# Patient Record
Sex: MALE | Race: WHITE | NOT HISPANIC OR LATINO
[De-identification: names, ages, dates, MRNs, and addresses within clinical notes are randomized per-mention and may not be internally consistent; named-entity substitution may affect disease eponyms.]

---

## 2017-09-06 ENCOUNTER — APPOINTMENT (OUTPATIENT)
Dept: GASTROENTEROLOGY | Facility: CLINIC | Age: 32
End: 2017-09-06
Payer: COMMERCIAL

## 2017-09-06 VITALS
RESPIRATION RATE: 14 BRPM | TEMPERATURE: 98.1 F | HEART RATE: 71 BPM | OXYGEN SATURATION: 98 % | HEIGHT: 67 IN | DIASTOLIC BLOOD PRESSURE: 81 MMHG | WEIGHT: 135 LBS | BODY MASS INDEX: 21.19 KG/M2 | SYSTOLIC BLOOD PRESSURE: 117 MMHG

## 2017-09-06 DIAGNOSIS — Z78.9 OTHER SPECIFIED HEALTH STATUS: ICD-10-CM

## 2017-09-06 DIAGNOSIS — K51.50 LEFT SIDED COLITIS W/OUT COMPLICATIONS: ICD-10-CM

## 2017-09-06 PROCEDURE — 36415 COLL VENOUS BLD VENIPUNCTURE: CPT | Mod: GC

## 2017-09-06 PROCEDURE — 99214 OFFICE O/P EST MOD 30 MIN: CPT | Mod: 25,GC

## 2017-09-07 LAB
BASOPHILS # BLD AUTO: 0.02 K/UL
BASOPHILS NFR BLD AUTO: 0.4 %
EOSINOPHIL # BLD AUTO: 0.1 K/UL
EOSINOPHIL NFR BLD AUTO: 1.9 %
HCT VFR BLD CALC: 45.9 %
HGB BLD-MCNC: 15 G/DL
IMM GRANULOCYTES NFR BLD AUTO: 0 %
LYMPHOCYTES # BLD AUTO: 1.29 K/UL
LYMPHOCYTES NFR BLD AUTO: 23.9 %
MAN DIFF?: NORMAL
MCHC RBC-ENTMCNC: 30.2 PG
MCHC RBC-ENTMCNC: 32.7 GM/DL
MCV RBC AUTO: 92.5 FL
MONOCYTES # BLD AUTO: 0.31 K/UL
MONOCYTES NFR BLD AUTO: 5.7 %
NEUTROPHILS # BLD AUTO: 3.68 K/UL
NEUTROPHILS NFR BLD AUTO: 68.1 %
PLATELET # BLD AUTO: 311 K/UL
RBC # BLD: 4.96 M/UL
RBC # FLD: 13.7 %
WBC # FLD AUTO: 5.4 K/UL

## 2017-09-08 LAB
ALBUMIN SERPL ELPH-MCNC: 4.7 G/DL
ALP BLD-CCNC: 54 U/L
ALT SERPL-CCNC: 16 U/L
ANION GAP SERPL CALC-SCNC: 19 MMOL/L
AST SERPL-CCNC: 38 U/L
BILIRUB SERPL-MCNC: 0.5 MG/DL
BUN SERPL-MCNC: 18 MG/DL
CALCIUM SERPL-MCNC: 9.9 MG/DL
CHLORIDE SERPL-SCNC: 100 MMOL/L
CO2 SERPL-SCNC: 19 MMOL/L
CREAT SERPL-MCNC: 0.91 MG/DL
CRP SERPL-MCNC: <0.2 MG/DL
GLUCOSE SERPL-MCNC: 84 MG/DL
POTASSIUM SERPL-SCNC: 4.4 MMOL/L
PROT SERPL-MCNC: 8.7 G/DL
SODIUM SERPL-SCNC: 138 MMOL/L

## 2017-09-26 ENCOUNTER — OUTPATIENT (OUTPATIENT)
Dept: OUTPATIENT SERVICES | Facility: HOSPITAL | Age: 32
LOS: 1 days | Discharge: HOME | End: 2017-09-26
Payer: COMMERCIAL

## 2017-09-26 ENCOUNTER — APPOINTMENT (OUTPATIENT)
Dept: GASTROENTEROLOGY | Facility: HOSPITAL | Age: 32
End: 2017-09-26

## 2017-09-26 ENCOUNTER — RESULT REVIEW (OUTPATIENT)
Age: 32
End: 2017-09-26

## 2017-09-26 PROCEDURE — 44386 ENDOSCOPY BOWEL POUCH/BIOP: CPT

## 2017-09-26 PROCEDURE — 44385 ENDOSCOPY OF BOWEL POUCH: CPT | Mod: GC

## 2017-09-26 PROCEDURE — 88305 TISSUE EXAM BY PATHOLOGIST: CPT

## 2017-09-27 LAB — SURGICAL PATHOLOGY STUDY: SIGNIFICANT CHANGE UP

## 2017-09-29 DIAGNOSIS — K51.90 ULCERATIVE COLITIS, UNSPECIFIED, WITHOUT COMPLICATIONS: ICD-10-CM

## 2017-10-09 ENCOUNTER — OUTPATIENT (OUTPATIENT)
Dept: OUTPATIENT SERVICES | Facility: HOSPITAL | Age: 32
LOS: 1 days | Discharge: HOME | End: 2017-10-09
Payer: COMMERCIAL

## 2017-10-09 ENCOUNTER — APPOINTMENT (OUTPATIENT)
Dept: GASTROENTEROLOGY | Facility: HOSPITAL | Age: 32
End: 2017-10-09

## 2017-10-09 PROCEDURE — 99214 OFFICE O/P EST MOD 30 MIN: CPT

## 2017-10-09 PROCEDURE — 91110 GI TRC IMG INTRAL ESOPH-ILE: CPT

## 2017-10-09 PROCEDURE — 91299 UNLISTED DX GI PROCEDURE: CPT

## 2017-10-09 PROCEDURE — 91110 GI TRC IMG INTRAL ESOPH-ILE: CPT | Mod: 26

## 2017-10-11 ENCOUNTER — APPOINTMENT (OUTPATIENT)
Dept: GASTROENTEROLOGY | Facility: CLINIC | Age: 32
End: 2017-10-11
Payer: COMMERCIAL

## 2017-10-11 ENCOUNTER — LABORATORY RESULT (OUTPATIENT)
Age: 32
End: 2017-10-11

## 2017-10-11 VITALS
HEART RATE: 65 BPM | RESPIRATION RATE: 14 BRPM | SYSTOLIC BLOOD PRESSURE: 110 MMHG | DIASTOLIC BLOOD PRESSURE: 80 MMHG | TEMPERATURE: 97.9 F | HEIGHT: 67 IN | OXYGEN SATURATION: 99 % | BODY MASS INDEX: 21.19 KG/M2 | WEIGHT: 135 LBS

## 2017-10-11 DIAGNOSIS — R10.9 UNSPECIFIED ABDOMINAL PAIN: ICD-10-CM

## 2017-10-11 PROCEDURE — 99214 OFFICE O/P EST MOD 30 MIN: CPT | Mod: 25

## 2017-10-11 PROCEDURE — 36415 COLL VENOUS BLD VENIPUNCTURE: CPT | Mod: GC

## 2017-10-12 ENCOUNTER — FORM ENCOUNTER (OUTPATIENT)
Age: 32
End: 2017-10-12

## 2017-10-12 ENCOUNTER — OTHER (OUTPATIENT)
Age: 32
End: 2017-10-12

## 2017-10-13 ENCOUNTER — OUTPATIENT (OUTPATIENT)
Dept: OUTPATIENT SERVICES | Facility: HOSPITAL | Age: 32
LOS: 1 days | End: 2017-10-13
Payer: COMMERCIAL

## 2017-10-13 PROCEDURE — 71020: CPT | Mod: 26

## 2017-10-13 PROCEDURE — 71046 X-RAY EXAM CHEST 2 VIEWS: CPT

## 2017-10-16 LAB
ADJUSTED MITOGEN: >10 IU/ML
ADJUSTED TB AG: 0 IU/ML
HBV CORE IGG+IGM SER QL: NONREACTIVE
HBV SURFACE AB SERPL IA-ACNC: 32.3 MIU/ML
HBV SURFACE AG SER QL: NONREACTIVE
M TB IFN-G BLD-IMP: NEGATIVE
QUANTIFERON GOLD NIL: 0.03 IU/ML

## 2017-10-17 DIAGNOSIS — K31.9 DISEASE OF STOMACH AND DUODENUM, UNSPECIFIED: ICD-10-CM

## 2017-10-17 DIAGNOSIS — K51.90 ULCERATIVE COLITIS, UNSPECIFIED, WITHOUT COMPLICATIONS: ICD-10-CM

## 2017-10-17 DIAGNOSIS — K63.5 POLYP OF COLON: ICD-10-CM

## 2017-10-20 LAB — TPMT ENZYME: 18.4

## 2017-10-25 ENCOUNTER — RX RENEWAL (OUTPATIENT)
Age: 32
End: 2017-10-25

## 2017-11-02 ENCOUNTER — RX RENEWAL (OUTPATIENT)
Age: 32
End: 2017-11-02

## 2017-11-08 ENCOUNTER — OUTPATIENT (OUTPATIENT)
Dept: OUTPATIENT SERVICES | Facility: HOSPITAL | Age: 32
LOS: 1 days | End: 2017-11-08
Payer: COMMERCIAL

## 2017-11-08 ENCOUNTER — APPOINTMENT (OUTPATIENT)
Dept: INFUSION THERAPY | Facility: HOSPITAL | Age: 32
End: 2017-11-08

## 2017-11-08 DIAGNOSIS — K50.90 CROHN'S DISEASE, UNSPECIFIED, WITHOUT COMPLICATIONS: ICD-10-CM

## 2017-11-08 LAB
ALBUMIN SERPL ELPH-MCNC: 4.7 G/DL — SIGNIFICANT CHANGE UP (ref 3.3–5)
ALP SERPL-CCNC: 34 U/L — LOW (ref 40–120)
ALT FLD-CCNC: 36 U/L — SIGNIFICANT CHANGE UP (ref 10–45)
ANION GAP SERPL CALC-SCNC: 13 MMOL/L — SIGNIFICANT CHANGE UP (ref 5–17)
AST SERPL-CCNC: 50 U/L — HIGH (ref 10–40)
BILIRUB SERPL-MCNC: 0.4 MG/DL — SIGNIFICANT CHANGE UP (ref 0.2–1.2)
BUN SERPL-MCNC: 26 MG/DL — HIGH (ref 7–23)
CALCIUM SERPL-MCNC: 9.9 MG/DL — SIGNIFICANT CHANGE UP (ref 8.4–10.5)
CHLORIDE SERPL-SCNC: 101 MMOL/L — SIGNIFICANT CHANGE UP (ref 96–108)
CO2 SERPL-SCNC: 26 MMOL/L — SIGNIFICANT CHANGE UP (ref 22–31)
CREAT SERPL-MCNC: 0.94 MG/DL — SIGNIFICANT CHANGE UP (ref 0.5–1.3)
CRP SERPL-MCNC: <0.03 MG/DL — SIGNIFICANT CHANGE UP (ref 0–0.4)
ERYTHROCYTE [SEDIMENTATION RATE] IN BLOOD: 5 MM/HR — SIGNIFICANT CHANGE UP
GLUCOSE SERPL-MCNC: 104 MG/DL — HIGH (ref 70–99)
HCT VFR BLD CALC: 40.7 % — SIGNIFICANT CHANGE UP (ref 39–50)
HGB BLD-MCNC: 13.7 G/DL — SIGNIFICANT CHANGE UP (ref 13–17)
MCHC RBC-ENTMCNC: 30.3 PG — SIGNIFICANT CHANGE UP (ref 27–34)
MCHC RBC-ENTMCNC: 33.7 G/DL — SIGNIFICANT CHANGE UP (ref 32–36)
MCV RBC AUTO: 90 FL — SIGNIFICANT CHANGE UP (ref 80–100)
PLATELET # BLD AUTO: 263 K/UL — SIGNIFICANT CHANGE UP (ref 150–400)
POTASSIUM SERPL-MCNC: 4.1 MMOL/L — SIGNIFICANT CHANGE UP (ref 3.5–5.3)
POTASSIUM SERPL-SCNC: 4.1 MMOL/L — SIGNIFICANT CHANGE UP (ref 3.5–5.3)
PROT SERPL-MCNC: 7.4 G/DL — SIGNIFICANT CHANGE UP (ref 6–8.3)
RBC # BLD: 4.52 M/UL — SIGNIFICANT CHANGE UP (ref 4.2–5.8)
RBC # FLD: 14 % — SIGNIFICANT CHANGE UP (ref 10.3–16.9)
SODIUM SERPL-SCNC: 140 MMOL/L — SIGNIFICANT CHANGE UP (ref 135–145)
WBC # BLD: 8.2 K/UL — SIGNIFICANT CHANGE UP (ref 3.8–10.5)
WBC # FLD AUTO: 8.2 K/UL — SIGNIFICANT CHANGE UP (ref 3.8–10.5)

## 2017-11-08 PROCEDURE — 96413 CHEMO IV INFUSION 1 HR: CPT

## 2017-11-08 PROCEDURE — 86140 C-REACTIVE PROTEIN: CPT

## 2017-11-08 PROCEDURE — 85027 COMPLETE CBC AUTOMATED: CPT

## 2017-11-08 PROCEDURE — 80053 COMPREHEN METABOLIC PANEL: CPT

## 2017-11-08 PROCEDURE — 96415 CHEMO IV INFUSION ADDL HR: CPT

## 2017-11-08 PROCEDURE — 36415 COLL VENOUS BLD VENIPUNCTURE: CPT

## 2017-11-08 PROCEDURE — 85652 RBC SED RATE AUTOMATED: CPT

## 2017-11-08 PROCEDURE — 96375 TX/PRO/DX INJ NEW DRUG ADDON: CPT

## 2017-11-08 RX ORDER — ACETAMINOPHEN 500 MG
650 TABLET ORAL ONCE
Qty: 0 | Refills: 0 | Status: DISCONTINUED | OUTPATIENT
Start: 2017-11-08 | End: 2017-11-23

## 2017-11-08 RX ORDER — DIPHENHYDRAMINE HCL 50 MG
50 CAPSULE ORAL ONCE
Qty: 0 | Refills: 0 | Status: DISCONTINUED | OUTPATIENT
Start: 2017-11-08 | End: 2017-11-23

## 2017-11-08 RX ORDER — INFLIXIMAB-DYYB 120 MG/ML
300 INJECTION SUBCUTANEOUS ONCE
Qty: 0 | Refills: 0 | Status: DISCONTINUED | OUTPATIENT
Start: 2017-11-08 | End: 2017-11-23

## 2017-11-20 RX ORDER — INFLIXIMAB-DYYB 120 MG/ML
300 INJECTION SUBCUTANEOUS ONCE
Qty: 0 | Refills: 0 | Status: DISCONTINUED | OUTPATIENT
Start: 2017-11-21 | End: 2017-12-06

## 2017-11-20 RX ORDER — ACETAMINOPHEN 500 MG
650 TABLET ORAL ONCE
Qty: 0 | Refills: 0 | Status: DISCONTINUED | OUTPATIENT
Start: 2017-11-21 | End: 2017-12-06

## 2017-11-20 RX ORDER — DIPHENHYDRAMINE HCL 50 MG
50 CAPSULE ORAL ONCE
Qty: 0 | Refills: 0 | Status: DISCONTINUED | OUTPATIENT
Start: 2017-11-21 | End: 2017-12-06

## 2017-11-21 ENCOUNTER — OUTPATIENT (OUTPATIENT)
Dept: OUTPATIENT SERVICES | Facility: HOSPITAL | Age: 32
LOS: 1 days | End: 2017-11-21
Payer: COMMERCIAL

## 2017-11-21 DIAGNOSIS — K50.90 CROHN'S DISEASE, UNSPECIFIED, WITHOUT COMPLICATIONS: ICD-10-CM

## 2017-11-21 LAB
ALBUMIN SERPL ELPH-MCNC: 4.9 G/DL — SIGNIFICANT CHANGE UP (ref 3.3–5)
ALP SERPL-CCNC: 35 U/L — LOW (ref 40–120)
ALT FLD-CCNC: 32 U/L — SIGNIFICANT CHANGE UP (ref 10–45)
ANION GAP SERPL CALC-SCNC: 12 MMOL/L — SIGNIFICANT CHANGE UP (ref 5–17)
AST SERPL-CCNC: 43 U/L — HIGH (ref 10–40)
BILIRUB SERPL-MCNC: 0.4 MG/DL — SIGNIFICANT CHANGE UP (ref 0.2–1.2)
BUN SERPL-MCNC: 16 MG/DL — SIGNIFICANT CHANGE UP (ref 7–23)
CALCIUM SERPL-MCNC: 9.9 MG/DL — SIGNIFICANT CHANGE UP (ref 8.4–10.5)
CHLORIDE SERPL-SCNC: 97 MMOL/L — SIGNIFICANT CHANGE UP (ref 96–108)
CO2 SERPL-SCNC: 26 MMOL/L — SIGNIFICANT CHANGE UP (ref 22–31)
CREAT SERPL-MCNC: 1.3 MG/DL — SIGNIFICANT CHANGE UP (ref 0.5–1.3)
CRP SERPL-MCNC: <0 MG/DL — SIGNIFICANT CHANGE UP (ref 0–0.4)
GLUCOSE SERPL-MCNC: 88 MG/DL — SIGNIFICANT CHANGE UP (ref 70–99)
HCT VFR BLD CALC: 42.9 % — SIGNIFICANT CHANGE UP (ref 39–50)
HGB BLD-MCNC: 14.3 G/DL — SIGNIFICANT CHANGE UP (ref 13–17)
MCHC RBC-ENTMCNC: 30.2 PG — SIGNIFICANT CHANGE UP (ref 27–34)
MCHC RBC-ENTMCNC: 33.3 G/DL — SIGNIFICANT CHANGE UP (ref 32–36)
MCV RBC AUTO: 90.7 FL — SIGNIFICANT CHANGE UP (ref 80–100)
PLATELET # BLD AUTO: 223 K/UL — SIGNIFICANT CHANGE UP (ref 150–400)
POTASSIUM SERPL-MCNC: 4.6 MMOL/L — SIGNIFICANT CHANGE UP (ref 3.5–5.3)
POTASSIUM SERPL-SCNC: 4.6 MMOL/L — SIGNIFICANT CHANGE UP (ref 3.5–5.3)
PROT SERPL-MCNC: 7.6 G/DL — SIGNIFICANT CHANGE UP (ref 6–8.3)
RBC # BLD: 4.73 M/UL — SIGNIFICANT CHANGE UP (ref 4.2–5.8)
RBC # FLD: 13.6 % — SIGNIFICANT CHANGE UP (ref 10.3–16.9)
SODIUM SERPL-SCNC: 135 MMOL/L — SIGNIFICANT CHANGE UP (ref 135–145)
WBC # BLD: 8.8 K/UL — SIGNIFICANT CHANGE UP (ref 3.8–10.5)
WBC # FLD AUTO: 8.8 K/UL — SIGNIFICANT CHANGE UP (ref 3.8–10.5)

## 2017-11-21 PROCEDURE — 96413 CHEMO IV INFUSION 1 HR: CPT

## 2017-11-21 PROCEDURE — 96415 CHEMO IV INFUSION ADDL HR: CPT

## 2017-11-21 PROCEDURE — 86140 C-REACTIVE PROTEIN: CPT

## 2017-11-21 PROCEDURE — 80053 COMPREHEN METABOLIC PANEL: CPT

## 2017-11-21 PROCEDURE — 85027 COMPLETE CBC AUTOMATED: CPT

## 2017-11-21 PROCEDURE — 36415 COLL VENOUS BLD VENIPUNCTURE: CPT

## 2017-11-21 PROCEDURE — 96375 TX/PRO/DX INJ NEW DRUG ADDON: CPT

## 2017-12-06 ENCOUNTER — APPOINTMENT (OUTPATIENT)
Dept: GASTROENTEROLOGY | Facility: CLINIC | Age: 32
End: 2017-12-06
Payer: COMMERCIAL

## 2017-12-06 VITALS
RESPIRATION RATE: 12 BRPM | DIASTOLIC BLOOD PRESSURE: 72 MMHG | BODY MASS INDEX: 23.18 KG/M2 | HEART RATE: 47 BPM | OXYGEN SATURATION: 97 % | WEIGHT: 148 LBS | TEMPERATURE: 98.1 F | SYSTOLIC BLOOD PRESSURE: 119 MMHG

## 2017-12-06 PROCEDURE — 99214 OFFICE O/P EST MOD 30 MIN: CPT

## 2017-12-18 RX ORDER — ACETAMINOPHEN 500 MG
650 TABLET ORAL ONCE
Qty: 0 | Refills: 0 | Status: DISCONTINUED | OUTPATIENT
Start: 2017-12-19 | End: 2018-01-03

## 2017-12-18 RX ORDER — INFLIXIMAB-DYYB 120 MG/ML
300 INJECTION SUBCUTANEOUS ONCE
Qty: 0 | Refills: 0 | Status: DISCONTINUED | OUTPATIENT
Start: 2017-12-19 | End: 2018-01-03

## 2017-12-18 RX ORDER — DIPHENHYDRAMINE HCL 50 MG
50 CAPSULE ORAL ONCE
Qty: 0 | Refills: 0 | Status: DISCONTINUED | OUTPATIENT
Start: 2017-12-19 | End: 2018-01-03

## 2017-12-19 ENCOUNTER — APPOINTMENT (OUTPATIENT)
Dept: INFUSION THERAPY | Facility: HOSPITAL | Age: 32
End: 2017-12-19

## 2017-12-19 ENCOUNTER — OUTPATIENT (OUTPATIENT)
Dept: OUTPATIENT SERVICES | Facility: HOSPITAL | Age: 32
LOS: 1 days | End: 2017-12-19
Payer: COMMERCIAL

## 2017-12-19 DIAGNOSIS — K50.90 CROHN'S DISEASE, UNSPECIFIED, WITHOUT COMPLICATIONS: ICD-10-CM

## 2017-12-19 LAB
ALBUMIN SERPL ELPH-MCNC: 4.6 G/DL — SIGNIFICANT CHANGE UP (ref 3.3–5)
ALP SERPL-CCNC: 36 U/L — LOW (ref 40–120)
ALT FLD-CCNC: 26 U/L — SIGNIFICANT CHANGE UP (ref 10–45)
ANION GAP SERPL CALC-SCNC: 13 MMOL/L — SIGNIFICANT CHANGE UP (ref 5–17)
AST SERPL-CCNC: 42 U/L — HIGH (ref 10–40)
BILIRUB DIRECT SERPL-MCNC: <0.2 MG/DL — SIGNIFICANT CHANGE UP (ref 0–0.2)
BILIRUB INDIRECT FLD-MCNC: >0.3 MG/DL — SIGNIFICANT CHANGE UP (ref 0.2–1)
BILIRUB SERPL-MCNC: 0.5 MG/DL — SIGNIFICANT CHANGE UP (ref 0.2–1.2)
BUN SERPL-MCNC: 17 MG/DL — SIGNIFICANT CHANGE UP (ref 7–23)
CALCIUM SERPL-MCNC: 9.4 MG/DL — SIGNIFICANT CHANGE UP (ref 8.4–10.5)
CHLORIDE SERPL-SCNC: 102 MMOL/L — SIGNIFICANT CHANGE UP (ref 96–108)
CO2 SERPL-SCNC: 24 MMOL/L — SIGNIFICANT CHANGE UP (ref 22–31)
CREAT SERPL-MCNC: 0.96 MG/DL — SIGNIFICANT CHANGE UP (ref 0.5–1.3)
CRP SERPL-MCNC: <0.03 MG/DL — SIGNIFICANT CHANGE UP (ref 0–0.4)
GLUCOSE SERPL-MCNC: 104 MG/DL — HIGH (ref 70–99)
HCT VFR BLD CALC: 43.2 % — SIGNIFICANT CHANGE UP (ref 39–50)
HGB BLD-MCNC: 14.5 G/DL — SIGNIFICANT CHANGE UP (ref 13–17)
MCHC RBC-ENTMCNC: 30.6 PG — SIGNIFICANT CHANGE UP (ref 27–34)
MCHC RBC-ENTMCNC: 33.6 G/DL — SIGNIFICANT CHANGE UP (ref 32–36)
MCV RBC AUTO: 91.1 FL — SIGNIFICANT CHANGE UP (ref 80–100)
PLATELET # BLD AUTO: 210 K/UL — SIGNIFICANT CHANGE UP (ref 150–400)
POTASSIUM SERPL-MCNC: 4.4 MMOL/L — SIGNIFICANT CHANGE UP (ref 3.5–5.3)
POTASSIUM SERPL-SCNC: 4.4 MMOL/L — SIGNIFICANT CHANGE UP (ref 3.5–5.3)
PROT SERPL-MCNC: 7.2 G/DL — SIGNIFICANT CHANGE UP (ref 6–8.3)
RBC # BLD: 4.74 M/UL — SIGNIFICANT CHANGE UP (ref 4.2–5.8)
RBC # FLD: 13.6 % — SIGNIFICANT CHANGE UP (ref 10.3–16.9)
SODIUM SERPL-SCNC: 139 MMOL/L — SIGNIFICANT CHANGE UP (ref 135–145)
WBC # BLD: 9.4 K/UL — SIGNIFICANT CHANGE UP (ref 3.8–10.5)
WBC # FLD AUTO: 9.4 K/UL — SIGNIFICANT CHANGE UP (ref 3.8–10.5)

## 2017-12-19 PROCEDURE — 96415 CHEMO IV INFUSION ADDL HR: CPT

## 2017-12-19 PROCEDURE — 80048 BASIC METABOLIC PNL TOTAL CA: CPT

## 2017-12-19 PROCEDURE — 86140 C-REACTIVE PROTEIN: CPT

## 2017-12-19 PROCEDURE — 36415 COLL VENOUS BLD VENIPUNCTURE: CPT

## 2017-12-19 PROCEDURE — 96413 CHEMO IV INFUSION 1 HR: CPT

## 2017-12-19 PROCEDURE — 80076 HEPATIC FUNCTION PANEL: CPT

## 2017-12-19 PROCEDURE — 85027 COMPLETE CBC AUTOMATED: CPT

## 2017-12-19 PROCEDURE — 96375 TX/PRO/DX INJ NEW DRUG ADDON: CPT

## 2018-01-05 ENCOUNTER — TRANSCRIPTION ENCOUNTER (OUTPATIENT)
Age: 33
End: 2018-01-05

## 2018-01-12 ENCOUNTER — RX RENEWAL (OUTPATIENT)
Age: 33
End: 2018-01-12

## 2018-02-13 RX ORDER — ACETAMINOPHEN 500 MG
650 TABLET ORAL ONCE
Qty: 0 | Refills: 0 | Status: DISCONTINUED | OUTPATIENT
Start: 2018-02-14 | End: 2018-03-01

## 2018-02-13 RX ORDER — INFLIXIMAB-DYYB 120 MG/ML
300 INJECTION SUBCUTANEOUS ONCE
Qty: 0 | Refills: 0 | Status: DISCONTINUED | OUTPATIENT
Start: 2018-02-14 | End: 2018-03-01

## 2018-02-13 RX ORDER — DIPHENHYDRAMINE HCL 50 MG
50 CAPSULE ORAL ONCE
Qty: 0 | Refills: 0 | Status: DISCONTINUED | OUTPATIENT
Start: 2018-02-14 | End: 2018-03-01

## 2018-02-14 ENCOUNTER — APPOINTMENT (OUTPATIENT)
Dept: GASTROENTEROLOGY | Facility: CLINIC | Age: 33
End: 2018-02-14
Payer: COMMERCIAL

## 2018-02-14 ENCOUNTER — OUTPATIENT (OUTPATIENT)
Dept: OUTPATIENT SERVICES | Facility: HOSPITAL | Age: 33
LOS: 1 days | End: 2018-02-14
Payer: COMMERCIAL

## 2018-02-14 ENCOUNTER — APPOINTMENT (OUTPATIENT)
Dept: INFUSION THERAPY | Facility: HOSPITAL | Age: 33
End: 2018-02-14

## 2018-02-14 VITALS
BODY MASS INDEX: 22.71 KG/M2 | OXYGEN SATURATION: 98 % | WEIGHT: 145 LBS | RESPIRATION RATE: 16 BRPM | HEART RATE: 92 BPM | DIASTOLIC BLOOD PRESSURE: 80 MMHG | TEMPERATURE: 98.2 F | SYSTOLIC BLOOD PRESSURE: 110 MMHG

## 2018-02-14 DIAGNOSIS — R74.8 ABNORMAL LEVELS OF OTHER SERUM ENZYMES: ICD-10-CM

## 2018-02-14 DIAGNOSIS — F41.9 ANXIETY DISORDER, UNSPECIFIED: ICD-10-CM

## 2018-02-14 DIAGNOSIS — K50.90 CROHN'S DISEASE, UNSPECIFIED, WITHOUT COMPLICATIONS: ICD-10-CM

## 2018-02-14 LAB
ALBUMIN SERPL ELPH-MCNC: 4.5 G/DL — SIGNIFICANT CHANGE UP (ref 3.3–5)
ALP SERPL-CCNC: 39 U/L — LOW (ref 40–120)
ALT FLD-CCNC: 112 U/L — HIGH (ref 10–45)
ANION GAP SERPL CALC-SCNC: 14 MMOL/L — SIGNIFICANT CHANGE UP (ref 5–17)
AST SERPL-CCNC: 93 U/L — HIGH (ref 10–40)
BILIRUB DIRECT SERPL-MCNC: 0.2 MG/DL — SIGNIFICANT CHANGE UP (ref 0–0.2)
BILIRUB INDIRECT FLD-MCNC: 1 MG/DL — SIGNIFICANT CHANGE UP (ref 0.2–1)
BILIRUB SERPL-MCNC: 1.2 MG/DL — SIGNIFICANT CHANGE UP (ref 0.2–1.2)
BUN SERPL-MCNC: 17 MG/DL — SIGNIFICANT CHANGE UP (ref 7–23)
CALCIUM SERPL-MCNC: 9.3 MG/DL — SIGNIFICANT CHANGE UP (ref 8.4–10.5)
CHLORIDE SERPL-SCNC: 98 MMOL/L — SIGNIFICANT CHANGE UP (ref 96–108)
CO2 SERPL-SCNC: 24 MMOL/L — SIGNIFICANT CHANGE UP (ref 22–31)
CREAT SERPL-MCNC: 0.94 MG/DL — SIGNIFICANT CHANGE UP (ref 0.5–1.3)
CRP SERPL-MCNC: 0.2 MG/DL — SIGNIFICANT CHANGE UP (ref 0–0.4)
GLUCOSE SERPL-MCNC: 100 MG/DL — HIGH (ref 70–99)
HCT VFR BLD CALC: 42.8 % — SIGNIFICANT CHANGE UP (ref 39–50)
HGB BLD-MCNC: 14.6 G/DL — SIGNIFICANT CHANGE UP (ref 13–17)
MCHC RBC-ENTMCNC: 30.4 PG — SIGNIFICANT CHANGE UP (ref 27–34)
MCHC RBC-ENTMCNC: 34.1 G/DL — SIGNIFICANT CHANGE UP (ref 32–36)
MCV RBC AUTO: 89 FL — SIGNIFICANT CHANGE UP (ref 80–100)
PLATELET # BLD AUTO: 204 K/UL — SIGNIFICANT CHANGE UP (ref 150–400)
POTASSIUM SERPL-MCNC: 4.3 MMOL/L — SIGNIFICANT CHANGE UP (ref 3.5–5.3)
POTASSIUM SERPL-SCNC: 4.3 MMOL/L — SIGNIFICANT CHANGE UP (ref 3.5–5.3)
PROT SERPL-MCNC: 7.4 G/DL — SIGNIFICANT CHANGE UP (ref 6–8.3)
RBC # BLD: 4.81 M/UL — SIGNIFICANT CHANGE UP (ref 4.2–5.8)
RBC # FLD: 12.7 % — SIGNIFICANT CHANGE UP (ref 10.3–16.9)
SODIUM SERPL-SCNC: 136 MMOL/L — SIGNIFICANT CHANGE UP (ref 135–145)
WBC # BLD: 7 K/UL — SIGNIFICANT CHANGE UP (ref 3.8–10.5)
WBC # FLD AUTO: 7 K/UL — SIGNIFICANT CHANGE UP (ref 3.8–10.5)

## 2018-02-14 PROCEDURE — 86140 C-REACTIVE PROTEIN: CPT

## 2018-02-14 PROCEDURE — 96413 CHEMO IV INFUSION 1 HR: CPT

## 2018-02-14 PROCEDURE — 36415 COLL VENOUS BLD VENIPUNCTURE: CPT

## 2018-02-14 PROCEDURE — 99214 OFFICE O/P EST MOD 30 MIN: CPT | Mod: 25

## 2018-02-14 PROCEDURE — 80076 HEPATIC FUNCTION PANEL: CPT

## 2018-02-14 PROCEDURE — 80048 BASIC METABOLIC PNL TOTAL CA: CPT

## 2018-02-14 PROCEDURE — 96415 CHEMO IV INFUSION ADDL HR: CPT

## 2018-02-14 PROCEDURE — 96375 TX/PRO/DX INJ NEW DRUG ADDON: CPT

## 2018-02-14 PROCEDURE — 85027 COMPLETE CBC AUTOMATED: CPT

## 2018-02-20 LAB
ALBUMIN SERPL ELPH-MCNC: 4.8 G/DL
ALP BLD-CCNC: 39 U/L
ALT SERPL-CCNC: 122 U/L
ANA PAT FLD IF-IMP: ABNORMAL
ANA SER IF-ACNC: ABNORMAL
ANION GAP SERPL CALC-SCNC: 17 MMOL/L
AST SERPL-CCNC: 107 U/L
BILIRUB SERPL-MCNC: 1.3 MG/DL
BUN SERPL-MCNC: 17 MG/DL
CALCIUM SERPL-MCNC: 9.9 MG/DL
CHLORIDE SERPL-SCNC: 101 MMOL/L
CO2 SERPL-SCNC: 23 MMOL/L
CREAT SERPL-MCNC: 1.15 MG/DL
DSDNA AB SER-ACNC: 17 IU/ML
GLUCOSE SERPL-MCNC: 88 MG/DL
POTASSIUM SERPL-SCNC: 4.3 MMOL/L
PROT SERPL-MCNC: 8.2 G/DL
SMOOTH MUSCLE AB SER QL IF: NORMAL
SODIUM SERPL-SCNC: 141 MMOL/L

## 2018-03-12 RX ORDER — CLOMIPHENE CITRATE 50 MG/1
50 TABLET ORAL
Refills: 0 | Status: DISCONTINUED | COMMUNITY
End: 2018-03-12

## 2018-03-12 RX ORDER — METRONIDAZOLE 500 MG/1
500 TABLET ORAL TWICE DAILY
Qty: 60 | Refills: 0 | Status: DISCONTINUED | COMMUNITY
Start: 2017-09-26 | End: 2018-03-12

## 2018-04-05 ENCOUNTER — OTHER (OUTPATIENT)
Age: 33
End: 2018-04-05

## 2018-04-11 ENCOUNTER — APPOINTMENT (OUTPATIENT)
Dept: INFUSION THERAPY | Facility: HOSPITAL | Age: 33
End: 2018-04-11

## 2018-04-18 ENCOUNTER — APPOINTMENT (OUTPATIENT)
Dept: GASTROENTEROLOGY | Facility: CLINIC | Age: 33
End: 2018-04-18
Payer: COMMERCIAL

## 2018-04-18 VITALS
DIASTOLIC BLOOD PRESSURE: 90 MMHG | HEART RATE: 58 BPM | OXYGEN SATURATION: 99 % | BODY MASS INDEX: 21.97 KG/M2 | SYSTOLIC BLOOD PRESSURE: 150 MMHG | RESPIRATION RATE: 14 BRPM | HEIGHT: 67 IN | WEIGHT: 140 LBS | TEMPERATURE: 98 F

## 2018-04-18 PROCEDURE — 36415 COLL VENOUS BLD VENIPUNCTURE: CPT | Mod: GC

## 2018-04-18 PROCEDURE — 99214 OFFICE O/P EST MOD 30 MIN: CPT | Mod: 25,GC

## 2018-04-19 LAB
ALBUMIN SERPL ELPH-MCNC: 4.7 G/DL
ALP BLD-CCNC: 45 U/L
ALT SERPL-CCNC: 266 U/L
ANION GAP SERPL CALC-SCNC: 12 MMOL/L
AST SERPL-CCNC: 167 U/L
BASOPHILS # BLD AUTO: 0.02 K/UL
BASOPHILS NFR BLD AUTO: 0.5 %
BILIRUB SERPL-MCNC: 1.1 MG/DL
BUN SERPL-MCNC: 18 MG/DL
CALCIUM SERPL-MCNC: 9.9 MG/DL
CERULOPLASMIN SERPL-MCNC: 27 MG/DL
CHLORIDE SERPL-SCNC: 99 MMOL/L
CO2 SERPL-SCNC: 27 MMOL/L
CREAT SERPL-MCNC: 1.19 MG/DL
EOSINOPHIL # BLD AUTO: 0.01 K/UL
EOSINOPHIL NFR BLD AUTO: 0.2 %
GLUCOSE SERPL-MCNC: 98 MG/DL
HCT VFR BLD CALC: 47.7 %
HGB BLD-MCNC: 15.9 G/DL
IMM GRANULOCYTES NFR BLD AUTO: 0 %
LYMPHOCYTES # BLD AUTO: 1.31 K/UL
LYMPHOCYTES NFR BLD AUTO: 32.1 %
MAN DIFF?: NORMAL
MCHC RBC-ENTMCNC: 30.3 PG
MCHC RBC-ENTMCNC: 33.3 GM/DL
MCV RBC AUTO: 91 FL
MONOCYTES # BLD AUTO: 0.32 K/UL
MONOCYTES NFR BLD AUTO: 7.8 %
NEUTROPHILS # BLD AUTO: 2.42 K/UL
NEUTROPHILS NFR BLD AUTO: 59.4 %
PLATELET # BLD AUTO: 234 K/UL
POTASSIUM SERPL-SCNC: 3.8 MMOL/L
PROT SERPL-MCNC: 8.2 G/DL
RBC # BLD: 5.24 M/UL
RBC # FLD: 13.8 %
SODIUM SERPL-SCNC: 138 MMOL/L
WBC # FLD AUTO: 4.08 K/UL

## 2018-05-24 ENCOUNTER — FORM ENCOUNTER (OUTPATIENT)
Age: 33
End: 2018-05-24

## 2018-05-25 ENCOUNTER — OUTPATIENT (OUTPATIENT)
Dept: OUTPATIENT SERVICES | Facility: HOSPITAL | Age: 33
LOS: 1 days | End: 2018-05-25
Payer: COMMERCIAL

## 2018-05-25 ENCOUNTER — APPOINTMENT (OUTPATIENT)
Dept: MRI IMAGING | Facility: HOSPITAL | Age: 33
End: 2018-05-25
Payer: COMMERCIAL

## 2018-05-25 PROCEDURE — 74183 MRI ABD W/O CNTR FLWD CNTR: CPT | Mod: 26

## 2018-05-25 PROCEDURE — A9585: CPT

## 2018-05-25 PROCEDURE — 74183 MRI ABD W/O CNTR FLWD CNTR: CPT

## 2018-06-08 ENCOUNTER — OTHER (OUTPATIENT)
Age: 33
End: 2018-06-08

## 2018-06-20 ENCOUNTER — APPOINTMENT (OUTPATIENT)
Dept: GASTROENTEROLOGY | Facility: CLINIC | Age: 33
End: 2018-06-20
Payer: COMMERCIAL

## 2018-06-20 VITALS
OXYGEN SATURATION: 99 % | HEIGHT: 67 IN | DIASTOLIC BLOOD PRESSURE: 73 MMHG | BODY MASS INDEX: 21.97 KG/M2 | WEIGHT: 140 LBS | RESPIRATION RATE: 14 BRPM | TEMPERATURE: 98.3 F | SYSTOLIC BLOOD PRESSURE: 120 MMHG | HEART RATE: 87 BPM

## 2018-06-20 PROCEDURE — 99215 OFFICE O/P EST HI 40 MIN: CPT | Mod: 25

## 2018-06-20 PROCEDURE — 36415 COLL VENOUS BLD VENIPUNCTURE: CPT

## 2018-06-20 RX ORDER — INFLIXIMAB 100 MG/10ML
100 INJECTION, POWDER, LYOPHILIZED, FOR SOLUTION INTRAVENOUS
Qty: 1 | Refills: 0 | Status: DISCONTINUED | COMMUNITY
Start: 2017-12-06 | End: 2018-06-20

## 2018-06-21 LAB
ALBUMIN SERPL ELPH-MCNC: 4.3 G/DL
ALP BLD-CCNC: 57 U/L
ALT SERPL-CCNC: 112 U/L
ANION GAP SERPL CALC-SCNC: 13 MMOL/L
AST SERPL-CCNC: 95 U/L
BASOPHILS # BLD AUTO: 0.01 K/UL
BASOPHILS NFR BLD AUTO: 0.2 %
BILIRUB SERPL-MCNC: 0.6 MG/DL
BUN SERPL-MCNC: 15 MG/DL
CALCIUM SERPL-MCNC: 9.5 MG/DL
CHLORIDE SERPL-SCNC: 103 MMOL/L
CO2 SERPL-SCNC: 24 MMOL/L
CREAT SERPL-MCNC: 0.98 MG/DL
CRP SERPL-MCNC: 0.6 MG/DL
EOSINOPHIL # BLD AUTO: 0.03 K/UL
EOSINOPHIL NFR BLD AUTO: 0.7 %
GLUCOSE SERPL-MCNC: 84 MG/DL
HCT VFR BLD CALC: 46.1 %
HGB BLD-MCNC: 15.6 G/DL
IMM GRANULOCYTES NFR BLD AUTO: 0 %
LYMPHOCYTES # BLD AUTO: 1.34 K/UL
LYMPHOCYTES NFR BLD AUTO: 33.1 %
MAN DIFF?: NORMAL
MCHC RBC-ENTMCNC: 30.1 PG
MCHC RBC-ENTMCNC: 33.8 GM/DL
MCV RBC AUTO: 89 FL
MONOCYTES # BLD AUTO: 0.21 K/UL
MONOCYTES NFR BLD AUTO: 5.2 %
NEUTROPHILS # BLD AUTO: 2.46 K/UL
NEUTROPHILS NFR BLD AUTO: 60.8 %
PLATELET # BLD AUTO: 287 K/UL
POTASSIUM SERPL-SCNC: 4.1 MMOL/L
PROT SERPL-MCNC: 7.9 G/DL
RBC # BLD: 5.18 M/UL
RBC # FLD: 13 %
SODIUM SERPL-SCNC: 140 MMOL/L
WBC # FLD AUTO: 4.05 K/UL

## 2018-06-26 ENCOUNTER — RX RENEWAL (OUTPATIENT)
Age: 33
End: 2018-06-26

## 2018-06-28 RX ORDER — ADALIMUMAB 40MG/0.8ML
40 KIT SUBCUTANEOUS
Qty: 1 | Refills: 0 | Status: DISCONTINUED | COMMUNITY
Start: 2018-06-26 | End: 2018-06-28

## 2018-06-28 RX ORDER — ADALIMUMAB 40MG/0.8ML
40 KIT SUBCUTANEOUS
Qty: 1 | Refills: 1 | Status: DISCONTINUED | COMMUNITY
Start: 2018-06-20 | End: 2018-06-28

## 2018-07-06 ENCOUNTER — RX RENEWAL (OUTPATIENT)
Age: 33
End: 2018-07-06

## 2018-08-07 ENCOUNTER — RX RENEWAL (OUTPATIENT)
Age: 33
End: 2018-08-07

## 2018-08-07 ENCOUNTER — APPOINTMENT (OUTPATIENT)
Dept: GASTROENTEROLOGY | Facility: CLINIC | Age: 33
End: 2018-08-07
Payer: COMMERCIAL

## 2018-08-07 VITALS
SYSTOLIC BLOOD PRESSURE: 120 MMHG | HEART RATE: 60 BPM | DIASTOLIC BLOOD PRESSURE: 80 MMHG | RESPIRATION RATE: 14 BRPM | BODY MASS INDEX: 22.71 KG/M2 | OXYGEN SATURATION: 98 % | WEIGHT: 145 LBS | TEMPERATURE: 98.1 F

## 2018-08-07 PROCEDURE — 99214 OFFICE O/P EST MOD 30 MIN: CPT

## 2018-08-07 RX ORDER — ADALIMUMAB 40MG/0.8ML
40 KIT SUBCUTANEOUS
Qty: 1 | Refills: 0 | Status: DISCONTINUED | COMMUNITY
Start: 2018-06-28 | End: 2018-08-07

## 2018-08-07 RX ORDER — ADALIMUMAB 40MG/0.8ML
40 KIT SUBCUTANEOUS
Qty: 1 | Refills: 2 | Status: DISCONTINUED | COMMUNITY
Start: 2018-06-28 | End: 2018-08-07

## 2018-08-07 RX ORDER — TESTOSTERONE 30 MG/1.5ML
SOLUTION TOPICAL
Refills: 0 | Status: ACTIVE | COMMUNITY

## 2018-08-15 ENCOUNTER — RX RENEWAL (OUTPATIENT)
Age: 33
End: 2018-08-15

## 2018-09-05 ENCOUNTER — OTHER (OUTPATIENT)
Age: 33
End: 2018-09-05

## 2018-09-12 ENCOUNTER — OUTPATIENT (OUTPATIENT)
Dept: OUTPATIENT SERVICES | Facility: HOSPITAL | Age: 33
LOS: 1 days | End: 2018-09-12
Payer: COMMERCIAL

## 2018-09-12 ENCOUNTER — APPOINTMENT (OUTPATIENT)
Dept: CT IMAGING | Facility: HOSPITAL | Age: 33
End: 2018-09-12

## 2018-09-12 PROCEDURE — 74177 CT ABD & PELVIS W/CONTRAST: CPT | Mod: 26

## 2018-09-12 PROCEDURE — 74177 CT ABD & PELVIS W/CONTRAST: CPT

## 2018-09-24 ENCOUNTER — RESULT REVIEW (OUTPATIENT)
Age: 33
End: 2018-09-24

## 2018-10-05 ENCOUNTER — APPOINTMENT (OUTPATIENT)
Dept: GASTROENTEROLOGY | Facility: HOSPITAL | Age: 33
End: 2018-10-05

## 2018-10-05 ENCOUNTER — RESULT REVIEW (OUTPATIENT)
Age: 33
End: 2018-10-05

## 2018-10-05 ENCOUNTER — OUTPATIENT (OUTPATIENT)
Dept: OUTPATIENT SERVICES | Facility: HOSPITAL | Age: 33
LOS: 1 days | Discharge: HOME | End: 2018-10-05
Payer: COMMERCIAL

## 2018-10-05 PROCEDURE — 88305 TISSUE EXAM BY PATHOLOGIST: CPT

## 2018-10-05 PROCEDURE — 44386 ENDOSCOPY BOWEL POUCH/BIOP: CPT

## 2018-10-05 PROCEDURE — 43239 EGD BIOPSY SINGLE/MULTIPLE: CPT

## 2018-10-05 PROCEDURE — 44385 ENDOSCOPY OF BOWEL POUCH: CPT

## 2018-10-08 LAB — SURGICAL PATHOLOGY STUDY: SIGNIFICANT CHANGE UP

## 2018-10-17 ENCOUNTER — APPOINTMENT (OUTPATIENT)
Dept: GASTROENTEROLOGY | Facility: CLINIC | Age: 33
End: 2018-10-17
Payer: COMMERCIAL

## 2018-10-17 VITALS
SYSTOLIC BLOOD PRESSURE: 122 MMHG | RESPIRATION RATE: 14 BRPM | BODY MASS INDEX: 22.76 KG/M2 | HEART RATE: 70 BPM | OXYGEN SATURATION: 99 % | WEIGHT: 145 LBS | HEIGHT: 67 IN | TEMPERATURE: 98 F | DIASTOLIC BLOOD PRESSURE: 80 MMHG

## 2018-10-17 PROCEDURE — 99214 OFFICE O/P EST MOD 30 MIN: CPT | Mod: GC

## 2018-10-18 ENCOUNTER — RX RENEWAL (OUTPATIENT)
Age: 33
End: 2018-10-18

## 2018-10-18 LAB
ALBUMIN SERPL ELPH-MCNC: 5 G/DL
ALP BLD-CCNC: 37 U/L
ALT SERPL-CCNC: 40 U/L
ANION GAP SERPL CALC-SCNC: 14 MMOL/L
AST SERPL-CCNC: 60 U/L
BASOPHILS # BLD AUTO: 0.02 K/UL
BASOPHILS NFR BLD AUTO: 0.3 %
BILIRUB SERPL-MCNC: 0.8 MG/DL
BUN SERPL-MCNC: 14 MG/DL
CALCIUM SERPL-MCNC: 9.9 MG/DL
CHLORIDE SERPL-SCNC: 102 MMOL/L
CO2 SERPL-SCNC: 24 MMOL/L
CREAT SERPL-MCNC: 0.98 MG/DL
CRP SERPL-MCNC: 0.13 MG/DL
EOSINOPHIL # BLD AUTO: 0.03 K/UL
EOSINOPHIL NFR BLD AUTO: 0.4 %
HCT VFR BLD CALC: 45.9 %
HGB BLD-MCNC: 15.7 G/DL
IMM GRANULOCYTES NFR BLD AUTO: 0.1 %
LYMPHOCYTES # BLD AUTO: 1.08 K/UL
LYMPHOCYTES NFR BLD AUTO: 13.9 %
MAN DIFF?: NORMAL
MCHC RBC-ENTMCNC: 31 PG
MCHC RBC-ENTMCNC: 34.2 GM/DL
MCV RBC AUTO: 90.7 FL
MONOCYTES # BLD AUTO: 0.3 K/UL
MONOCYTES NFR BLD AUTO: 3.9 %
NEUTROPHILS # BLD AUTO: 6.34 K/UL
NEUTROPHILS NFR BLD AUTO: 81.4 %
PLATELET # BLD AUTO: 242 K/UL
POTASSIUM SERPL-SCNC: 4.2 MMOL/L
PROT SERPL-MCNC: 7.8 G/DL
RBC # BLD: 5.06 M/UL
RBC # FLD: 13.5 %
SODIUM SERPL-SCNC: 140 MMOL/L
WBC # FLD AUTO: 7.78 K/UL

## 2018-11-08 ENCOUNTER — EMERGENCY (EMERGENCY)
Facility: HOSPITAL | Age: 33
LOS: 1 days | Discharge: HOME | End: 2018-11-08
Attending: EMERGENCY MEDICINE | Admitting: EMERGENCY MEDICINE
Payer: COMMERCIAL

## 2018-11-08 VITALS
TEMPERATURE: 99 F | DIASTOLIC BLOOD PRESSURE: 80 MMHG | OXYGEN SATURATION: 99 % | RESPIRATION RATE: 17 BRPM | HEART RATE: 57 BPM | SYSTOLIC BLOOD PRESSURE: 119 MMHG

## 2018-11-08 VITALS
SYSTOLIC BLOOD PRESSURE: 149 MMHG | RESPIRATION RATE: 17 BRPM | WEIGHT: 145.06 LBS | TEMPERATURE: 98 F | HEART RATE: 67 BPM | DIASTOLIC BLOOD PRESSURE: 89 MMHG | OXYGEN SATURATION: 100 %

## 2018-11-08 DIAGNOSIS — Z88.8 ALLERGY STATUS TO OTHER DRUGS, MEDICAMENTS AND BIOLOGICAL SUBSTANCES: ICD-10-CM

## 2018-11-08 DIAGNOSIS — R79.9 ABNORMAL FINDING OF BLOOD CHEMISTRY, UNSPECIFIED: ICD-10-CM

## 2018-11-08 LAB
ALBUMIN SERPL ELPH-MCNC: 4.9 G/DL — SIGNIFICANT CHANGE UP (ref 3.3–5)
ALP SERPL-CCNC: 35 U/L — LOW (ref 40–120)
ALT FLD-CCNC: 41 U/L — SIGNIFICANT CHANGE UP (ref 10–45)
ANION GAP SERPL CALC-SCNC: 16 MMOL/L — SIGNIFICANT CHANGE UP (ref 5–17)
AST SERPL-CCNC: 59 U/L — HIGH (ref 10–40)
BASOPHILS NFR BLD AUTO: 0.3 % — SIGNIFICANT CHANGE UP (ref 0–2)
BILIRUB SERPL-MCNC: 1 MG/DL — SIGNIFICANT CHANGE UP (ref 0.2–1.2)
BUN SERPL-MCNC: 14 MG/DL — SIGNIFICANT CHANGE UP (ref 7–23)
CALCIUM SERPL-MCNC: 9.9 MG/DL — SIGNIFICANT CHANGE UP (ref 8.4–10.5)
CHLORIDE SERPL-SCNC: 98 MMOL/L — SIGNIFICANT CHANGE UP (ref 96–108)
CO2 SERPL-SCNC: 23 MMOL/L — SIGNIFICANT CHANGE UP (ref 22–31)
CREAT SERPL-MCNC: 0.91 MG/DL — SIGNIFICANT CHANGE UP (ref 0.5–1.3)
EOSINOPHIL NFR BLD AUTO: 1.4 % — SIGNIFICANT CHANGE UP (ref 0–6)
EXTRA BLUE TOP TUBE: SIGNIFICANT CHANGE UP
GLUCOSE SERPL-MCNC: 107 MG/DL — HIGH (ref 70–99)
HCT VFR BLD CALC: 48.2 % — SIGNIFICANT CHANGE UP (ref 39–50)
HGB BLD-MCNC: 16.5 G/DL — SIGNIFICANT CHANGE UP (ref 13–17)
LYMPHOCYTES # BLD AUTO: 46.1 % — HIGH (ref 13–44)
MCHC RBC-ENTMCNC: 31 PG — SIGNIFICANT CHANGE UP (ref 27–34)
MCHC RBC-ENTMCNC: 34.2 G/DL — SIGNIFICANT CHANGE UP (ref 32–36)
MCV RBC AUTO: 90.4 FL — SIGNIFICANT CHANGE UP (ref 80–100)
MONOCYTES NFR BLD AUTO: 5.3 % — SIGNIFICANT CHANGE UP (ref 2–14)
NEUTROPHILS NFR BLD AUTO: 46.9 % — SIGNIFICANT CHANGE UP (ref 43–77)
PLATELET # BLD AUTO: 221 K/UL — SIGNIFICANT CHANGE UP (ref 150–400)
POTASSIUM SERPL-MCNC: 3.9 MMOL/L — SIGNIFICANT CHANGE UP (ref 3.5–5.3)
POTASSIUM SERPL-SCNC: 3.9 MMOL/L — SIGNIFICANT CHANGE UP (ref 3.5–5.3)
PROT SERPL-MCNC: 7.7 G/DL — SIGNIFICANT CHANGE UP (ref 6–8.3)
RBC # BLD: 5.33 M/UL — SIGNIFICANT CHANGE UP (ref 4.2–5.8)
RBC # FLD: 13 % — SIGNIFICANT CHANGE UP (ref 10.3–16.9)
SODIUM SERPL-SCNC: 137 MMOL/L — SIGNIFICANT CHANGE UP (ref 135–145)
WBC # BLD: 5.8 K/UL — SIGNIFICANT CHANGE UP (ref 3.8–10.5)
WBC # FLD AUTO: 5.8 K/UL — SIGNIFICANT CHANGE UP (ref 3.8–10.5)

## 2018-11-08 PROCEDURE — 93005 ELECTROCARDIOGRAM TRACING: CPT

## 2018-11-08 PROCEDURE — 99284 EMERGENCY DEPT VISIT MOD MDM: CPT | Mod: 25

## 2018-11-08 PROCEDURE — 93010 ELECTROCARDIOGRAM REPORT: CPT

## 2018-11-08 PROCEDURE — 99283 EMERGENCY DEPT VISIT LOW MDM: CPT | Mod: 25

## 2018-11-08 NOTE — ED PROVIDER NOTE - MEDICAL DECISION MAKING DETAILS
Patient in ED w concern for elevated K as per labs completed outpatient.  Patient non toxic, no acute complaints in ED today.  Labs reviewed, K wnl.  Will plan for discharge with instruction for prompt PCP follow up in 1-2 days for re evaluation and patient is adivsed to return immediately to ED should symptoms worsen or if there is any concern prior to this recommended follow up.  Patient is aware of plan and verbalizes his understanding.

## 2018-11-08 NOTE — ED PROVIDER NOTE - OBJECTIVE STATEMENT
33 year old male with history of Crohn's Disease presents to ED with concern for elevated potassium as per blood work completed by his endocrinologist several days ago.  Patient states labs were completed as routine panel and he has no acute medical complaints or concerns at this time.  He was called by his endocrinologist to come to ED for repeat lab testing and further evaluation.  Patient denies associated chest pain, shortness of breath, fever, chills, abdominal pain, nausea, vomiting or any additional acute complaints or concerns at this time.

## 2018-11-08 NOTE — ED ADULT NURSE NOTE - NSIMPLEMENTINTERV_GEN_ALL_ED
Implemented All Universal Safety Interventions:  [place] to call system. Call bell, personal items and telephone within reach. Instruct patient to call for assistance. Room bathroom lighting operational. Non-slip footwear when patient is off stretcher. Physically safe environment: no spills, clutter or unnecessary equipment. Stretcher in lowest position, wheels locked, appropriate side rails in place.

## 2018-11-08 NOTE — ED ADULT TRIAGE NOTE - CHIEF COMPLAINT QUOTE
Patient states he had blood work done on Tuesday and was told he had high potassium.  Patient states "fluttering in my chest Sunday and Monday", which has since resolved.  Patient denies any CP, SOB, dizziness, N/V/D, or any other complaints.

## 2018-11-08 NOTE — ED PROVIDER NOTE - GASTROINTESTINAL NEGATIVE STATEMENT, MLM
+ hx of Crohn's.  no abdominal pain, no bloating, no constipation, no diarrhea, no nausea and no vomiting.

## 2018-12-19 ENCOUNTER — LABORATORY RESULT (OUTPATIENT)
Age: 33
End: 2018-12-19

## 2018-12-19 ENCOUNTER — APPOINTMENT (OUTPATIENT)
Dept: GASTROENTEROLOGY | Facility: CLINIC | Age: 33
End: 2018-12-19
Payer: COMMERCIAL

## 2018-12-19 VITALS
RESPIRATION RATE: 14 BRPM | WEIGHT: 140 LBS | HEART RATE: 70 BPM | OXYGEN SATURATION: 100 % | BODY MASS INDEX: 21.93 KG/M2 | SYSTOLIC BLOOD PRESSURE: 136 MMHG | DIASTOLIC BLOOD PRESSURE: 88 MMHG

## 2018-12-19 PROCEDURE — 99213 OFFICE O/P EST LOW 20 MIN: CPT | Mod: GC

## 2018-12-20 LAB
ALBUMIN SERPL ELPH-MCNC: 4.7 G/DL
ALP BLD-CCNC: 48 U/L
ALT SERPL-CCNC: 36 U/L
ANION GAP SERPL CALC-SCNC: 10 MMOL/L
AST SERPL-CCNC: 50 U/L
BILIRUB SERPL-MCNC: 0.7 MG/DL
BUN SERPL-MCNC: 19 MG/DL
CALCIUM SERPL-MCNC: 9.9 MG/DL
CHLORIDE SERPL-SCNC: 104 MMOL/L
CO2 SERPL-SCNC: 25 MMOL/L
CREAT SERPL-MCNC: 1.01 MG/DL
GLUCOSE SERPL-MCNC: 99 MG/DL
POTASSIUM SERPL-SCNC: 4.2 MMOL/L
PROT SERPL-MCNC: 7.8 G/DL
SODIUM SERPL-SCNC: 139 MMOL/L

## 2018-12-26 ENCOUNTER — MOBILE ON CALL (OUTPATIENT)
Age: 33
End: 2018-12-26

## 2019-01-10 ENCOUNTER — RX RENEWAL (OUTPATIENT)
Age: 34
End: 2019-01-10

## 2019-04-04 ENCOUNTER — RX RENEWAL (OUTPATIENT)
Age: 34
End: 2019-04-04

## 2019-05-01 ENCOUNTER — RX RENEWAL (OUTPATIENT)
Age: 34
End: 2019-05-01

## 2019-05-28 ENCOUNTER — RX RENEWAL (OUTPATIENT)
Age: 34
End: 2019-05-28

## 2019-05-29 ENCOUNTER — TRANSCRIPTION ENCOUNTER (OUTPATIENT)
Age: 34
End: 2019-05-29

## 2019-06-07 ENCOUNTER — RESULT REVIEW (OUTPATIENT)
Age: 34
End: 2019-06-07

## 2019-06-07 ENCOUNTER — APPOINTMENT (OUTPATIENT)
Dept: GASTROENTEROLOGY | Facility: HOSPITAL | Age: 34
End: 2019-06-07

## 2019-06-07 ENCOUNTER — OUTPATIENT (OUTPATIENT)
Dept: OUTPATIENT SERVICES | Facility: HOSPITAL | Age: 34
LOS: 1 days | Discharge: HOME | End: 2019-06-07
Payer: COMMERCIAL

## 2019-06-07 PROCEDURE — 44386 ENDOSCOPY BOWEL POUCH/BIOP: CPT

## 2019-06-07 PROCEDURE — 88305 TISSUE EXAM BY PATHOLOGIST: CPT

## 2019-06-10 LAB — SURGICAL PATHOLOGY STUDY: SIGNIFICANT CHANGE UP

## 2019-06-11 ENCOUNTER — TRANSCRIPTION ENCOUNTER (OUTPATIENT)
Age: 34
End: 2019-06-11

## 2019-06-12 ENCOUNTER — TRANSCRIPTION ENCOUNTER (OUTPATIENT)
Age: 34
End: 2019-06-12

## 2019-06-17 ENCOUNTER — APPOINTMENT (OUTPATIENT)
Dept: GASTROENTEROLOGY | Facility: CLINIC | Age: 34
End: 2019-06-17
Payer: COMMERCIAL

## 2019-06-17 VITALS
HEART RATE: 81 BPM | DIASTOLIC BLOOD PRESSURE: 100 MMHG | RESPIRATION RATE: 14 BRPM | BODY MASS INDEX: 21.97 KG/M2 | WEIGHT: 140 LBS | OXYGEN SATURATION: 99 % | HEIGHT: 67 IN | SYSTOLIC BLOOD PRESSURE: 140 MMHG

## 2019-06-17 PROCEDURE — 99214 OFFICE O/P EST MOD 30 MIN: CPT

## 2019-06-17 RX ORDER — ESCITALOPRAM OXALATE 5 MG/1
TABLET, FILM COATED ORAL
Refills: 0 | Status: ACTIVE | COMMUNITY

## 2019-06-17 RX ORDER — MIRTAZAPINE 15 MG/1
15 TABLET, FILM COATED ORAL
Refills: 0 | Status: DISCONTINUED | COMMUNITY
End: 2019-06-17

## 2019-06-17 RX ORDER — CLONAZEPAM 2 MG/1
TABLET ORAL
Refills: 0 | Status: ACTIVE | COMMUNITY

## 2019-06-17 NOTE — HISTORY OF PRESENT ILLNESS
[Inflammatory Bowel Disease] : inflammatory bowel disease [Abdominal Surgery] : abdominal surgery [Wt Loss ___ Lbs] : no recent weight loss [de-identified] : 34 yr old M with PMHx significant for UC s/p TPC J pouch (2006) s/p recurrent pouchitis 2015, 2017; and found to have disease in small bowel so converted to diagnosis of Crohns disease, he is currently on monotherapy with Humira since June 2018 after having bout of DILI from IFX, who presents for follow up. S/P Pouchoscopy with worsening ischemic ulceration in the efferent limb at the previous suture line, however other areas of inflammation healed.\par \par Feels good today and has no new complaints.\par \par He has 6 formed BMs/day which is normal for him and actually improved from pre-treatment. No urgency. No blood in stool. No abdominal pain or rectal discomfort. No bloating. No weight loss or appetite changes. No n/v, fevers/chills. \par \par Patient denies EIMs.\par \par Brought his 7 month old healthy baby with him. He is on sabbatical from [facility], returning in the fall. \par \par \par ENDOSCOPIC HISTORY:\par Pouchoscopy 9/26/17 showing ulceration of prepouch ileum and pouch (near anal anastamosis), biopsies show active enteritis with cryptitis\par VCE that showed thickened gastric and duodenal mucosa\par Pouchoscopy (2015) demonstrating mild edema and ulcer, but bx did not confirm any chronic inflammation or e/o Crohn's of the pouch.

## 2019-06-17 NOTE — ASSESSMENT
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 S/P Pouchoscopy with worsening ischemic ulceration in at the suture line, however other areas of inflammation improved.\par \par 1. Crohn's of J Pouch -\par - Clinical remission with ADA, IFX stopped for DILI\par - continue Humira, qweekly dosing; level 43.7 (earlier than trough)\par - follow up LFTs from DILI due to infliximab - downtrending adequately\par - Continue [place] prn\par - Pt asked to call us if he has any flare symptoms\par - cont FODMAP diet given improvement in symptoms \par \par 2. 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 (where he will be for the summer), however we have also provided him with information for Dr. Angel Ahumada in [place] who has expressed interest in taking on his case.  There's really no medical therapies available for this. \par \par 3. Elevated LFT's \par - likely due to the DILI from the Infliximab\par - downtrending\par - Work up neg - might have some relation to his supplement intake (stopped most)\par \par 4. HCM\par - DERM referral given\par - DEXA ordered \par - anxiety stable on lexapro/klonopin; seeing psychiatrist\par - denies tobacco use, no NSAID use\par - consider drawing hepatitis titers and iron, b12 and d at f/u; he declined bloodwork at this visit \par \par F/U 3-4 months

## 2019-06-17 NOTE — CONSULT LETTER
[Dear  ___] : Dear  [unfilled], [Courtesy Letter:] : I had the pleasure of seeing your patient, [unfilled], in my office today. [Sincerely,] : Sincerely, [Please see my note below.] : Please see my note below. [FreeTextEntry3] : Gerald Bernal MD\par Director, IBD Program\par [facility], [facility]\par \par Associate Professor of Medicine\par [employer]\par [employer]\par

## 2019-06-26 ENCOUNTER — RX RENEWAL (OUTPATIENT)
Age: 34
End: 2019-06-26

## 2019-07-22 ENCOUNTER — RX RENEWAL (OUTPATIENT)
Age: 34
End: 2019-07-22

## 2019-09-30 ENCOUNTER — RX RENEWAL (OUTPATIENT)
Age: 34
End: 2019-09-30

## 2019-10-15 ENCOUNTER — RX RENEWAL (OUTPATIENT)
Age: 34
End: 2019-10-15

## 2019-10-30 DIAGNOSIS — R19.7 DIARRHEA, UNSPECIFIED: ICD-10-CM

## 2019-10-31 ENCOUNTER — APPOINTMENT (OUTPATIENT)
Dept: GASTROENTEROLOGY | Facility: CLINIC | Age: 34
End: 2019-10-31
Payer: COMMERCIAL

## 2019-10-31 VITALS
TEMPERATURE: 98.2 F | HEART RATE: 61 BPM | DIASTOLIC BLOOD PRESSURE: 67 MMHG | WEIGHT: 155.6 LBS | SYSTOLIC BLOOD PRESSURE: 122 MMHG | BODY MASS INDEX: 24.42 KG/M2 | RESPIRATION RATE: 15 BRPM | HEIGHT: 67 IN | OXYGEN SATURATION: 98 %

## 2019-10-31 DIAGNOSIS — K50.90 CROHN'S DISEASE, UNSPECIFIED, W/OUT COMPLICATIONS: ICD-10-CM

## 2019-10-31 PROCEDURE — 36415 COLL VENOUS BLD VENIPUNCTURE: CPT

## 2019-10-31 PROCEDURE — 99214 OFFICE O/P EST MOD 30 MIN: CPT | Mod: 25

## 2019-10-31 NOTE — PHYSICAL EXAM
[General Appearance - Alert] : alert [General Appearance - In No Acute Distress] : in no acute distress [Sclera] : the sclera and conjunctiva were normal [Hearing Threshold Finger Rub Not [place]] : hearing was normal [Neck Appearance] : the appearance of the neck was normal [Exaggerated Use Of Accessory Muscles For Inspiration] : no accessory muscle use [Heart Sounds] : normal S1 and S2 [Bowel Sounds] : normal bowel sounds [Abdomen Soft] : soft [No CVA Tenderness] : no ~M costovertebral angle tenderness [No Spinal Tenderness] : no spinal tenderness [Abnormal Walk] : normal gait [Skin Color & Pigmentation] : normal skin color and pigmentation [] : no rash [Oriented To Time, Place, And Person] : oriented to person, place, and time

## 2019-11-01 PROBLEM — K50.90 CROHN'S DISEASE: Status: ACTIVE | Noted: 2018-08-07

## 2019-11-01 LAB
ALBUMIN SERPL ELPH-MCNC: 4.3 G/DL
ALP BLD-CCNC: 66 U/L
ALT SERPL-CCNC: 27 U/L
ANION GAP SERPL CALC-SCNC: 12 MMOL/L
AST SERPL-CCNC: 35 U/L
BASOPHILS # BLD AUTO: 0.02 K/UL
BASOPHILS NFR BLD AUTO: 0.4 %
BILIRUB SERPL-MCNC: 0.5 MG/DL
BUN SERPL-MCNC: 9 MG/DL
CALCIUM SERPL-MCNC: 8.7 MG/DL
CHLORIDE SERPL-SCNC: 105 MMOL/L
CO2 SERPL-SCNC: 22 MMOL/L
CREAT SERPL-MCNC: 0.97 MG/DL
CRP SERPL-MCNC: 1.44 MG/DL
EOSINOPHIL # BLD AUTO: 0.04 K/UL
EOSINOPHIL NFR BLD AUTO: 0.8 %
GI PCR PANEL, STOOL: NORMAL
GLUCOSE SERPL-MCNC: 88 MG/DL
HCT VFR BLD CALC: 49 %
HGB BLD-MCNC: 15.8 G/DL
IMM GRANULOCYTES NFR BLD AUTO: 0.2 %
LYMPHOCYTES # BLD AUTO: 1.97 K/UL
LYMPHOCYTES NFR BLD AUTO: 38.6 %
MAN DIFF?: NORMAL
MCHC RBC-ENTMCNC: 30.4 PG
MCHC RBC-ENTMCNC: 32.2 GM/DL
MCV RBC AUTO: 94.4 FL
MONOCYTES # BLD AUTO: 0.37 K/UL
MONOCYTES NFR BLD AUTO: 7.3 %
NEUTROPHILS # BLD AUTO: 2.69 K/UL
NEUTROPHILS NFR BLD AUTO: 52.7 %
PLATELET # BLD AUTO: 270 K/UL
POTASSIUM SERPL-SCNC: 4.8 MMOL/L
PROT SERPL-MCNC: 7.3 G/DL
RBC # BLD: 5.19 M/UL
RBC # FLD: 12.5 %
SODIUM SERPL-SCNC: 139 MMOL/L
WBC # FLD AUTO: 5.1 K/UL

## 2019-11-01 NOTE — HISTORY OF PRESENT ILLNESS
[Inflammatory Bowel Disease] : inflammatory bowel disease [Abdominal Surgery] : abdominal surgery [Wt Loss ___ Lbs] : no recent weight loss [de-identified] : 34 yr old M with PMHx significant for UC s/p TPC J pouch (2006) s/p recurrent pouchitis 2015, 2017; and found to have disease in small bowel so converted to diagnosis of Crohns disease, he is currently on monotherapy with Humira since June 2018 after having bout of DILI from IFX, who presents for follow up in clinical remission. Underwent pouchoscopy this summer with worsening ischemic ulceration in the efferent limb at the previous suture line, however other areas of inflammation healed.\par \par He is here for a sick visit for c/o diarrhea and nausea that is acute. He reports last week he noted diarrhea and nausea for 2 days that felt like food poisoning. This self-resolved and he felt back to baseline but then Monday night he ate sushi and felt sick again in the middle of the night. Since he's had diarrhea, some abd discomfort and nausea. He took peptobismol yesterday with significant relief today but loose stools persists. No fevers/chills. No weight loss (gained 15 lbs since the summer!)\par \par He reports his Crohn's overall has been in remission, felt better than ever prior to this episode. Was not exposed to antibiotics or any sick contacts. Reports for 2-3 months he took supplement \par \par He was having 3-6 formed BMs/day which is normal for him and actually improved from pre-treatment. No urgency. No blood in stool. No abdominal pain or rectal discomfort. No bloating. No weight loss or appetite changes. No n/v, fevers/chills. \par \par Patient denies EIMs.\par \par ENDOSCOPIC HISTORY:\par Pouchoscopy: \par Pouchoscopy 9/26/17 showing ulceration of prepouch ileum and pouch (near anal anastamosis), biopsies show active enteritis with cryptitis\par VCE that showed thickened gastric and duodenal mucosa\par Pouchoscopy (2015) demonstrating mild edema and ulcer, but bx did not confirm any chronic inflammation or e/o Crohn's of the pouch.

## 2019-11-01 NOTE — ASSESSMENT
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 S/P Pouchoscopy with worsening ischemic ulceration in at the suture line, however other areas of inflammation improved. Here for sick visit for two episodes of nausea and diarrhea in the last week triggered by sushi. \par \par 1. Diarrhea\par - GI PCR, cdiff to rule out infection\par - fecal calpro although less likely this is flare given he's been feeling so great and this is acute episode \par - cont peptobismol given significant relief\par - ED if dizzy, worsening symptoms and unable to tolerate PO\par \par 2. Crohn's of J Pouch -\par - clinical remission with ADA, IFX stopped for DILI\par - continue Humira, qweekly dosing; level 43.7 (earlier than trough)\par - follow up LFTs from DILI due to infliximab - now normal \par \par 3. 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 Elevated LFT's  - resolved\par - likely due to the DILI from the Infliximab\par - Work up neg - might have some relation to his supplement intake (stopped most)\par \par 4. HCM\par - DERM referral given\par - DEXA ordered \par - anxiety stable on lexapro/klonopin; seeing psychiatrist\par - denies tobacco use, no NSAID use\par - consider drawing hepatitis titers and iron, b12 and d at f/u \par \par F/U post-procedure

## 2019-11-01 NOTE — CONSULT LETTER
[Dear  ___] : Dear  [unfilled], [Courtesy Letter:] : I had the pleasure of seeing your patient, [unfilled], in my office today. [Please see my note below.] : Please see my note below. [Sincerely,] : Sincerely, [FreeTextEntry3] : Jessica David NP \par [facility]

## 2019-11-04 LAB — CALPROTECTIN FECAL: 25 UG/G

## 2019-11-05 LAB
C DIFF TOX B STL QL CT TISS CULT: NORMAL
Lab: NORMAL

## 2020-01-09 ENCOUNTER — RESULT REVIEW (OUTPATIENT)
Age: 35
End: 2020-01-09

## 2020-01-09 ENCOUNTER — APPOINTMENT (OUTPATIENT)
Dept: GASTROENTEROLOGY | Facility: HOSPITAL | Age: 35
End: 2020-01-09

## 2020-01-09 ENCOUNTER — OUTPATIENT (OUTPATIENT)
Dept: OUTPATIENT SERVICES | Facility: HOSPITAL | Age: 35
LOS: 1 days | Discharge: HOME | End: 2020-01-09
Payer: COMMERCIAL

## 2020-01-09 PROCEDURE — 44386 ENDOSCOPY BOWEL POUCH/BIOP: CPT

## 2020-01-09 PROCEDURE — 88305 TISSUE EXAM BY PATHOLOGIST: CPT

## 2020-01-09 PROCEDURE — 88305 TISSUE EXAM BY PATHOLOGIST: CPT | Mod: 26

## 2020-01-09 PROCEDURE — 44386 ENDOSCOPY BOWEL POUCH/BIOP: CPT | Mod: GC

## 2020-01-13 LAB — SURGICAL PATHOLOGY STUDY: SIGNIFICANT CHANGE UP

## 2020-03-27 ENCOUNTER — RX RENEWAL (OUTPATIENT)
Age: 35
End: 2020-03-27

## 2020-03-31 ENCOUNTER — RX RENEWAL (OUTPATIENT)
Age: 35
End: 2020-03-31

## 2020-06-10 ENCOUNTER — APPOINTMENT (OUTPATIENT)
Dept: GASTROENTEROLOGY | Facility: CLINIC | Age: 35
End: 2020-06-10
Payer: COMMERCIAL

## 2020-06-10 PROCEDURE — 99214 OFFICE O/P EST MOD 30 MIN: CPT | Mod: 95

## 2020-06-10 NOTE — ASSESSMENT
[FreeTextEntry1] : 36 yo M with CD of Jpouch on adalimumab. \par Had been in endoscopic improvement with plan for hyperbaric for remaining ulceration when he was admitted for abd pain.  Unclear if ? true volvulus of pouch or some more complication related to adhesive dz in the pre-pouch area.  Will have him see Dr. mullins as first step and defer to him if he prefers to do pouchscopy.  Will review in IBD conf. \par \par f/u based on those findings. \par \par FTF 25min to review osh data and discuss plans.

## 2020-06-10 NOTE — HISTORY OF PRESENT ILLNESS
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 \par Day of event, he had gone biking for 3hrs and had a large spinach based meal. \par p/w n/v and severe abd pain. \par CT scan as detailed in previous note.\par \par Now feels he's getting back to his normal state after resolution of sx with conservative therapy.

## 2020-06-10 NOTE — PHYSICAL EXAM
[General Appearance - Alert] : alert [Sclera] : the sclera and conjunctiva were normal [Outer Ear] : the ears and nose were normal in appearance [] : no respiratory distress [Neck Appearance] : the appearance of the neck was normal

## 2020-06-10 NOTE — CONSULT LETTER
[Dear  ___] : Dear  [unfilled], [Referral Letter:] : I am referring [unfilled] to you for further evaluation.  My most recent evaluation follows. [Sincerely,] : Sincerely, [Please see my note below.] : Please see my note below. [FreeTextEntry3] : Gerald Bernal MD\par Associate Professor of Medicine\par Director IBD Program\par [facility]\par

## 2020-06-29 ENCOUNTER — APPOINTMENT (OUTPATIENT)
Dept: COLORECTAL SURGERY | Facility: CLINIC | Age: 35
End: 2020-06-29
Payer: COMMERCIAL

## 2020-06-29 VITALS
HEIGHT: 67 IN | TEMPERATURE: 97.1 F | BODY MASS INDEX: 23.23 KG/M2 | DIASTOLIC BLOOD PRESSURE: 78 MMHG | WEIGHT: 148 LBS | SYSTOLIC BLOOD PRESSURE: 129 MMHG | HEART RATE: 61 BPM

## 2020-06-29 DIAGNOSIS — K56.609 UNSPECIFIED INTESTINAL OBSTRUCTION, UNSPECIFIED AS TO PARTIAL VERSUS COMPLETE OBSTRUCTION: ICD-10-CM

## 2020-06-29 PROCEDURE — 99204 OFFICE O/P NEW MOD 45 MIN: CPT

## 2020-06-29 RX ORDER — MESALAMINE 1000 MG/1
1000 SUPPOSITORY RECTAL
Qty: 30 | Refills: 3 | Status: DISCONTINUED | COMMUNITY
Start: 2017-09-06 | End: 2020-06-29

## 2020-06-29 RX ORDER — BACILLUS COAGULANS/INULIN 1B-250 MG
CAPSULE ORAL
Refills: 0 | Status: DISCONTINUED | COMMUNITY
End: 2020-06-29

## 2020-06-29 NOTE — PHYSICAL EXAM
[Abdomen Masses] : No abdominal masses [Abdomen Tenderness] : ~T No ~M abdominal tenderness [No HSM] : no hepatosplenomegaly [JVD] : no jugular venous distention  [Normal Breath Sounds] : Normal breath sounds [No Rash or Lesion] : No rash or lesion [Alert] : alert [Calm] : calm

## 2020-06-29 NOTE — ASSESSMENT
[FreeTextEntry1] : I reviewed with the patient that given his history of prior bowel obstructions (without documentation) and his recent small bowel obstruction secondary to suspected volvulus the concern of possible intra-abdominal adhesive disease may have  caused his recent hospitalization and volvulus event.\par \par I have requested that we obtain a copy of the CT scan for review.  Advised gastrografin for further assesment.\par \par \par I reviewed the patient that if he develops recurrent symptoms of abdominal pain or distention he should be seen promptly for repeat evaluation. The potential of a volvulus and bowel ischemia as well as the need for possible emergency surgery was detailed.\par

## 2020-06-29 NOTE — HISTORY OF PRESENT ILLNESS
[FreeTextEntry1] : 34 y/o M presents for evaluation of recent volvulus\par H/o UC (diagnosed in his early 20's) s/p IPPA in 2008 with Dr. Wexner at [facility], with recurrent pouchitis in 2015 or 2017. Found to have disease in the small bowel, diagnosis changed from UC to Crohns Disease. Currently on Humira weekly \par Recently hospitalized at [facility] 6/6/20 for decreased flatus and abdominal pain\par \par CT A/P 6/6/20\par - s/p proctocolectomy w/ ileal j-pouch formation\par - swirling of the mesentery and bowel just proximal to the J-pouch anastomosis with dilation of proximal bowel concerning for volvulus\par -prostatomegaly, similar to 2015 \par \par \par Treated with IV fluids and Morphine\par Denies abdominal or rectal pain, rectal bleeding, mucous stools\par Denies fever or chills\par Reports good appetite, continues to follow low fiber diet since D/c. Prior to hospital admission was following a very high fiber diet \par BH: 3-4 formed Bm's daily\par \par Pouchoscopy completed 1/9/20 with Dr. Bernal\par - normal pouch body\par - normal efferent and afferent limb, no evidence of CD\par - anastomotic ulcer, similar in appearance to 2019 pouchoscopy. Likely ischemic in nature, biopsies taken\par  \par \par Final Diagnosis\par Pouch ulcer, biopsy:\par - Small bowel mucosa with mild active enteritis.\par - Negative for dysplasia.\par

## 2020-08-11 ENCOUNTER — RX RENEWAL (OUTPATIENT)
Age: 35
End: 2020-08-11

## 2020-08-17 ENCOUNTER — RX RENEWAL (OUTPATIENT)
Age: 35
End: 2020-08-17

## 2020-08-17 ENCOUNTER — HOSPITAL ENCOUNTER (INPATIENT)
Facility: HOSPITAL | Age: 35
LOS: 9 days | Discharge: HOME | DRG: 330 | End: 2020-08-27
Attending: EMERGENCY MEDICINE | Admitting: SURGERY
Payer: COMMERCIAL

## 2020-08-17 DIAGNOSIS — K56.2 VOLVULUS (CMS/HCC): Primary | ICD-10-CM

## 2020-08-17 LAB
ALBUMIN SERPL-MCNC: 4.6 G/DL (ref 3.4–5)
ALP SERPL-CCNC: 38 IU/L (ref 35–126)
ALT SERPL-CCNC: 45 IU/L (ref 16–63)
ANION GAP SERPL CALC-SCNC: 10 MEQ/L (ref 3–15)
AST SERPL-CCNC: 67 IU/L (ref 15–41)
BASOPHILS # BLD: 0.05 K/UL (ref 0.01–0.1)
BASOPHILS NFR BLD: 0.6 %
BILIRUB DIRECT SERPL-MCNC: 0.3 MG/DL
BILIRUB SERPL-MCNC: 1.9 MG/DL (ref 0.3–1.2)
BUN SERPL-MCNC: 21 MG/DL (ref 8–20)
CALCIUM SERPL-MCNC: 9.1 MG/DL (ref 8.9–10.3)
CHLORIDE SERPL-SCNC: 100 MEQ/L (ref 98–109)
CO2 SERPL-SCNC: 24 MEQ/L (ref 22–32)
CREAT SERPL-MCNC: 1.1 MG/DL (ref 0.8–1.3)
DIFFERENTIAL METHOD BLD: NORMAL
EOSINOPHIL # BLD: 0.08 K/UL (ref 0.04–0.54)
EOSINOPHIL NFR BLD: 0.9 %
ERYTHROCYTE [DISTWIDTH] IN BLOOD BY AUTOMATED COUNT: 12.7 % (ref 11.6–14.4)
GFR SERPL CREATININE-BSD FRML MDRD: >60 ML/MIN/1.73M*2
GLUCOSE SERPL-MCNC: 86 MG/DL (ref 70–99)
HCT VFR BLDCO AUTO: 46.8 % (ref 40.1–51)
HGB BLD-MCNC: 15.4 G/DL (ref 13.7–17.5)
IMM GRANULOCYTES # BLD AUTO: 0.04 K/UL (ref 0–0.08)
IMM GRANULOCYTES NFR BLD AUTO: 0.4 %
LIPASE SERPL-CCNC: 32 U/L (ref 20–51)
LYMPHOCYTES # BLD: 3.07 K/UL (ref 1.2–3.5)
LYMPHOCYTES NFR BLD: 33.8 %
MCH RBC QN AUTO: 30.8 PG (ref 28–33.2)
MCHC RBC AUTO-ENTMCNC: 32.9 G/DL (ref 32.2–36.5)
MCV RBC AUTO: 93.6 FL (ref 83–98)
MONOCYTES # BLD: 0.56 K/UL (ref 0.3–1)
MONOCYTES NFR BLD: 6.2 %
NEUTROPHILS # BLD: 5.27 K/UL (ref 1.7–7)
NEUTS SEG NFR BLD: 58.1 %
NRBC BLD-RTO: 0 %
PDW BLD AUTO: 9.6 FL (ref 9.4–12.4)
PLATELET # BLD AUTO: 243 K/UL (ref 150–350)
POTASSIUM SERPL-SCNC: 4.1 MEQ/L (ref 3.6–5.1)
PROT SERPL-MCNC: 8 G/DL (ref 6–8.2)
RBC # BLD AUTO: 5 M/UL (ref 4.5–5.8)
SODIUM SERPL-SCNC: 134 MEQ/L (ref 136–144)
WBC # BLD AUTO: 9.07 K/UL (ref 3.8–10.5)

## 2020-08-17 PROCEDURE — 96374 THER/PROPH/DIAG INJ IV PUSH: CPT | Mod: 59

## 2020-08-17 PROCEDURE — 96361 HYDRATE IV INFUSION ADD-ON: CPT | Mod: 59

## 2020-08-17 PROCEDURE — 96375 TX/PRO/DX INJ NEW DRUG ADDON: CPT | Mod: 59

## 2020-08-17 PROCEDURE — 63600000 HC DRUGS/DETAIL CODE: Performed by: EMERGENCY MEDICINE

## 2020-08-17 PROCEDURE — 82248 BILIRUBIN DIRECT: CPT | Performed by: PHYSICIAN ASSISTANT

## 2020-08-17 PROCEDURE — 83690 ASSAY OF LIPASE: CPT | Performed by: PHYSICIAN ASSISTANT

## 2020-08-17 PROCEDURE — 36415 COLL VENOUS BLD VENIPUNCTURE: CPT | Performed by: PHYSICIAN ASSISTANT

## 2020-08-17 PROCEDURE — 25800000 HC PHARMACY IV SOLUTIONS: Performed by: PHYSICIAN ASSISTANT

## 2020-08-17 PROCEDURE — 63600000 HC DRUGS/DETAIL CODE: Performed by: PHYSICIAN ASSISTANT

## 2020-08-17 PROCEDURE — 85025 COMPLETE CBC W/AUTO DIFF WBC: CPT | Performed by: PHYSICIAN ASSISTANT

## 2020-08-17 PROCEDURE — 99285 EMERGENCY DEPT VISIT HI MDM: CPT | Mod: 25

## 2020-08-17 RX ORDER — ONDANSETRON HYDROCHLORIDE 2 MG/ML
4 INJECTION, SOLUTION INTRAVENOUS ONCE
Status: COMPLETED | OUTPATIENT
Start: 2020-08-17 | End: 2020-08-17

## 2020-08-17 RX ORDER — ONDANSETRON HYDROCHLORIDE 2 MG/ML
INJECTION, SOLUTION INTRAVENOUS
Status: DISPENSED
Start: 2020-08-17 | End: 2020-08-18

## 2020-08-17 RX ORDER — HYDROMORPHONE HYDROCHLORIDE 1 MG/ML
INJECTION, SOLUTION INTRAMUSCULAR; INTRAVENOUS; SUBCUTANEOUS
Status: DISPENSED
Start: 2020-08-17 | End: 2020-08-18

## 2020-08-17 RX ORDER — MORPHINE SULFATE 4 MG/ML
4 INJECTION, SOLUTION INTRAMUSCULAR; INTRAVENOUS ONCE
Status: COMPLETED | OUTPATIENT
Start: 2020-08-18 | End: 2020-08-17

## 2020-08-17 RX ORDER — MORPHINE SULFATE 4 MG/ML
INJECTION, SOLUTION INTRAMUSCULAR; INTRAVENOUS
Status: DISPENSED
Start: 2020-08-17 | End: 2020-08-18

## 2020-08-17 RX ORDER — HYDROMORPHONE HYDROCHLORIDE 1 MG/ML
0.5 INJECTION, SOLUTION INTRAMUSCULAR; INTRAVENOUS; SUBCUTANEOUS ONCE
Status: COMPLETED | OUTPATIENT
Start: 2020-08-17 | End: 2020-08-17

## 2020-08-17 RX ADMIN — ONDANSETRON 4 MG: 2 INJECTION INTRAMUSCULAR; INTRAVENOUS at 23:40

## 2020-08-17 RX ADMIN — MORPHINE SULFATE 4 MG: 4 INJECTION, SOLUTION INTRAMUSCULAR; INTRAVENOUS at 23:54

## 2020-08-17 RX ADMIN — HYDROMORPHONE HYDROCHLORIDE 0.5 MG: 1 INJECTION, SOLUTION INTRAMUSCULAR; INTRAVENOUS; SUBCUTANEOUS at 23:30

## 2020-08-17 RX ADMIN — SODIUM CHLORIDE 1000 ML: 9 INJECTION, SOLUTION INTRAVENOUS at 22:47

## 2020-08-17 SDOH — HEALTH STABILITY: MENTAL HEALTH: HOW OFTEN DO YOU HAVE A DRINK CONTAINING ALCOHOL?: NEVER

## 2020-08-18 ENCOUNTER — APPOINTMENT (INPATIENT)
Dept: RADIOLOGY | Facility: HOSPITAL | Age: 35
DRG: 330 | End: 2020-08-18
Attending: STUDENT IN AN ORGANIZED HEALTH CARE EDUCATION/TRAINING PROGRAM
Payer: COMMERCIAL

## 2020-08-18 ENCOUNTER — ANESTHESIA (INPATIENT)
Dept: OPERATING ROOM | Facility: HOSPITAL | Age: 35
DRG: 330 | End: 2020-08-18
Payer: COMMERCIAL

## 2020-08-18 ENCOUNTER — APPOINTMENT (EMERGENCY)
Dept: RADIOLOGY | Facility: HOSPITAL | Age: 35
DRG: 330 | End: 2020-08-18
Attending: EMERGENCY MEDICINE
Payer: COMMERCIAL

## 2020-08-18 ENCOUNTER — ANESTHESIA (EMERGENCY)
Dept: ENDOSCOPY | Facility: HOSPITAL | Age: 35
DRG: 330 | End: 2020-08-18
Payer: COMMERCIAL

## 2020-08-18 ENCOUNTER — ANESTHESIA EVENT (INPATIENT)
Dept: OPERATING ROOM | Facility: HOSPITAL | Age: 35
DRG: 330 | End: 2020-08-18
Payer: COMMERCIAL

## 2020-08-18 ENCOUNTER — APPOINTMENT (INPATIENT)
Dept: RADIOLOGY | Facility: HOSPITAL | Age: 35
DRG: 330 | End: 2020-08-18
Attending: PHYSICIAN ASSISTANT
Payer: COMMERCIAL

## 2020-08-18 ENCOUNTER — ANESTHESIA EVENT (EMERGENCY)
Dept: ENDOSCOPY | Facility: HOSPITAL | Age: 35
DRG: 330 | End: 2020-08-18
Payer: COMMERCIAL

## 2020-08-18 PROBLEM — Z99.11 ON MECHANICALLY ASSISTED VENTILATION (CMS/HCC): Status: ACTIVE | Noted: 2020-08-18

## 2020-08-18 PROBLEM — K50.10 CROHN'S DISEASE OF COLON (CMS/HCC): Status: ACTIVE | Noted: 2020-08-18

## 2020-08-18 PROBLEM — K56.2 VOLVULUS (CMS/HCC): Status: ACTIVE | Noted: 2020-08-18

## 2020-08-18 PROBLEM — K56.2: Status: ACTIVE | Noted: 2020-08-18

## 2020-08-18 LAB
ABO + RH BLD: NORMAL
ANION GAP SERPL CALC-SCNC: 8 MEQ/L (ref 3–15)
ANION GAP SERPL CALC-SCNC: 9 MEQ/L (ref 3–15)
BASE EXCESS BLDA CALC-SCNC: -3.8 MEQ/L
BASOPHILS # BLD: 0.02 K/UL (ref 0.01–0.1)
BASOPHILS NFR BLD: 0.1 %
BLD GP AB SCN SERPL QL: NEGATIVE
BUN SERPL-MCNC: 16 MG/DL (ref 8–20)
BUN SERPL-MCNC: 18 MG/DL (ref 8–20)
CA-I BLD-SCNC: 1.06 MMOL/L (ref 1.15–1.27)
CALCIUM SERPL-MCNC: 7.9 MG/DL (ref 8.9–10.3)
CALCIUM SERPL-MCNC: 8.8 MG/DL (ref 8.9–10.3)
CHLORIDE SERPL-SCNC: 105 MEQ/L (ref 98–109)
CHLORIDE SERPL-SCNC: 107 MEQ/L (ref 98–109)
CO2 BLDA-SCNC: 26 MEQ/L (ref 22–32)
CO2 SERPL-SCNC: 21 MEQ/L (ref 22–32)
CO2 SERPL-SCNC: 21 MEQ/L (ref 22–32)
CREAT SERPL-MCNC: 0.8 MG/DL (ref 0.8–1.3)
CREAT SERPL-MCNC: 0.9 MG/DL (ref 0.8–1.3)
D AG BLD QL: POSITIVE
DIFFERENTIAL METHOD BLD: ABNORMAL
EOSINOPHIL # BLD: 0 K/UL (ref 0.04–0.54)
EOSINOPHIL NFR BLD: 0 %
ERYTHROCYTE [DISTWIDTH] IN BLOOD BY AUTOMATED COUNT: 12.9 % (ref 11.6–14.4)
ERYTHROCYTE [DISTWIDTH] IN BLOOD BY AUTOMATED COUNT: 13 % (ref 11.6–14.4)
GFR SERPL CREATININE-BSD FRML MDRD: >60 ML/MIN/1.73M*2
GFR SERPL CREATININE-BSD FRML MDRD: >60 ML/MIN/1.73M*2
GLUCOSE BLD-MCNC: 124 MG/DL (ref 70–99)
GLUCOSE BLDA-MCNC: 153 MG/DL (ref 70–99)
GLUCOSE SERPL-MCNC: 120 MG/DL (ref 70–99)
GLUCOSE SERPL-MCNC: 144 MG/DL (ref 70–99)
GRAM STN SPEC: NORMAL
HCO3 BLDA-SCNC: 24 MEQ/L (ref 21–28)
HCT VFR BLDA CALC: 53 % (ref 42–52)
HCT VFR BLDCO AUTO: 46.6 % (ref 40.1–51)
HCT VFR BLDCO AUTO: 51 % (ref 40.1–51)
HGB BLD-MCNC: 15.5 G/DL (ref 13.7–17.5)
HGB BLD-MCNC: 16.8 G/DL (ref 13.7–17.5)
IMM GRANULOCYTES # BLD AUTO: 0.03 K/UL (ref 0–0.08)
IMM GRANULOCYTES NFR BLD AUTO: 0.2 %
INR PPP: 1.1 INR
LABORATORY COMMENT REPORT: NORMAL
LACTATE BLDA-SCNC: 1.2 MMOL/L (ref 0.4–1.6)
LACTATE SERPL-SCNC: 1 MMOL/L (ref 0.4–2)
LACTATE SERPL-SCNC: 1.4 MMOL/L (ref 0.4–2)
LYMPHOCYTES # BLD: 0.8 K/UL (ref 1.2–3.5)
LYMPHOCYTES NFR BLD: 5.8 %
MCH RBC QN AUTO: 30.8 PG (ref 28–33.2)
MCH RBC QN AUTO: 31 PG (ref 28–33.2)
MCHC RBC AUTO-ENTMCNC: 32.9 G/DL (ref 32.2–36.5)
MCHC RBC AUTO-ENTMCNC: 33.3 G/DL (ref 32.2–36.5)
MCV RBC AUTO: 93.2 FL (ref 83–98)
MCV RBC AUTO: 93.4 FL (ref 83–98)
MONOCYTES # BLD: 0.46 K/UL (ref 0.3–1)
MONOCYTES NFR BLD: 3.3 %
NEUTROPHILS # BLD: 12.56 K/UL (ref 1.7–7)
NEUTS SEG NFR BLD: 90.6 %
NRBC BLD-RTO: 0 %
PCO2 BLDA: 52 MM HG (ref 35–48)
PDW BLD AUTO: 9.4 FL (ref 9.4–12.4)
PDW BLD AUTO: 9.6 FL (ref 9.4–12.4)
PH BLDA: 7.27 PH (ref 7.35–7.45)
PLATELET # BLD AUTO: 229 K/UL (ref 150–350)
PLATELET # BLD AUTO: 252 K/UL (ref 150–350)
PO2 BLDA: 133 MM HG (ref 83–100)
POCT PATIENT TEMPERATURE: 98.6 °F (ref 97–99)
POCT TEST (BLD GAS): ABNORMAL
POCT TEST: ABNORMAL
POTASSIUM BLDA-SCNC: 4.2 MEQ/L (ref 3.4–4.5)
POTASSIUM SERPL-SCNC: 4.2 MEQ/L (ref 3.6–5.1)
POTASSIUM SERPL-SCNC: 4.8 MEQ/L (ref 3.6–5.1)
PROTHROMBIN TIME: 14.3 SEC (ref 12.2–14.5)
RBC # BLD AUTO: 5 M/UL (ref 4.5–5.8)
RBC # BLD AUTO: 5.46 M/UL (ref 4.5–5.8)
SAO2 % BLDA: 99 % (ref 93–98)
SARS-COV-2 RNA RESP QL NAA+PROBE: NEGATIVE
SODIUM BLDA-SCNC: 134 MEQ/L (ref 136–145)
SODIUM SERPL-SCNC: 135 MEQ/L (ref 136–144)
SODIUM SERPL-SCNC: 136 MEQ/L (ref 136–144)
TRIGL SERPL-MCNC: 42 MG/DL (ref 30–149)
WBC # BLD AUTO: 10.12 K/UL (ref 3.8–10.5)
WBC # BLD AUTO: 13.87 K/UL (ref 3.8–10.5)

## 2020-08-18 PROCEDURE — U0002 COVID-19 LAB TEST NON-CDC: HCPCS | Performed by: PHYSICIAN ASSISTANT

## 2020-08-18 PROCEDURE — 44050 REDUCE BOWEL OBSTRUCTION: CPT | Performed by: SURGERY

## 2020-08-18 PROCEDURE — 94003 VENT MGMT INPAT SUBQ DAY: CPT

## 2020-08-18 PROCEDURE — 85027 COMPLETE CBC AUTOMATED: CPT | Performed by: STUDENT IN AN ORGANIZED HEALTH CARE EDUCATION/TRAINING PROGRAM

## 2020-08-18 PROCEDURE — 63600000 HC DRUGS/DETAIL CODE: Performed by: SURGERY

## 2020-08-18 PROCEDURE — 74176 CT ABD & PELVIS W/O CONTRAST: CPT

## 2020-08-18 PROCEDURE — 63600000 HC DRUGS/DETAIL CODE: Mod: JW | Performed by: NURSE ANESTHETIST, CERTIFIED REGISTERED

## 2020-08-18 PROCEDURE — 80048 BASIC METABOLIC PNL TOTAL CA: CPT | Performed by: STUDENT IN AN ORGANIZED HEALTH CARE EDUCATION/TRAINING PROGRAM

## 2020-08-18 PROCEDURE — 27200000 HC STERILE SUPPLY: Performed by: SURGERY

## 2020-08-18 PROCEDURE — 63600000 HC DRUGS/DETAIL CODE: Performed by: STUDENT IN AN ORGANIZED HEALTH CARE EDUCATION/TRAINING PROGRAM

## 2020-08-18 PROCEDURE — 63600000 HC DRUGS/DETAIL CODE: Performed by: INTERNAL MEDICINE

## 2020-08-18 PROCEDURE — 71000011 HC PACU PHASE 1 EA ADDL MIN: Performed by: SURGERY

## 2020-08-18 PROCEDURE — 20000000 HC ROOM AND CARE ICU

## 2020-08-18 PROCEDURE — 96376 TX/PRO/DX INJ SAME DRUG ADON: CPT | Mod: 59

## 2020-08-18 PROCEDURE — 83605 ASSAY OF LACTIC ACID: CPT | Performed by: STUDENT IN AN ORGANIZED HEALTH CARE EDUCATION/TRAINING PROGRAM

## 2020-08-18 PROCEDURE — 63600000 HC DRUGS/DETAIL CODE: Performed by: PHYSICIAN ASSISTANT

## 2020-08-18 PROCEDURE — 75000135 HC SIGMOIDOSCOPY W WO BRUSH: Performed by: INTERNAL MEDICINE

## 2020-08-18 PROCEDURE — 85610 PROTHROMBIN TIME: CPT | Performed by: STUDENT IN AN ORGANIZED HEALTH CARE EDUCATION/TRAINING PROGRAM

## 2020-08-18 PROCEDURE — 83605 ASSAY OF LACTIC ACID: CPT | Performed by: PHYSICIAN ASSISTANT

## 2020-08-18 PROCEDURE — 36000003 HC OR LEVEL 3 INITIAL 30MIN: Performed by: SURGERY

## 2020-08-18 PROCEDURE — 84478 ASSAY OF TRIGLYCERIDES: CPT | Performed by: PHYSICIAN ASSISTANT

## 2020-08-18 PROCEDURE — 99222 1ST HOSP IP/OBS MODERATE 55: CPT | Mod: 57 | Performed by: SURGERY

## 2020-08-18 PROCEDURE — 86900 BLOOD TYPING SEROLOGIC ABO: CPT

## 2020-08-18 PROCEDURE — 87205 SMEAR GRAM STAIN: CPT | Performed by: PHYSICIAN ASSISTANT

## 2020-08-18 PROCEDURE — 71045 X-RAY EXAM CHEST 1 VIEW: CPT

## 2020-08-18 PROCEDURE — 0D9N8ZZ DRAINAGE OF SIGMOID COLON, VIA NATURAL OR ARTIFICIAL OPENING ENDOSCOPIC: ICD-10-PCS | Performed by: INTERNAL MEDICINE

## 2020-08-18 PROCEDURE — 74018 RADEX ABDOMEN 1 VIEW: CPT

## 2020-08-18 PROCEDURE — 25000000 HC PHARMACY GENERAL: Performed by: NURSE ANESTHETIST, CERTIFIED REGISTERED

## 2020-08-18 PROCEDURE — 36000013 HC OR LEVEL 3 EA ADDL MIN: Performed by: SURGERY

## 2020-08-18 PROCEDURE — 63600000 HC DRUGS/DETAIL CODE

## 2020-08-18 PROCEDURE — 63600000 HC DRUGS/DETAIL CODE: Performed by: EMERGENCY MEDICINE

## 2020-08-18 PROCEDURE — 85025 COMPLETE CBC W/AUTO DIFF WBC: CPT | Performed by: STUDENT IN AN ORGANIZED HEALTH CARE EDUCATION/TRAINING PROGRAM

## 2020-08-18 PROCEDURE — 25000000 HC PHARMACY GENERAL: Performed by: STUDENT IN AN ORGANIZED HEALTH CARE EDUCATION/TRAINING PROGRAM

## 2020-08-18 PROCEDURE — 25000000 HC PHARMACY GENERAL: Performed by: PHYSICIAN ASSISTANT

## 2020-08-18 PROCEDURE — 71000001 HC PACU PHASE 1 INITIAL 30MIN: Performed by: SURGERY

## 2020-08-18 PROCEDURE — 36600 WITHDRAWAL OF ARTERIAL BLOOD: CPT

## 2020-08-18 PROCEDURE — 74177 CT ABD & PELVIS W/CONTRAST: CPT

## 2020-08-18 PROCEDURE — 63600105 HC IODINE BASED CONTRAST: Performed by: PHYSICIAN ASSISTANT

## 2020-08-18 PROCEDURE — 94002 VENT MGMT INPAT INIT DAY: CPT

## 2020-08-18 PROCEDURE — 25000000 HC PHARMACY GENERAL: Performed by: SURGERY

## 2020-08-18 PROCEDURE — 0DS80ZZ REPOSITION SMALL INTESTINE, OPEN APPROACH: ICD-10-PCS | Performed by: SURGERY

## 2020-08-18 PROCEDURE — 37000001 HC ANESTHESIA GENERAL: Performed by: INTERNAL MEDICINE

## 2020-08-18 PROCEDURE — 36415 COLL VENOUS BLD VENIPUNCTURE: CPT | Performed by: PHYSICIAN ASSISTANT

## 2020-08-18 PROCEDURE — 25800000 HC PHARMACY IV SOLUTIONS: Performed by: NURSE ANESTHETIST, CERTIFIED REGISTERED

## 2020-08-18 PROCEDURE — 37000001 HC ANESTHESIA GENERAL: Performed by: SURGERY

## 2020-08-18 PROCEDURE — 87070 CULTURE OTHR SPECIMN AEROBIC: CPT | Performed by: PHYSICIAN ASSISTANT

## 2020-08-18 RX ORDER — HYDROMORPHONE HYDROCHLORIDE 1 MG/ML
1 INJECTION, SOLUTION INTRAMUSCULAR; INTRAVENOUS; SUBCUTANEOUS ONCE
Status: DISCONTINUED | OUTPATIENT
Start: 2020-08-18 | End: 2020-08-18

## 2020-08-18 RX ORDER — EPHEDRINE SULFATE 50 MG/ML
INJECTION, SOLUTION INTRAVENOUS AS NEEDED
Status: DISCONTINUED | OUTPATIENT
Start: 2020-08-18 | End: 2020-08-18 | Stop reason: SURG

## 2020-08-18 RX ORDER — HYDROMORPHONE HYDROCHLORIDE 1 MG/ML
1 INJECTION, SOLUTION INTRAMUSCULAR; INTRAVENOUS; SUBCUTANEOUS ONCE
Status: COMPLETED | OUTPATIENT
Start: 2020-08-18 | End: 2020-08-18

## 2020-08-18 RX ORDER — MORPHINE SULFATE 4 MG/ML
4 INJECTION, SOLUTION INTRAMUSCULAR; INTRAVENOUS ONCE
Status: COMPLETED | OUTPATIENT
Start: 2020-08-18 | End: 2020-08-18

## 2020-08-18 RX ORDER — IBUPROFEN 200 MG
16-32 TABLET ORAL AS NEEDED
Status: DISCONTINUED | OUTPATIENT
Start: 2020-08-18 | End: 2020-08-18

## 2020-08-18 RX ORDER — MIDAZOLAM HYDROCHLORIDE 2 MG/2ML
2 INJECTION, SOLUTION INTRAMUSCULAR; INTRAVENOUS AS NEEDED
Status: DISCONTINUED | OUTPATIENT
Start: 2020-08-18 | End: 2020-08-19

## 2020-08-18 RX ORDER — PROPOFOL 10 MG/ML
INJECTION, EMULSION INTRAVENOUS AS NEEDED
Status: DISCONTINUED | OUTPATIENT
Start: 2020-08-18 | End: 2020-08-18 | Stop reason: SURG

## 2020-08-18 RX ORDER — SODIUM CHLORIDE, SODIUM GLUCONATE, SODIUM ACETATE, POTASSIUM CHLORIDE AND MAGNESIUM CHLORIDE 30; 37; 368; 526; 502 MG/100ML; MG/100ML; MG/100ML; MG/100ML; MG/100ML
INJECTION, SOLUTION INTRAVENOUS CONTINUOUS PRN
Status: DISCONTINUED | OUTPATIENT
Start: 2020-08-18 | End: 2020-08-18 | Stop reason: SURG

## 2020-08-18 RX ORDER — CHLORHEXIDINE GLUCONATE ORAL RINSE 1.2 MG/ML
15 SOLUTION DENTAL
Status: DISCONTINUED | OUTPATIENT
Start: 2020-08-18 | End: 2020-08-21

## 2020-08-18 RX ORDER — DEXTROSE 40 %
15-30 GEL (GRAM) ORAL AS NEEDED
Status: DISCONTINUED | OUTPATIENT
Start: 2020-08-18 | End: 2020-08-27 | Stop reason: HOSPADM

## 2020-08-18 RX ORDER — FENTANYL CITRATE/PF 100MCG/2ML
25 SYRINGE (ML) INTRAVENOUS
Status: DISCONTINUED | OUTPATIENT
Start: 2020-08-18 | End: 2020-08-18

## 2020-08-18 RX ORDER — FENTANYL CITRATE/PF 100MCG/2ML
0-200 SYRINGE (ML) INTRAVENOUS
Status: DISCONTINUED | OUTPATIENT
Start: 2020-08-18 | End: 2020-08-19

## 2020-08-18 RX ORDER — MORPHINE SULFATE 2 MG/ML
2 INJECTION, SOLUTION INTRAMUSCULAR; INTRAVENOUS
Status: DISCONTINUED | OUTPATIENT
Start: 2020-08-18 | End: 2020-08-19

## 2020-08-18 RX ORDER — LANOLIN ALCOHOL/MO/W.PET/CERES
400 CREAM (GRAM) TOPICAL 2 TIMES DAILY PRN
Status: DISCONTINUED | OUTPATIENT
Start: 2020-08-18 | End: 2020-08-21

## 2020-08-18 RX ORDER — PANTOPRAZOLE SODIUM 40 MG/10ML
40 INJECTION, POWDER, LYOPHILIZED, FOR SOLUTION INTRAVENOUS EVERY 24 HOURS
Status: DISCONTINUED | OUTPATIENT
Start: 2020-08-18 | End: 2020-08-27 | Stop reason: HOSPADM

## 2020-08-18 RX ORDER — TESTOSTERONE CYPIONATE 1000 MG/10ML
100 INJECTION, SOLUTION INTRAMUSCULAR WEEKLY
COMMUNITY

## 2020-08-18 RX ORDER — FENTANYL CITRATE 50 UG/ML
INJECTION, SOLUTION INTRAMUSCULAR; INTRAVENOUS AS NEEDED
Status: DISCONTINUED | OUTPATIENT
Start: 2020-08-18 | End: 2020-08-18 | Stop reason: SURG

## 2020-08-18 RX ORDER — DEXTROSE 50 % IN WATER (D50W) INTRAVENOUS SYRINGE
25 AS NEEDED
Status: DISCONTINUED | OUTPATIENT
Start: 2020-08-18 | End: 2020-08-18

## 2020-08-18 RX ORDER — PROPOFOL 10 MG/ML
5-80 INJECTION, EMULSION INTRAVENOUS
Status: DISCONTINUED | OUTPATIENT
Start: 2020-08-18 | End: 2020-08-19

## 2020-08-18 RX ORDER — HYDROMORPHONE HYDROCHLORIDE 1 MG/ML
0.5 INJECTION, SOLUTION INTRAMUSCULAR; INTRAVENOUS; SUBCUTANEOUS ONCE
Status: COMPLETED | OUTPATIENT
Start: 2020-08-18 | End: 2020-08-18

## 2020-08-18 RX ORDER — LIDOCAINE HYDROCHLORIDE 10 MG/ML
INJECTION, SOLUTION INFILTRATION; PERINEURAL AS NEEDED
Status: DISCONTINUED | OUTPATIENT
Start: 2020-08-18 | End: 2020-08-18 | Stop reason: SURG

## 2020-08-18 RX ORDER — MORPHINE SULFATE 2 MG/ML
2 INJECTION, SOLUTION INTRAMUSCULAR; INTRAVENOUS ONCE
Status: COMPLETED | OUTPATIENT
Start: 2020-08-18 | End: 2020-08-18

## 2020-08-18 RX ORDER — ENOXAPARIN SODIUM 100 MG/ML
40 INJECTION SUBCUTANEOUS
Status: DISCONTINUED | OUTPATIENT
Start: 2020-08-19 | End: 2020-08-27 | Stop reason: HOSPADM

## 2020-08-18 RX ORDER — METRONIDAZOLE 500 MG/100ML
500 INJECTION, SOLUTION INTRAVENOUS
Status: DISCONTINUED | OUTPATIENT
Start: 2020-08-18 | End: 2020-08-19

## 2020-08-18 RX ORDER — PHENYLEPHRINE HCL IN 0.9% NACL 1 MG/10 ML
SYRINGE (ML) INTRAVENOUS AS NEEDED
Status: DISCONTINUED | OUTPATIENT
Start: 2020-08-18 | End: 2020-08-18 | Stop reason: SURG

## 2020-08-18 RX ORDER — ROCURONIUM BROMIDE 10 MG/ML
INJECTION, SOLUTION INTRAVENOUS AS NEEDED
Status: DISCONTINUED | OUTPATIENT
Start: 2020-08-18 | End: 2020-08-18 | Stop reason: SURG

## 2020-08-18 RX ORDER — DEXTROSE 40 %
15-30 GEL (GRAM) ORAL AS NEEDED
Status: DISCONTINUED | OUTPATIENT
Start: 2020-08-18 | End: 2020-08-18

## 2020-08-18 RX ORDER — ONDANSETRON HYDROCHLORIDE 2 MG/ML
INJECTION, SOLUTION INTRAVENOUS AS NEEDED
Status: DISCONTINUED | OUTPATIENT
Start: 2020-08-18 | End: 2020-08-18 | Stop reason: SURG

## 2020-08-18 RX ORDER — MORPHINE SULFATE 4 MG/ML
4 INJECTION, SOLUTION INTRAMUSCULAR; INTRAVENOUS ONCE
Status: DISCONTINUED | OUTPATIENT
Start: 2020-08-18 | End: 2020-08-18

## 2020-08-18 RX ORDER — MORPHINE SULFATE 4 MG/ML
INJECTION, SOLUTION INTRAMUSCULAR; INTRAVENOUS
Status: COMPLETED
Start: 2020-08-18 | End: 2020-08-18

## 2020-08-18 RX ORDER — POTASSIUM CHLORIDE 14.9 MG/ML
20 INJECTION INTRAVENOUS AS NEEDED
Status: DISCONTINUED | OUTPATIENT
Start: 2020-08-18 | End: 2020-08-27 | Stop reason: HOSPADM

## 2020-08-18 RX ORDER — DEXTROSE 50 % IN WATER (D50W) INTRAVENOUS SYRINGE
25 AS NEEDED
Status: DISCONTINUED | OUTPATIENT
Start: 2020-08-18 | End: 2020-08-27 | Stop reason: HOSPADM

## 2020-08-18 RX ORDER — KETAMINE HYDROCHLORIDE 10 MG/ML
INJECTION, SOLUTION INTRAMUSCULAR; INTRAVENOUS AS NEEDED
Status: DISCONTINUED | OUTPATIENT
Start: 2020-08-18 | End: 2020-08-18 | Stop reason: SURG

## 2020-08-18 RX ORDER — FENTANYL CITRATE 50 UG/ML
50 INJECTION, SOLUTION INTRAMUSCULAR; INTRAVENOUS ONCE
Status: COMPLETED | OUTPATIENT
Start: 2020-08-18 | End: 2020-08-18

## 2020-08-18 RX ORDER — FENTANYL CITRATE 50 UG/ML
INJECTION, SOLUTION INTRAMUSCULAR; INTRAVENOUS
Status: COMPLETED
Start: 2020-08-18 | End: 2020-08-18

## 2020-08-18 RX ORDER — IBUPROFEN 200 MG
16-32 TABLET ORAL AS NEEDED
Status: DISCONTINUED | OUTPATIENT
Start: 2020-08-18 | End: 2020-08-27 | Stop reason: HOSPADM

## 2020-08-18 RX ORDER — HYDROMORPHONE HYDROCHLORIDE 1 MG/ML
INJECTION, SOLUTION INTRAMUSCULAR; INTRAVENOUS; SUBCUTANEOUS
Status: COMPLETED
Start: 2020-08-18 | End: 2020-08-18

## 2020-08-18 RX ORDER — SODIUM CHLORIDE, SODIUM LACTATE, POTASSIUM CHLORIDE, CALCIUM CHLORIDE 600; 310; 30; 20 MG/100ML; MG/100ML; MG/100ML; MG/100ML
INJECTION, SOLUTION INTRAVENOUS CONTINUOUS
Status: ACTIVE | OUTPATIENT
Start: 2020-08-18 | End: 2020-08-19

## 2020-08-18 RX ORDER — ESCITALOPRAM OXALATE 20 MG/1
40 TABLET ORAL DAILY
COMMUNITY

## 2020-08-18 RX ORDER — POTASSIUM CHLORIDE 750 MG/1
40 TABLET, FILM COATED, EXTENDED RELEASE ORAL AS NEEDED
Status: DISCONTINUED | OUTPATIENT
Start: 2020-08-18 | End: 2020-08-27 | Stop reason: HOSPADM

## 2020-08-18 RX ORDER — ENOXAPARIN SODIUM 100 MG/ML
40 INJECTION SUBCUTANEOUS
Status: DISCONTINUED | OUTPATIENT
Start: 2020-08-18 | End: 2020-08-18

## 2020-08-18 RX ORDER — METRONIDAZOLE 500 MG/100ML
500 INJECTION, SOLUTION INTRAVENOUS
Status: DISCONTINUED | OUTPATIENT
Start: 2020-08-18 | End: 2020-08-18

## 2020-08-18 RX ORDER — HYDROMORPHONE HYDROCHLORIDE 1 MG/ML
0.5 INJECTION, SOLUTION INTRAMUSCULAR; INTRAVENOUS; SUBCUTANEOUS ONCE
Status: DISCONTINUED | OUTPATIENT
Start: 2020-08-18 | End: 2020-08-18

## 2020-08-18 RX ORDER — POTASSIUM CHLORIDE 750 MG/1
20 TABLET, FILM COATED, EXTENDED RELEASE ORAL AS NEEDED
Status: DISCONTINUED | OUTPATIENT
Start: 2020-08-18 | End: 2020-08-27 | Stop reason: HOSPADM

## 2020-08-18 RX ORDER — SODIUM CHLORIDE 9 MG/ML
INJECTION, SOLUTION INTRAVENOUS CONTINUOUS PRN
Status: DISCONTINUED | OUTPATIENT
Start: 2020-08-18 | End: 2020-08-18 | Stop reason: SURG

## 2020-08-18 RX ORDER — MIDAZOLAM HYDROCHLORIDE 2 MG/2ML
INJECTION, SOLUTION INTRAMUSCULAR; INTRAVENOUS AS NEEDED
Status: DISCONTINUED | OUTPATIENT
Start: 2020-08-18 | End: 2020-08-18 | Stop reason: SURG

## 2020-08-18 RX ADMIN — IOHEXOL 115 ML: 300 INJECTION, SOLUTION INTRAVENOUS at 00:54

## 2020-08-18 RX ADMIN — Medication 200 MCG: at 13:18

## 2020-08-18 RX ADMIN — Medication 200 MCG: at 13:35

## 2020-08-18 RX ADMIN — FENTANYL CITRATE 25 MCG/HR: 50 INJECTION, SOLUTION INTRAMUSCULAR; INTRAVENOUS at 16:41

## 2020-08-18 RX ADMIN — SODIUM CHLORIDE, SODIUM GLUCONATE, SODIUM ACETATE, POTASSIUM CHLORIDE AND MAGNESIUM CHLORIDE: 526; 502; 368; 37; 30 INJECTION, SOLUTION INTRAVENOUS at 13:20

## 2020-08-18 RX ADMIN — FENTANYL CITRATE 50 MCG: 50 INJECTION, SOLUTION INTRAMUSCULAR; INTRAVENOUS at 12:16

## 2020-08-18 RX ADMIN — ROCURONIUM BROMIDE 5 MG: 10 INJECTION, SOLUTION INTRAVENOUS at 04:12

## 2020-08-18 RX ADMIN — SODIUM CHLORIDE: 900 INJECTION, SOLUTION INTRAVENOUS at 12:28

## 2020-08-18 RX ADMIN — EPHEDRINE SULFATE 10 MG: 50 INJECTION INTRAVENOUS at 13:06

## 2020-08-18 RX ADMIN — FENTANYL CITRATE 50 MCG: 50 INJECTION, SOLUTION INTRAMUSCULAR; INTRAVENOUS at 12:37

## 2020-08-18 RX ADMIN — EPHEDRINE SULFATE 10 MG: 50 INJECTION INTRAVENOUS at 13:12

## 2020-08-18 RX ADMIN — SUCCINYLCHOLINE CHLORIDE 120 MG: 20 INJECTION, SOLUTION INTRAMUSCULAR; INTRAVENOUS; PARENTERAL at 12:37

## 2020-08-18 RX ADMIN — METRONIDAZOLE 500 MG: 500 INJECTION, SOLUTION INTRAVENOUS at 17:23

## 2020-08-18 RX ADMIN — SODIUM CHLORIDE, SODIUM LACTATE, POTASSIUM CHLORIDE, CALCIUM CHLORIDE: 600; 310; 30; 20 INJECTION, SOLUTION INTRAVENOUS at 16:28

## 2020-08-18 RX ADMIN — Medication 100 MCG: at 13:30

## 2020-08-18 RX ADMIN — MORPHINE SULFATE 4 MG: 4 INJECTION, SOLUTION INTRAMUSCULAR; INTRAVENOUS at 01:00

## 2020-08-18 RX ADMIN — EPHEDRINE SULFATE 5 MG: 50 INJECTION INTRAVENOUS at 12:51

## 2020-08-18 RX ADMIN — PROPOFOL 200 MG: 10 INJECTION, EMULSION INTRAVENOUS at 04:12

## 2020-08-18 RX ADMIN — Medication 25 MG: at 12:37

## 2020-08-18 RX ADMIN — PANTOPRAZOLE SODIUM 40 MG: 40 INJECTION, POWDER, LYOPHILIZED, FOR SOLUTION INTRAVENOUS at 16:29

## 2020-08-18 RX ADMIN — MORPHINE SULFATE 4 MG: 4 INJECTION, SOLUTION INTRAMUSCULAR; INTRAVENOUS at 09:54

## 2020-08-18 RX ADMIN — EPHEDRINE SULFATE 10 MG: 50 INJECTION INTRAVENOUS at 04:41

## 2020-08-18 RX ADMIN — EPHEDRINE SULFATE 5 MG: 50 INJECTION INTRAVENOUS at 12:53

## 2020-08-18 RX ADMIN — MORPHINE SULFATE 4 MG: 4 INJECTION, SOLUTION INTRAMUSCULAR; INTRAVENOUS at 07:44

## 2020-08-18 RX ADMIN — ONDANSETRON 4 MG: 2 INJECTION INTRAMUSCULAR; INTRAVENOUS at 13:39

## 2020-08-18 RX ADMIN — ROCURONIUM BROMIDE 20 MG: 10 INJECTION, SOLUTION INTRAVENOUS at 13:40

## 2020-08-18 RX ADMIN — METRONIDAZOLE 500 MG: 500 INJECTION, SOLUTION INTRAVENOUS at 07:39

## 2020-08-18 RX ADMIN — EPHEDRINE SULFATE 10 MG: 50 INJECTION INTRAVENOUS at 13:26

## 2020-08-18 RX ADMIN — PROPOFOL 70 MCG/KG/MIN: 10 INJECTION, EMULSION INTRAVENOUS at 16:28

## 2020-08-18 RX ADMIN — EPHEDRINE SULFATE 10 MG: 50 INJECTION INTRAVENOUS at 13:30

## 2020-08-18 RX ADMIN — MORPHINE SULFATE 2 MG: 2 INJECTION, SOLUTION INTRAMUSCULAR; INTRAVENOUS at 06:17

## 2020-08-18 RX ADMIN — ROCURONIUM BROMIDE 5 MG: 10 INJECTION, SOLUTION INTRAVENOUS at 12:38

## 2020-08-18 RX ADMIN — FENTANYL CITRATE 50 MCG: 0.05 INJECTION, SOLUTION INTRAMUSCULAR; INTRAVENOUS at 15:20

## 2020-08-18 RX ADMIN — SUCCINYLCHOLINE CHLORIDE 160 MG: 20 INJECTION, SOLUTION INTRAMUSCULAR; INTRAVENOUS; PARENTERAL at 04:12

## 2020-08-18 RX ADMIN — ROCURONIUM BROMIDE 30 MG: 10 INJECTION, SOLUTION INTRAVENOUS at 14:00

## 2020-08-18 RX ADMIN — LIDOCAINE HYDROCHLORIDE 5 ML: 10 INJECTION, SOLUTION INFILTRATION; PERINEURAL at 04:12

## 2020-08-18 RX ADMIN — Medication 200 MCG: at 13:12

## 2020-08-18 RX ADMIN — PROPOFOL 150 MG: 10 INJECTION, EMULSION INTRAVENOUS at 12:37

## 2020-08-18 RX ADMIN — HYDROMORPHONE HYDROCHLORIDE 1 MG: 1 INJECTION, SOLUTION INTRAMUSCULAR; INTRAVENOUS; SUBCUTANEOUS at 06:02

## 2020-08-18 RX ADMIN — Medication 5 MG: at 13:24

## 2020-08-18 RX ADMIN — SODIUM CHLORIDE, SODIUM GLUCONATE, SODIUM ACETATE, POTASSIUM CHLORIDE AND MAGNESIUM CHLORIDE: 526; 502; 368; 37; 30 INJECTION, SOLUTION INTRAVENOUS at 12:40

## 2020-08-18 RX ADMIN — CHLORHEXIDINE GLUCONATE 15 ML: 1.2 RINSE ORAL at 21:31

## 2020-08-18 RX ADMIN — MIDAZOLAM HYDROCHLORIDE 2 MG: 1 INJECTION, SOLUTION INTRAMUSCULAR; INTRAVENOUS at 12:36

## 2020-08-18 RX ADMIN — FENTANYL CITRATE 100 MCG: 50 INJECTION, SOLUTION INTRAMUSCULAR; INTRAVENOUS at 13:42

## 2020-08-18 RX ADMIN — LIDOCAINE HYDROCHLORIDE 5 ML: 10 INJECTION, SOLUTION INFILTRATION; PERINEURAL at 12:37

## 2020-08-18 RX ADMIN — PROPOFOL 60 MCG/KG/MIN: 10 INJECTION, EMULSION INTRAVENOUS at 21:29

## 2020-08-18 RX ADMIN — SODIUM CHLORIDE, SODIUM LACTATE, POTASSIUM CHLORIDE, CALCIUM CHLORIDE: 600; 310; 30; 20 INJECTION, SOLUTION INTRAVENOUS at 06:21

## 2020-08-18 RX ADMIN — CEFTRIAXONE SODIUM 2 G: 2 INJECTION, POWDER, FOR SOLUTION INTRAMUSCULAR; INTRAVENOUS at 09:22

## 2020-08-18 RX ADMIN — ROCURONIUM BROMIDE 45 MG: 10 INJECTION, SOLUTION INTRAVENOUS at 12:45

## 2020-08-18 RX ADMIN — HYDROMORPHONE HYDROCHLORIDE 0.5 MG: 1 INJECTION, SOLUTION INTRAMUSCULAR; INTRAVENOUS; SUBCUTANEOUS at 03:08

## 2020-08-18 ASSESSMENT — COGNITIVE AND FUNCTIONAL STATUS - GENERAL
CLIMB 3 TO 5 STEPS WITH RAILING: 1 - TOTAL
EATING MEALS: 1 - TOTAL
DRESSING REGULAR LOWER BODY CLOTHING: 1 - TOTAL
WALKING IN HOSPITAL ROOM: 1 - TOTAL
STANDING UP FROM CHAIR USING ARMS: 1 - TOTAL
TOILETING: 1 - TOTAL
MOVING TO AND FROM BED TO CHAIR: 1 - TOTAL
HELP NEEDED FOR PERSONAL GROOMING: 1 - TOTAL
DRESSING REGULAR UPPER BODY CLOTHING: 1 - TOTAL
HELP NEEDED FOR BATHING: 1 - TOTAL

## 2020-08-18 ASSESSMENT — PAIN SCALES - GENERAL: PAIN_LEVEL: 0

## 2020-08-18 ASSESSMENT — ENCOUNTER SYMPTOMS
VOMITING: 1
ABDOMINAL PAIN: 1
NAUSEA: 1
CONSTIPATION: 1
HEMATURIA: 0
DYSURIA: 0
DIARRHEA: 0
CHILLS: 0
BLOOD IN STOOL: 0
SHORTNESS OF BREATH: 0
FEVER: 0

## 2020-08-18 NOTE — ANESTHESIA PROCEDURE NOTES
Airway  Urgency: elective    Start Time: 8/18/2020 4:14 AM    General Information and Staff    Patient location during procedure: OR  Anesthesiologist: Maricruz Murcia MD  Resident/CRNA: Leticia Roque CRNA  Performed: resident/CRNA     Indications and Patient Condition  Indications for airway management: anesthesia and airway protection  Sedation level: general  Preoxygenated: yes  Patient position: sniffing  Mask difficulty assessment: 0 - not attempted    Final Airway Details  Final airway type: endotracheal airway      Successful airway: ETT    Successful intubation technique: video laryngoscopy  Facilitating devices/methods: intubating stylet  Endotracheal tube insertion site: oral  Blade: Giovanna  Blade size: #3  ETT size (mm): 8.0  Cormack-Lehane Classification: grade I - full view of glottis  Placement verified by: chest auscultation and capnometry   Measured from: lips  ETT to lips (cm): 21  Number of attempts at approach: 1  Number of other approaches attempted: 0  Atraumatic airway insertion

## 2020-08-18 NOTE — PATIENT CARE CONFERENCE
Care Progression Rounds Note  Date: 8/18/2020  Time: 11:12 AM     Patient Name: Selvin Jorge     Medical Record Number: [id number]   YOB: 1985  Sex: Male      Room/Bed: 5458    Admitting Diagnosis: Volvulus (CMS/HCC) [K56.2]  Volvulus (CMS/HCC) [K56.2]   Admit Date/Time: 8/17/2020 10:36 PM    Primary Diagnosis: No Principal Problem: There is no principal problem currently on the Problem List. Please update the Problem List and refresh.  Principal Problem: No Principal Problem: There is no principal problem currently on the Problem List. Please update the Problem List and refresh.    GMLOS: pending  Anticipated Discharge Date: 8/23/2020    AM-PAC  Mobility Score:      Discharge Planning:  Anticipated Discharge Disposition: home with assistance, home with home health services    Barriers to Discharge:  Barriers to Discharge: Medical issues not resolved(going to the OR today)    Participants:  , social work/services, nursing

## 2020-08-18 NOTE — ANESTHESIOLOGIST PRE-PROCEDURE ATTESTATION
Pre-Procedure Patient Identification:  I am the Primary Anesthesiologist and have identified the patient on 08/18/20 at 4:53 AM.   I have confirmed the following procedure(s) DIAGNOSTIC FLEXIBLE SIGMOIDOSCOPY WITH COLLECTION OF SPECIMEN BY WASHING will be performed by the following surgeon/proceduralist Hazel Jose DO.

## 2020-08-18 NOTE — PLAN OF CARE
Problem: Adult Inpatient Plan of Care  Goal: Readiness for Transition of Care  8/18/2020 1044 by Tiffany Khan RN  Outcome: Progressing     Problem: Adult Inpatient Plan of Care  Goal: Readiness for Transition of Care  Intervention: Mutually Develop Transition Plan  Flowsheets (Taken 8/18/2020 1044)  Anticipated Discharge Disposition: home with assistance; home with home health services  Equipment Needed After Discharge: -- (To be determined post/op)  Assistive Device/Animal Currently Used at Home: none  Concerns Comments: Attempted to meet with patient.   OR planned for today then transfer to [facility]. Recently medicated for abdominal pain. Patient resting on side and not disturbed. Info from chart and discharge planning rounds. Independent with adls. Lives in [place] with wife. In [place] for the summer staying with inlaws. CC will continue to follow post/op for any discharge planning needs.  Transportation Concerns: car, none  Readmission Within the Last 30 Days: no previous admission in last 30 days  Patient/Family Anticipates Transition to: home with family  Transportation Anticipated: family or friend will provide  Concerns to be Addressed: discharge planning

## 2020-08-18 NOTE — ED PROVIDER NOTES
HPI     Chief Complaint   Patient presents with   • Abdominal Pain     abd pain today with one episode of vomiting  EMS gave 50mcg Fentanyl x 2 and 4mg zofran with pain relief        36yo M hx of Chron's disease with total colectomy and J pouch 2008 presents to ED for abd pain.  Patient reports pain has been throughout the day today in his general abdomen.  Associated with nausea and several episodes of nonbloody vomiting.  He notes decreased movement in his stools.  Patient is concerned due to feeling similar to this 2 months prior when he was diagnosed with a volvulus.  Patient reports this resolved on its own and he had a follow-up barium enema 11 days ago which was normal.  Patient follows with GI at [facility] in [place].  He denies any fevers, chills, diarrhea, bloody stools, chest pain or shortness of breath.           Patient History     Past Medical History:   Diagnosis Date   • Crohn's disease (CMS/HCC)        Past Surgical History:   Procedure Laterality Date   • ABDOMINAL SURGERY         History reviewed. No pertinent family history.    Social History     Tobacco Use   • Smoking status: Never Smoker   Substance Use Topics   • Alcohol use: Never     Frequency: Never   • Drug use: Not on file       Systems Reviewed from Nursing Triage:          Review of Systems     Review of Systems   Constitutional: Negative for chills and fever.   Respiratory: Negative for shortness of breath.    Cardiovascular: Negative for chest pain.   Gastrointestinal: Positive for abdominal pain, constipation, nausea and vomiting. Negative for blood in stool and diarrhea.   Genitourinary: Negative for dysuria and hematuria.        Physical Exam     ED Triage Vitals   Temp Heart Rate Resp BP SpO2   08/17/20 2238 08/17/20 2238 08/17/20 2238 08/17/20 2238 08/17/20 2238   (!) 35.6 °C (96 °F) (!) 53 16 129/61 99 %      Temp Source Heart Rate Source Patient Position BP Location FiO2 (%) (Set)   08/17/20 2238 08/17/20 8084  08/17/20 2354 08/17/20 2354 --   Tympanic Monitor Lying Right upper arm        Pulse Ox %: 99 % (08/17/20 2348)  Pulse Ox Interpretation: Normal (08/17/20 2348)  Heart Rate: 53 (08/17/20 2348)  Rhythm Strip Interpretation: Sinus Bradycardia (08/17/20 2348)    Patient Vitals for the past 24 hrs:   BP Temp Temp src Pulse Resp SpO2   08/17/20 2354 125/75 -- -- (!) 52 16 96 %   08/17/20 2238 129/61 (!) 35.6 °C (96 °F) Tympanic (!) 53 16 99 %                                          Physical Exam   Constitutional: He appears well-developed and well-nourished.   Pt well appearing, received 100 mcg fentanyl and only reports 3/10 pain.   HENT:   Head: Normocephalic and atraumatic.   Eyes: Conjunctivae are normal.   Neck: Neck supple.   Cardiovascular: Normal rate and regular rhythm.   No murmur heard.  Pulmonary/Chest: Effort normal and breath sounds normal. No respiratory distress.   Abdominal: Soft. There is no tenderness.   Musculoskeletal: He exhibits no edema.   Neurological: He is alert.   Skin: Skin is warm and dry.   Psychiatric: He has a normal mood and affect.   Nursing note and vitals reviewed.           Critical Care  Performed by: Eduardo Figueroa MD  Authorized by: Eduardo Figueroa MD     Critical care provider statement:     Critical care time (minutes):  50    Critical care was necessary to treat or prevent imminent or life-threatening deterioration of the following conditions: Volvulus.    Critical care was time spent personally by me on the following activities:  Development of treatment plan with patient or surrogate, discussions with consultants, evaluation of patient's response to treatment, examination of patient, ordering and performing treatments and interventions, ordering and review of laboratory studies, ordering and review of radiographic studies, pulse oximetry and re-evaluation of patient's condition        Labs Reviewed   BASIC METABOLIC PANEL - Abnormal       Result Value    Sodium 134  (*)     Potassium 4.1      Chloride 100      CO2 24      BUN 21 (*)     Creatinine 1.1      Glucose 86      Calcium 9.1      eGFR >60.0      Anion Gap 10     HEPATIC FUNCTION PANEL - Abnormal    Albumin 4.6      Bilirubin, Total 1.9 (*)     Bilirubin, Direct 0.3      Alkaline Phosphatase 38      AST (SGOT) 67 (*)     ALT (SGPT) 45      Total Protein 8.0     LIPASE - Normal    Lipase 32     CBC AND DIFF    WBC 9.07      RBC 5.00      Hemoglobin 15.4      Hematocrit 46.8      MCV 93.6      MCH 30.8      MCHC 32.9      RDW 12.7      Platelets 243      MPV 9.6      Differential Type Auto      nRBC 0.0      Immature Granulocytes 0.4      Neutrophils 58.1      Lymphocytes 33.8      Monocytes 6.2      Eosinophils 0.9      Basophils 0.6      Immature Granulocytes, Absolute 0.04      Neutrophils, Absolute 5.27      Lymphocytes, Absolute 3.07      Monocytes, Absolute 0.56      Eosinophils, Absolute 0.08      Basophils, Absolute 0.05     RAINBOW DRAW PANEL    Narrative:     The following orders were created for panel order [place] Draw Panel.  Procedure                               Abnormality         Status                     ---------                               -----------         ------                     RAINBOW LAVENDER[[id number]]                                                            RAINBOW LT GREEN[[id number]]                                                            RAINBOW LT GREEN[[id number]]                                 In process                   Please view results for these tests on the individual orders.   RAINBOW LAVENDER   RAINBOW LT GREEN   RAINBOW LT GREEN       CT ABDOMEN PELVIS WITH IV CONTRAST    (Results Pending)               ED Course & MDM     [facility]         ED Course as of Aug 18 1234   Mon Aug 17, 2020   2252 Spoke with pt GI doc. Hx of stricturing with UC colectomy and J pouch. CT scan showed volvulus 2 months ago. Resolved on own. Similar pain today. Barium enema 11d ago- everything  looked ok. Dr. Andrews at [facility]. [phone], Dr. Peña.     [EB]   Tue Aug 18, 2020   0127 CAT scan appears to be a large bowel obstruction.  It has not been officially read this is my opinion based on my read.  Will call surgery to look at the CAT scan and evaluate as we wait for radiology to read the scan    [RS]   0132 Page to surgery     [EB]   0134 D/w surgery concern for LBO. Pending formal read.     [EB]   0210 IMPRESSION:  Marked distention of the colon with probable transition point at the proximal  rectosigmoid anastomosis, potentially on the basis of an underlying stricture  with suspected secondary sigmoid volvulus.  Small amount of free fluid.     Finding: Other   Acuity: Critical  Status: CLOSED  The results were critically read back with DR EDUARDO GÓMEZ on 8/18/2020  1:37 AM.       [EB]   0221 D/w dr. Jose. Will see pt in am    [EB]   0237 D/w Dr. Jose. Will come in now to do flexible sig.     [EB]   0306 Surgery recommended NG tube to suction it has been ordered.    [RS]   0556 Apparently patient was never admitted to medicine or to surgery.  I called Dr. Jose to see who she wanted the patient admitted to she recommend surgery I spoke with surgery resident they will admit.    [RS]      ED Course User Index  [EB] Allie Shore PA C  [RS] Eduardo Gómez MD         Clinical Impressions as of Aug 18 1234   Volvulus (CMS/HCC)        Allie Shore PA C  08/18/20 1235

## 2020-08-18 NOTE — ASSESSMENT & PLAN NOTE
35 year old male with history of Crohn's disease s/p total colectomy with J-pouch and recent volvulus two months ago.  Attempted flexible sigmoidoscopy decompression unsucessful.  Patient underwent emergent surgical decompression.  Second look yesterday with washout, but unable to close given persistent dilation.  Patient states pain is adequately controlled.    Currently on ABX.  Would continue to hold Humira for now.  He normally receives it weekly on fridays.  If no signs of infection, would consider restarting when able.  Recent studies suggest no impairment of wound healing while on biologics. - For Now, would hold.    Plans for repeat OR today.

## 2020-08-18 NOTE — PROGRESS NOTES
Surgery Senior Note    Patient's CT A/P reviewed showing evidence of high grade small bowel obstruction. Given patient's exam, level of pain, and CT, will need urgent operative intervention. Discussed with patient who is amenable.    Spoke with patient's wife, Hazel, who was updated on Mr Jorge's clinical status and current operative plan. She asked that I speak with his GI team at [facility]. I spoke with the gastroenterology NP Sonja and updated her on the patient's status. She spoke with Dr Peña (IBD specialist) and Dr Fay (surgeon) who requested that we update them after the OR.    Will continue to plan for OR pending room availability.    Altagracia Emery MD

## 2020-08-18 NOTE — OP NOTE
_______________________________________________________________________________  Patient Name: Selvin Jorge           Procedure Date: 8/18/2020 3:38 AM  MRN: [id number]                     Account Number: [id number]  YOB: 1985              Age: 35  Gender: Male                          Note Status: Finalized  Attending MD: ARNDELL DON  _______________________________________________________________________________  Procedure:            Flexible Sigmoidoscopy  Indications:          Generalized abdominal pain, Abnormal CT of the GI  tract, Crohn's disease of the colon s/p total colectomy  with J-pouch, For therapy of volvulus  Providers:            RANDELL MURO (Doctor), Sp Dickson, RN (Nurse), Lidia Howe RN (Nurse)  Referring MD:  Requesting Provider:  Complications:        No immediate complications.  _______________________________________________________________________________  Procedure:            After obtaining informed consent, the endoscope was  passed under direct vision. Throughout the procedure,  the patient's blood pressure, pulse, and oxygen  saturations were monitored continuously. The was  introduced through the anus and advanced passed the  ileo-rectal anastomosis. The flexible sigmoidoscopy was  accomplished without difficulty. The patient tolerated  the procedure well.  Estimated Blood Loss: Estimated blood loss: none.  Findings:  A volvulus with viable appearing mucosa although ulceration and  friability was found just proximal the anastomosis. There was no  evidence of a stricture. Decompression of the volvulus was attempted,  and partial decompression was achieved. Upon withdrawal of the  colonoscope the lumen did appear to untwist or open up however there was  not a large gush of stool or air with this and the abdomen did appear  slightly more distended at the end of the procedure.  Impression:           - Volvulus. Partial  decompression achieved. Concern  that this may quickly recur based on results of flex  sig.  - No specimens collected.  Recommendation:       - Admit the patient to hospital [facility] for ongoing care.  - NG tube to suction.  - Serial abdominal examinations.  - Serial abdominal x-rays.  - Ultimately low threshold for surgery if recurs this  admission or concerns of peritonitis.  Procedure Code(s):    --- Professional ---  41770, Sigmoidoscopy, flexible; with decompression (for  pathologic distention) (eg, volvulus, megacolon),  including placement of decompression tube, when  performed  Diagnosis Code(s):    --- Professional ---  K56.2, Volvulus  R10.84, Generalized abdominal pain  K50.10, Crohn's disease of large intestine without  complications  R93.3, Abnormal findings on diagnostic imaging of other  parts of digestive tract  CPT copyright 2018 [employer]. All rights reserved.  The codes documented in this report are preliminary and upon  review may  be revised to meet current compliance requirements.  ________________________________  RANDELL DON  8/18/2020 4:51:27 AM  Number of Addenda: 0  Note Initiated On: 8/18/2020 3:38 AM

## 2020-08-18 NOTE — NURSING NOTE
Pt c/o 10/10 abdominal pain. Pt is diaphoretic and writhing around in pain on bed. Dr. Emery, surgery resident, made aware, orders received for IV Dilaudid. Pt made aware. States Dilaudid does not work for him, requesting morphine. Dr. Emery, made aware- orders received and carried out.

## 2020-08-18 NOTE — CONSULTS
General Surgery Consult    Subjective     Selvin Jorge is a 35 y.o. male who was admitted for Volvulus (CMS/HCC) [K56.2]. Patient was seen in consultation at the request of referring physician for management recommendations.     Mr. Jorge is a 35M w/ PMHx of Crohn's (previously diagnosed as UC) s/p Total Proctocolectomy w/ ileoanal anastomosis who presents with abdominal pain. Around 1pm, he developed abdominal pain, described as a constant, dull throbbing, located diffusely in the abdomen. He has been nauseated and had two episode of emesis, following PO intake. He last passed flatus around 8PM and had a bowel movement around 11am. He denies fevers/chills/CP/SOB.    Relevant history: He was initially diagnosed with UC in 2006 and then had a significant flare in 2008, after which he underwent a total proctocolectomy w/ ileoanal anastomosis. He was then re-diagnosed with Crohn's years later and has been on Humira for the last two years (administrates once a week, last dose on Friday). Patient had a similar episode 2 months ago in [facility] and was found to have a volvulus. He was admitted with plans for decompression the following morning. However, by morning, he was passing flatus and his pain had ceased so he was told that the volvulus had resolved. He had a follow-up barium enema 11 days ago with his GI doctor who informed him that there was no evidence of a volvulus at that time.    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 He and his wife have been staying in the [facility] with her parents for the summer.    GI: Dr. Peña, [facility], [place]    In the ED: WBC: 9, Lactate: 1  CTAP: Marked small bowel dilatation proximal to anastomosis with suspected volvulus, likely related to possible stricture at the anastomosis.    Medical History:   Past Medical History:   Diagnosis Date   • Crohn's disease (CMS/HCC)        Surgical History:   Past Surgical History:   Procedure Laterality Date   • ABDOMINAL  SURGERY         Social History:   Social History     Social History Narrative   • Not on file       Family History: History reviewed. No pertinent family history.    Allergies: Patient has no known allergies.    Home Medications:  •  escitalopram, Take 40 mg by mouth daily.    Current Medications:      Review of Systems  All other systems reviewed and negative except as noted in the HPI.    Objective     Physicial Exam  Visit Vitals  /73 (BP Location: Right upper arm, Patient Position: Lying)   Pulse (!) 55   Temp (!) 35.6 °C (96 °F) (Tympanic)   Resp 16   SpO2 100%       General appearance: alert, appears stated age and cooperative  Head: normocephalic, without obvious abnormality, atraumatic  Eyes: conjunctivae/corneas clear. PERRL  Neck: supple, symmetrical, trachea midline  Lungs: normal breath effort  Chest wall: no tenderness  Heart: regular rate and rhythm  Abdomen: soft, mild diffuse tenderness, distended, no rebound/guarding  Extremities: extremities normal, warm and well-perfused  Skin: Skin color, texture, turgor normal  Neurologic: Grossly normal    Labs  CBC Results       08/17/20                          2245           WBC 9.07           RBC 5.00           HGB 15.4           HCT 46.8           MCV 93.6           MCH 30.8           MCHC 32.9                                    BMP Results       08/17/20                          2246                      K 4.1           Cl 100           CO2 24           Glucose 86           BUN 21           Creatinine 1.1           Calcium 9.1           Anion Gap 10           EGFR >60.0           Comment for K at 2246 on 08/17/20:    Results obtained on plasma. Plasma Potassium values may be up to 0.4 mEQ/L less than serum values. The differences may be greater for patients with high platelet or white cell counts.        Imaging  8/18 CTAP: Marked small bowel dilatation proximal to anastomosis with suspected volvulus, likely related to possible  stricture at the anastomosis.    Assessment     35M w/ Crohn's s/p Total Proctocolectomy w/ IPAA on Humera p/w abdominal pain. Findings of dilated small bowel loops c/f recurrent volvulus.        Plan     NGT decompression  Keep NPO w/IVF  Recommend GI decompression  Given Crohn's diagnosis and ongoing immmunosuppresive treatment, if stricture is causative, endoscopic management may be preferable Additional recommendations to be made following GI flex sig    Carolyn Levin MD   #[id number]

## 2020-08-18 NOTE — ANESTHESIA PREPROCEDURE EVALUATION
Relevant Problems   GASTROINTESTINAL   (+) Crohn's disease of colon (CMS/HCC)       Anesthesia ROS/MED HX    Anesthesia History    Previous anesthetics  Pulmonary - neg  Neuro/Psych - neg  Cardiovascular- neg   Covid19 Test Reviewed  Hematological - neg  Musculoskeletal- neg  Renal Disease- neg  Endo/Other- neg  Body Habitus: Normal  ROS/MED HX Comments:    GI/Hepatic/Renal: Chron's disease       Past Surgical History:   Procedure Laterality Date   • ABDOMINAL SURGERY         Physical Exam    Airway   Mallampati: II   TM distance: >3 FB   Neck ROM: full  Cardiovascular - normal   Rhythm: regular   Rate: normalPulmonary - normal   clear to auscultation  Dental - normal        Anesthesia Plan    Plan: general    Technique: general endotracheal     Airway: video laryngoscope       patient did not smoke on day of surgery  ASA 2 - emergent  Blood Products:   Use of Blood Products Discussed: No   Anesthetic plan and risks discussed with: patient  Induction:    intravenous   Postop Plan:   Patient Disposition: inpatient floor planned admission   Pain Management: IV analgesics

## 2020-08-18 NOTE — NURSING NOTE
Pt back from CT scan. Still c/o 10/10 abdominal pain and appears diaphoretic, writhing around in pain on bed. States 4mg morphine given earlier did no help at all. An Rodriguez, surgery PA, made aware.

## 2020-08-18 NOTE — NURSING NOTE
Rechecked patient's vitals per surgery resident's request. /98, heart rate 50, 93% (s/p 2L NC), and unable to obtain temp, temporal not registering properly due to cold sweat, and patient refused an oral temperature.

## 2020-08-18 NOTE — OP NOTE
Pre-op dx: Small bowel volvulus     Postop diagnosis: Small bowel volvulus     Procedure: Exploratory laparotomy, reduction of small bowel volvulus, washout, temporary abdominal wall closure with Abthera     Surgeon: Bryan     Assistant: PGY-4     Anesthesia: General endotracheal anesthesia     EBL: Minimal     Indications for procedure: 35 year old male who initially was diagnosed with ulcerative colitis in 2006 and then had a significant flare in 2008, and he underwent a total proctocolectomy w/ ileoanal anastomosis at the [facility]. He was then diagnosed with Crohn's years later and has been on Humira for the last two years (every Friday injection). He reported to the ED with the onset at 1pm of abdominal pain, constant, dull, throbbing, located diffusely in the abdomen with abdominal distention. He has been nauseated and had two episode of emesis, following PO intake. He last passed flatus around 8 PM and had a bowel movement around 11 am. He underwent endoscopic decompression by GI Dr. Joes.  Upon withdrawing the scope, the lumen did reportedly partially untwist, but there was no large gush of air or stool.  Post procedure, he had worsening abdominal pain, and CT scan showed persistent high grade SBO and volvulus. He reported a similar episode 2 months ago in [facility] and was found to have a volvulus. He was admitted with plans for decompression the following morning. However, by morning, he was passing flatus and his pain had ceased so he was told that the volvulus had resolved. We plan to proceed to the operating room for exploratory laparotomy, washout, possible bowel resection, other procedures as indicated. The risks and benefits of surgery were discussed with Selvin Jorge and his wife, including but not limited to bleeding, infection, injury to surrounding structures, bowel resection, anastomotic leak, possible ostomy, fistula, need for reoperation, heart attack, stroke, DVT, and even death,  and Selvin Jorge was willing to proceed.      Procedure: After the patient was correctly identified as Selvin Jorge, he was brought to the operating room and transferred to the operating table in the supine position.  General endotracheal anesthesia was administered without difficulty.  A Borrero catheter had already been inserted.  A NGT had already been placed. The patient's abdomen was prepped and draped in the usual sterile fashion. A midline incision was made with a 10 blade scalpel.  Cautery was used to dissect through the subcutaneous tissue and to open anterior fascia.  The posterior fascia was grasped between 2 hemostats and opened sharply.  The rest of the fascia was then opened under direct visualization with cautery.  The bowel was eviscerated and markedly dilated. There was found to be a volvulus within the mesentery of the small bowel that was twisted on a redundant mesentery.  There were no adhesions or internal hernia.  Initially, the small bowel was a light purple color, however after untwisting the volvulus within the mesentery, the small bowel perked up and appeared healthy, viable, and nonischemic.  A Doppler pulse was performed to all the areas of the small bowel and there was noted to be a great pulse with in the bowel wall.  The small bowel was ran all the way from the ligament of Treitz to the J pouch the pelvis. The bowel was markedly dilated, particularly at the small bowel anastomosis, with fecalized stool.  The abdomen was copiously irrigated with 5 L of saline.  The NGT was palpated and found to be good position within the stomach.  Given the marked dilation of the bowel, the abdomen was unable to be closed at this time.  Small bowel was carefully placed back within the abdomen, taking care to ensure that it was not twisted.  The decision was made to proceed with a temporary abdominal closure with Abthera device, with plans to proceed to the operating room tomorrow for a second look  of the bowel, washout, and possible abdominal wall closure.    Patient was transferred to the PACU intubated, with plans to return to the operating room tomorrow for a second look, abdominal washout, and possible closure.  All instrument and sponge counts were correct at the end of the case.  His wife was updated at the end of the operation along with his surgery team at [facility] per the family's request.

## 2020-08-18 NOTE — PLAN OF CARE
Problem: Adult Inpatient Plan of Care  Goal: Plan of Care Review  8/18/2020 1157 by Aminata Mathis, RN  Outcome: Progressing  Flowsheets (Taken 8/18/2020 1157)  Progress: declining  Plan of Care Reviewed With: patient  Outcome Summary: Pt remains in a significant amount of abdominal pain. Borrero catheter placed. VSS. Transferred to pre-op area for abdominal surgery. Pt's wife, Hazel, vaughn.

## 2020-08-18 NOTE — SIGNIFICANT EVENT
Patient is s/p Flex Sig by GI   Paged by nurse that patient was complaining of significantly worsened abdominal pain. Came to bedside to assess the patient.  HR: 50 BP: 169/98 O2: 93% 2LNC  On physical exam, he was diaphoretic and in distress. Abdomen is soft but diffusely tender c/f peritonitis.   Sent off for STAT labs, CXR/Abdominal XR, STAT CTAP w/ IV contrast to assess for free air. Discussed with patient the likelihood of OR if evidence of perforation. Discussed with Dr. Adams, who is also present at bedside

## 2020-08-18 NOTE — PROGRESS NOTES
GI Daily Progress Note           SUBJECTIVE    LOS: 0 days     Interval History: complaining of worsening pain.     Review of Systems  All other systems were reviewed and are negative oither than as stated in the HPI   OBJECTIVE   Vital signs in last 24 hours:  Temp:  [35.6 °C (96 °F)-36.8 °C (98.3 °F)] 36.2 °C (97.1 °F)  Heart Rate:  [46-74] 50  Resp:  [16-20] 18  BP: (114-169)/(61-98) 169/98      Intake/Output Summary (Last 24 hours) at 8/18/2020 0733  Last data filed at 8/17/2020 2352  Gross per 24 hour   Intake 1000 ml   Output --   Net 1000 ml       Intake/Output this shift:  No intake/output data recorded.   Current Medications:  •  cefTRIAXone, 2 g, intravenous, q24h INT  •  glucose, 16-32 g of dextrose, oral, PRN **OR** dextrose, 15-30 g of dextrose, oral, PRN **OR** glucagon, 1 mg, intramuscular, PRN **OR** dextrose in water, 25 mL, intravenous, PRN  •  lactated ringer's, , intravenous, Continuous  •  metroNIDAZOLE, 500 mg, intravenous, q8h INT    Physical Exam  General appearance: diaphoretic, writhing in pain.  Head: normocephalic, without obvious abnormality, atraumatic  Lungs: clear to auscultation bilaterally  Heart: tachy, nl rhythm, S1, S2 normal, no murmur, click, rub or gallop  Abdomen:ABDOMEN EXAM: distended, diffusely tender, +tympany ruq,   Extremities: extremities normal, warm and well-perfused; no cyanosis, clubbing, or edema  Skin: Skin color, texture, turgor normal. No rashes or lesions  Neurologic: Grossly normal     LABS & IMAGING   Labs      Results from last 7 days   Lab Units 08/18/20  0636 08/17/20  2245   WBC K/uL 10.12 9.07   HEMOGLOBIN g/dL 15.5 15.4   HEMATOCRIT % 46.6 46.8   PLATELETS K/uL 229 243     Results from last 7 days   Lab Units 08/18/20  0636 08/17/20  2246   SODIUM mEQ/L 135* 134*   POTASSIUM mEQ/L 4.2 4.1   CHLORIDE mEQ/L 105 100   CO2 mEQ/L 21* 24   BUN mg/dL 16 21*   CREATININE mg/dL 0.9 1.1   CALCIUM mg/dL 8.8* 9.1   ALBUMIN g/dL  --  4.6   BILIRUBIN TOTAL mg/dL   --  1.9*   ALK PHOS IU/L  --  38   ALT IU/L  --  45   AST IU/L  --  67*   GLUCOSE mg/dL 120* 86     Results from last 7 days   Lab Units 08/18/20  0636   INR INR 1.1       Imaging  I have independently reviewed the patient's Imaging. Current imaging findings - CXR/ABD xray with signifcant  Amount of air, unable to rule out free air       ASSESSMENT & PLAN     Active Problems:    Volvulus of colon (CMS/HCC)    Crohn's disease of colon (CMS/HCC)    Volvulus (CMS/HCC)      Crohn's disease of colon (CMS/HCC)  Assessment & Plan  See plan above    Volvulus of colon (CMS/HCC)  Assessment & Plan  35 year old male with history of Crohn's disease s/p total colectomy with J-pouch and recent volvulus two months ago now with imaging concerning for recurrent volvulus.   The patient is now in significant pain, diaphoretic after attempted decompression.  Abd xrays are insufficient in ruling out free air.  There is significant amounts of air in the bowel.    Plan:  - Emergent decompression via flex sig attempted.  Patient feeling worse after. I am concerned about a perforated viscous.  D/W Dr. Adams who is concerned that we are dealing with a perforated viscous, or persistent volvulus.  The patient is getting a stat repeat CT.              Van Rodriguez MD  8/18/2020  7:33 AM

## 2020-08-18 NOTE — ANESTHESIA PREPROCEDURE EVALUATION
Relevant Problems   GASTROINTESTINAL   (+) Crohn's disease of colon (CMS/HCC)         35 year old male with history of Crohn's disease s/p total colectomy with J-pouch and recent volvulus two months.  Pt had emergent attempted decompression via flex sig now c/f perforated viscous or persistent volvulus.     Anesthesia ROS/MED HX    Anesthesia History    Previous anesthetics  No family history of anesthetic complications  No history of anesthetic complications  Pulmonary - neg  Neuro/Psych - neg  Cardiovascular- neg   Covid19 Test Reviewed  Hematological - neg  Musculoskeletal- neg  Renal Disease- neg  Endo/Other- neg  Body Habitus: Normal  ROS/MED HX Comments:    Anesthesia History: VL X 1 with glide #3   GI/Hepatic/Renal: Cronh's dz s/p total colectomy and J pouch now with perforated viscous or volvulus       Past Surgical History:   Procedure Laterality Date   • ABDOMINAL SURGERY     • BOWEL RESECTION         CBC Results       08/18/20 08/17/20                       0636 2245          WBC 10.12 9.07          RBC 5.00 5.00          HGB 15.5 15.4          HCT 46.6 46.8          MCV 93.2 93.6          MCH 31.0 30.8          MCHC 33.3 32.9           243                       BMP Results       08/18/20 08/17/20                       0636 2246           134          K 4.2 4.1          Cl 105 100          CO2 21 24          Glucose 120 86          BUN 16 21          Creatinine 0.9 1.1          Calcium 8.8 9.1          Anion Gap 9 10          EGFR >60.0 >60.0          Comment for K at 2246 on 08/17/20:    Results obtained on plasma. Plasma Potassium values may be up to 0.4 mEQ/L less than serum values. The differences may be greater for patients with high platelet or white cell counts.      ,  Physical Exam    Airway   Mallampati: II   TM distance: >3 FB   Neck ROM: full  Cardiovascular    Rhythm: regular   Rate: normalPulmonary - normal   clear to auscultation  Dental - normal        Anesthesia Plan    Plan:  general    Technique: general endotracheal     Lines and Monitors: PIV     Airway: oral intubation and video laryngoscope       patient did not smoke on day of surgery  ASA 3  Blood Products:   Use of Blood Products Discussed: No   Anesthetic plan and risks discussed with: patient  Postop Plan:   Patient Disposition: phase II then home   Pain Management: IV analgesics

## 2020-08-18 NOTE — ASSESSMENT & PLAN NOTE
H/o Crohn's disease s/p total proctocolectomy with J pouch  8/18 volvulus reduction, ex lap, wash out, temporary closure  8/19 2nd look for washout and replacement of abthera  8/21 s/p replacement of abthera  8/24 s/p fascial closure with TE vac skin dressing placement    - wound care per surgical team - abd binder in place  - s/p periop abx  - NGT removed  - adequate pain control  - continue TPN, start CLD   - supportive care  - CXR with developing atelectasis   - encourage incentive spirometry   - encourage OOB  - lovenox for DVT prophylaxis, PPI for GI ppx  - stable for transfer to floor

## 2020-08-18 NOTE — H&P
General Surgery History and Physical    Diagnosis: Volvulus (CMS/HCC) [K56.2].  Chief Complaint:   Chief Complaint   Patient presents with   • Abdominal Pain     abd pain today with one episode of vomiting  EMS gave 50mcg Fentanyl x 2 and 4mg zofran with pain relief      HPI     Selvin Jorge is a 35 y.o. male who was admitted for Volvulus (CMS/HCC) [K56.2]. Patient was seen in consultation at the request of referring physician for management recommendations.      Mr. Jorge is a 35M w/ PMHx of Crohn's (previously diagnosed as UC) s/p Total Proctocolectomy w/ ileoanal anastomosis in 2008 at [facility] who presented to the ED with abdominal pain. Around 1pm, he developed abdominal pain, described as a constant, dull throbbing, located diffusely in the abdomen. He has been nauseated and had two episode of emesis, following PO intake. He last passed flatus around 8PM and had a bowel movement around 11am. He denies fevers/chills/CP/SOB.     Relevant history: He was initially diagnosed with UC in 2006 and then had a significant flare in 2008, after which he underwent a total proctocolectomy w/ ileoanal anastomosis. He was then re-diagnosed with Crohn's years later and has been on Humira for the last two years (administrates once a week, last dose on Friday). Patient had a similar episode 2 months ago in [facility] and was found to have a volvulus. He was admitted with plans for decompression the following morning. However, by morning, he was passing flatus and his pain had ceased so he was told that the volvulus had resolved. He had a follow-up barium enema 11 days ago with his GI doctor who informed him that there was no evidence of a volvulus at that time.     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 He and his wife have been staying in the [facility] with her parents for the summer.     GI: Dr. Peña, [facility], [place]     In the ED: WBC: 9, Lactate: 1  CTAP: Marked small bowel dilatation proximal  to anastomosis with suspected volvulus, likely related to possible stricture at the anastomosis.    Medical History:   Past Medical History:   Diagnosis Date   • Crohn's disease (CMS/HCC)      Surgical History:   Past Surgical History:   Procedure Laterality Date   • ABDOMINAL SURGERY     • BOWEL RESECTION       Social History:   Social History     Socioeconomic History   • Marital status:      Spouse name: None   • Number of children: None   • Years of education: None   • Highest education level: None   Occupational History   • None   Social Needs   • Financial resource strain: None   • Food insecurity:     Worry: None     Inability: None   • Transportation needs:     Medical: None     Non-medical: None   Tobacco Use   • Smoking status: Never Smoker   • Smokeless tobacco: Never Used   Substance and Sexual Activity   • Alcohol use: Never     Frequency: Never   • Drug use: None   • Sexual activity: None   Lifestyle   • Physical activity:     Days per week: None     Minutes per session: None   • Stress: None   Relationships   • Social connections:     Talks on phone: None     Gets together: None     Attends Gnosticism service: None     Active member of club or organization: None     Attends meetings of clubs or organizations: None     Relationship status: None   • Intimate partner violence:     Fear of current or ex partner: None     Emotionally abused: None     Physically abused: None     Forced sexual activity: None   Other Topics Concern   • None   Social History Narrative   • None     Family History: History reviewed. No pertinent family history.    Allergies: Patient has no known allergies.    Home Medications:  •  escitalopram, Take 40 mg by mouth daily.    Review of Systems  Full ROS performed and negative except for as noted in HPI.     Objective     Vital Signs for the last 24 hours:  Temp:  [35.6 °C (96 °F)-36.8 °C (98.3 °F)] 36.8 °C (98.3 °F)  Heart Rate:  [46-74] 53  Resp:  [16-20] 20  BP:  (114-129)/(61-85) 121/70    Physicial Exam    General appearance: alert, appears stated age and cooperative  Head: normocephalic, without obvious abnormality, atraumatic  Eyes: conjunctivae/corneas clear. PERRL, EOM's intact.  Neck: no adenopathy, no carotid bruit, no JVD, supple, symmetrical, trachea midline and thyroid not enlarged, symmetric, no tenderness/mass/nodules  Back: symmetric, no curvature. ROM normal. No CVA tenderness.  Lungs: clear to auscultation bilaterally, no increased work of breathing, no rhonchi or rales  Chest wall: no tenderness  Heart: regular rate and rhythm, S1, S2 normal, no murmur, click, rub or gallop  Abdomen:  soft, mild diffuse tenderness, distended, no rebound/guarding  Extremities: extremities normal, warm and well-perfused; no cyanosis, clubbing, or edema  Pulses: 2+ and symmetric  Skin: Skin color, texture, turgor normal. No rashes or lesions  Lymph nodes: Cervical, supraclavicular, axillary, and inguinal nodes normal.  Neurologic: Grossly normal, no motor or sensory deficits, cranial nerves II to XII are grossly intact.  Moves all extremities well.    Labs  CBC Results       08/17/20                          2245           WBC 9.07           RBC 5.00           HGB 15.4           HCT 46.8           MCV 93.6           MCH 30.8           MCHC 32.9                                    BMP Results       08/17/20                          2246                      K 4.1           Cl 100           CO2 24           Glucose 86           BUN 21           Creatinine 1.1           Calcium 9.1           Anion Gap 10           EGFR >60.0           Comment for K at 2246 on 08/17/20:    Results obtained on plasma. Plasma Potassium values may be up to 0.4 mEQ/L less than serum values. The differences may be greater for patients with high platelet or white cell counts.        Imaging  8/18 CTAP: Marked small bowel dilatation proximal to anastomosis with suspected volvulus, likely  related to possible stricture at the anastomosis.    Assessment/Plan      35M w/ Crohn's s/p Total Proctocolectomy w/ IPAA on Humera p/w abdominal pain. Findings of dilated small bowel loops c/f recurrent volvulus.    NGT decompression  Keep NPO w/IVF  Serial abdominal exams  GI c/s for decompression  Additional clinical planning to be made following GI flex sig     Carolyn Levin MD   #[id number]    Code Status: No Order     Surgery attending:     I performed a history and physical examination of the patient and discussed the management with the Resident. I reviewed the Resident's note and agree with the documented findings and plan of care, except for my comments below or within the additional notes today.    35 year old male who initially was diagnosed with ulcerative colitis in 2006 and then had a significant flare in 2008, and he underwent a total proctocolectomy w/ ileoanal anastomosis at the [facility]. He was then diagnosed with Crohn's years later and has been on Humira for the last two years (every Friday injection). He reported to the ED with the onset at 1pm of abdominal pain, constant, dull, throbbing, located diffusely in the abdomen with abdominal distention. He has been nauseated and had two episode of emesis, following PO intake. He last passed flatus around 8 PM and had a bowel movement around 11 am. He reported a similar episode 2 months ago in [facility] and was found to have a volvulus. He was admitted with plans for decompression the following morning. However, by morning, he was passing flatus and his pain had ceased so he was told that the volvulus had resolved. He had a follow-up barium enema 11 days ago with his GI doctor who informed him that there was no evidence of a volvulus at that time.    He underwent endoscopic decompression by GI Dr. Jose.  Upon withdrawing the scope, the lumen did reportedly partially untwist, but there was no large gush of air or stool. Post procedure, he  complained of worsening abdominal pain and distention after the scope. STAT Xrays demonstrated no free air, and he underwent a STAT CT demonstrating the fecalized small bowel with a possible transition point in the mid abdomen and decompressed small bowel in the right mid abdomen, suspicious for high-grade obstruction. Plan to proceed to the OR for Exploratory laparotomy, possible lysis of adhesions, possible bowel resection, possible ileostomy, possible reoperation. Risks, benefits, alternatives to surgery discussed in detail with the patient and his wife who agree to proceed. Discussed with his team at [facility] postoperatively the OR findings.     Chelsi Adams MD

## 2020-08-18 NOTE — PROGRESS NOTES
Transfer to Critical Care Note    SUBJECTIVE    35 year old male w/ PMH of Crohn's maintained on Humira (previously diagnosed as UC) s/p Total Proctocolectomy w/ ileoanal anastomosis back in 2018 presents with abdominal pain today with associated nausea and vomiting and found to have volvulus, went for endoscopy for emergent decompression with flex sig that with partial decompression, then proceed to OR for ex lap 2/2 to persistent pain. Dr. Rose discussed with Dr. Adams, initially dusky bowel noted when abdomen opened and after reduction and wash out appears to have fair return of circulation and less concern for ischemic bowel, however with significant residual inflammation. Patient had temporary abdominal closure and wound vac placed and plan to allow him to rest on ventilator with adequate sedation and pain control and return to OR tomorrow for further treatment/intervention. CLEMENT CRUZ at time of my evaluation in PACU as sedatd and intubated. Will admit to ICU and monitor closely.       PAST MEDICAL AND SURGICAL HISTORY  Past Medical History:   Diagnosis Date   • Crohn's disease (CMS/HCC)      Past Surgical History:   Procedure Laterality Date   • ABDOMINAL SURGERY     • BOWEL RESECTION         FAMILY HISTORY  History reviewed. No pertinent family history.    SOCIAL HISTORY  Social History     Socioeconomic History   • Marital status:      Spouse name: None   • Number of children: None   • Years of education: None   • Highest education level: None   Occupational History   • None   Social Needs   • Financial resource strain: None   • Food insecurity:     Worry: None     Inability: None   • Transportation needs:     Medical: None     Non-medical: None   Tobacco Use   • Smoking status: Never Smoker   • Smokeless tobacco: Never Used   Substance and Sexual Activity   • Alcohol use: Never     Frequency: Never   • Drug use: None   • Sexual activity: None   Lifestyle   • Physical activity:     Days per week: None      Minutes per session: None   • Stress: None   Relationships   • Social connections:     Talks on phone: None     Gets together: None     Attends Jewish service: None     Active member of club or organization: None     Attends meetings of clubs or organizations: None     Relationship status: None   • Intimate partner violence:     Fear of current or ex partner: None     Emotionally abused: None     Physically abused: None     Forced sexual activity: None   Other Topics Concern   • None   Social History Narrative   • None         OBJECTIVE  VITAL SIGNS  Temp:  [35.6 °C (96 °F)-37 °C (98.6 °F)] 36.6 °C (97.8 °F)  Heart Rate:  [46-80] 75  Resp:  [14-75] 75  BP: ()/() 120/78  FiO2 (%) (Set):  [40 %] 40 %  Vent Mode: Assist control/volume control  FiO2 (%) (Set):  [40 %] 40 %  S RR:  [16-20] 20  S VT:  [450 mL] 450 mL  PEEP/CPAP (Set, cmH2O):  [6 cm H20-16 cm H20] 6 cm H20  MAP (Observed, cmH2O):  [9] 9  PIP (Set, cmH2O):  [0 cm H2O] 0 cm H2O    Intake/Output Summary (Last 24 hours) at 8/18/2020 1711  Last data filed at 8/18/2020 1628  Gross per 24 hour   Intake 3150 ml   Output 2420 ml   Net 730 ml       O2 Requirement:  Oxygen Therapy: Supplemental oxygen  O2 Delivery Method: Endotracheal tube  FiO2 (%) (Set): 40 %  O2 Flow Rate (L/min): 2 L/min     Telemetry: sinus rhythm      MEDICATIONS    [START ON 8/19/2020] cefTRIAXone 2 g q24h INT   chlorhexidine 15 mL 2 times per day   [START ON 8/19/2020] enoxaparin 40 mg Daily (6a)   metroNIDAZOLE 500 mg q8h INT   pantoprazole 40 mg q24h          LAB RESULTS  Recent Results (from the past 24 hour(s))   CBC and differential    Collection Time: 08/17/20 10:45 PM   Result Value Ref Range    WBC 9.07 3.80 - 10.50 K/uL    RBC 5.00 4.50 - 5.80 M/uL    Hemoglobin 15.4 13.7 - 17.5 g/dL    Hematocrit 46.8 40.1 - 51.0 %    MCV 93.6 83.0 - 98.0 fL    MCH 30.8 28.0 - 33.2 pg    MCHC 32.9 32.2 - 36.5 g/dL    RDW 12.7 11.6 - 14.4 %    Platelets 243 150 - 350 K/uL    MPV  9.6 9.4 - 12.4 fL    Differential Type Auto     nRBC 0.0 <=0.0 %    Immature Granulocytes 0.4 %    Neutrophils 58.1 %    Lymphocytes 33.8 %    Monocytes 6.2 %    Eosinophils 0.9 %    Basophils 0.6 %    Immature Granulocytes, Absolute 0.04 0.00 - 0.08 K/uL    Neutrophils, Absolute 5.27 1.70 - 7.00 K/uL    Lymphocytes, Absolute 3.07 1.20 - 3.50 K/uL    Monocytes, Absolute 0.56 0.30 - 1.00 K/uL    Eosinophils, Absolute 0.08 0.04 - 0.54 K/uL    Basophils, Absolute 0.05 0.01 - 0.10 K/uL   Basic metabolic panel    Collection Time: 08/17/20 10:46 PM   Result Value Ref Range    Sodium 134 (L) 136 - 144 mEQ/L    Potassium 4.1 3.6 - 5.1 mEQ/L    Chloride 100 98 - 109 mEQ/L    CO2 24 22 - 32 mEQ/L    BUN 21 (H) 8 - 20 mg/dL    Creatinine 1.1 0.8 - 1.3 mg/dL    Glucose 86 70 - 99 mg/dL    Calcium 9.1 8.9 - 10.3 mg/dL    eGFR >60.0 >=60.0 mL/min/1.73m*2    Anion Gap 10 3 - 15 mEQ/L   Lipase    Collection Time: 08/17/20 10:46 PM   Result Value Ref Range    Lipase 32 20 - 51 U/L   Hepatic function panel    Collection Time: 08/17/20 10:46 PM   Result Value Ref Range    Albumin 4.6 3.4 - 5.0 g/dL    Bilirubin, Total 1.9 (H) 0.3 - 1.2 mg/dL    Bilirubin, Direct 0.3 <=0.4 mg/dL    Alkaline Phosphatase 38 35 - 126 IU/L    AST (SGOT) 67 (H) 15 - 41 IU/L    ALT (SGPT) 45 16 - 63 IU/L    Total Protein 8.0 6.0 - 8.2 g/dL   Lactate, w/ reflex repeat if > 2.0    Collection Time: 08/18/20  2:35 AM   Result Value Ref Range    Lactate 1.0 0.4 - 2.0 mmol/L   SARS-CoV-2 (COVID-19), PCR Nasopharynx    Collection Time: 08/18/20  2:46 AM   Result Value Ref Range    SARS-CoV-2 (COVID-19) Negative Negative   CBC    Collection Time: 08/18/20  6:36 AM   Result Value Ref Range    WBC 10.12 3.80 - 10.50 K/uL    RBC 5.00 4.50 - 5.80 M/uL    Hemoglobin 15.5 13.7 - 17.5 g/dL    Hematocrit 46.6 40.1 - 51.0 %    MCV 93.2 83.0 - 98.0 fL    MCH 31.0 28.0 - 33.2 pg    MCHC 33.3 32.2 - 36.5 g/dL    RDW 12.9 11.6 - 14.4 %    Platelets 229 150 - 350 K/uL    MPV 9.6  9.4 - 12.4 fL   Basic metabolic panel    Collection Time: 08/18/20  6:36 AM   Result Value Ref Range    Sodium 135 (L) 136 - 144 mEQ/L    Potassium 4.2 3.6 - 5.1 mEQ/L    Chloride 105 98 - 109 mEQ/L    CO2 21 (L) 22 - 32 mEQ/L    BUN 16 8 - 20 mg/dL    Creatinine 0.9 0.8 - 1.3 mg/dL    Glucose 120 (H) 70 - 99 mg/dL    Calcium 8.8 (L) 8.9 - 10.3 mg/dL    eGFR >60.0 >=60.0 mL/min/1.73m*2    Anion Gap 9 3 - 15 mEQ/L   Protime-INR    Collection Time: 08/18/20  6:36 AM   Result Value Ref Range    PT 14.3 12.2 - 14.5 sec    INR 1.1   INR   Type and screen    Collection Time: 08/18/20  6:36 AM   Result Value Ref Range    Antibody Screen Negative     ABO AB     Rh Factor Positive     History Check No type on file    CBC and differential    Collection Time: 08/18/20  3:29 PM   Result Value Ref Range    WBC 13.87 (H) 3.80 - 10.50 K/uL    RBC 5.46 4.50 - 5.80 M/uL    Hemoglobin 16.8 13.7 - 17.5 g/dL    Hematocrit 51.0 40.1 - 51.0 %    MCV 93.4 83.0 - 98.0 fL    MCH 30.8 28.0 - 33.2 pg    MCHC 32.9 32.2 - 36.5 g/dL    RDW 13.0 11.6 - 14.4 %    Platelets 252 150 - 350 K/uL    MPV 9.4 9.4 - 12.4 fL    Differential Type Auto     nRBC 0.0 <=0.0 %    Immature Granulocytes 0.2 %    Neutrophils 90.6 %    Lymphocytes 5.8 %    Monocytes 3.3 %    Eosinophils 0.0 %    Basophils 0.1 %    Immature Granulocytes, Absolute 0.03 0.00 - 0.08 K/uL    Neutrophils, Absolute 12.56 (H) 1.70 - 7.00 K/uL    Lymphocytes, Absolute 0.80 (L) 1.20 - 3.50 K/uL    Monocytes, Absolute 0.46 0.30 - 1.00 K/uL    Eosinophils, Absolute 0.00 (L) 0.04 - 0.54 K/uL    Basophils, Absolute 0.02 0.01 - 0.10 K/uL   Basic metabolic panel    Collection Time: 08/18/20  3:29 PM   Result Value Ref Range    Sodium 136 136 - 144 mEQ/L    Potassium 4.8 3.6 - 5.1 mEQ/L    Chloride 107 98 - 109 mEQ/L    CO2 21 (L) 22 - 32 mEQ/L    BUN 18 8 - 20 mg/dL    Creatinine 0.8 0.8 - 1.3 mg/dL    Glucose 144 (H) 70 - 99 mg/dL    Calcium 7.9 (L) 8.9 - 10.3 mg/dL    eGFR >60.0 >=60.0  mL/min/1.73m*2    Anion Gap 8 3 - 15 mEQ/L   Lactic acid, Venous    Collection Time: 08/18/20  3:29 PM   Result Value Ref Range    Lactate 1.4 0.4 - 2.0 mmol/L   POCT Arterial Blood Gas    Collection Time: 08/18/20  4:23 PM   Result Value Ref Range    pH, Arterial 7.27 (L) 7.35 - 7.45 pH    pCO2, Arterial 52 (H) 35 - 48 mm Hg    pO2, Arterial 133 (H) 83 - 100 mm Hg    HCO3, Arterial 24 21 - 28 mEQ/L    Base Excess, Arterial -3.8 mEQ/L    O2 Sat, Arterial 99 (H) 93 - 98 %    TCO2, Arterial 26 22 - 32 mEQ/L    Lactate, Arterial 1.2 0.4 - 1.6 mmol/L    Glucose, Arterial 153 (H) 70 - 99 mg/dL    Sodium, Arterial 134 (L) 136 - 145 mEQ/L    Potassium, Arterial 4.2 3.4 - 4.5 mEQ/L    Ionized Calcium, Arterial 1.06 (L) 1.15 - 1.27 mmol/L    Hematocrit, Arterial 53 (H) 42 - 52 %    Patient Temperature 98.6 97.0 - 99.0 °F    POC Test POC      Laboratory results were independently reviewed    ABG  Results from last 7 days   Lab Units 08/18/20  1623   PH ART pH 7.27*   PCO2 ART mm Hg 52*   PO2 ART mm Hg 133*   HCO3 ART mEQ/L 24   O2 SAT ART % 99*   BASE EXC ART mEQ/L -3.8         CULTURES  Microbiology Results     Procedure Component Value Units Date/Time    SARS-CoV-2 (COVID-19), PCR Nasopharynx [[id number]]  (Normal) Collected:  08/18/20 0246    Specimen:  Nasopharyngeal Swab from Nasopharynx Updated:  08/18/20 0349     SARS-CoV-2 (COVID-19) Negative          Laboratory results were independently reviewed    RADIOLOGY  No results found.  Radiography was independently reviewed.      VTE Risk Assessment and Plan  lovenox    GI prophylaxis  PPI    PHYSICAL EXAM    GEN: WDWN, no immediate distress  HENT: NC/AT, no deformity  NECK: supple, no lymphadenopathy  CARD: RRR, no rubs, murmur, gallops  RESP: assisted, CTA, no wheeze, rales, crackles  Gi: firm, distended, wound vac in place  EXT: no edema, pulses throughout  SKIN: warm, dry, no rash  NEURO: sedated, intubated        ASSESSMENT & PLAN    * Volvulus (CMS/[facility])  Assessment &  Plan  8/18 volvulus reduction, ex lap, wash out, temporary closure    -drain and wound care per surgery  -NGT in place  -fentanyl infusion while in ventilator for pain control  -GI ppx and dvt ppx  -bowel regimen when appropriate  -monitor vitals and urine output    On mechanically assisted ventilation (CMS/HCC)  Assessment & Plan  Kept intubated with open abdomen with plan for more surgery tomorrow    AC vent  Lowest effective sedation  Follow-up post intubation studies  GI and dvt ppx        Acting as a scribe in the presence of COMPA Downs  I have reviewed and agree with the above note.  Ramiro Rose MD   8/19/2020  8:42 AM

## 2020-08-18 NOTE — NURSING NOTE
Patient arrived from endo s/p emergency decomprsion via flex sigmoid. NG tube in place from ER set to low continuous suction. Patient c/o 10/10 abdominal pain, guarding visible. Surgery paged, orders for 1mg diluadid administered w/ no relief. Surgery resident up to assess patient, multiple x-rays completed at bedside. 2MG morphine ordered and administered. Patient still c/o 10/10 abdominal pain. Patient scheduled for STAT ct scan.

## 2020-08-18 NOTE — ANESTHESIOLOGIST PRE-PROCEDURE ATTESTATION
Pre-Procedure Patient Identification:  I am the Primary Anesthesiologist and have identified the patient on 08/18/20 at 1214 PM.   I have confirmed the following procedure(s) LAPAROTOMY EXPLORATORY, reduction of volvulus, washout, temporary abdominal closure will be performed by the following surgeon/proceduralist Chelsi Adams MD.

## 2020-08-18 NOTE — ANESTHESIA POSTPROCEDURE EVALUATION
Patient: Selvin Jorge    Procedure Summary     Date:  08/18/20 Room / Location:  [facility] PAV OR 06 / [facility] OR \[facility] [place] [facility]\""    Anesthesia Start:  1228 Anesthesia Stop:  1423    Procedure:  LAPAROTOMY EXPLORATORY, reduction of volvulus, washout, temporary abdominal closure (N/A ) Diagnosis:       Volvulus (CMS/HCC)      (Volvulus (CMS/HCC) [K56.2])    Surgeon:  Chelsi Adams MD Responsible Provider:  Franchesca Edwards MD    Anesthesia Type:  general ASA Status:  3          Anesthesia Type: general  PACU Vitals  8/18/2020 1405 - 8/18/2020 1441      8/18/2020  1410 8/18/2020  1415          BP:  (!) 104/52  --      Temp:  37 °C (98.6 °F)  --      Pulse:  61  63      Resp:  --  16      SpO2:  --  100 %              Anesthesia Post Evaluation    Mode of pain management: IV medication  Patient location during evaluation: ICU  Patient participation: complete - patient cannot participate  Post-procedure mental status: patient sedated and paralyzed.  Cardiovascular status: acceptable  Airway Patency: adequate  Respiratory status: acceptable, ETT and ventilator  Hydration status: acceptable  Anesthetic complications: no

## 2020-08-18 NOTE — ANESTHESIA POSTPROCEDURE EVALUATION
Patient: Selvin Jorge    Procedure Summary     Date:  08/18/20 Room / Location:  [facility] GI 2 / [facility] GI    Anesthesia Start:  0405 Anesthesia Stop:  0453    Procedure:  DIAGNOSTIC FLEXIBLE SIGMOIDOSCOPY WITH COLLECTION OF SPECIMEN BY WASHING (N/A Anus) Diagnosis:  (volvulus)    Provider:  Hazel Jose DO Responsible Provider:  Maricruz Murcia MD    Anesthesia Type:  general ASA Status:  2 - Emergent          Anesthesia Type: general  PACU Vitals     No data found in the last 10 encounters.            Anesthesia Post Evaluation    Pain score: 0  Pain management: adequate  Patient location during evaluation: PACU  Patient participation: complete - patient participated  Level of consciousness: awake and alert  Cardiovascular status: acceptable  Airway Patency: adequate  Respiratory status: acceptable  Hydration status: acceptable  Anesthetic complications: no

## 2020-08-18 NOTE — ASSESSMENT & PLAN NOTE
Kept intubated with open abdomen with plan for more surgery tomorrow    - AC vent  - Lowest effective sedation  - Follow-up post intubation studies --> sputum cx pending  - GI and dvt ppx  - Monitor CXR prn

## 2020-08-18 NOTE — NURSING NOTE
Pt still c/o 10/10 abdominal pain. Surgery team made aware, awaiting call back. Pt's wife requesting call from surgery team for update. Surgery team made aware and given wife, Hazel's, phone number.

## 2020-08-18 NOTE — ED ATTESTATION NOTE
I have personally seen and examined the patient.  I reviewed and agree with physician assistant / nurse practitioner’s assessment and plan of care    My examination, assessment, and plan of care of  is as follows:     Patient presents with abdominal pain that started this morning he has a history of Crohn's disease and has had volvulus and obstructions in the past.  Had normal bowel movement today and then later he started getting some bloating and cramping when on a bike ride came back with worsening pain.  Did start vomiting still having pain at this time.  No fevers no chills.  Heart rate is low but patient is an avid biker and very athletic.    Exam  Patient is awake alert oriented no acute distress  Lungs are clear bilateral auscultation  Heart slightly bradycardic but regular rhythm  Abdomen is soft nonrigid nondistended moderately tender in the right upper and lower quadrant    Plan  Lab work is been sent will CAT scan to rule out obstruction.     Eduardo Figueroa MD  08/17/20 2737

## 2020-08-19 ENCOUNTER — ANESTHESIA EVENT (INPATIENT)
Dept: OPERATING ROOM | Facility: HOSPITAL | Age: 35
DRG: 330 | End: 2020-08-19
Payer: COMMERCIAL

## 2020-08-19 ENCOUNTER — APPOINTMENT (INPATIENT)
Dept: RADIOLOGY | Facility: HOSPITAL | Age: 35
DRG: 330 | End: 2020-08-19
Attending: PHYSICIAN ASSISTANT
Payer: COMMERCIAL

## 2020-08-19 ENCOUNTER — ANESTHESIA (INPATIENT)
Dept: OPERATING ROOM | Facility: HOSPITAL | Age: 35
DRG: 330 | End: 2020-08-19
Payer: COMMERCIAL

## 2020-08-19 LAB
ANION GAP SERPL CALC-SCNC: 5 MEQ/L (ref 3–15)
BASE EXCESS BLDA CALC-SCNC: 4.2 MEQ/L
BASOPHILS # BLD: 0.02 K/UL (ref 0.01–0.1)
BASOPHILS NFR BLD: 0.2 %
BUN SERPL-MCNC: 17 MG/DL (ref 8–20)
CA-I BLD-SCNC: 1.08 MMOL/L (ref 1.15–1.27)
CALCIUM SERPL-MCNC: 8.1 MG/DL (ref 8.9–10.3)
CHLORIDE SERPL-SCNC: 106 MEQ/L (ref 98–109)
CO2 BLDA-SCNC: 30 MEQ/L (ref 22–32)
CO2 SERPL-SCNC: 23 MEQ/L (ref 22–32)
CREAT SERPL-MCNC: 0.8 MG/DL (ref 0.8–1.3)
DIFFERENTIAL METHOD BLD: ABNORMAL
EOSINOPHIL # BLD: 0 K/UL (ref 0.04–0.54)
EOSINOPHIL NFR BLD: 0 %
ERYTHROCYTE [DISTWIDTH] IN BLOOD BY AUTOMATED COUNT: 13.2 % (ref 11.6–14.4)
GFR SERPL CREATININE-BSD FRML MDRD: >60 ML/MIN/1.73M*2
GLUCOSE BLDA-MCNC: 114 MG/DL (ref 70–99)
GLUCOSE SERPL-MCNC: 120 MG/DL (ref 70–99)
HCO3 BLDA-SCNC: 28 MEQ/L (ref 21–28)
HCT VFR BLDA CALC: 50 % (ref 42–52)
HCT VFR BLDCO AUTO: 49.7 % (ref 40.1–51)
HGB BLD-MCNC: 16.8 G/DL (ref 13.7–17.5)
IMM GRANULOCYTES # BLD AUTO: 0.02 K/UL (ref 0–0.08)
IMM GRANULOCYTES NFR BLD AUTO: 0.2 %
LACTATE BLDA-SCNC: 1.1 MMOL/L (ref 0.4–1.6)
LYMPHOCYTES # BLD: 1.25 K/UL (ref 1.2–3.5)
LYMPHOCYTES NFR BLD: 10.9 %
MAGNESIUM SERPL-MCNC: 2.1 MG/DL (ref 1.8–2.5)
MCH RBC QN AUTO: 31 PG (ref 28–33.2)
MCHC RBC AUTO-ENTMCNC: 33.8 G/DL (ref 32.2–36.5)
MCV RBC AUTO: 91.7 FL (ref 83–98)
MONOCYTES # BLD: 0.82 K/UL (ref 0.3–1)
MONOCYTES NFR BLD: 7.1 %
NEUTROPHILS # BLD: 9.41 K/UL (ref 1.7–7)
NEUTS SEG NFR BLD: 81.6 %
NRBC BLD-RTO: 0 %
PCO2 BLDA: 40 MM HG (ref 35–48)
PDW BLD AUTO: 9.9 FL (ref 9.4–12.4)
PH BLDA: 7.46 PH (ref 7.35–7.45)
PHOSPHATE SERPL-MCNC: 3.8 MG/DL (ref 2.4–4.7)
PLATELET # BLD AUTO: 245 K/UL (ref 150–350)
PO2 BLDA: 149 MM HG (ref 83–100)
POCT PATIENT TEMPERATURE: 98.6 °F (ref 97–99)
POCT TEST (BLD GAS): ABNORMAL
POTASSIUM BLDA-SCNC: 4 MEQ/L (ref 3.4–4.5)
POTASSIUM SERPL-SCNC: 4.3 MEQ/L (ref 3.6–5.1)
RBC # BLD AUTO: 5.42 M/UL (ref 4.5–5.8)
SAO2 % BLDA: 99 % (ref 93–98)
SODIUM BLDA-SCNC: 137 MEQ/L (ref 136–145)
SODIUM SERPL-SCNC: 134 MEQ/L (ref 136–144)
WBC # BLD AUTO: 11.52 K/UL (ref 3.8–10.5)

## 2020-08-19 PROCEDURE — 36600 WITHDRAWAL OF ARTERIAL BLOOD: CPT

## 2020-08-19 PROCEDURE — 71000011 HC PACU PHASE 1 EA ADDL MIN: Performed by: SURGERY

## 2020-08-19 PROCEDURE — 63600000 HC DRUGS/DETAIL CODE: Performed by: NURSE ANESTHETIST, CERTIFIED REGISTERED

## 2020-08-19 PROCEDURE — 63600000 HC DRUGS/DETAIL CODE: Performed by: PHYSICIAN ASSISTANT

## 2020-08-19 PROCEDURE — 83735 ASSAY OF MAGNESIUM: CPT | Performed by: PHYSICIAN ASSISTANT

## 2020-08-19 PROCEDURE — 25000000 HC PHARMACY GENERAL: Performed by: STUDENT IN AN ORGANIZED HEALTH CARE EDUCATION/TRAINING PROGRAM

## 2020-08-19 PROCEDURE — 99024 POSTOP FOLLOW-UP VISIT: CPT | Performed by: SURGERY

## 2020-08-19 PROCEDURE — 25000000 HC PHARMACY GENERAL: Performed by: NURSE ANESTHETIST, CERTIFIED REGISTERED

## 2020-08-19 PROCEDURE — 71045 X-RAY EXAM CHEST 1 VIEW: CPT

## 2020-08-19 PROCEDURE — 25000000 HC PHARMACY GENERAL: Performed by: PHYSICIAN ASSISTANT

## 2020-08-19 PROCEDURE — 27200000 HC STERILE SUPPLY: Performed by: SURGERY

## 2020-08-19 PROCEDURE — 37000001 HC ANESTHESIA GENERAL: Performed by: SURGERY

## 2020-08-19 PROCEDURE — 36415 COLL VENOUS BLD VENIPUNCTURE: CPT | Performed by: PHYSICIAN ASSISTANT

## 2020-08-19 PROCEDURE — 36000003 HC OR LEVEL 3 INITIAL 30MIN: Performed by: SURGERY

## 2020-08-19 PROCEDURE — 36000013 HC OR LEVEL 3 EA ADDL MIN: Performed by: SURGERY

## 2020-08-19 PROCEDURE — 94003 VENT MGMT INPAT SUBQ DAY: CPT

## 2020-08-19 PROCEDURE — 82435 ASSAY OF BLOOD CHLORIDE: CPT | Performed by: PHYSICIAN ASSISTANT

## 2020-08-19 PROCEDURE — 20000000 HC ROOM AND CARE ICU

## 2020-08-19 PROCEDURE — 25000000 HC PHARMACY GENERAL

## 2020-08-19 PROCEDURE — 49000 EXPLORATION OF ABDOMEN: CPT | Mod: 58 | Performed by: SURGERY

## 2020-08-19 PROCEDURE — 63600000 HC DRUGS/DETAIL CODE: Performed by: ANESTHESIOLOGY

## 2020-08-19 PROCEDURE — 84100 ASSAY OF PHOSPHORUS: CPT | Performed by: PHYSICIAN ASSISTANT

## 2020-08-19 PROCEDURE — 63600000 HC DRUGS/DETAIL CODE: Performed by: STUDENT IN AN ORGANIZED HEALTH CARE EDUCATION/TRAINING PROGRAM

## 2020-08-19 PROCEDURE — 71000001 HC PACU PHASE 1 INITIAL 30MIN: Performed by: SURGERY

## 2020-08-19 PROCEDURE — 63600000 HC DRUGS/DETAIL CODE: Performed by: HOSPITALIST

## 2020-08-19 PROCEDURE — 25800000 HC PHARMACY IV SOLUTIONS: Performed by: HOSPITALIST

## 2020-08-19 PROCEDURE — 85025 COMPLETE CBC W/AUTO DIFF WBC: CPT | Performed by: PHYSICIAN ASSISTANT

## 2020-08-19 RX ORDER — MIDAZOLAM HYDROCHLORIDE 2 MG/2ML
INJECTION, SOLUTION INTRAMUSCULAR; INTRAVENOUS AS NEEDED
Status: DISCONTINUED | OUTPATIENT
Start: 2020-08-19 | End: 2020-08-19 | Stop reason: SURG

## 2020-08-19 RX ORDER — NEOSTIGMINE METHYLSULFATE 1 MG/ML
INJECTION INTRAVENOUS AS NEEDED
Status: DISCONTINUED | OUTPATIENT
Start: 2020-08-19 | End: 2020-08-19 | Stop reason: SURG

## 2020-08-19 RX ORDER — LABETALOL HCL 20 MG/4 ML
5 SYRINGE (ML) INTRAVENOUS AS NEEDED
Status: DISCONTINUED | OUTPATIENT
Start: 2020-08-19 | End: 2020-08-24

## 2020-08-19 RX ORDER — ONDANSETRON HYDROCHLORIDE 2 MG/ML
4 INJECTION, SOLUTION INTRAVENOUS
Status: DISCONTINUED | OUTPATIENT
Start: 2020-08-19 | End: 2020-08-24

## 2020-08-19 RX ORDER — ROCURONIUM BROMIDE 10 MG/ML
INJECTION, SOLUTION INTRAVENOUS AS NEEDED
Status: DISCONTINUED | OUTPATIENT
Start: 2020-08-19 | End: 2020-08-19 | Stop reason: SURG

## 2020-08-19 RX ORDER — EPHEDRINE SULFATE 50 MG/ML
INJECTION, SOLUTION INTRAVENOUS AS NEEDED
Status: DISCONTINUED | OUTPATIENT
Start: 2020-08-19 | End: 2020-08-19 | Stop reason: SURG

## 2020-08-19 RX ORDER — HYDROMORPHONE HYDROCHLORIDE 1 MG/ML
0.5 INJECTION, SOLUTION INTRAMUSCULAR; INTRAVENOUS; SUBCUTANEOUS
Status: DISCONTINUED | OUTPATIENT
Start: 2020-08-19 | End: 2020-08-20

## 2020-08-19 RX ORDER — LIDOCAINE HYDROCHLORIDE 10 MG/ML
INJECTION, SOLUTION INFILTRATION; PERINEURAL AS NEEDED
Status: DISCONTINUED | OUTPATIENT
Start: 2020-08-19 | End: 2020-08-19 | Stop reason: SURG

## 2020-08-19 RX ORDER — FENTANYL CITRATE 50 UG/ML
50 INJECTION, SOLUTION INTRAMUSCULAR; INTRAVENOUS
Status: DISCONTINUED | OUTPATIENT
Start: 2020-08-19 | End: 2020-08-21

## 2020-08-19 RX ORDER — SODIUM CHLORIDE 9 MG/ML
INJECTION, SOLUTION INTRAVENOUS CONTINUOUS
Status: DISCONTINUED | OUTPATIENT
Start: 2020-08-19 | End: 2020-08-20

## 2020-08-19 RX ORDER — METRONIDAZOLE 500 MG/100ML
INJECTION, SOLUTION INTRAVENOUS
Status: COMPLETED
Start: 2020-08-19 | End: 2020-08-19

## 2020-08-19 RX ORDER — GLYCOPYRROLATE 0.6MG/3ML
SYRINGE (ML) INTRAVENOUS AS NEEDED
Status: DISCONTINUED | OUTPATIENT
Start: 2020-08-19 | End: 2020-08-19 | Stop reason: SURG

## 2020-08-19 RX ORDER — ONDANSETRON HYDROCHLORIDE 2 MG/ML
INJECTION, SOLUTION INTRAVENOUS AS NEEDED
Status: DISCONTINUED | OUTPATIENT
Start: 2020-08-19 | End: 2020-08-19 | Stop reason: SURG

## 2020-08-19 RX ORDER — LORAZEPAM 2 MG/ML
INJECTION INTRAMUSCULAR
Status: COMPLETED
Start: 2020-08-19 | End: 2020-08-20

## 2020-08-19 RX ORDER — HYDROMORPHONE HYDROCHLORIDE 1 MG/ML
INJECTION, SOLUTION INTRAMUSCULAR; INTRAVENOUS; SUBCUTANEOUS AS NEEDED
Status: DISCONTINUED | OUTPATIENT
Start: 2020-08-19 | End: 2020-08-19 | Stop reason: SURG

## 2020-08-19 RX ORDER — LORAZEPAM 2 MG/ML
0.5 INJECTION INTRAMUSCULAR ONCE
Status: COMPLETED | OUTPATIENT
Start: 2020-08-20 | End: 2020-08-20

## 2020-08-19 RX ORDER — MORPHINE SULFATE 2 MG/ML
2 INJECTION, SOLUTION INTRAMUSCULAR; INTRAVENOUS EVERY 2 HOUR PRN
Status: DISCONTINUED | OUTPATIENT
Start: 2020-08-19 | End: 2020-08-24

## 2020-08-19 RX ORDER — FENTANYL CITRATE 50 UG/ML
INJECTION, SOLUTION INTRAMUSCULAR; INTRAVENOUS AS NEEDED
Status: DISCONTINUED | OUTPATIENT
Start: 2020-08-19 | End: 2020-08-19 | Stop reason: SURG

## 2020-08-19 RX ORDER — PROPOFOL 10 MG/ML
INJECTION, EMULSION INTRAVENOUS AS NEEDED
Status: DISCONTINUED | OUTPATIENT
Start: 2020-08-19 | End: 2020-08-19 | Stop reason: SURG

## 2020-08-19 RX ORDER — METRONIDAZOLE 500 MG/100ML
500 INJECTION, SOLUTION INTRAVENOUS
Status: DISCONTINUED | OUTPATIENT
Start: 2020-08-19 | End: 2020-08-20

## 2020-08-19 RX ADMIN — CEFTRIAXONE SODIUM 2 G: 2 INJECTION, POWDER, FOR SOLUTION INTRAMUSCULAR; INTRAVENOUS at 10:15

## 2020-08-19 RX ADMIN — NEOSTIGMINE METHYLSULFATE 3 MG: 1 INJECTION INTRAVENOUS at 17:33

## 2020-08-19 RX ADMIN — FENTANYL CITRATE 50 MCG: 0.05 INJECTION, SOLUTION INTRAMUSCULAR; INTRAVENOUS at 19:16

## 2020-08-19 RX ADMIN — METRONIDAZOLE 500 MG: 500 INJECTION, SOLUTION INTRAVENOUS at 18:11

## 2020-08-19 RX ADMIN — CHLORHEXIDINE GLUCONATE 15 ML: 1.2 RINSE ORAL at 10:15

## 2020-08-19 RX ADMIN — SODIUM CHLORIDE: 900 INJECTION, SOLUTION INTRAVENOUS at 20:47

## 2020-08-19 RX ADMIN — FENTANYL CITRATE 50 MCG/HR: 50 INJECTION, SOLUTION INTRAMUSCULAR; INTRAVENOUS at 15:27

## 2020-08-19 RX ADMIN — FENTANYL CITRATE 50 MCG: 0.05 INJECTION, SOLUTION INTRAMUSCULAR; INTRAVENOUS at 18:21

## 2020-08-19 RX ADMIN — PANTOPRAZOLE SODIUM 40 MG: 40 INJECTION, POWDER, LYOPHILIZED, FOR SOLUTION INTRAVENOUS at 08:35

## 2020-08-19 RX ADMIN — PROPOFOL 60 MCG/KG/MIN: 10 INJECTION, EMULSION INTRAVENOUS at 08:36

## 2020-08-19 RX ADMIN — SODIUM CHLORIDE, POTASSIUM CHLORIDE, SODIUM LACTATE AND CALCIUM CHLORIDE 1000 ML: 600; 310; 30; 20 INJECTION, SOLUTION INTRAVENOUS at 14:18

## 2020-08-19 RX ADMIN — HYDROMORPHONE HYDROCHLORIDE 0.5 MG: 1 INJECTION, SOLUTION INTRAMUSCULAR; INTRAVENOUS; SUBCUTANEOUS at 17:15

## 2020-08-19 RX ADMIN — PROPOFOL 60 MCG/KG/MIN: 10 INJECTION, EMULSION INTRAVENOUS at 02:28

## 2020-08-19 RX ADMIN — FENTANYL CITRATE 50 MCG: 50 INJECTION, SOLUTION INTRAMUSCULAR; INTRAVENOUS at 17:21

## 2020-08-19 RX ADMIN — METRONIDAZOLE 500 MG: 500 INJECTION, SOLUTION INTRAVENOUS at 08:35

## 2020-08-19 RX ADMIN — PROPOFOL 50 MCG/KG/MIN: 10 INJECTION, EMULSION INTRAVENOUS at 15:31

## 2020-08-19 RX ADMIN — ROCURONIUM BROMIDE 40 MG: 10 INJECTION, SOLUTION INTRAVENOUS at 16:39

## 2020-08-19 RX ADMIN — MORPHINE SULFATE 2 MG: 2 INJECTION, SOLUTION INTRAMUSCULAR; INTRAVENOUS at 23:51

## 2020-08-19 RX ADMIN — ONDANSETRON 4 MG: 2 INJECTION INTRAMUSCULAR; INTRAVENOUS at 17:13

## 2020-08-19 RX ADMIN — SODIUM CHLORIDE, SODIUM LACTATE, POTASSIUM CHLORIDE, CALCIUM CHLORIDE: 600; 310; 30; 20 INJECTION, SOLUTION INTRAVENOUS at 16:37

## 2020-08-19 RX ADMIN — PROPOFOL 200 MG: 10 INJECTION, EMULSION INTRAVENOUS at 16:39

## 2020-08-19 RX ADMIN — Medication 0.6 MG: at 17:33

## 2020-08-19 RX ADMIN — CHLORHEXIDINE GLUCONATE 15 ML: 1.2 RINSE ORAL at 21:06

## 2020-08-19 RX ADMIN — PROPOFOL 50 MCG/KG/MIN: 10 INJECTION, EMULSION INTRAVENOUS at 12:23

## 2020-08-19 RX ADMIN — EPHEDRINE SULFATE 10 MG: 50 INJECTION INTRAVENOUS at 17:11

## 2020-08-19 RX ADMIN — MORPHINE SULFATE 2 MG: 2 INJECTION, SOLUTION INTRAMUSCULAR; INTRAVENOUS at 20:18

## 2020-08-19 RX ADMIN — FENTANYL CITRATE 50 MCG: 50 INJECTION, SOLUTION INTRAMUSCULAR; INTRAVENOUS at 16:53

## 2020-08-19 RX ADMIN — PROPOFOL 60 MCG/KG/MIN: 10 INJECTION, EMULSION INTRAVENOUS at 00:37

## 2020-08-19 RX ADMIN — MIDAZOLAM HYDROCHLORIDE 2 MG: 1 INJECTION, SOLUTION INTRAMUSCULAR; INTRAVENOUS at 16:37

## 2020-08-19 RX ADMIN — LIDOCAINE HYDROCHLORIDE 5 ML: 10 INJECTION, SOLUTION INFILTRATION; PERINEURAL at 16:39

## 2020-08-19 RX ADMIN — MORPHINE SULFATE 2 MG: 2 INJECTION, SOLUTION INTRAMUSCULAR; INTRAVENOUS at 22:06

## 2020-08-19 RX ADMIN — FENTANYL CITRATE 50 MCG: 0.05 INJECTION, SOLUTION INTRAMUSCULAR; INTRAVENOUS at 18:47

## 2020-08-19 RX ADMIN — SODIUM CHLORIDE, SODIUM LACTATE, POTASSIUM CHLORIDE, CALCIUM CHLORIDE 125 ML/HR: 600; 310; 30; 20 INJECTION, SOLUTION INTRAVENOUS at 02:28

## 2020-08-19 RX ADMIN — ENOXAPARIN SODIUM 40 MG: 100 INJECTION SUBCUTANEOUS at 05:41

## 2020-08-19 RX ADMIN — SODIUM CHLORIDE, SODIUM LACTATE, POTASSIUM CHLORIDE, CALCIUM CHLORIDE: 600; 310; 30; 20 INJECTION, SOLUTION INTRAVENOUS at 12:23

## 2020-08-19 RX ADMIN — METRONIDAZOLE 500 MG: 500 INJECTION, SOLUTION INTRAVENOUS at 00:35

## 2020-08-19 ASSESSMENT — PAIN SCALES - GENERAL: PAIN_LEVEL: 4

## 2020-08-19 NOTE — ANESTHESIOLOGIST PRE-PROCEDURE ATTESTATION
Pre-Procedure Patient Identification:  I am the Primary Anesthesiologist and have identified the patient on 08/19/20 at 4:21 PM.   I have confirmed the following procedure(s) LAPAROTOMY EXPLORATORY, washout, possible closure, possible abthera vac placement will be performed by the following surgeon/proceduralist Chelsi Adams MD.

## 2020-08-19 NOTE — NURSING NOTE
Pt placed on travel monitor with RT, Anesthesia and OR Tech, Taken to OR for scheduled washout. Prop, Fent, LR gtts as per flow.

## 2020-08-19 NOTE — ASSESSMENT & PLAN NOTE
This was surgically reduced by Dr. Adams  The patient currently has a wound VAC  He does not have fevers, white count is down, he is not hypotensive  Blood cultures were not drawn  I suggest stopping antibiotics

## 2020-08-19 NOTE — NURSING NOTE
Surgery MD Frias came to assess pt and she was made aware that since 1900 pt had 100 ml of urine output. She stated to page her (2100) if it would be lesser in the next fallowing hours and she might consider more fluids them. Also she was made aware that wound vac drainage is 300 ml since 1900. Altagracia stated that surgery will be tomorrow afternoon and it is Ok to give lovenox in the morning.

## 2020-08-19 NOTE — OP NOTE
Pre-op dx: Small bowel volvulus s/p Exploratory laparotomy, reduction of small bowel volvulus, washout, temporary abdominal closure with Abthera     Postop diagnosis: Same     Procedure: Exploratory laparotomy, washout , vac change     Surgeon: Bryan     Assistant: [id number]-[id number]     Anesthesia: General endotracheal anesthesia     EBL: min     Specimens: none      Indications for procedure: Patient is a 35 y.o. male with small bowel volvulus s/p Exploratory laparotomy, reduction of small bowel volvulus, washout, temporary abdominal closure with Abthera on 8/18/20.  He is brought back to the operating room today for second look laparotomy, washout, and VAC change. R/B/A were discussed with his wife who wished to proceed.       Procedure: After the patient was correctly identified as Selvin Jorge, he was brought to the operating room and transferred to the operating table in the supine position.  He was already intubated.  The outer layer of the vac was removed, and the patient's abdomen was prepped and draped in the usual sterile fashion.  The vac sponge was removed and a timeout was perfored according to protocol.  The bowel was found to be viable and nonischemic.  However, it was markedly dilated, particularly at the previous small bowel anastomosis.  The abdomen was copiously irrigated with 5 L of normal saline.  The NG tube was palpated and found to be within good position of the stomach.  Some of the proximal small bowel contents were milked back towards the NG tube.  Given the marked dilation of the small bowel within the abdomen, it was unable to be closed at this time.  The small bowel was fully placed back in the abdomen, taking care to ensure that it was not twisted.  Plan was made to proceed with a temporary abdominal closure again with the ABThera device, and to proceed back to the operating room in 2 days for second look, washout, possible abdominal wall closure.  The patient was extubated in the operating  room, and transferred to the PACU in stable condition.  All sponge needle and instrument counts reportedly correct x2 at the end of the case.  The patient's wife was updated at the end of the operation.

## 2020-08-19 NOTE — ANESTHESIA POSTPROCEDURE EVALUATION
Patient: Selvin Jorge    Procedure Summary     Date:  08/19/20 Room / Location:  [facility] PAV OR [address] [place] [address] OR PAV    Anesthesia Start:  1637 Anesthesia Stop:  1804    Procedure:  LAPAROTOMY EXPLORATORY, washout,  abthera vac placement (N/A Abdomen) Diagnosis:       Volvulus (CMS/HCC)      (Volvulus (CMS/HCC) [K56.2])    Surgeon:  Chelsi Adams MD Responsible Provider:  Brad Ulloa MD    Anesthesia Type:  general ASA Status:  2          Anesthesia Type: general  PACU Vitals     No data found in the last 10 encounters.            Anesthesia Post Evaluation    Pain score: 4  Pain management: satisfactory to patient  Mode of pain management: IV medication  Patient location during evaluation: PACU  Patient participation: complete - patient participated  Level of consciousness: awake  Cardiovascular status: acceptable  Airway Patency: adequate  Respiratory status: acceptable  Hydration status: stable  Anesthetic complications: no

## 2020-08-19 NOTE — ANESTHESIA PREPROCEDURE EVALUATION
Relevant Problems   No relevant active problems         35 year old male with history of Crohn's disease s/p total colectomy with J-pouch and recent volvulus two months.  Pt had emergent attempted decompression via flex sig now c/f perforated viscous or persistent volvulus.     Anesthesia ROS/MED HX    Anesthesia History    Previous anesthetics  No family history of anesthetic complications  No history of anesthetic complications  Pulmonary - neg  Neuro/Psych - neg  Cardiovascular- neg   Covid19 Test Reviewed  Comments: intubated  Hematological - neg  Musculoskeletal- neg  Renal Disease- neg  Endo/Other- neg  Body Habitus: Normal  ROS/MED HX Comments:    Anesthesia History: VL X 1 with glide #3   GI/Hepatic/Renal: Cronh's dz s/p total colectomy and J pouch now with perforated viscous or volvulus       Past Surgical History:   Procedure Laterality Date   • ABDOMINAL SURGERY     • BOWEL RESECTION         CBC Results       08/18/20 08/17/20                       0636 2245          WBC 10.12 9.07          RBC 5.00 5.00          HGB 15.5 15.4          HCT 46.6 46.8          MCV 93.2 93.6          MCH 31.0 30.8          MCHC 33.3 32.9           243                       BMP Results       08/18/20 08/17/20                       0636 2246           134          K 4.2 4.1          Cl 105 100          CO2 21 24          Glucose 120 86          BUN 16 21          Creatinine 0.9 1.1          Calcium 8.8 9.1          Anion Gap 9 10          EGFR >60.0 >60.0          Comment for K at 2246 on 08/17/20:    Results obtained on plasma. Plasma Potassium values may be up to 0.4 mEQ/L less than serum values. The differences may be greater for patients with high platelet or white cell counts.      ,  Physical Exam    Airway   Mallampati: II   TM distance: >3 FB   Neck ROM: full  Cardiovascular    Rhythm: regular   Rate: normalPulmonary - normal   clear to auscultation  Other Findings   Intubated  Dental -  normal        Anesthesia Plan    Plan: general    Technique: general endotracheal     Lines and Monitors: PIV       patient did not smoke on day of surgery  ASA 2  Blood Products:   Use of Blood Products Discussed: No   Anesthetic plan and risks discussed with: patient  Induction:    intravenous   Postop Plan:   Patient Disposition: phase II then home   Pain Management: IV analgesics

## 2020-08-19 NOTE — PLAN OF CARE
Problem: Adult Inpatient Plan of Care  Goal: Readiness for Transition of Care  Intervention: Mutually Develop Transition Plan  Flowsheets (Taken 8/19/2020 1612)  Discharge Coordination/Progress: Pt curently remains intubated/ vented, plan is for OR today. Pt currently has a KCI wound vac, KCI would vac form placed on paper chart..Spoke with patient wife Hazel, Hazel  stated he is completely independent prior to admit. 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 For the summer they are staying with wife's parents  at [address] [place] [address]. Will continue to follow for any needs.

## 2020-08-19 NOTE — PLAN OF CARE
Pt continues on vent support, sedated. NGT to low suctioning with not significant greenish output. VS are stable, afebrile. Wound vac is connected to suctioning unit with seal maintained. Serosanguinous output 400 ml since 1900.

## 2020-08-19 NOTE — PLAN OF CARE
Pt remains intubated on ACVC vent settings with no acute changes overnight.      Problem: Device-Related Complication Risk (Mechanical Ventilation, Invasive)  Goal: Optimal Device Function  Outcome: Progressing  Intervention: Optimize Device Care and Function  Flowsheets (Taken 8/19/2020 0539)  Airway Safety Measures: manual resuscitator/mask/valve in room; suction at bedside  Aspiration Precautions: respiratory status monitored

## 2020-08-19 NOTE — PROGRESS NOTES
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 Wound vac in place to suction with serosang output.    Interval summary  - no acute events overnight  - afebrile, HD stable  - plan for repeat OR this afternoon for second look        OBJECTIVE   VITAL SIGNS  Temp:  [35.7 °C (96.2 °F)-37.2 °C (98.9 °F)] 35.7 °C (96.2 °F)  Heart Rate:  [61-84] 67  Resp:  [16-75] 69  BP: ()/(52-78) 110/56  FiO2 (%) (Set):  [40 %] 40 %  Vent Mode: Assist control/volume control  FiO2 (%) (Set):  [40 %] 40 %  S RR:  [16-20] 20  S VT:  [450 mL] 450 mL  PEEP/CPAP (Set, cmH2O):  [6 cm H20-16 cm H20] 6 cm H20  MAP (Observed, cmH2O):  [9-13] 10  PIP (Set, cmH2O):  [0 cm H2O] 0 cm H2O    Intake/Output Summary (Last 24 hours) at 8/19/2020 1153  Last data filed at 8/19/2020 0800  Gross per 24 hour   Intake 5832.9 ml   Output 2890 ml   Net 2942.9 ml       O2 Requirement:  Oxygen Therapy: Supplemental oxygen  O2 Delivery Method: Endotracheal tube  FiO2 (%) (Set): 40 %  O2 Flow Rate (L/min): 2 L/min     Telemetry: sinus rhythm      MEDICATIONS    chlorhexidine 15 mL 2 times per day   enoxaparin 40 mg Daily (6a)   pantoprazole 40 mg q24h          LAB RESULTS  Recent Results (from the past 24 hour(s))   CBC and differential    Collection Time: 08/18/20  3:29 PM   Result Value Ref Range    WBC 13.87 (H) 3.80 - 10.50 K/uL    RBC 5.46 4.50 - 5.80 M/uL    Hemoglobin 16.8 13.7 - 17.5 g/dL    Hematocrit 51.0 40.1 - 51.0 %    MCV 93.4 83.0 - 98.0 fL    MCH 30.8 28.0 - 33.2 pg    MCHC 32.9 32.2 - 36.5 g/dL    RDW 13.0 11.6 - 14.4 %    Platelets 252 150 - 350 K/uL    MPV 9.4 9.4 - 12.4 fL    Differential Type Auto     nRBC 0.0 <=0.0 %    Immature Granulocytes 0.2 %    Neutrophils 90.6 %    Lymphocytes 5.8 %    Monocytes 3.3 %    Eosinophils 0.0 %    Basophils 0.1 %    Immature Granulocytes, Absolute 0.03 0.00 - 0.08 K/uL     Neutrophils, Absolute 12.56 (H) 1.70 - 7.00 K/uL    Lymphocytes, Absolute 0.80 (L) 1.20 - 3.50 K/uL    Monocytes, Absolute 0.46 0.30 - 1.00 K/uL    Eosinophils, Absolute 0.00 (L) 0.04 - 0.54 K/uL    Basophils, Absolute 0.02 0.01 - 0.10 K/uL   Basic metabolic panel    Collection Time: 08/18/20  3:29 PM   Result Value Ref Range    Sodium 136 136 - 144 mEQ/L    Potassium 4.8 3.6 - 5.1 mEQ/L    Chloride 107 98 - 109 mEQ/L    CO2 21 (L) 22 - 32 mEQ/L    BUN 18 8 - 20 mg/dL    Creatinine 0.8 0.8 - 1.3 mg/dL    Glucose 144 (H) 70 - 99 mg/dL    Calcium 7.9 (L) 8.9 - 10.3 mg/dL    eGFR >60.0 >=60.0 mL/min/1.73m*2    Anion Gap 8 3 - 15 mEQ/L   Lactic acid, Venous    Collection Time: 08/18/20  3:29 PM   Result Value Ref Range    Lactate 1.4 0.4 - 2.0 mmol/L   Triglycerides    Collection Time: 08/18/20  3:29 PM   Result Value Ref Range    Triglycerides 42 30 - 149 mg/dL   Sputum Gram Stain (Lab Only) Expectorated Sputum    Collection Time: 08/18/20  4:21 PM   Result Value Ref Range    Gram Stain Result No WBC Seen     Gram Stain Result No organisms seen     Gram Stain Result No Epithelial Cells Seen    POCT Arterial Blood Gas    Collection Time: 08/18/20  4:23 PM   Result Value Ref Range    pH, Arterial 7.27 (L) 7.35 - 7.45 pH    pCO2, Arterial 52 (H) 35 - 48 mm Hg    pO2, Arterial 133 (H) 83 - 100 mm Hg    HCO3, Arterial 24 21 - 28 mEQ/L    Base Excess, Arterial -3.8 mEQ/L    O2 Sat, Arterial 99 (H) 93 - 98 %    TCO2, Arterial 26 22 - 32 mEQ/L    Lactate, Arterial 1.2 0.4 - 1.6 mmol/L    Glucose, Arterial 153 (H) 70 - 99 mg/dL    Sodium, Arterial 134 (L) 136 - 145 mEQ/L    Potassium, Arterial 4.2 3.4 - 4.5 mEQ/L    Ionized Calcium, Arterial 1.06 (L) 1.15 - 1.27 mmol/L    Hematocrit, Arterial 53 (H) 42 - 52 %    Patient Temperature 98.6 97.0 - 99.0 °F    POC Test POC    POCT Glucose    Collection Time: 08/18/20  7:22 PM   Result Value Ref Range    POCT Bedside Glucose 124 (H) 70 - 99 mg/dL    POC Test POC    CBC and  differential    Collection Time: 08/19/20  2:58 AM   Result Value Ref Range    WBC 11.52 (H) 3.80 - 10.50 K/uL    RBC 5.42 4.50 - 5.80 M/uL    Hemoglobin 16.8 13.7 - 17.5 g/dL    Hematocrit 49.7 40.1 - 51.0 %    MCV 91.7 83.0 - 98.0 fL    MCH 31.0 28.0 - 33.2 pg    MCHC 33.8 32.2 - 36.5 g/dL    RDW 13.2 11.6 - 14.4 %    Platelets 245 150 - 350 K/uL    MPV 9.9 9.4 - 12.4 fL    Differential Type Auto     nRBC 0.0 <=0.0 %    Immature Granulocytes 0.2 %    Neutrophils 81.6 %    Lymphocytes 10.9 %    Monocytes 7.1 %    Eosinophils 0.0 %    Basophils 0.2 %    Immature Granulocytes, Absolute 0.02 0.00 - 0.08 K/uL    Neutrophils, Absolute 9.41 (H) 1.70 - 7.00 K/uL    Lymphocytes, Absolute 1.25 1.20 - 3.50 K/uL    Monocytes, Absolute 0.82 0.30 - 1.00 K/uL    Eosinophils, Absolute 0.00 (L) 0.04 - 0.54 K/uL    Basophils, Absolute 0.02 0.01 - 0.10 K/uL   Basic metabolic panel    Collection Time: 08/19/20  2:58 AM   Result Value Ref Range    Sodium 134 (L) 136 - 144 mEQ/L    Potassium 4.3 3.6 - 5.1 mEQ/L    Chloride 106 98 - 109 mEQ/L    CO2 23 22 - 32 mEQ/L    BUN 17 8 - 20 mg/dL    Creatinine 0.8 0.8 - 1.3 mg/dL    Glucose 120 (H) 70 - 99 mg/dL    Calcium 8.1 (L) 8.9 - 10.3 mg/dL    eGFR >60.0 >=60.0 mL/min/1.73m*2    Anion Gap 5 3 - 15 mEQ/L   Magnesium    Collection Time: 08/19/20  2:58 AM   Result Value Ref Range    Magnesium 2.1 1.8 - 2.5 mg/dL   Phosphorus    Collection Time: 08/19/20  2:58 AM   Result Value Ref Range    Phosphorus 3.8 2.4 - 4.7 mg/dL   POCT Arterial Blood Gas    Collection Time: 08/19/20  5:06 AM   Result Value Ref Range    pH, Arterial 7.46 (H) 7.35 - 7.45 pH    pCO2, Arterial 40 35 - 48 mm Hg    pO2, Arterial 149 (H) 83 - 100 mm Hg    HCO3, Arterial 28 21 - 28 mEQ/L    Base Excess, Arterial 4.2 mEQ/L    O2 Sat, Arterial 99 (H) 93 - 98 %    TCO2, Arterial 30 22 - 32 mEQ/L    Lactate, Arterial 1.1 0.4 - 1.6 mmol/L    Glucose, Arterial 114 (H) 70 - 99 mg/dL    Sodium, Arterial 137 136 - 145 mEQ/L     Potassium, Arterial 4.0 3.4 - 4.5 mEQ/L    Ionized Calcium, Arterial 1.08 (L) 1.15 - 1.27 mmol/L    Hematocrit, Arterial 50 42 - 52 %    Patient Temperature 98.6 97.0 - 99.0 °F    POC Test POC      Laboratory results were independently reviewed    ABG  Results from last 7 days   Lab Units 08/19/20  0506   PH ART pH 7.46*   PCO2 ART mm Hg 40   PO2 ART mm Hg 149*   HCO3 ART mEQ/L 28   O2 SAT ART % 99*   BASE EXC ART mEQ/L 4.2         CULTURES  Microbiology Results     Procedure Component Value Units Date/Time    Sputum Gram Stain (Lab Only) Expectorated Sputum [[id number]] Collected:  08/18/20 1621    Specimen:  Expectorated Sputum Updated:  08/18/20 2111     Gram Stain Result No WBC Seen      No organisms seen      No Epithelial Cells Seen    SARS-CoV-2 (COVID-19), PCR Nasopharynx [[id number]]  (Normal) Collected:  08/18/20 0246    Specimen:  Nasopharyngeal Swab from Nasopharynx Updated:  08/18/20 0349     SARS-CoV-2 (COVID-19) Negative          Laboratory results were independently reviewed    RADIOLOGY  Ct Abdomen Pelvis Without Iv Contrast    Result Date: 8/18/2020  CLINICAL HISTORY:       Small bowel obstruction.  Worsening abdominal pain status post endoscopy; clinical concern for bowel perforation. COMMENT:   Computed tomography of the abdomen and pelvis is performed from the lung bases to the symphysis pubis without oral and IV contrast. CT Dose:  One or more dose reduction techniques (e.g. automated exposure control, adjustment of the mA and/or kV according to patient size, use of iterative reconstruction technique) utilized for this examination. Contrast:      None Comparison studies: 8/18/2020 CT abdomen/pelvis from earlier today FINDINGS: LUNG BASES: Mild basilar subsegmental atelectasis. LIVER:  unremarkable SPLEEN:  Unremarkable PANCREAS: Unremarkable ADRENAL GLANDS: Unremarkable GALLBLADDER: Hyperdensity in the gallbladder lumen suggesting some vicarious excretion of IV contrast; please correlate  clinically and with laboratory values for assessment of renal function. KIDNEYS: Unremarkable.  Excreted contrast from the prior CT scan; no hydroureteronephrosis. BOWEL LOOPS:  By history, the patient is status post colectomy.  Ileo-sigmoid or rectal anastomotic suture line low in the pelvis.  Redemonstration of markedly distended fecalized small bowel loops with decompressed distal small bowel loops in the right lower quadrant consistent with high-grade small bowel obstruction. Possible transition point in the central abdomen such as on image 88 series 2 and image 37 series 3.  More inferiorly, there is mesenteric swirling such as on image 116 series 2.  Differential possibilities include midgut volvulus, internal hernia or unrelated incidental post surgical change. Mild ascites, slightly increased since earlier today.  No discrete collection, pneumatosis, or free air. Interval placement of a nasogastric tube, the tip extending into the stomach, with decompression of the stomach compared to prior. ABDOMINAL/PELVIC LYMPH NODES: None enlarged BLADDER: Distended.  No bladder wall thickening or mass. PROSTATE GLAND: Unremarkable BONY ARCHITECTURE: Unremarkable     IMPRESSION: 1.  By history, status post colectomy.  Redemonstration of markedly distended, fecalized small bowel with a possible transition point in the mid abdomen and decompressed small bowel in the right mid abdomen, suspicious for high-grade obstruction.  Swirling of the mesentery inferior to the transition point and proximal to the ileosigmoid/rectal anastomosis may reflect midgut volvulus, internal hernia, or incidental unrelated postsurgical change. 2.  Slightly increased ascites since earlier today.  No discrete abscess, pneumatosis, or free air. 3.  Increased density in the gallbladder lumen suggesting vicarious excretion of contrast.  Please correlate clinically and with laboratory values to assess renal function. Results discussed with the surgery  team and read back at 815 hours on 8/18/2020.     X-ray Abdomen 1 View    Result Date: 8/18/2020  CLINICAL HISTORY: Bowel obstruction, follow-up post endoscopy.  Clinical concern for bowel perforation. COMMENT: A single supine view of the abdomen is obtained, time stamped 607 hours, followed by a single limited erect portable view of the upper abdomen time stamped 644 hours. By history, the patient is status post colectomy.  There is redemonstration of distended, gas-filled bowel loops along with dilated, fecalized bowel loops peripherally, in keeping with the suspected small bowel obstruction on CT. There is no definite free air on the erect view.  A nasogastric tube tip projects over the left upper quadrant of the abdomen, in keeping with a position in the proximal stomach. No pathologic calcifications.  The bladder is distended and opacified with contrast from the preceding CT scan.  There is no organomegaly or discrete soft tissue mass.  The visualized osseous structures are unremarkable.     IMPRESSION: 1.  Redemonstration of distended gas-filled and distended, fecalized bowel loops, in keeping with the suspected bowel obstruction on the CT scan from earlier today. 2.  No definite evidence of free air on the erect view.    X-ray Abdomen 1 View    Result Date: 8/18/2020  CLINICAL HISTORY: Bowel obstruction, follow-up post endoscopy.  Clinical concern for bowel perforation. COMMENT: A single supine view of the abdomen is obtained, time stamped 607 hours, followed by a single limited erect portable view of the upper abdomen time stamped 644 hours. By history, the patient is status post colectomy.  There is redemonstration of distended, gas-filled bowel loops along with dilated, fecalized bowel loops peripherally, in keeping with the suspected small bowel obstruction on CT. There is no definite free air on the erect view.  A nasogastric tube tip projects over the left upper quadrant of the abdomen, in keeping with  a position in the proximal stomach. No pathologic calcifications.  The bladder is distended and opacified with contrast from the preceding CT scan.  There is no organomegaly or discrete soft tissue mass.  The visualized osseous structures are unremarkable.     IMPRESSION: 1.  Redemonstration of distended gas-filled and distended, fecalized bowel loops, in keeping with the suspected bowel obstruction on the CT scan from earlier today. 2.  No definite evidence of free air on the erect view.    X-ray Abdomen 1 View    Result Date: 8/18/2020  CLINICAL HISTORY: Small bowel obstruction.  Nasogastric tube placement COMMENT: A limited semierect portable view of the upper abdomen is obtained.  A nasogastric tube has been placed.  It courses through the lower mediastinum near the midline and appears to be extrinsic to the tracheobronchial tree..  The tip projects over the left upper quadrant of the abdomen, in keeping with a position in the proximal stomach.  There are distended loops of bowel, in keeping with the bowel obstruction suspected on CT scan.  No definite free air is seen.     IMPRESSION: 1.  Nasogastric tube placement.  Tip projecting over the left upper quadrant of the abdomen, in keeping with a position in the proximal stomach. 2.  Distended gas-filled bowel loops in keeping with the suspected bowel obstruction on today's CT scan.  No definite free air.    Ct Abdomen Pelvis With Iv Contrast    Addendum Date: 8/18/2020    Further history was given including status post total colectomy and J-pouch. Given this additional information, the marked dilatation proximal to the anastomosis is dilated small bowel loops (not colon as was previously mentioned) with  suspected volvulus likely related to possible stricture at the anastomosis.    Result Date: 8/18/2020  CLINICAL HISTORY: History of Crohn's disease.  History of volvulus and obstructions in the past.  Vomiting earlier.  Abdominal pain. COMMENT: COMPARISON: None  available TECHNIQUE: CT of the abdomen and pelvis was performed with the patient in the supine position. Images reconstructed/reformatted in the axial, coronal and sagittal planes. CT DOSE: One or more dose reduction techniques (e.g. automated exposure control, adjustment of the mA and/or kV according to patient size, use of iterative reconstruction technique) utilized for this examination. ORAL CONTRAST: None INTRAVENOUS CONTRAST:115 mL of Omnipaque 350 intravenous contrast was administered without event. LOWER CHEST: Within normal limits. LIVER: Within normal limits. BILE DUCTS: Normal caliber. GALLBLADDER: No calcified gallstones. Normal caliber wall. PANCREAS: Within normal limits. SPLEEN: Within normal limits. ADRENALS: Within normal limits. KIDNEYS/URETERS/BLADDER: Kidneys enhance symmetrically.  No hydronephrosis. Marked bladder distention. REPRODUCTIVE ORGANS: No pelvic masses. BOWEL: Marked distention of the colon with marked fecal retention measuring up to approximately 8 cm.  Surgical anastomosis noted along the rectosigmoid junction where there appears to be a transition point as well as marked swirling of the mesenteric vessels just proximal to the anastamosis.  No pneumatosis. PERITONEUM: Small amount of free fluid.  No pneumoperitoneum. VESSELS: Swelling of the mesenteric vessels noted within the lower mid abdomen concerning for underlying volvulus.  Abdominal aorta and iliac vessels are normal in caliber.  The major abdominal vasculature appears patent. LYMPH NODES: Within normal limits. ABDOMINAL WALL: Within normal limits. BONES: Within normal limits.     IMPRESSION: Marked distention of the colon with probable transition point at the proximal rectosigmoid anastomosis, potentially on the basis of an underlying stricture with suspected secondary sigmoid volvulus.  Small amount of free fluid. Finding: Other   Acuity: Critical  Status: CLOSED The results were critically read back with DR TANNER  FRIEDAJAZLYN on 8/18/2020 1:37 AM. I certify that I have reviewed this examination and agree with this report. Earlene Mcgraw MD    X-ray Chest 1 View    Result Date: 8/18/2020  CLINICAL HISTORY: Endotracheal tube placement. COMMENT: Two AP views of the chest were obtained and compared to prior study from earlier the same day. Enteric tube is seen coursing under the diaphragm.  There is an endotracheal tube with tip at the thoracic inlet.  The heart is mildly enlarged.  There is no focal consolidation, pleural effusion or pneumothorax.     IMPRESSION:  Endotracheal tube with at the thoracic inlet.    X-ray Chest 1 View    Result Date: 8/18/2020  CLINICAL HISTORY: Nasogastric tube placement.  Abdominal pain, distention. Bowel obstruction. COMMENT: A single portable frontal view of the chest is obtained, time stamped 609 hours. Comparison--none Multiple leads project over the chest.  A nasogastric tube courses through the mediastinum just to the left of midline and appears to be extrinsic to the tracheobronchial tree; the tip projects over the left upper quadrant of the abdomen, in keeping with a position in the stomach. There is slight elevation or eventration of the right hemidiaphragm.  Heart size is top normal to mildly enlarged.  There is no focal consolidation, pleural effusion, or pneumothorax.  Hilar and mediastinal contours are within normal limits.  No acute osseous abnormality is appreciated. There is redemonstration of distended bowel loops in the upper abdomen in keeping with the bowel obstruction noted on today's CT scan.  No free air is seen under the diaphragms on this erect view.     IMPRESSION: 1.  No definite evidence of acute cardiopulmonary disease 2.  Cardiomegaly    Radiography was independently reviewed.      VTE Risk Assessment and Plan  Lovenox    GI prophylaxis  PPI    PHYSICAL EXAM    GEN: chronically and acutely ill male, in no acute distress  HENT: NC/AT, no deformity  NECK: supple, no  lymphadenopathy  CARD: RRR, no rubs, murmur, gallops  RESP: assisted, CTA, no wheeze, rales, crackles  Gi: firm, distended, wound vac in place with serosang output  EXT: no edema, pulses present  SKIN: warm, dry, no rash  NEURO: intubated, sedated        ASSESSMENT & PLAN    * Volvulus (CMS/[facility])  Assessment & Plan  H/o Crohn's disease s/p total proctocolectomy with J pouch  8/18 volvulus reduction, ex lap, wash out, temporary closure    - ICU level of care  - continue ACV settings, goal to extubate when able   - drain and wound care per surgery  - wound vac in place, monitor output  - NGT in place  - continue fentanyl drip for pain control, wean propofol as able  - IVF hydration as needed, on LR  - holden to gravity, monitor UOP  - NPO for now  - GI ppx and DVT ppx  - bowel regimen when appropriate  - ID following, d/c abx  - plan for repeat OR today for second look       On mechanically assisted ventilation (CMS/[facility])  Assessment & Plan  Kept intubated with open abdomen with plan for more surgery tomorrow    - AC vent  - Lowest effective sedation  - Follow-up post intubation studies --> sputum cx pending  - GI and dvt ppx  - Monitor CXR prn        Acting as a scribe in the presence of Dr. Ramiro Rose.  COMPA [place]   I have reviewed and agree with the above note.  Ramiro Rose MD  [id number].  8/20/2020  8:12 AM

## 2020-08-19 NOTE — CONSULTS
Infectious Disease Consult Note    Patient Name: Selvin Jorge  MR#: [id number]  : 1985  Admission Date: 2020  Consult Date: 20 9:59 AM   Consultant: Boston Vickers MD    Reason for Consult: Ceftriaxone 2g daily for concern of bacterial translocation  Referring Provider: Dr. Rose        Selvin Jorge is a 35 y.o. male who was admitted on 2020.  This patient with a history of Crohn's disease was diagnosed in  with a flare in  to require proctocolectomy with ileoanal anastomosis at [facility].  He was diagnosed with Crohn's disease years later leading to treatment with Humira.  2 months ago he developed volvulus that resolved spontaneously but now he was admitted the day of yesterday complaining of severe mid abdominal pain constant dull throbbing with vomiting.  He underwent endoscopic decompression and successfully so he was emergently taken to the OR by Dr. Chelsi Adams who performed an ex lap, reduction of small bowel volvulus washout and temporary abdominal closure with Abthera.  The patient is initial white count was 13 now down to 11 he has been afebrile antibiotically stable  He remains intubated in ICU      Allergies: No Known Allergies    Medical History:   Past Medical History:   Diagnosis Date   • Crohn's disease (CMS/HCC)        Surgical History:   Past Surgical History:   Procedure Laterality Date   • ABDOMINAL SURGERY     • BOWEL RESECTION         Social History     Tobacco Use   • Smoking status: Never Smoker   • Smokeless tobacco: Never Used   Substance Use Topics   • Alcohol use: Never     Frequency: Never   • Drug use: Not on file       Family History: History reviewed. No pertinent family history.    Review of Systems    Review of systems not obtained due to patient factors.    Medications:    Current IP Meds (From admission, onward)        Frequency     cefTRIAXone (ROCEPHIN) IVPB 2 g in 100 mL NSS vial in bag      Every 24 hours interval      enoxaparin (LOVENOX) syringe 40 mg      Daily (6a)     chlorhexidine (PERIDEX) 0.12 % mouthwash 15 mL  (Ventilator/VAP Bundle)      2 times per day     fentaNYL 1500 mcg/30 mL (50 mcg/mL) syringe  (Sedation Medications for Critical Care Patients)      Titrated     metroNIDAZOLE in NaCl (iso-os) (FLAGYL) IVPB 500 mg      Every 8 hours interval     fentaNYL bolus from bag 25 mcg  (Sedation Medications for Critical Care Patients)      As needed     potassium chloride (KLOR-CON) tablet extended release 20 mEq  (Potassium IV/oral replacement)      As needed     potassium chloride (KLOR-CON) tablet extended release 40 mEq  (Potassium IV/oral replacement)      As needed     potassium chloride 20 mEq in 100 mL IVPB  (premix)  (Potassium IV/oral replacement)      As needed     potassium chloride 40 mEq in sodium chloride 0.9 % 250 mL IVPB  (Potassium IV/oral replacement)      As needed     potassium bicarbonate (EFFER-K) disintegrating tablet 20 mEq  (Potassium IV/oral replacement)      As needed     potassium bicarbonate (EFFER-K) disintegrating tablet 40 mEq  (Potassium IV/oral replacement)      As needed     magnesium oxide (MAG-OX) tablet 400 mg      2 times daily PRN     magnesium sulfate IVPB 2g in 50 mL NSS/D5W/SWFI      As needed     calcium gluconate 1,000 mg in sodium chloride 0.9 % 50 mL IVPB      Every 6 hours PRN     glucose chewable tablet 16-32 g of dextrose  (Hypoglycemia Treatment Protocol and Hyperglycemia Validation Protocol)      As needed     dextrose 40 % oral gel 15-30 g of dextrose  (Hypoglycemia Treatment Protocol and Hyperglycemia Validation Protocol)      As needed     glucagon (GLUCAGEN) injection 1 mg  (Hypoglycemia Treatment Protocol and Hyperglycemia Validation Protocol)      As needed     dextrose in water injection 12.5 g  (Hypoglycemia Treatment Protocol and Hyperglycemia Validation Protocol)      As needed     pantoprazole (PROTONIX) injection 40 mg      Every 24 hours     propofoL  (DIPRIVAN) 10 mg/mL continuous infusion  (Sedation Medications for Critical Care Patients)      Titrated     fentaNYL 1500 mcg/30 mL (50 mcg/mL) syringe  (Sedation Medications for Critical Care Patients)  Status:  Discontinued      Titrated     midazolam (VERSED) 1 mg/mL injection 2 mg  (Sedation Medications for Critical Care Patients)      As needed     fentaNYL (PF) (SUBLIMAZE) injection 50 mcg      Once     enoxaparin (LOVENOX) syringe 40 mg  Status:  Discontinued      Daily (6a)     ondansetron (ZOFRAN) injection  Status:  Discontinued      As needed     phenylephrine HCl in 0.9% NaCl 1 mg/10 mL (100 mcg/mL) syringe  Status:  Discontinued      As needed     fentaNYL (PF) (SUBLIMAZE) injection 50 mcg      Once     ePHEDrine injection  Status:  Discontinued      As needed     electrolyte-A (PLASMA-LYTE A) infusion  Status:  Discontinued      Continuous PRN     rocuronium (ZEMURON) injection  Status:  Discontinued      As needed     fentaNYL (PF) (SUBLIMAZE) injection  Status:  Discontinued      As needed     lidocaine (XYLOCAINE) 10 mg/mL (1 %) injection  Status:  Discontinued      As needed     succinylcholine (ANECTINE) injection  Status:  Discontinued      As needed     propofoL (DIPRIVAN) 10 mg/mL continuous infusion  Status:  Discontinued      As needed     ketamine (KETALAR) injection  Status:  Discontinued      As needed     midazolam (VERSED) 1 mg/mL injection  Status:  Discontinued      As needed     sodium chloride 0.9 % infusion  Status:  Discontinued      Continuous PRN     morphine injection 2 mg      Every 3 hours PRN     HYDROmorphone (DILAUDID) 1 mg/mL injection 1 mg  Status:  Discontinued      Once     metroNIDAZOLE in NaCl (iso-os) (FLAGYL) IVPB 500 mg  Status:  Discontinued      Every 8 hours interval     lactated ringer's infusion      Continuous     glucose chewable tablet 16-32 g of dextrose  (Hypoglycemia Treatment Protocol and Hyperglycemia Validation Protocol)  Status:  Discontinued       As needed     dextrose 40 % oral gel 15-30 g of dextrose  (Hypoglycemia Treatment Protocol and Hyperglycemia Validation Protocol)  Status:  Discontinued      As needed     glucagon (GLUCAGEN) injection 1 mg  (Hypoglycemia Treatment Protocol and Hyperglycemia Validation Protocol)  Status:  Discontinued      As needed     dextrose in water injection 12.5 g  (Hypoglycemia Treatment Protocol and Hyperglycemia Validation Protocol)  Status:  Discontinued      As needed          Anti-infectives (From admission, onward)    Start     Dose/Rate Route Frequency Ordered Stop    20 0900  cefTRIAXone (ROCEPHIN) IVPB 2 g in 100 mL NSS vial in bag      2 g  200 mL/hr over 30 Minutes intravenous Every 24 hours interval 20 1655 20 0859    20 1730  metroNIDAZOLE in NaCl (iso-os) (FLAGYL) IVPB 500 mg      500 mg  100 mL/hr over 60 Minutes intravenous Every 8 hours interval 20 1655              Vital Signs:    Temp:  [35.7 °C (96.2 °F)-37.2 °C (98.9 °F)] 35.7 °C (96.2 °F)  Heart Rate:  [58-84] 69  Resp:  [16-75] 65  BP: ()/(52-78) 110/55  FiO2 (%) (Set):  [40 %] 40 %    Temp (72hrs), Av.4 °C (97.6 °F), Min:35.6 °C (96 °F), Max:37.2 °C (98.9 °F)      Physical Exam     Gen: Sedated  HEENT: ETT in place  Neck: Supple  LAD: No cervical LAD  Lungs: CTAB  CV: RRR no murmurs  Abd: Wound VAC in place  Ext: No c/c/e  Skin: no rash          Lines, Drains, Airways, Wounds:  Peripheral IV 20 Left Wrist (Active)   Number of days: 2       Peripheral IV 20 Right Forearm (Active)   Number of days: 1       NG/OG Tube 20 Right nostril (Active)   Number of days: 1       Urethral Catheter Latex 16 Fr (Active)   Number of days: 1       ETT  (Active)   Number of days: 1       Surgical Incision Abdomen (Active)   Number of days: 1       Labs:    Lab Results   Component Value Date    WBC 11.52 (H) 2020    HGB 16.8 2020    HCT 49.7 2020    MCV 91.7 2020     2020      Lab Results   Component Value Date    GLUCOSE 120 (H) 08/19/2020    CALCIUM 8.1 (L) 08/19/2020     (L) 08/19/2020    K 4.3 08/19/2020    CO2 23 08/19/2020     08/19/2020    BUN 17 08/19/2020    CREATININE 0.8 08/19/2020     Lab Results   Component Value Date    ALT 45 08/17/2020    AST 67 (H) 08/17/2020    ALKPHOS 38 08/17/2020    BILITOT 1.9 (H) 08/17/2020     UA Results     No lab values to display.        Microbiology Results     Procedure Component Value Units Date/Time    Sputum Gram Stain (Lab Only) Expectorated Sputum [[id number]] Collected:  08/18/20 1621    Specimen:  Expectorated Sputum Updated:  08/18/20 2111     Gram Stain Result No WBC Seen      No organisms seen      No Epithelial Cells Seen    SARS-CoV-2 (COVID-19), PCR Nasopharynx [[id number]]  (Normal) Collected:  08/18/20 0246    Specimen:  Nasopharyngeal Swab from Nasopharynx Updated:  08/18/20 0349     SARS-CoV-2 (COVID-19) Negative           Pathology Results     ** No results found for the last 720 hours. **          Echo:          Imaging:  Independent review of the CT of the abdomen and pelvis without IV contrast shows evidence of small bowel volvulus and high-grade obstruction.  Mild ascites no free air gallbladder bile ducts lung bases spleen liver and adrenals are normal  Independent review of the CT of the abdomen with IV contrast shows market distention of the colon with transition point at the rectosigmoid anastomosis with suspected volvulus, normal liver spleen pancreas adrenals and lower cuts of the chest  Radiology Imaging   XR CHEST 1 VW    Narrative CLINICAL HISTORY: Endotracheal tube placement.    COMMENT: Two AP views of the chest were obtained and compared to prior study  from earlier the same day.    Enteric tube is seen coursing under the diaphragm.  There is an endotracheal  tube with tip at the thoracic inlet.  The heart is mildly enlarged.  There is no  focal consolidation, pleural effusion or pneumothorax.       Impression IMPRESSION:  Endotracheal tube with at the thoracic inlet.       Patient Active Problem List   Diagnosis Code   • Volvulus of small intestine (CMS/HCC) K56.2   • Crohn's disease of colon (CMS/HCC) K50.10   • Volvulus (CMS/HCC) K56.2   • On mechanically assisted ventilation (CMS/HCC) Z99.11     Volvulus of small intestine (CMS/HCC)  Assessment & Plan  This was surgically reduced by Dr. Adams  The patient currently has a wound VAC  He does not have fevers, white count is down, he is not hypotensive  Blood cultures were not drawn  I suggest stopping antibiotics    On mechanically assisted ventilation (CMS/HCC)  Assessment & Plan  Management per critical care team    Crohn's disease of colon (CMS/HCC)  Assessment & Plan  This is being managed by gastroenterology        Boston Vickers MD  8/19/2020 9:59 AM

## 2020-08-19 NOTE — PLAN OF CARE
Problem: Adult Inpatient Plan of Care  Goal: Plan of Care Review  Flowsheets (Taken 8/19/2020 4783)  Progress: no change  Plan of Care Reviewed With: caregiver  Outcome Summary: Pt remains sedated and intubated. Pt is currently getting 562 lipid kcals from propofol. Pt with NGT in place. Pt is POD#1 and returning to OR today. Pt has wound vac in abdomen.  Goal: initiate parenteral nutrition support if anticipate prolonged NPO status  Recommendations:  1. Consider begin TPN for nutrition support if anticipate NPO > 7days:      Day 1 TPN with 70gms protein, 150gms dextrose, no lipids while on propofol.      Day 2 TPN: 100gms protein, 250gms dextrose, no lipids       Day 3/TPN Goal: 130gms protein, 320gms dextrose, no lipids to provide 1608 kcals (total of 2170 kcals with     Propofol.   Can add 55gms lipid to TPN once of propofol.    2. Await return of GI function.  3. Monitor NPO status and labs.

## 2020-08-19 NOTE — PROGRESS NOTES
General Surgery Daily Progress Note    Subjective     Interval History:   S/p exlap, detorsion of midgut volvulus, abthera vac placement 8/18  Remain intubated postoperatively, stable on ventilator. Pain appears controlled on fentanyl gtt. Making good urine output via holden. No bowel movements. Abthera vac continues to hold suction with serosang output    Objective     Vital signs in last 24 hours:  Temp:  [36 °C (96.8 °F)-37.2 °C (98.9 °F)] 36.7 °C (98.1 °F)  Heart Rate:  [50-84] 77  Resp:  [14-75] 20  BP: ()/() 102/53  FiO2 (%) (Set):  [40 %] 40 %      Intake/Output Summary (Last 24 hours) at 8/19/2020 0535  Last data filed at 8/19/2020 0442  Gross per 24 hour   Intake 5408.1 ml   Output 3525 ml   Net 1883.1 ml     Intake/Output this shift:  I/O this shift:  In: 1716.5 [I.V.:1516.5; IV Piggyback:200]  Out: 955 [Urine:555; Other:400]    Physical Exam    General: intubated, sedated, appears comfortable, responsive to stimulation  Heart: RRR  Lungs: AC 20/450/6/40%, moving good air bilaterally, normal peak pressures  Abd: Abthera vac in place, good suction, no leak, thin serosang drainage, area with evidence bowel seen under vac  Extremities: soft, nontender, no edema, good distal pulses    VTE Assessment: I have reassessed and the patient's VTE risk and treatment plan is appropriate.    Labs  I have reviewed the patient's labs.  Current labs are within normal limits.  Labs are pending.    Imaging  I have reviewed the Imaging from the last 24 hrs.      Assessment/Plan     Expected Discharge Date:   8/23/2020    Mr Joreg is a 35M with history of Crohn's disease s/p total proctocolectomy with J pouch here with midgut volvulus now s/p exlap, detorsion of midgut volvulus and abthera vac placement  - Continue vent management per ICU  - Continue fentanyl gtt for pain control, attempt to wean propofol  - NGT LCWS, npo for now  -  ml/hr, will consider dropping to maintenance rate as patient appears well  resuscitated  - Abthera vac to 125 mmHg suction, will monitor output  - ceftriaxone, flagyl for possible translocation  - lovenox dvt ppx  - holden to gravity  - possible second look today in OR this afternoon. Patient consented.    Altagracia Emery MD  Surgery Resident    Surgery attending:     I performed a history and physical examination of the patient and discussed the management with the Resident. I reviewed the Resident's note and agree with the documented findings and plan of care, except for my comments below or within the additional notes today.    35-year-old male with history of Crohn's disease status post total proctocolectomy with J-pouch who presented with a  volvulus POD #1 status post exploratory laparotomy, detorsion of volvulus, and abthera wound vac placement.  Remains intubated on the vent- on fentanyl and propofol. Follows simple commands.  On exam, afebrile vital signs stable.  Urine output 2270 cc.  NG tube with 100 cc of drainage.  Wound VAC with 1520.  Labs reviewed: Creatinine 0.8, white blood cell count 11.5, hemoglobin 16.8.  Plan to proceed back to the operating room today for second look ex lap, washout, and Abthera wound vac replacement as the bowel seems to be too swollen for primary closure today.  Plans for extubation post procedure and return to the operating room again on Friday for washout, and attempt at closure.  Continue LR @125 ml/hr. Holden. Strict I/O. Lovenox DVT prophylaxis. Updated his wife Hazel on the phone.    Chelsi Adams MD

## 2020-08-19 NOTE — PROGRESS NOTES
GI Daily Progress Note           SUBJECTIVE    LOS: 1 day     Interval History: pt underwent emergent surgical decompression for volvulus of the small intestines.  Patient is currently intubated, sedated.     Review of Systems  All other systems were reviewed and are negative oither than as stated in the HPI   OBJECTIVE   Vital signs in last 24 hours:  Temp:  [36 °C (96.8 °F)-37.2 °C (98.9 °F)] 36.7 °C (98.1 °F)  Heart Rate:  [50-84] 73  Resp:  [14-75] 20  BP: ()/() 104/59  FiO2 (%) (Set):  [40 %] 40 %      Intake/Output Summary (Last 24 hours) at 8/19/2020 0613  Last data filed at 8/19/2020 0600  Gross per 24 hour   Intake 5711.3 ml   Output 3965 ml   Net 1746.3 ml       Intake/Output this shift:  I/O this shift:  In: 2019.7 [I.V.:1819.7; IV Piggyback:200]  Out: 1395 [Urine:645; Other:750]   Current Medications:  •  calcium gluconate, 1 g, intravenous, q6h PRN  •  cefTRIAXone, 2 g, intravenous, q24h INT  •  chlorhexidine, 15 mL, Mouth/Throat, 2 times per day  •  glucose, 16-32 g of dextrose, oral, PRN **OR** dextrose, 15-30 g of dextrose, oral, PRN **OR** glucagon, 1 mg, intramuscular, PRN **OR** dextrose in water, 25 mL, intravenous, PRN  •  enoxaparin, 40 mg, subcutaneous, Daily (6a)  •  fentaNYL, 0-200 mcg/hr, intravenous, Titrated  •  [DISCONTINUED] fentaNYL, 25 mcg/hr, intravenous, Titrated **AND** fentaNYL, 25 mcg, intravenous, PRN  •  lactated ringer's, , intravenous, Continuous  •  magnesium oxide, 400 mg, oral, 2x daily PRN  •  magnesium sulfate, 2 g, intravenous, PRN  •  metroNIDAZOLE, 500 mg, intravenous, q8h INT  •  propofoL, 5-80 mcg/kg/min, intravenous, Titrated **AND** midazolam, 2 mg, intravenous, PRN **AND** Triglycerides, , , q72h  •  morphine, 2 mg, intravenous, q3h PRN  •  pantoprazole, 40 mg, intravenous, q24h  •  potassium chloride, 20 mEq, oral, PRN **OR** potassium chloride, 40 mEq, oral, PRN **OR** potassium chloride, 20 mEq, intravenous, PRN **OR** potassium chloride, 40  mEq, intravenous, PRN **OR** potassium bicarbonate, 20 mEq, oral, PRN **OR** potassium bicarbonate, 40 mEq, oral, PRN    Physical Exam  General appearance: intubated, sedated  Head: normocephalic, without obvious abnormality, atraumatic, ET tube Iin place  Lungs: mechanical breath sounds  Heart: regular rate and rhythm, S1, S2 normal, no murmur, click, rub or gallop  Abdomen:distened, absent bs, wound vac in place  Extremities: extremities normal, warm and well-perfused; no cyanosis, clubbing, or edema  Skin: Skin color, texture, turgor normal. No rashes or lesions  Neurologic: sedated   LABS & IMAGING   Labs      Results from last 7 days   Lab Units 08/19/20 0258 08/18/20  1529 08/18/20  0636   WBC K/uL 11.52* 13.87* 10.12   HEMOGLOBIN g/dL 16.8 16.8 15.5   HEMATOCRIT % 49.7 51.0 46.6   PLATELETS K/uL 245 252 229     Results from last 7 days   Lab Units 08/19/20  0258 08/18/20  1529 08/18/20  0636 08/17/20  2246   SODIUM mEQ/L 134* 136 135* 134*   POTASSIUM mEQ/L 4.3 4.8 4.2 4.1   CHLORIDE mEQ/L 106 107 105 100   CO2 mEQ/L 23 21* 21* 24   BUN mg/dL 17 18 16 21*   CREATININE mg/dL 0.8 0.8 0.9 1.1   CALCIUM mg/dL 8.1* 7.9* 8.8* 9.1   ALBUMIN g/dL  --   --   --  4.6   BILIRUBIN TOTAL mg/dL  --   --   --  1.9*   ALK PHOS IU/L  --   --   --  38   ALT IU/L  --   --   --  45   AST IU/L  --   --   --  67*   GLUCOSE mg/dL 120* 144* 120* 86     Results from last 7 days   Lab Units 08/18/20  0636   INR INR 1.1       Imaging  I have reviewed the Imaging from the last 24 hrs.    Ct Abdomen Pelvis Without Iv Contrast    Result Date: 8/18/2020  CLINICAL HISTORY:       Small bowel obstruction.  Worsening abdominal pain status post endoscopy; clinical concern for bowel perforation. COMMENT:   Computed tomography of the abdomen and pelvis is performed from the lung bases to the symphysis pubis without oral and IV contrast. CT Dose:  One or more dose reduction techniques (e.g. automated exposure control, adjustment of the mA and/or  kV according to patient size, use of iterative reconstruction technique) utilized for this examination. Contrast:      None Comparison studies: 8/18/2020 CT abdomen/pelvis from earlier today FINDINGS: LUNG BASES: Mild basilar subsegmental atelectasis. LIVER:  unremarkable SPLEEN:  Unremarkable PANCREAS: Unremarkable ADRENAL GLANDS: Unremarkable GALLBLADDER: Hyperdensity in the gallbladder lumen suggesting some vicarious excretion of IV contrast; please correlate clinically and with laboratory values for assessment of renal function. KIDNEYS: Unremarkable.  Excreted contrast from the prior CT scan; no hydroureteronephrosis. BOWEL LOOPS:  By history, the patient is status post colectomy.  Ileo-sigmoid or rectal anastomotic suture line low in the pelvis.  Redemonstration of markedly distended fecalized small bowel loops with decompressed distal small bowel loops in the right lower quadrant consistent with high-grade small bowel obstruction. Possible transition point in the central abdomen such as on image 88 series 2 and image 37 series 3.  More inferiorly, there is mesenteric swirling such as on image 116 series 2.  Differential possibilities include midgut volvulus, internal hernia or unrelated incidental post surgical change. Mild ascites, slightly increased since earlier today.  No discrete collection, pneumatosis, or free air. Interval placement of a nasogastric tube, the tip extending into the stomach, with decompression of the stomach compared to prior. ABDOMINAL/PELVIC LYMPH NODES: None enlarged BLADDER: Distended.  No bladder wall thickening or mass. PROSTATE GLAND: Unremarkable BONY ARCHITECTURE: Unremarkable     IMPRESSION: 1.  By history, status post colectomy.  Redemonstration of markedly distended, fecalized small bowel with a possible transition point in the mid abdomen and decompressed small bowel in the right mid abdomen, suspicious for high-grade obstruction.  Swirling of the mesentery inferior to the  transition point and proximal to the ileosigmoid/rectal anastomosis may reflect midgut volvulus, internal hernia, or incidental unrelated postsurgical change. 2.  Slightly increased ascites since earlier today.  No discrete abscess, pneumatosis, or free air. 3.  Increased density in the gallbladder lumen suggesting vicarious excretion of contrast.  Please correlate clinically and with laboratory values to assess renal function. Results discussed with the surgery team and read back at 815 hours on 8/18/2020.     X-ray Abdomen 1 View    Result Date: 8/18/2020  CLINICAL HISTORY: Bowel obstruction, follow-up post endoscopy.  Clinical concern for bowel perforation. COMMENT: A single supine view of the abdomen is obtained, time stamped 607 hours, followed by a single limited erect portable view of the upper abdomen time stamped 644 hours. By history, the patient is status post colectomy.  There is redemonstration of distended, gas-filled bowel loops along with dilated, fecalized bowel loops peripherally, in keeping with the suspected small bowel obstruction on CT. There is no definite free air on the erect view.  A nasogastric tube tip projects over the left upper quadrant of the abdomen, in keeping with a position in the proximal stomach. No pathologic calcifications.  The bladder is distended and opacified with contrast from the preceding CT scan.  There is no organomegaly or discrete soft tissue mass.  The visualized osseous structures are unremarkable.     IMPRESSION: 1.  Redemonstration of distended gas-filled and distended, fecalized bowel loops, in keeping with the suspected bowel obstruction on the CT scan from earlier today. 2.  No definite evidence of free air on the erect view.    X-ray Abdomen 1 View    Result Date: 8/18/2020  CLINICAL HISTORY: Bowel obstruction, follow-up post endoscopy.  Clinical concern for bowel perforation. COMMENT: A single supine view of the abdomen is obtained, time stamped 607 hours,  followed by a single limited erect portable view of the upper abdomen time stamped 644 hours. By history, the patient is status post colectomy.  There is redemonstration of distended, gas-filled bowel loops along with dilated, fecalized bowel loops peripherally, in keeping with the suspected small bowel obstruction on CT. There is no definite free air on the erect view.  A nasogastric tube tip projects over the left upper quadrant of the abdomen, in keeping with a position in the proximal stomach. No pathologic calcifications.  The bladder is distended and opacified with contrast from the preceding CT scan.  There is no organomegaly or discrete soft tissue mass.  The visualized osseous structures are unremarkable.     IMPRESSION: 1.  Redemonstration of distended gas-filled and distended, fecalized bowel loops, in keeping with the suspected bowel obstruction on the CT scan from earlier today. 2.  No definite evidence of free air on the erect view.    X-ray Abdomen 1 View    Result Date: 8/18/2020  CLINICAL HISTORY: Small bowel obstruction.  Nasogastric tube placement COMMENT: A limited semierect portable view of the upper abdomen is obtained.  A nasogastric tube has been placed.  It courses through the lower mediastinum near the midline and appears to be extrinsic to the tracheobronchial tree..  The tip projects over the left upper quadrant of the abdomen, in keeping with a position in the proximal stomach.  There are distended loops of bowel, in keeping with the bowel obstruction suspected on CT scan.  No definite free air is seen.     IMPRESSION: 1.  Nasogastric tube placement.  Tip projecting over the left upper quadrant of the abdomen, in keeping with a position in the proximal stomach. 2.  Distended gas-filled bowel loops in keeping with the suspected bowel obstruction on today's CT scan.  No definite free air.    Ct Abdomen Pelvis With Iv Contrast    Addendum Date: 8/18/2020    Further history was given  including status post total colectomy and J-pouch. Given this additional information, the marked dilatation proximal to the anastomosis is dilated small bowel loops (not colon as was previously mentioned) with  suspected volvulus likely related to possible stricture at the anastomosis.    Result Date: 8/18/2020  CLINICAL HISTORY: History of Crohn's disease.  History of volvulus and obstructions in the past.  Vomiting earlier.  Abdominal pain. COMMENT: COMPARISON: None available TECHNIQUE: CT of the abdomen and pelvis was performed with the patient in the supine position. Images reconstructed/reformatted in the axial, coronal and sagittal planes. CT DOSE: One or more dose reduction techniques (e.g. automated exposure control, adjustment of the mA and/or kV according to patient size, use of iterative reconstruction technique) utilized for this examination. ORAL CONTRAST: None INTRAVENOUS CONTRAST:115 mL of Omnipaque 350 intravenous contrast was administered without event. LOWER CHEST: Within normal limits. LIVER: Within normal limits. BILE DUCTS: Normal caliber. GALLBLADDER: No calcified gallstones. Normal caliber wall. PANCREAS: Within normal limits. SPLEEN: Within normal limits. ADRENALS: Within normal limits. KIDNEYS/URETERS/BLADDER: Kidneys enhance symmetrically.  No hydronephrosis. Marked bladder distention. REPRODUCTIVE ORGANS: No pelvic masses. BOWEL: Marked distention of the colon with marked fecal retention measuring up to approximately 8 cm.  Surgical anastomosis noted along the rectosigmoid junction where there appears to be a transition point as well as marked swirling of the mesenteric vessels just proximal to the anastamosis.  No pneumatosis. PERITONEUM: Small amount of free fluid.  No pneumoperitoneum. VESSELS: Swelling of the mesenteric vessels noted within the lower mid abdomen concerning for underlying volvulus.  Abdominal aorta and iliac vessels are normal in caliber.  The major abdominal  vasculature appears patent. LYMPH NODES: Within normal limits. ABDOMINAL WALL: Within normal limits. BONES: Within normal limits.     IMPRESSION: Marked distention of the colon with probable transition point at the proximal rectosigmoid anastomosis, potentially on the basis of an underlying stricture with suspected secondary sigmoid volvulus.  Small amount of free fluid. Finding: Other   Acuity: Critical  Status: CLOSED The results were critically read back with DR CIRO GÓMEZ on 8/18/2020 1:37 AM. I certify that I have reviewed this examination and agree with this report. Earlene Mcgraw MD    X-ray Chest 1 View    Result Date: 8/18/2020  CLINICAL HISTORY: Endotracheal tube placement. COMMENT: Two AP views of the chest were obtained and compared to prior study from earlier the same day. Enteric tube is seen coursing under the diaphragm.  There is an endotracheal tube with tip at the thoracic inlet.  The heart is mildly enlarged.  There is no focal consolidation, pleural effusion or pneumothorax.     IMPRESSION:  Endotracheal tube with at the thoracic inlet.    X-ray Chest 1 View    Result Date: 8/18/2020  CLINICAL HISTORY: Nasogastric tube placement.  Abdominal pain, distention. Bowel obstruction. COMMENT: A single portable frontal view of the chest is obtained, time stamped 609 hours. Comparison--none Multiple leads project over the chest.  A nasogastric tube courses through the mediastinum just to the left of midline and appears to be extrinsic to the tracheobronchial tree; the tip projects over the left upper quadrant of the abdomen, in keeping with a position in the stomach. There is slight elevation or eventration of the right hemidiaphragm.  Heart size is top normal to mildly enlarged.  There is no focal consolidation, pleural effusion, or pneumothorax.  Hilar and mediastinal contours are within normal limits.  No acute osseous abnormality is appreciated. There is redemonstration of distended bowel loops  in the upper abdomen in keeping with the bowel obstruction noted on today's CT scan.  No free air is seen under the diaphragms on this erect view.     IMPRESSION: 1.  No definite evidence of acute cardiopulmonary disease 2.  Cardiomegaly       ASSESSMENT & PLAN     Principal Problem:    Volvulus (CMS/HCC)  Active Problems:    Volvulus of small intestine (CMS/HCC)    Crohn's disease of colon (CMS/HCC)    On mechanically assisted ventilation (CMS/HCC)      Crohn's disease of colon (CMS/HCC)  Assessment & Plan  See plan above    Volvulus of small intestine (CMS/HCC)  Assessment & Plan  35 year old male with history of Crohn's disease s/p total colectomy with J-pouch and recent volvulus two months ago.  Attempted flexible sigmoidoscopy decompression unsucessful.  Patient underwent emergent surgical decompression yesterday.      Plans for repeat OR today for second look and planned wound closure per surgery.              Van Rodriguez MD  8/19/2020  6:13 AM

## 2020-08-19 NOTE — PLAN OF CARE
Problem: Adult Inpatient Plan of Care  Goal: Readiness for Transition of Care  Intervention: Mutually Develop Transition Plan  Flowsheets (Taken 8/19/2020 1612)  Discharge Coordination/Progress: Pt curently remains intubated/ vented, plan is for OR today. Pt currently has a [employer] wound vac, [employer] would vac form placed on chart..Spoke with patient wife Hazel, Hazel  stated he is completely independent prior to admit. 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 For the summer they are staying with wife's parents  at [address] [place] [address] . Hazel is aware of OR today and that  pt. Has a wound vac. . Informed Hazel we will f/u for any needs.

## 2020-08-20 LAB
ANION GAP SERPL CALC-SCNC: 4 MEQ/L (ref 3–15)
BASOPHILS # BLD: 0.02 K/UL (ref 0.01–0.1)
BASOPHILS NFR BLD: 0.3 %
BUN SERPL-MCNC: 17 MG/DL (ref 8–20)
CALCIUM SERPL-MCNC: 7.7 MG/DL (ref 8.9–10.3)
CHLORIDE SERPL-SCNC: 108 MEQ/L (ref 98–109)
CO2 SERPL-SCNC: 27 MEQ/L (ref 22–32)
CREAT SERPL-MCNC: 0.8 MG/DL (ref 0.8–1.3)
DIFFERENTIAL METHOD BLD: ABNORMAL
EOSINOPHIL # BLD: 0.01 K/UL (ref 0.04–0.54)
EOSINOPHIL NFR BLD: 0.1 %
ERYTHROCYTE [DISTWIDTH] IN BLOOD BY AUTOMATED COUNT: 13.6 % (ref 11.6–14.4)
GFR SERPL CREATININE-BSD FRML MDRD: >60 ML/MIN/1.73M*2
GLUCOSE SERPL-MCNC: 95 MG/DL (ref 70–99)
HCT VFR BLDCO AUTO: 41.2 % (ref 40.1–51)
HGB BLD-MCNC: 13.6 G/DL (ref 13.7–17.5)
IMM GRANULOCYTES # BLD AUTO: 0.02 K/UL (ref 0–0.08)
IMM GRANULOCYTES NFR BLD AUTO: 0.3 %
LYMPHOCYTES # BLD: 1.36 K/UL (ref 1.2–3.5)
LYMPHOCYTES NFR BLD: 19 %
MAGNESIUM SERPL-MCNC: 1.9 MG/DL (ref 1.8–2.5)
MCH RBC QN AUTO: 30.9 PG (ref 28–33.2)
MCHC RBC AUTO-ENTMCNC: 33 G/DL (ref 32.2–36.5)
MCV RBC AUTO: 93.6 FL (ref 83–98)
MICROORGANISM SPEC CULT: NORMAL
MONOCYTES # BLD: 0.61 K/UL (ref 0.3–1)
MONOCYTES NFR BLD: 8.5 %
NEUTROPHILS # BLD: 5.12 K/UL (ref 1.7–7)
NEUTS SEG NFR BLD: 71.8 %
NRBC BLD-RTO: 0 %
PDW BLD AUTO: 9.5 FL (ref 9.4–12.4)
PHOSPHATE SERPL-MCNC: 2.8 MG/DL (ref 2.4–4.7)
PLATELET # BLD AUTO: 203 K/UL (ref 150–350)
POTASSIUM SERPL-SCNC: 4.1 MEQ/L (ref 3.6–5.1)
RBC # BLD AUTO: 4.4 M/UL (ref 4.5–5.8)
SODIUM SERPL-SCNC: 139 MEQ/L (ref 136–144)
WBC # BLD AUTO: 7.14 K/UL (ref 3.8–10.5)

## 2020-08-20 PROCEDURE — 63700000 HC SELF-ADMINISTRABLE DRUG: Performed by: HOSPITALIST

## 2020-08-20 PROCEDURE — 25000000 HC PHARMACY GENERAL: Performed by: HOSPITALIST

## 2020-08-20 PROCEDURE — 25800000 HC PHARMACY IV SOLUTIONS: Performed by: HOSPITALIST

## 2020-08-20 PROCEDURE — 63600000 HC DRUGS/DETAIL CODE: Performed by: STUDENT IN AN ORGANIZED HEALTH CARE EDUCATION/TRAINING PROGRAM

## 2020-08-20 PROCEDURE — 200200 PR NO CHARGE: Performed by: HOSPITALIST

## 2020-08-20 PROCEDURE — 63600000 HC DRUGS/DETAIL CODE: Performed by: HOSPITALIST

## 2020-08-20 PROCEDURE — 99024 POSTOP FOLLOW-UP VISIT: CPT | Performed by: SURGERY

## 2020-08-20 PROCEDURE — 80048 BASIC METABOLIC PNL TOTAL CA: CPT | Performed by: HOSPITALIST

## 2020-08-20 PROCEDURE — 63600000 HC DRUGS/DETAIL CODE: Performed by: SURGERY

## 2020-08-20 PROCEDURE — 83735 ASSAY OF MAGNESIUM: CPT | Performed by: HOSPITALIST

## 2020-08-20 PROCEDURE — 21400000 HC ROOM AND CARE CCU/INTERMEDIATE

## 2020-08-20 PROCEDURE — 85025 COMPLETE CBC W/AUTO DIFF WBC: CPT | Performed by: HOSPITALIST

## 2020-08-20 PROCEDURE — 84100 ASSAY OF PHOSPHORUS: CPT | Performed by: HOSPITALIST

## 2020-08-20 PROCEDURE — 63600000 HC DRUGS/DETAIL CODE

## 2020-08-20 PROCEDURE — 36415 COLL VENOUS BLD VENIPUNCTURE: CPT | Performed by: HOSPITALIST

## 2020-08-20 PROCEDURE — 25800000 HC PHARMACY IV SOLUTIONS: Performed by: SURGERY

## 2020-08-20 RX ORDER — CEFAZOLIN SODIUM 2 G/50ML
2 SOLUTION INTRAVENOUS ONCE
Status: COMPLETED | OUTPATIENT
Start: 2020-08-21 | End: 2020-08-21

## 2020-08-20 RX ORDER — METRONIDAZOLE 500 MG/100ML
500 INJECTION, SOLUTION INTRAVENOUS
Status: DISCONTINUED | OUTPATIENT
Start: 2020-08-21 | End: 2020-08-21

## 2020-08-20 RX ORDER — LORAZEPAM 2 MG/ML
0.5 INJECTION INTRAMUSCULAR EVERY 6 HOURS PRN
Status: DISCONTINUED | OUTPATIENT
Start: 2020-08-20 | End: 2020-08-26

## 2020-08-20 RX ORDER — DEXTROSE MONOHYDRATE AND SODIUM CHLORIDE 5; .45 G/100ML; G/100ML
INJECTION, SOLUTION INTRAVENOUS CONTINUOUS
Status: DISCONTINUED | OUTPATIENT
Start: 2020-08-20 | End: 2020-08-22

## 2020-08-20 RX ORDER — LORAZEPAM 2 MG/ML
0.5 INJECTION INTRAMUSCULAR
Status: DISCONTINUED | OUTPATIENT
Start: 2020-08-20 | End: 2020-08-20

## 2020-08-20 RX ORDER — BISACODYL 10 MG/1
10 SUPPOSITORY RECTAL ONCE
Status: DISCONTINUED | OUTPATIENT
Start: 2020-08-20 | End: 2020-08-20

## 2020-08-20 RX ORDER — HYDROMORPHONE HCL IN 0.9% NACL 0.2 MG/ML
PLASTIC BAG, INJECTION (ML) INTRAVENOUS CONTINUOUS
Status: DISCONTINUED | OUTPATIENT
Start: 2020-08-20 | End: 2020-08-25

## 2020-08-20 RX ADMIN — MORPHINE SULFATE 2 MG: 2 INJECTION, SOLUTION INTRAMUSCULAR; INTRAVENOUS at 02:07

## 2020-08-20 RX ADMIN — MORPHINE SULFATE 2 MG: 2 INJECTION, SOLUTION INTRAMUSCULAR; INTRAVENOUS at 05:57

## 2020-08-20 RX ADMIN — METRONIDAZOLE 500 MG: 500 INJECTION, SOLUTION INTRAVENOUS at 01:47

## 2020-08-20 RX ADMIN — Medication: at 20:20

## 2020-08-20 RX ADMIN — LORAZEPAM 0.5 MG: 2 INJECTION INTRAMUSCULAR; INTRAVENOUS at 11:50

## 2020-08-20 RX ADMIN — LORAZEPAM 0.5 MG: 2 INJECTION INTRAMUSCULAR; INTRAVENOUS at 06:35

## 2020-08-20 RX ADMIN — METRONIDAZOLE 500 MG: 500 INJECTION, SOLUTION INTRAVENOUS at 12:16

## 2020-08-20 RX ADMIN — CHLORHEXIDINE GLUCONATE 15 ML: 1.2 RINSE ORAL at 21:41

## 2020-08-20 RX ADMIN — MORPHINE SULFATE 2 MG: 2 INJECTION, SOLUTION INTRAMUSCULAR; INTRAVENOUS at 04:05

## 2020-08-20 RX ADMIN — PANTOPRAZOLE SODIUM 40 MG: 40 INJECTION, POWDER, LYOPHILIZED, FOR SOLUTION INTRAVENOUS at 10:28

## 2020-08-20 RX ADMIN — LORAZEPAM 0.5 MG: 2 INJECTION INTRAMUSCULAR; INTRAVENOUS at 20:12

## 2020-08-20 RX ADMIN — LORAZEPAM 0.5 MG: 2 INJECTION INTRAMUSCULAR at 00:04

## 2020-08-20 RX ADMIN — ENOXAPARIN SODIUM 40 MG: 100 INJECTION SUBCUTANEOUS at 05:00

## 2020-08-20 RX ADMIN — DEXTROSE AND SODIUM CHLORIDE: 5; 450 INJECTION, SOLUTION INTRAVENOUS at 10:22

## 2020-08-20 RX ADMIN — Medication: at 10:06

## 2020-08-20 RX ADMIN — LORAZEPAM 0.5 MG: 2 INJECTION INTRAMUSCULAR; INTRAVENOUS at 00:04

## 2020-08-20 RX ADMIN — CEFTRIAXONE SODIUM 2 G: 2 INJECTION, POWDER, FOR SOLUTION INTRAMUSCULAR; INTRAVENOUS at 11:37

## 2020-08-20 RX ADMIN — DEXTROSE AND SODIUM CHLORIDE: 5; 450 INJECTION, SOLUTION INTRAVENOUS at 23:27

## 2020-08-20 NOTE — PROGRESS NOTES
Infectious Disease Progress Note    Patient Name: Selvin Jorge  MR#: [id number]  : 1985  Admission Date: 2020  Date: 20   Time: 12:07 PM   Author: Boston Vickers MD    OBJECTIVE:  Extubated  Antibiotics restarted by the surgical team  T-max 100.1    Antibiotics:    Anti-infectives (From admission, onward)    Start     Dose/Rate Route Frequency Ordered Stop    20 0900  cefTRIAXone (ROCEPHIN) 2 g in sodium chloride 0.9 % 100 mL IVPB      2 g  200 mL/hr over 30 Minutes intravenous Every 24 hours interval 20 17520 175  metroNIDAZOLE in NaCl (iso-os) (FLAGYL) IVPB 500 mg      500 mg  100 mL/hr over 60 Minutes intravenous Every 8 hours interval 20 175            ROS  All other systems negative vital Signs:    Temp:  [36.7 °C (98 °F)-37.8 °C (100.1 °F)] 36.7 °C (98 °F)  Heart Rate:  [56-72] 62  Resp:  [15-68] 20  BP: (105-139)/(55-70) 121/62  FiO2 (%) (Set):  [40 %] 40 %    Temp (72hrs), Av.6 °C (97.9 °F), Min:35.6 °C (96 °F), Max:37.8 °C (100.1 °F)      Physical Exam    Gen: Aox3  HEENT: OP clear  Neck: Supple  LAD: No cervical LAD  Lungs: CTAB  CV: RRR no murmurs  Abd: Wound VAC in place  Ext: No c/c/e  Skin: no rash  Neuro: II-XII intact        Lines, Drains, Airways, Wounds:  Peripheral IV 20 Left Wrist (Active)   Number of days: 3       Peripheral IV 20 Right Forearm (Active)   Number of days: 2       NG/OG Tube 20 Right nostril (Active)   Number of days: 2       Urethral Catheter Latex 16 Fr (Active)   Number of days: 2       Surgical Incision Abdomen (Active)   Number of days: 2       Labs:    Lab Results   Component Value Date    WBC 7.14 2020    HGB 13.6 (L) 2020    HCT 41.2 2020    MCV 93.6 2020     2020     Lab Results   Component Value Date    GLUCOSE 95 2020    CALCIUM 7.7 (L) 2020     2020    K 4.1 2020    CO2 27 2020     2020    BUN 17  08/20/2020    CREATININE 0.8 08/20/2020     Lab Results   Component Value Date    ALT 45 08/17/2020    AST 67 (H) 08/17/2020    ALKPHOS 38 08/17/2020    BILITOT 1.9 (H) 08/17/2020         Patient Active Problem List   Diagnosis Code   • Volvulus of small intestine (CMS/HCC) K56.2   • Crohn's disease of colon (CMS/HCC) K50.10   • Volvulus (CMS/HCC) K56.2   • On mechanically assisted ventilation (CMS/HCC) Z99.11     Volvulus of small intestine (CMS/HCC)  Assessment & Plan  Recommend to discontinue antibiotics        Boston Vickers MD  8/20/202012:07 PM

## 2020-08-20 NOTE — PATIENT CARE CONFERENCE
Care Progression Rounds Note  Date: 8/20/2020  Time: 10:55 AM     Patient Name: Selvin Jorge     Medical Record Number: [id number]   YOB: 1985  Sex: Male      Room/Bed: [facility]    Admitting Diagnosis: Volvulus (CMS/HCC) [K56.2]  Volvulus (CMS/HCC) [K56.2]   Admit Date/Time: 8/17/2020 10:36 PM    Primary Diagnosis: Volvulus (CMS/HCC)  Principal Problem: Volvulus (CMS/HCC)    GMLOS: 10.4  Anticipated Discharge Date: 8/23/2020    AM-PAC  Mobility Score: 6    Discharge Planning:  Anticipated Discharge Disposition: home with assistance, home with home health services    Barriers to Discharge:  Barriers to Discharge: Medical issues not resolved    Participants:  respiratory therapy, , social work/services, dietitian/nutrition services, nursing, physical therapy

## 2020-08-20 NOTE — PROGRESS NOTES
GI Daily Progress Note           SUBJECTIVE    LOS: 2 days     Interval History: S/p OR yesterday for ex-lap and wash out with vac change.     Review of Systems  All other systems were reviewed and are negative oither than as stated in the HPI   OBJECTIVE   Vital signs in last 24 hours:  Temp:  [35.7 °C (96.2 °F)-37.3 °C (99.2 °F)] 36.7 °C (98 °F)  Heart Rate:  [56-72] 65  Resp:  [15-73] 22  BP: (104-139)/(55-70) 125/63  FiO2 (%) (Set):  [40 %] 40 %      Intake/Output Summary (Last 24 hours) at 8/20/2020 0651  Last data filed at 8/20/2020 0600  Gross per 24 hour   Intake 3235.18 ml   Output 2200 ml   Net 1035.18 ml       Intake/Output this shift:  I/O this shift:  In: 1212.1 [I.V.:1152.1; NG/GT:60]  Out: 1000 [Urine:650; Emesis/NG output:100; Other:250]   Current Medications:  •  calcium gluconate, 1 g, intravenous, q6h PRN  •  cefTRIAXone, 2 g, intravenous, q24h INT  •  chlorhexidine, 15 mL, Mouth/Throat, 2 times per day  •  glucose, 16-32 g of dextrose, oral, PRN **OR** dextrose, 15-30 g of dextrose, oral, PRN **OR** glucagon, 1 mg, intramuscular, PRN **OR** dextrose in water, 25 mL, intravenous, PRN  •  enoxaparin, 40 mg, subcutaneous, Daily (6a)  •  fentaNYL, 50 mcg, intravenous, q15 min PRN  •  HYDROmorphone, 0.5 mg, intravenous, q15 min PRN  •  labetaloL, 5 mg, intravenous, PRN  •  LORazepam, 0.5 mg, intravenous, q6h INT  •  magnesium oxide, 400 mg, oral, 2x daily PRN  •  magnesium sulfate, 2 g, intravenous, PRN  •  metroNIDAZOLE in NaCl (iso-os), 500 mg, intravenous, q8h INT  •  morphine, 2 mg, intravenous, q2h PRN  •  ondansetron, 4 mg, intravenous, q15 min PRN  •  pantoprazole, 40 mg, intravenous, q24h  •  potassium chloride, 20 mEq, oral, PRN **OR** potassium chloride, 40 mEq, oral, PRN **OR** potassium chloride, 20 mEq, intravenous, PRN **OR** potassium chloride, 40 mEq, intravenous, PRN **OR** potassium bicarbonate, 20 mEq, oral, PRN **OR** potassium bicarbonate, 40 mEq, oral, PRN  •  sodium chloride  0.9 %, , intravenous, Continuous    Physical Exam  General appearance: alert, appears stated age and cooperative  Head: normocephalic, without obvious abnormality, atraumatic  Lungs: clear to auscultation bilaterally  Heart: regular rate and rhythm, S1, S2 normal, no murmur, click, rub or gallop  Abdomen: Soft, +vac, some bowel sounds; no bruits, organomegaly or masses.  Extremities: extremities normal, warm and well-perfused; no cyanosis, clubbing, or edema  Skin: Skin color, texture, turgor normal. No rashes or lesions  Neurologic: Grossly normal     LABS & IMAGING   Labs      Results from last 7 days   Lab Units 08/20/20  0457 08/19/20  0258 08/18/20  1529   WBC K/uL 7.14 11.52* 13.87*   HEMOGLOBIN g/dL 13.6* 16.8 16.8   HEMATOCRIT % 41.2 49.7 51.0   PLATELETS K/uL 203 245 252     Results from last 7 days   Lab Units 08/20/20  0457 08/19/20 0258 08/18/20  1529  08/17/20  2246   SODIUM mEQ/L 139 134* 136   < > 134*   POTASSIUM mEQ/L 4.1 4.3 4.8   < > 4.1   CHLORIDE mEQ/L 108 106 107   < > 100   CO2 mEQ/L 27 23 21*   < > 24   BUN mg/dL 17 17 18   < > 21*   CREATININE mg/dL 0.8 0.8 0.8   < > 1.1   CALCIUM mg/dL 7.7* 8.1* 7.9*   < > 9.1   ALBUMIN g/dL  --   --   --   --  4.6   BILIRUBIN TOTAL mg/dL  --   --   --   --  1.9*   ALK PHOS IU/L  --   --   --   --  38   ALT IU/L  --   --   --   --  45   AST IU/L  --   --   --   --  67*   GLUCOSE mg/dL 95 120* 144*   < > 86    < > = values in this interval not displayed.     Results from last 7 days   Lab Units 08/18/20  0636   INR INR 1.1       Imaging  I have reviewed the Imaging from the last 24 hrs.    X-ray Chest 1 View    Result Date: 8/19/2020  CLINICAL HISTORY: Respiratory failure COMMENT: A single AP view of the chest was obtained and compared to prior study from yesterday. There is an enteric tube seen coursing under the diaphragm.  There is an endotracheal tube with tip at the thoracic inlet.  The heart is normal in size. There is no focal consolidation, pleural  effusion, or pneumothorax.     IMPRESSION: Endotracheal tube in place.  No acute disease in the chest.       ASSESSMENT & PLAN     Principal Problem:    Volvulus (CMS/HCC)  Active Problems:    Volvulus of small intestine (CMS/HCC)    Crohn's disease of colon (CMS/HCC)    On mechanically assisted ventilation (CMS/HCC)      Crohn's disease of colon (CMS/HCC)  Assessment & Plan  See plan above    Volvulus of small intestine (CMS/HCC)  Assessment & Plan  35 year old male with history of Crohn's disease s/p total colectomy with J-pouch and recent volvulus two months ago.  Attempted flexible sigmoidoscopy decompression unsucessful.  Patient underwent emergent surgical decompression.  Second look yesterday with washout, but unable to close given persistent dilation.  Patient states pain is adequately controlled.    Currently on ABX.  Would continue to hold Humira for now.  He normally receives it weekly on fridays.  If no signs of infection, would consider restarting.  Recent studies suggest no impairment of wound healing while on biologics. - For Now, would hold.                Van Rodriguez MD  8/20/2020  6:51 AM

## 2020-08-20 NOTE — PROGRESS NOTES
Patient stable throughout the night. D/w Surgery - [facility] Dr. Adams service. Surgery aware that order is placed for downgrade and that patient may or may not leave ICU prior to end of shift.

## 2020-08-20 NOTE — PROGRESS NOTES
General Surgery Daily Progress Note    Subjective     Interval History:   S/p exlap, detorsion of midgut volvulus, abthera vac placement 8/18 and 2nd look for washout and replacement of abthera on 8/19    NAEON. Continues to do well. Pain is improving. Denies N/V, F/C, SOB, or CP. Enema given overnight but continues to have no flatus or BM,    Objective     Vital signs in last 24 hours:  Temp:  [36.6 °C (97.9 °F)-36.9 °C (98.5 °F)] 36.9 °C (98.5 °F)  Heart Rate:  [56-77] 71  Resp:  [13-21] 17  BP: (116-132)/(61-77) 128/75      Intake/Output Summary (Last 24 hours) at 8/21/2020 0624  Last data filed at 8/21/2020 0300  Gross per 24 hour   Intake 1555.67 ml   Output 1575 ml   Net -19.33 ml     Intake/Output this shift:  I/O this shift:  In: 1040 [I.V.:1040]  Out: 650 [Urine:650]    Physical Exam    General: AAOx3, NAD  Heart: RRR  Lungs: sating well on RA, no respiratory distress  Abd: Abthera vac in place, good suction, no leak, thin serosang drainage, area with evidence bowel seen under vac  Extremities: soft, nontender, no edema, good distal pulses    VTE Assessment: I have reassessed and the patient's VTE risk and treatment plan is appropriate.    Labs  I have reviewed the patient's labs.  Current labs are within normal limits.  Labs are pending.    Imaging  I have reviewed the Imaging from the last 24 hrs.      Assessment/Plan     Expected Discharge Date:   8/23/2020    Mr Jorge is a 35M with history of Crohn's disease s/p total proctocolectomy with J pouch here with midgut volvulus now s/p exlap, detorsion of midgut volvulus and abthera vac placement  - OR today for washout, abthera replacement   - Continue dPCA for pain control  - NGT LCWS, npo for now  - mIVFs today (D5 1/2 at 80 cc/hr)  - Abthera vac to 125 mmHg suction, will monitor output  - lovenox dvt ppx    Lindsay Weil, MD     Surgery attending:     I performed a history and physical examination of the patient and discussed the management with the  Resident. I reviewed the Resident's note and agree with the documented findings and plan of care, except for my comments below or within the additional notes today.    35-year-old male with history of Crohn's disease status post total proctocolectomy with J-pouch who presented with a volvulus POD #3 status post exploratory laparotomy, detorsion of volvulus, and abthera wound vac placement, POD#2 after washout, Abthera replacement.    Well controlled with Dilaudid PCA.  NG tube with 100 cc of bilious output.  Urine output 1.825 L. On exam, afebrile vital signs stable.  AB Thera dressing clean dry and intact with good seal.  Abdomen appropriately tender to palpation.  Labs reviewed: White blood cell count 6.8, Cr 0.7. Plan to proceed back to the operating room for ex lap, washout, abthera wound vac placement vs closure. Recommend starting TPN. Diuresis. [place]. Strict I/O. Lovenox DVT prophylaxis. Updated his wife Hazel on the phone.    Chelsi Adams MD

## 2020-08-20 NOTE — PROGRESS NOTES
Pulmonary & Critical Care Progress Note    Subjective    Pt seen in follow up after ICU admission for volvulus  Went to or for ex-lap yesterday, extubated post procedure without issue  The patient has discomfort but pain adequately controlled with PCA pump        Objective   Medications  Scheduled Meds:  • cefTRIAXone  2 g intravenous q24h INT   • chlorhexidine  15 mL Mouth/Throat 2 times per day   • enoxaparin  40 mg subcutaneous Daily (6a)   • metroNIDAZOLE in NaCl (iso-os)  500 mg intravenous q8h INT   • pantoprazole  40 mg intravenous q24h     Continuous Infusions:  • D5 % and 0.45 % sodium chloride       • HYDROmorphone in 0.9 % NaCl         PRN Meds:.•  calcium gluconate  •  glucose **OR** dextrose **OR** glucagon **OR** dextrose in water  •  fentaNYL  •  labetaloL  •  LORazepam  •  magnesium oxide  •  magnesium sulfate  •  morphine  •  HYDROmorphone in 0.9 % NaCl **AND** naloxone (NARCAN) IV syringe 0.04 mg/mL **AND** Vital Signs, Pulse Oximetry, Sedation Score, and End Tidal CO2 (not required for Comfort Care patients) **AND** End Tidal CO2 Monitoring **AND** Notify physician (specify):Systolic blood pressure less than: 100; Respiratory rate less than: 7 **AND** For PCA discontinuation: reassess patient and document within a minimum of 1 hour (refer to Patient Controlled Analgesia Policy) **AND** PCA Nursing Documentation  •  ondansetron  •  potassium chloride **OR** potassium chloride **OR** potassium chloride **OR** potassium chloride **OR** potassium bicarbonate **OR** potassium bicarbonate     Vital Signs  Temp:  [36.3 °C (97.4 °F)-37.8 °C (100.1 °F)] 37.8 °C (100.1 °F)  Heart Rate:  [56-72] 72  Resp:  [15-70] 21  BP: (105-139)/(55-70) 123/61  FiO2 (%) (Set):  [40 %] 40 %  Vent Mode: Assist control/volume control  FiO2 (%) (Set):  [40 %] 40 %  S RR:  [20] 20  S VT:  [450 mL] 450 mL  PEEP/CPAP (Set, cmH2O):  [6 cm H20] 6 cm H20  MAP (Observed, cmH2O):  [10] 10  PIP (Set, cmH2O):  [0 cm H2O] 0 cm  H2O    Intake/Output Summary (Last 24 hours) at 8/20/2020 0842  Last data filed at 8/20/2020 0700  Gross per 24 hour   Intake 3148.58 ml   Output 2375 ml   Net 773.58 ml       Telemetry  sinus rhythm     Laboratory  Recent Results (from the past 48 hour(s))   CBC and differential    Collection Time: 08/18/20  3:29 PM   Result Value Ref Range    WBC 13.87 (H) 3.80 - 10.50 K/uL    RBC 5.46 4.50 - 5.80 M/uL    Hemoglobin 16.8 13.7 - 17.5 g/dL    Hematocrit 51.0 40.1 - 51.0 %    MCV 93.4 83.0 - 98.0 fL    MCH 30.8 28.0 - 33.2 pg    MCHC 32.9 32.2 - 36.5 g/dL    RDW 13.0 11.6 - 14.4 %    Platelets 252 150 - 350 K/uL    MPV 9.4 9.4 - 12.4 fL    Differential Type Auto     nRBC 0.0 <=0.0 %    Immature Granulocytes 0.2 %    Neutrophils 90.6 %    Lymphocytes 5.8 %    Monocytes 3.3 %    Eosinophils 0.0 %    Basophils 0.1 %    Immature Granulocytes, Absolute 0.03 0.00 - 0.08 K/uL    Neutrophils, Absolute 12.56 (H) 1.70 - 7.00 K/uL    Lymphocytes, Absolute 0.80 (L) 1.20 - 3.50 K/uL    Monocytes, Absolute 0.46 0.30 - 1.00 K/uL    Eosinophils, Absolute 0.00 (L) 0.04 - 0.54 K/uL    Basophils, Absolute 0.02 0.01 - 0.10 K/uL   Basic metabolic panel    Collection Time: 08/18/20  3:29 PM   Result Value Ref Range    Sodium 136 136 - 144 mEQ/L    Potassium 4.8 3.6 - 5.1 mEQ/L    Chloride 107 98 - 109 mEQ/L    CO2 21 (L) 22 - 32 mEQ/L    BUN 18 8 - 20 mg/dL    Creatinine 0.8 0.8 - 1.3 mg/dL    Glucose 144 (H) 70 - 99 mg/dL    Calcium 7.9 (L) 8.9 - 10.3 mg/dL    eGFR >60.0 >=60.0 mL/min/1.73m*2    Anion Gap 8 3 - 15 mEQ/L   Lactic acid, Venous    Collection Time: 08/18/20  3:29 PM   Result Value Ref Range    Lactate 1.4 0.4 - 2.0 mmol/L   Triglycerides    Collection Time: 08/18/20  3:29 PM   Result Value Ref Range    Triglycerides 42 30 - 149 mg/dL   Sputum Gram Stain (Lab Only) Expectorated Sputum    Collection Time: 08/18/20  4:21 PM   Result Value Ref Range    Gram Stain Result No WBC Seen     Gram Stain Result No organisms seen      Gram Stain Result No Epithelial Cells Seen    Sputum Culture (Lab Only) Expectorated Sputum    Collection Time: 08/18/20  4:21 PM   Result Value Ref Range    Culture Culture in progress    POCT Arterial Blood Gas    Collection Time: 08/18/20  4:23 PM   Result Value Ref Range    pH, Arterial 7.27 (L) 7.35 - 7.45 pH    pCO2, Arterial 52 (H) 35 - 48 mm Hg    pO2, Arterial 133 (H) 83 - 100 mm Hg    HCO3, Arterial 24 21 - 28 mEQ/L    Base Excess, Arterial -3.8 mEQ/L    O2 Sat, Arterial 99 (H) 93 - 98 %    TCO2, Arterial 26 22 - 32 mEQ/L    Lactate, Arterial 1.2 0.4 - 1.6 mmol/L    Glucose, Arterial 153 (H) 70 - 99 mg/dL    Sodium, Arterial 134 (L) 136 - 145 mEQ/L    Potassium, Arterial 4.2 3.4 - 4.5 mEQ/L    Ionized Calcium, Arterial 1.06 (L) 1.15 - 1.27 mmol/L    Hematocrit, Arterial 53 (H) 42 - 52 %    Patient Temperature 98.6 97.0 - 99.0 °F    POC Test POC    POCT Glucose    Collection Time: 08/18/20  7:22 PM   Result Value Ref Range    POCT Bedside Glucose 124 (H) 70 - 99 mg/dL    POC Test POC    CBC and differential    Collection Time: 08/19/20  2:58 AM   Result Value Ref Range    WBC 11.52 (H) 3.80 - 10.50 K/uL    RBC 5.42 4.50 - 5.80 M/uL    Hemoglobin 16.8 13.7 - 17.5 g/dL    Hematocrit 49.7 40.1 - 51.0 %    MCV 91.7 83.0 - 98.0 fL    MCH 31.0 28.0 - 33.2 pg    MCHC 33.8 32.2 - 36.5 g/dL    RDW 13.2 11.6 - 14.4 %    Platelets 245 150 - 350 K/uL    MPV 9.9 9.4 - 12.4 fL    Differential Type Auto     nRBC 0.0 <=0.0 %    Immature Granulocytes 0.2 %    Neutrophils 81.6 %    Lymphocytes 10.9 %    Monocytes 7.1 %    Eosinophils 0.0 %    Basophils 0.2 %    Immature Granulocytes, Absolute 0.02 0.00 - 0.08 K/uL    Neutrophils, Absolute 9.41 (H) 1.70 - 7.00 K/uL    Lymphocytes, Absolute 1.25 1.20 - 3.50 K/uL    Monocytes, Absolute 0.82 0.30 - 1.00 K/uL    Eosinophils, Absolute 0.00 (L) 0.04 - 0.54 K/uL    Basophils, Absolute 0.02 0.01 - 0.10 K/uL   Basic metabolic panel    Collection Time: 08/19/20  2:58 AM   Result Value  Ref Range    Sodium 134 (L) 136 - 144 mEQ/L    Potassium 4.3 3.6 - 5.1 mEQ/L    Chloride 106 98 - 109 mEQ/L    CO2 23 22 - 32 mEQ/L    BUN 17 8 - 20 mg/dL    Creatinine 0.8 0.8 - 1.3 mg/dL    Glucose 120 (H) 70 - 99 mg/dL    Calcium 8.1 (L) 8.9 - 10.3 mg/dL    eGFR >60.0 >=60.0 mL/min/1.73m*2    Anion Gap 5 3 - 15 mEQ/L   Magnesium    Collection Time: 08/19/20  2:58 AM   Result Value Ref Range    Magnesium 2.1 1.8 - 2.5 mg/dL   Phosphorus    Collection Time: 08/19/20  2:58 AM   Result Value Ref Range    Phosphorus 3.8 2.4 - 4.7 mg/dL   POCT Arterial Blood Gas    Collection Time: 08/19/20  5:06 AM   Result Value Ref Range    pH, Arterial 7.46 (H) 7.35 - 7.45 pH    pCO2, Arterial 40 35 - 48 mm Hg    pO2, Arterial 149 (H) 83 - 100 mm Hg    HCO3, Arterial 28 21 - 28 mEQ/L    Base Excess, Arterial 4.2 mEQ/L    O2 Sat, Arterial 99 (H) 93 - 98 %    TCO2, Arterial 30 22 - 32 mEQ/L    Lactate, Arterial 1.1 0.4 - 1.6 mmol/L    Glucose, Arterial 114 (H) 70 - 99 mg/dL    Sodium, Arterial 137 136 - 145 mEQ/L    Potassium, Arterial 4.0 3.4 - 4.5 mEQ/L    Ionized Calcium, Arterial 1.08 (L) 1.15 - 1.27 mmol/L    Hematocrit, Arterial 50 42 - 52 %    Patient Temperature 98.6 97.0 - 99.0 °F    POC Test POC    CBC and differential    Collection Time: 08/20/20  4:57 AM   Result Value Ref Range    WBC 7.14 3.80 - 10.50 K/uL    RBC 4.40 (L) 4.50 - 5.80 M/uL    Hemoglobin 13.6 (L) 13.7 - 17.5 g/dL    Hematocrit 41.2 40.1 - 51.0 %    MCV 93.6 83.0 - 98.0 fL    MCH 30.9 28.0 - 33.2 pg    MCHC 33.0 32.2 - 36.5 g/dL    RDW 13.6 11.6 - 14.4 %    Platelets 203 150 - 350 K/uL    MPV 9.5 9.4 - 12.4 fL    Differential Type Auto     nRBC 0.0 <=0.0 %    Immature Granulocytes 0.3 %    Neutrophils 71.8 %    Lymphocytes 19.0 %    Monocytes 8.5 %    Eosinophils 0.1 %    Basophils 0.3 %    Immature Granulocytes, Absolute 0.02 0.00 - 0.08 K/uL    Neutrophils, Absolute 5.12 1.70 - 7.00 K/uL    Lymphocytes, Absolute 1.36 1.20 - 3.50 K/uL    Monocytes,  Absolute 0.61 0.30 - 1.00 K/uL    Eosinophils, Absolute 0.01 (L) 0.04 - 0.54 K/uL    Basophils, Absolute 0.02 0.01 - 0.10 K/uL   Basic metabolic panel    Collection Time: 08/20/20  4:57 AM   Result Value Ref Range    Sodium 139 136 - 144 mEQ/L    Potassium 4.1 3.6 - 5.1 mEQ/L    Chloride 108 98 - 109 mEQ/L    CO2 27 22 - 32 mEQ/L    BUN 17 8 - 20 mg/dL    Creatinine 0.8 0.8 - 1.3 mg/dL    Glucose 95 70 - 99 mg/dL    Calcium 7.7 (L) 8.9 - 10.3 mg/dL    eGFR >60.0 >=60.0 mL/min/1.73m*2    Anion Gap 4 3 - 15 mEQ/L   Magnesium    Collection Time: 08/20/20  4:57 AM   Result Value Ref Range    Magnesium 1.9 1.8 - 2.5 mg/dL   Phosphorus    Collection Time: 08/20/20  4:57 AM   Result Value Ref Range    Phosphorus 2.8 2.4 - 4.7 mg/dL       Imaging  X-ray Chest 1 View    Result Date: 8/19/2020  CLINICAL HISTORY: Respiratory failure COMMENT: A single AP view of the chest was obtained and compared to prior study from yesterday. There is an enteric tube seen coursing under the diaphragm.  There is an endotracheal tube with tip at the thoracic inlet.  The heart is normal in size. There is no focal consolidation, pleural effusion, or pneumothorax.     IMPRESSION: Endotracheal tube in place.  No acute disease in the chest.        Physical Exam  General: no diaphoresis, no acute distress  Head: normocephalic, atraumatic  ENT: pupils equal & reactive, no discharge  Neck: supple, no jugular venous distension  Cardiovascular: regular, normal rate & rhythm  Pulmonary: decreased at bases  Abdomen: approp tender, hypoactive, +vac in place  Extremities: non-edematous, non-erythematous  Neurologic: grossly non-focal, EOM intact  Skin: warm, dry       Assessment/Plan   * Volvulus (CMS/HCC)  Assessment & Plan  H/o Crohn's disease s/p total proctocolectomy with J pouch  8/18 volvulus reduction, ex lap, wash out, temporary closure    - wound care per surgical team - vac in place  - continue periop abx  - NGT in place, monitor output  - lowest  effective pain control  - NPO for now  - lovenox for DVT prophylaxis  - supportive care  - encourage incentive spirometry (ordered)        Code status: Full Code        Acting as a scribe in the presence of COMPA Chau  I have reviewed and agree with the note. Geovanny Anderson MD

## 2020-08-20 NOTE — PROGRESS NOTES
General Surgery Daily Progress Note    Subjective     Interval History:   S/p exlap, detorsion of midgut volvulus, abthera vac placement 8/18 and 2nd look for washout and replacement of abthera on 8/19    Extuabed post-operatively and doing well. MAPS > 65. Pain control is his biggest issue. Denies N/V, F/C, SOB, or CP. Otherwise, doing well. No Flatus/BM. UOP > 40 cc/hr.     Objective     Vital signs in last 24 hours:  Temp:  [36.3 °C (97.4 °F)-37.8 °C (100.1 °F)] 37.8 °C (100.1 °F)  Heart Rate:  [56-72] 72  Resp:  [15-70] 21  BP: (105-139)/(55-70) 123/61  FiO2 (%) (Set):  [40 %] 40 %      Intake/Output Summary (Last 24 hours) at 8/20/2020 0829  Last data filed at 8/20/2020 0700  Gross per 24 hour   Intake 3148.58 ml   Output 2375 ml   Net 773.58 ml     Intake/Output this shift:  I/O this shift:  In: 125 [I.V.:125]  Out: 175 [Urine:175]    Physical Exam    General: intubated, sedated, appears comfortable, responsive to stimulation  Heart: RRR  Lungs: AC 20/450/6/40%, moving good air bilaterally, normal peak pressures  Abd: Abthera vac in place, good suction, no leak, thin serosang drainage, area with evidence bowel seen under vac  Extremities: soft, nontender, no edema, good distal pulses    VTE Assessment: I have reassessed and the patient's VTE risk and treatment plan is appropriate.    Labs  I have reviewed the patient's labs.  Current labs are within normal limits.  Labs are pending.    Imaging  I have reviewed the Imaging from the last 24 hrs.      Assessment/Plan     Expected Discharge Date:   8/23/2020    Mr Jorge is a 35M with history of Crohn's disease s/p total proctocolectomy with J pouch here with midgut volvulus now s/p exlap, detorsion of midgut volvulus and abthera vac placement  - Convert to dPCA for pain control  - NGT LCWS, npo for now  - mIVFs today (D5 1/2 at 80 cc/hr)  - Abthera vac to 125 mmHg suction, will monitor output  - ceftriaxone, flagyl restarted for possible translocation  - lovenox  dvt ppx  - holden to gravity  - Return to OR planned for Friday     Eric Yao MD     Surgery attending:     I performed a history and physical examination of the patient and discussed the management with the Resident. I reviewed the Resident's note and agree with the documented findings and plan of care, except for my comments below or within the additional notes today.    35-year-old male with history of Crohn's disease status post total proctocolectomy with J-pouch who presented with a volvulus POD #2 status post exploratory laparotomy, detorsion of volvulus, and abthera wound vac placement, POD#1 after washout, Abthera replacement.  Bated postop.  Working on pain control and Dilaudid PCA started this morning.  On exam, afebrile vital signs stable.  Urine output 1.275 L.  NG tube with 450 cc of bilious output.  AB Thera dressing clean dry and intact with good seal.  Abdomen appropriately tender to palpation.  Labs reviewed: White blood cell count 7.1, Cr 0.8. Plan to proceed back to the operating room for ex lap, washout, and possible closure on Friday 8/21/20. In OR, had large amount of fecalized stool distally, will attempt gentle enema to see if that assists in bowel decompression. Holden. Strict I/O. Lovenox DVT prophylaxis. Updated his wife Hazel on the phone.     Chelsi Adams MD

## 2020-08-21 ENCOUNTER — APPOINTMENT (INPATIENT)
Dept: RADIOLOGY | Facility: HOSPITAL | Age: 35
DRG: 330 | End: 2020-08-21
Attending: SURGERY
Payer: COMMERCIAL

## 2020-08-21 ENCOUNTER — TRANSCRIPTION ENCOUNTER (OUTPATIENT)
Age: 35
End: 2020-08-21

## 2020-08-21 ENCOUNTER — ANESTHESIA (INPATIENT)
Dept: OPERATING ROOM | Facility: HOSPITAL | Age: 35
DRG: 330 | End: 2020-08-21
Payer: COMMERCIAL

## 2020-08-21 LAB
ALBUMIN SERPL-MCNC: 2.6 G/DL (ref 3.4–5)
ALP SERPL-CCNC: 30 IU/L (ref 35–126)
ALT SERPL-CCNC: 22 IU/L (ref 16–63)
ANION GAP SERPL CALC-SCNC: 6 MEQ/L (ref 3–15)
ANION GAP SERPL CALC-SCNC: 7 MEQ/L (ref 3–15)
APTT PPP: 30 SEC (ref 23–35)
AST SERPL-CCNC: 24 IU/L (ref 15–41)
BASOPHILS # BLD: 0.02 K/UL (ref 0.01–0.1)
BASOPHILS NFR BLD: 0.3 %
BILIRUB SERPL-MCNC: 1.4 MG/DL (ref 0.3–1.2)
BUN SERPL-MCNC: 14 MG/DL (ref 8–20)
BUN SERPL-MCNC: 15 MG/DL (ref 8–20)
CALCIUM SERPL-MCNC: 7.6 MG/DL (ref 8.9–10.3)
CALCIUM SERPL-MCNC: 7.7 MG/DL (ref 8.9–10.3)
CHLORIDE SERPL-SCNC: 103 MEQ/L (ref 98–109)
CHLORIDE SERPL-SCNC: 105 MEQ/L (ref 98–109)
CO2 SERPL-SCNC: 26 MEQ/L (ref 22–32)
CO2 SERPL-SCNC: 27 MEQ/L (ref 22–32)
CREAT SERPL-MCNC: 0.7 MG/DL (ref 0.8–1.3)
CREAT SERPL-MCNC: 0.7 MG/DL (ref 0.8–1.3)
DIFFERENTIAL METHOD BLD: ABNORMAL
EOSINOPHIL # BLD: 0.07 K/UL (ref 0.04–0.54)
EOSINOPHIL NFR BLD: 1 %
ERYTHROCYTE [DISTWIDTH] IN BLOOD BY AUTOMATED COUNT: 13.6 % (ref 11.6–14.4)
GFR SERPL CREATININE-BSD FRML MDRD: >60 ML/MIN/1.73M*2
GFR SERPL CREATININE-BSD FRML MDRD: >60 ML/MIN/1.73M*2
GLUCOSE BLD-MCNC: 76 MG/DL (ref 70–99)
GLUCOSE BLD-MCNC: 88 MG/DL (ref 70–99)
GLUCOSE SERPL-MCNC: 91 MG/DL (ref 70–99)
GLUCOSE SERPL-MCNC: 93 MG/DL (ref 70–99)
HCT VFR BLDCO AUTO: 40.1 % (ref 40.1–51)
HGB BLD-MCNC: 13.3 G/DL (ref 13.7–17.5)
IMM GRANULOCYTES # BLD AUTO: 0.02 K/UL (ref 0–0.08)
IMM GRANULOCYTES NFR BLD AUTO: 0.3 %
INR PPP: 1.2 INR
LYMPHOCYTES # BLD: 1.86 K/UL (ref 1.2–3.5)
LYMPHOCYTES NFR BLD: 27.3 %
MAGNESIUM SERPL-MCNC: 1.8 MG/DL (ref 1.8–2.5)
MCH RBC QN AUTO: 31 PG (ref 28–33.2)
MCHC RBC AUTO-ENTMCNC: 33.2 G/DL (ref 32.2–36.5)
MCV RBC AUTO: 93.5 FL (ref 83–98)
MONOCYTES # BLD: 0.5 K/UL (ref 0.3–1)
MONOCYTES NFR BLD: 7.3 %
NEUTROPHILS # BLD: 4.35 K/UL (ref 1.7–7)
NEUTS SEG NFR BLD: 63.8 %
NRBC BLD-RTO: 0 %
PDW BLD AUTO: 9.5 FL (ref 9.4–12.4)
PHOSPHATE SERPL-MCNC: 2.1 MG/DL (ref 2.4–4.7)
PLATELET # BLD AUTO: 195 K/UL (ref 150–350)
POCT TEST: NORMAL
POCT TEST: NORMAL
POTASSIUM SERPL-SCNC: 3.6 MEQ/L (ref 3.6–5.1)
POTASSIUM SERPL-SCNC: 3.9 MEQ/L (ref 3.6–5.1)
PROT SERPL-MCNC: 5 G/DL (ref 6–8.2)
PROTHROMBIN TIME: 14.7 SEC (ref 12.2–14.5)
RBC # BLD AUTO: 4.29 M/UL (ref 4.5–5.8)
SODIUM SERPL-SCNC: 137 MEQ/L (ref 136–144)
SODIUM SERPL-SCNC: 137 MEQ/L (ref 136–144)
WBC # BLD AUTO: 6.82 K/UL (ref 3.8–10.5)

## 2020-08-21 PROCEDURE — 71000001 HC PACU PHASE 1 INITIAL 30MIN: Performed by: SURGERY

## 2020-08-21 PROCEDURE — 80053 COMPREHEN METABOLIC PANEL: CPT | Performed by: SURGERY

## 2020-08-21 PROCEDURE — 63600000 HC DRUGS/DETAIL CODE: Performed by: PHYSICIAN ASSISTANT

## 2020-08-21 PROCEDURE — 25800000 HC PHARMACY IV SOLUTIONS: Performed by: SURGERY

## 2020-08-21 PROCEDURE — 85610 PROTHROMBIN TIME: CPT | Performed by: STUDENT IN AN ORGANIZED HEALTH CARE EDUCATION/TRAINING PROGRAM

## 2020-08-21 PROCEDURE — 25800000 HC PHARMACY IV SOLUTIONS: Performed by: NURSE ANESTHETIST, CERTIFIED REGISTERED

## 2020-08-21 PROCEDURE — 27200000 HC STERILE SUPPLY: Performed by: SURGERY

## 2020-08-21 PROCEDURE — 63600000 HC DRUGS/DETAIL CODE: Performed by: ANESTHESIOLOGY

## 2020-08-21 PROCEDURE — 25000000 HC PHARMACY GENERAL: Performed by: PHYSICIAN ASSISTANT

## 2020-08-21 PROCEDURE — 63600000 HC DRUGS/DETAIL CODE: Performed by: HOSPITALIST

## 2020-08-21 PROCEDURE — 36000013 HC OR LEVEL 3 EA ADDL MIN: Performed by: SURGERY

## 2020-08-21 PROCEDURE — 49000 EXPLORATION OF ABDOMEN: CPT | Mod: 58 | Performed by: SURGERY

## 2020-08-21 PROCEDURE — 37000001 HC ANESTHESIA GENERAL: Performed by: SURGERY

## 2020-08-21 PROCEDURE — 25000000 HC PHARMACY GENERAL: Performed by: STUDENT IN AN ORGANIZED HEALTH CARE EDUCATION/TRAINING PROGRAM

## 2020-08-21 PROCEDURE — 02H633Z INSERTION OF INFUSION DEVICE INTO RIGHT ATRIUM, PERCUTANEOUS APPROACH: ICD-10-PCS | Performed by: SURGERY

## 2020-08-21 PROCEDURE — 21400000 HC ROOM AND CARE CCU/INTERMEDIATE

## 2020-08-21 PROCEDURE — 36415 COLL VENOUS BLD VENIPUNCTURE: CPT | Performed by: HOSPITALIST

## 2020-08-21 PROCEDURE — 63600000 HC DRUGS/DETAIL CODE: Performed by: STUDENT IN AN ORGANIZED HEALTH CARE EDUCATION/TRAINING PROGRAM

## 2020-08-21 PROCEDURE — 71045 X-RAY EXAM CHEST 1 VIEW: CPT

## 2020-08-21 PROCEDURE — 25000000 HC PHARMACY GENERAL: Performed by: HOSPITALIST

## 2020-08-21 PROCEDURE — 85730 THROMBOPLASTIN TIME PARTIAL: CPT | Performed by: STUDENT IN AN ORGANIZED HEALTH CARE EDUCATION/TRAINING PROGRAM

## 2020-08-21 PROCEDURE — 80048 BASIC METABOLIC PNL TOTAL CA: CPT | Performed by: HOSPITALIST

## 2020-08-21 PROCEDURE — 83735 ASSAY OF MAGNESIUM: CPT | Performed by: HOSPITALIST

## 2020-08-21 PROCEDURE — 36000003 HC OR LEVEL 3 INITIAL 30MIN: Performed by: SURGERY

## 2020-08-21 PROCEDURE — 36573 INSJ PICC RS&I 5 YR+: CPT

## 2020-08-21 PROCEDURE — 25000000 HC PHARMACY GENERAL: Performed by: INTERNAL MEDICINE

## 2020-08-21 PROCEDURE — C1751 CATH, INF, PER/CENT/MIDLINE: HCPCS

## 2020-08-21 PROCEDURE — 63600000 HC DRUGS/DETAIL CODE: Performed by: SURGERY

## 2020-08-21 PROCEDURE — 25000000 HC PHARMACY GENERAL: Performed by: NURSE ANESTHETIST, CERTIFIED REGISTERED

## 2020-08-21 PROCEDURE — 25800000 HC PHARMACY IV SOLUTIONS: Performed by: STUDENT IN AN ORGANIZED HEALTH CARE EDUCATION/TRAINING PROGRAM

## 2020-08-21 PROCEDURE — 63600000 HC DRUGS/DETAIL CODE: Performed by: NURSE ANESTHETIST, CERTIFIED REGISTERED

## 2020-08-21 PROCEDURE — 85025 COMPLETE CBC W/AUTO DIFF WBC: CPT | Performed by: HOSPITALIST

## 2020-08-21 PROCEDURE — 71000011 HC PACU PHASE 1 EA ADDL MIN: Performed by: SURGERY

## 2020-08-21 PROCEDURE — 99024 POSTOP FOLLOW-UP VISIT: CPT | Performed by: SURGERY

## 2020-08-21 PROCEDURE — 84100 ASSAY OF PHOSPHORUS: CPT | Performed by: HOSPITALIST

## 2020-08-21 RX ORDER — FUROSEMIDE 10 MG/ML
10 INJECTION INTRAMUSCULAR; INTRAVENOUS ONCE
Status: COMPLETED | OUTPATIENT
Start: 2020-08-21 | End: 2020-08-21

## 2020-08-21 RX ORDER — SODIUM CHLORIDE 0.9 % (FLUSH) 0.9 %
10 SYRINGE (ML) INJECTION
Status: DISCONTINUED | OUTPATIENT
Start: 2020-08-21 | End: 2020-08-27 | Stop reason: HOSPADM

## 2020-08-21 RX ORDER — HYDROMORPHONE HYDROCHLORIDE 1 MG/ML
0.5 INJECTION, SOLUTION INTRAMUSCULAR; INTRAVENOUS; SUBCUTANEOUS
Status: DISCONTINUED | OUTPATIENT
Start: 2020-08-21 | End: 2020-08-24

## 2020-08-21 RX ORDER — MIDAZOLAM HYDROCHLORIDE 2 MG/2ML
INJECTION, SOLUTION INTRAMUSCULAR; INTRAVENOUS AS NEEDED
Status: DISCONTINUED | OUTPATIENT
Start: 2020-08-21 | End: 2020-08-21 | Stop reason: SURG

## 2020-08-21 RX ORDER — PROPOFOL 10 MG/ML
INJECTION, EMULSION INTRAVENOUS AS NEEDED
Status: DISCONTINUED | OUTPATIENT
Start: 2020-08-21 | End: 2020-08-21 | Stop reason: SURG

## 2020-08-21 RX ORDER — ROCURONIUM BROMIDE 10 MG/ML
INJECTION, SOLUTION INTRAVENOUS AS NEEDED
Status: DISCONTINUED | OUTPATIENT
Start: 2020-08-21 | End: 2020-08-21 | Stop reason: SURG

## 2020-08-21 RX ORDER — METRONIDAZOLE 500 MG/100ML
500 INJECTION, SOLUTION INTRAVENOUS
Status: COMPLETED | OUTPATIENT
Start: 2020-08-21 | End: 2020-08-21

## 2020-08-21 RX ORDER — CEFAZOLIN SODIUM 1 G/50ML
SOLUTION INTRAVENOUS AS NEEDED
Status: DISCONTINUED | OUTPATIENT
Start: 2020-08-21 | End: 2020-08-21 | Stop reason: SURG

## 2020-08-21 RX ORDER — HYDROMORPHONE HYDROCHLORIDE 1 MG/ML
INJECTION, SOLUTION INTRAMUSCULAR; INTRAVENOUS; SUBCUTANEOUS AS NEEDED
Status: DISCONTINUED | OUTPATIENT
Start: 2020-08-21 | End: 2020-08-21 | Stop reason: SURG

## 2020-08-21 RX ORDER — FENTANYL CITRATE 50 UG/ML
INJECTION, SOLUTION INTRAMUSCULAR; INTRAVENOUS AS NEEDED
Status: DISCONTINUED | OUTPATIENT
Start: 2020-08-21 | End: 2020-08-21 | Stop reason: SURG

## 2020-08-21 RX ORDER — FENTANYL CITRATE 50 UG/ML
50 INJECTION, SOLUTION INTRAMUSCULAR; INTRAVENOUS
Status: COMPLETED | OUTPATIENT
Start: 2020-08-21 | End: 2020-08-24

## 2020-08-21 RX ORDER — SODIUM CHLORIDE 0.9 % (FLUSH) 0.9 %
10 SYRINGE (ML) INJECTION AS NEEDED
Status: DISCONTINUED | OUTPATIENT
Start: 2020-08-21 | End: 2020-08-27 | Stop reason: HOSPADM

## 2020-08-21 RX ORDER — METRONIDAZOLE 500 MG/100ML
500 INJECTION, SOLUTION INTRAVENOUS ONCE
Status: DISCONTINUED | OUTPATIENT
Start: 2020-08-21 | End: 2020-08-21

## 2020-08-21 RX ORDER — SODIUM CHLORIDE 9 MG/ML
INJECTION, SOLUTION INTRAVENOUS CONTINUOUS PRN
Status: DISCONTINUED | OUTPATIENT
Start: 2020-08-21 | End: 2020-08-21 | Stop reason: SURG

## 2020-08-21 RX ORDER — ONDANSETRON HYDROCHLORIDE 2 MG/ML
INJECTION, SOLUTION INTRAVENOUS AS NEEDED
Status: DISCONTINUED | OUTPATIENT
Start: 2020-08-21 | End: 2020-08-21 | Stop reason: SURG

## 2020-08-21 RX ADMIN — MIDAZOLAM HYDROCHLORIDE 1 MG: 1 INJECTION, SOLUTION INTRAMUSCULAR; INTRAVENOUS at 14:20

## 2020-08-21 RX ADMIN — LORAZEPAM 0.5 MG: 2 INJECTION INTRAMUSCULAR; INTRAVENOUS at 21:01

## 2020-08-21 RX ADMIN — FUROSEMIDE 10 MG: 10 INJECTION, SOLUTION INTRAMUSCULAR; INTRAVENOUS at 18:55

## 2020-08-21 RX ADMIN — FENTANYL CITRATE 50 MCG: 50 INJECTION INTRAMUSCULAR; INTRAVENOUS at 16:20

## 2020-08-21 RX ADMIN — Medication: at 08:39

## 2020-08-21 RX ADMIN — ENOXAPARIN SODIUM 40 MG: 100 INJECTION SUBCUTANEOUS at 06:25

## 2020-08-21 RX ADMIN — ONDANSETRON 4 MG: 2 INJECTION INTRAMUSCULAR; INTRAVENOUS at 15:10

## 2020-08-21 RX ADMIN — Medication 10 ML: at 23:47

## 2020-08-21 RX ADMIN — LORAZEPAM 0.5 MG: 2 INJECTION INTRAMUSCULAR; INTRAVENOUS at 03:19

## 2020-08-21 RX ADMIN — PROPOFOL 130 MG: 10 INJECTION, EMULSION INTRAVENOUS at 14:36

## 2020-08-21 RX ADMIN — PANTOPRAZOLE SODIUM 40 MG: 40 INJECTION, POWDER, LYOPHILIZED, FOR SOLUTION INTRAVENOUS at 08:30

## 2020-08-21 RX ADMIN — CEFAZOLIN SODIUM 2 G: 1 SOLUTION INTRAVENOUS at 14:46

## 2020-08-21 RX ADMIN — FENTANYL CITRATE 100 MCG: 50 INJECTION, SOLUTION INTRAMUSCULAR; INTRAVENOUS at 14:36

## 2020-08-21 RX ADMIN — HYDROMORPHONE HYDROCHLORIDE 0.5 MG: 1 INJECTION, SOLUTION INTRAMUSCULAR; INTRAVENOUS; SUBCUTANEOUS at 15:03

## 2020-08-21 RX ADMIN — METRONIDAZOLE 500 MG: 500 INJECTION, SOLUTION INTRAVENOUS at 05:12

## 2020-08-21 RX ADMIN — ONDANSETRON HYDROCHLORIDE 4 MG: 2 INJECTION, SOLUTION INTRAMUSCULAR; INTRAVENOUS at 15:51

## 2020-08-21 RX ADMIN — SODIUM CHLORIDE: 900 INJECTION, SOLUTION INTRAVENOUS at 14:23

## 2020-08-21 RX ADMIN — SUGAMMADEX 144.2 MG: 100 INJECTION, SOLUTION INTRAVENOUS at 15:23

## 2020-08-21 RX ADMIN — METRONIDAZOLE 500 MG: 500 INJECTION, SOLUTION INTRAVENOUS at 13:31

## 2020-08-21 RX ADMIN — MIDAZOLAM HYDROCHLORIDE 1 MG: 1 INJECTION, SOLUTION INTRAMUSCULAR; INTRAVENOUS at 14:27

## 2020-08-21 RX ADMIN — POTASSIUM PHOSPHATE, MONOBASIC AND POTASSIUM PHOSPHATE, DIBASIC 15 MMOL: 224; 236 INJECTION, SOLUTION INTRAVENOUS at 08:33

## 2020-08-21 RX ADMIN — MAGNESIUM SULFATE IN WATER 2 G: 40 INJECTION, SOLUTION INTRAVENOUS at 05:13

## 2020-08-21 RX ADMIN — LORAZEPAM 0.5 MG: 2 INJECTION INTRAMUSCULAR; INTRAVENOUS at 10:19

## 2020-08-21 RX ADMIN — CEFAZOLIN SODIUM 2 G: 2 SOLUTION INTRAVENOUS at 06:25

## 2020-08-21 RX ADMIN — Medication: at 23:37

## 2020-08-21 RX ADMIN — ROCURONIUM BROMIDE 50 MG: 10 INJECTION, SOLUTION INTRAVENOUS at 14:37

## 2020-08-21 RX ADMIN — DEXTROSE AND SODIUM CHLORIDE: 5; 450 INJECTION, SOLUTION INTRAVENOUS at 17:46

## 2020-08-21 RX ADMIN — FENTANYL CITRATE 50 MCG: 50 INJECTION INTRAMUSCULAR; INTRAVENOUS at 16:03

## 2020-08-21 ASSESSMENT — PAIN SCALES - GENERAL: PAIN_LEVEL: 1

## 2020-08-21 NOTE — OR SURGEON
Pre-Procedure patient identification:  I am the primary operating surgeon/proceduralist and I have identified the patient on 08/21/20 at 1:56 PM Chelsi Adams MD  Phone Number: [phone]

## 2020-08-21 NOTE — PLAN OF CARE
Problem: Adult Inpatient Plan of Care  Goal: Plan of Care Review  Flowsheets (Taken 8/21/2020 8171)  Progress: no change  Plan of Care Reviewed With: other   Outcome Summary: Pt extubated and downgraded to SDU. NPO x4 days. In OR for repeat ex lap, washout and possible closure this afternoon. NGT in place. PO4 low this am.  Goals:  Pt will meet at least 75% of minimum energy/pro needs to promote surgical healing.   Electrolyte stability.   Recommendations:  Consider TPN if unable to advance diet to Full Liquids/Low Fiber in the next 3 days.   Recommend diet advancement to Clear Liquids->Full Liquids->Low Fiber as medically appropriate.   Monitor wt weekly and nutrition related labs daily.   TPN Recommendations:    Day 1: 70gms protein, 150gms dextrose, 25 gms SMOFLIPIDS    Day 2: 100gms protein, 250gms dextrose, 55 gms SMOFLIPIDS      Day 3/Goal: 130gms protein, 320gms dextrose, 55 gms SMOFLIPIDS (Provides 2,158 kcals, GIR 3.09)    -Recommend 2,000 ml total volume, 24 hr infusion

## 2020-08-21 NOTE — OP NOTE
Pre-op dx: Small bowel volvulus s/p Exploratory laparotomy, reduction of small bowel volvulus, washout, temporary abdominal closure with Abthera     Postop diagnosis: Same     Procedure: Exploratory laparotomy, washout , vac change     Surgeon: Bryan     Assistant: [id number]     Anesthesia: General endotracheal anesthesia     EBL: min     Specimens: none      Indications for procedure: Patient is a 35 y.o. male with small bowel volvulus s/p Exploratory laparotomy, reduction of small bowel volvulus, washout, temporary abdominal closure with Abthera on 8/18/20.  He is brought back to the operating room today for 3rd look laparotomy, washout, possible vac change versus abdominal closure. R/B/A were discussed patient and his wife who wished to proceed.       Procedure: After the patient was correctly identified as Selvin Jorge, he was brought to the operating room and transferred to the operating table in the supine position.  GETA was induced by anesthesia.  The outer layer of the vac was removed, and the patient's abdomen was prepped and draped in the usual sterile fashion.  The vac sponge was removed and a timeout was perfored according to protocol.  The bowel was found to be viable and nonischemic. While it was less dilated than 2 days ago, it still minimally dilated.The abdomen was copiously irrigated with 5 L of normal saline.  The NG tube was palpated and found to be within good position of the stomach.  Some of the proximal small bowel contents were milked back towards the NG tube.  Given the dilation of the small bowel within the abdomen, it was unable to be closed at this time.  The small bowel was fully placed back in the abdomen, taking care to ensure that it was not twisted.  Plan was made to proceed with a temporary abdominal closure again with the ABThera device, and to proceed back to the operating room in 3 days by my partner, , for washout, possible abdominal wall closure.  The patient was  extubated in the operating room, and transferred to the PACU in stable condition.  All sponge needle and instrument counts reportedly correct x2 at the end of the case.  The patient's wife was updated at the end of the operation.

## 2020-08-21 NOTE — PROGRESS NOTES
GI Daily Progress Note           SUBJECTIVE    LOS: 3 days     Interval History: none.     Review of Systems  All other systems were reviewed and are negative oither than as stated in the HPI   OBJECTIVE   Vital signs in last 24 hours:  Temp:  [36.5 °C (97.7 °F)-36.9 °C (98.5 °F)] 36.5 °C (97.7 °F)  Heart Rate:  [56-77] 58  Resp:  [13-21] 14  BP: (116-132)/(62-77) 121/71      Intake/Output Summary (Last 24 hours) at 8/21/2020 0705  Last data filed at 8/21/2020 0625  Gross per 24 hour   Intake 1860.25 ml   Output 1750 ml   Net 110.25 ml       Intake/Output this shift:  No intake/output data recorded.   Current Medications:  •  calcium gluconate, 1 g, intravenous, q6h PRN  •  chlorhexidine, 15 mL, Mouth/Throat, 2 times per day  •  D5 % and 0.45 % sodium chloride, , intravenous, Continuous  •  glucose, 16-32 g of dextrose, oral, PRN **OR** dextrose, 15-30 g of dextrose, oral, PRN **OR** glucagon, 1 mg, intramuscular, PRN **OR** dextrose in water, 25 mL, intravenous, PRN  •  enoxaparin, 40 mg, subcutaneous, Daily (6a)  •  fentaNYL, 50 mcg, intravenous, q15 min PRN  •  HYDROmorphone in 0.9 % NaCl, , intravenous, Continuous **AND** naloxone (NARCAN) IV syringe 0.04 mg/mL, 0.04 mg, intravenous, PRN **AND** Vital Signs, Pulse Oximetry, Sedation Score, and End Tidal CO2 (not required for Comfort Care patients), , , Per unit protocol **AND** End Tidal CO2 Monitoring, , , Once **AND** Notify physician (specify):Systolic blood pressure less than: 100; Respiratory rate less than: 7, , , Until discontinued **AND** For PCA discontinuation: reassess patient and document within a minimum of 1 hour (refer to Patient Controlled Analgesia Policy), , , Until discontinued **AND** PCA Nursing Documentation, , , q4h JOSH  •  labetaloL, 5 mg, intravenous, PRN  •  LORazepam, 0.5 mg, intravenous, q6h PRN  •  magnesium oxide, 400 mg, oral, 2x daily PRN  •  magnesium sulfate, 2 g, intravenous, PRN  •  metroNIDAZOLE, 500 mg, intravenous, q8h  INT  •  morphine, 2 mg, intravenous, q2h PRN  •  ondansetron, 4 mg, intravenous, q15 min PRN  •  pantoprazole, 40 mg, intravenous, q24h  •  potassium chloride, 20 mEq, oral, PRN **OR** potassium chloride, 40 mEq, oral, PRN **OR** potassium chloride, 20 mEq, intravenous, PRN **OR** potassium chloride, 40 mEq, intravenous, PRN **OR** potassium bicarbonate, 20 mEq, oral, PRN **OR** potassium bicarbonate, 40 mEq, oral, PRN    Physical Exam  General appearance: alert, appears stated age and cooperative, no acute distress  Abdomen: some distention, +tender, decreased bowel sounds; no bruits, organomegaly or masses.     LABS & IMAGING   Labs      Results from last 7 days   Lab Units 08/21/20 0318 08/20/20 0457 08/19/20 0258   WBC K/uL 6.82 7.14 11.52*   HEMOGLOBIN g/dL 13.3* 13.6* 16.8   HEMATOCRIT % 40.1 41.2 49.7   PLATELETS K/uL 195 203 245     Results from last 7 days   Lab Units 08/21/20 0318 08/20/20 0457 08/19/20 0258 08/17/20  2246   SODIUM mEQ/L 137 139 134*   < > 134*   POTASSIUM mEQ/L 3.9 4.1 4.3   < > 4.1   CHLORIDE mEQ/L 105 108 106   < > 100   CO2 mEQ/L 26 27 23   < > 24   BUN mg/dL 14 17 17   < > 21*   CREATININE mg/dL 0.7* 0.8 0.8   < > 1.1   CALCIUM mg/dL 7.7* 7.7* 8.1*   < > 9.1   ALBUMIN g/dL  --   --   --   --  4.6   BILIRUBIN TOTAL mg/dL  --   --   --   --  1.9*   ALK PHOS IU/L  --   --   --   --  38   ALT IU/L  --   --   --   --  45   AST IU/L  --   --   --   --  67*   GLUCOSE mg/dL 91 95 120*   < > 86    < > = values in this interval not displayed.     Results from last 7 days   Lab Units 08/21/20 0318   INR INR 1.2       Imaging      No results found.     ASSESSMENT & PLAN     Principal Problem:    Volvulus (CMS/HCC)  Active Problems:    Volvulus of small intestine (CMS/HCC)    Crohn's disease of colon (CMS/HCC)    On mechanically assisted ventilation (CMS/HCC)      Crohn's disease of colon (CMS/HCC)  Assessment & Plan  See plan above    Volvulus of small intestine (CMS/HCC)  Assessment &  Plan  35 year old male with history of Crohn's disease s/p total colectomy with J-pouch and recent volvulus two months ago.  Attempted flexible sigmoidoscopy decompression unsucessful.  Patient underwent emergent surgical decompression.  Second look yesterday with washout, but unable to close given persistent dilation.  Patient states pain is adequately controlled.    Currently on ABX.  Would continue to hold Humira for now.  He normally receives it weekly on fridays.  If no signs of infection, would consider restarting when able.  Recent studies suggest no impairment of wound healing while on biologics. - For Now, would hold.    Plans for repeat OR today.                Van Rodriguez MD  8/21/2020  7:05 AM

## 2020-08-21 NOTE — ANESTHESIOLOGIST PRE-PROCEDURE ATTESTATION
Pre-Procedure Patient Identification:  I am the Primary Anesthesiologist and have identified the patient on 08/21/20 at 1:25 PM.   I have confirmed the following procedure(s) LAPAROTOMY EXPLORATORY,  WASHOUT, ABTHERA REMOVAL/PLACEMENT will be performed by the following surgeon/proceduralist Chelsi Adams MD.

## 2020-08-21 NOTE — PLAN OF CARE
Problem: Adult Inpatient Plan of Care  Goal: Plan of Care Review  Flowsheets (Taken 8/21/2020 1207)  Outcome Summary: Surgery notes document use of Abthera wound therapy.  This is a product with [facility] [employer] [facility].  I did speak with [employer] [facility] representative Audrey .  Audrey stated she is not sure she has seen a pt dc home with an Abthera wound system but when plan for pt is known can call back and will assist in getting device if to dc home with it.

## 2020-08-21 NOTE — ANESTHESIA POSTPROCEDURE EVALUATION
Patient: Selvin Jorge    Procedure Summary     Date:  08/21/20 Room / Location:  [facility] OR  / [facility] OR [facility]    Anesthesia Start:  1423 Anesthesia Stop:  1545    Procedure:  LAPAROTOMY EXPLORATORY,  WASHOUT, ABTHERA REMOVAL/PLACEMENT (N/A ) Diagnosis:       Volvulus (CMS/HCC)      (Volvulus (CMS/HCC) [K56.2])    Surgeon:  Chelsi Adams MD Responsible Provider:  Daniel Gautam MD    Anesthesia Type:  general ASA Status:  3          Anesthesia Type: general  PACU Vitals  8/21/2020 1532 - 8/21/2020 1551      8/21/2020  1540             BP:  (!) 154/89    Temp:  36.7 °C (98 °F)    Pulse:  69    Resp:  16            Anesthesia Post Evaluation    Pain score: 1  Pain management: adequate  Patient location during evaluation: PACU  Patient participation: complete - patient participated  Level of consciousness: awake and alert  Cardiovascular status: acceptable  Airway Patency: adequate  Respiratory status: acceptable and nasal cannula  Hydration status: acceptable  Anesthetic complications: no

## 2020-08-21 NOTE — ANESTHESIA PROCEDURE NOTES
Airway  Urgency: elective    Start Time: 8/21/2020 2:43 PM  Airway not difficult    General Information and Staff    Patient location during procedure: OR  Anesthesiologist: Daniel Gautam MD  Resident/CRNA: Nelly Rodriguez CRNA  Performed: resident/CRNA     Indications and Patient Condition  Indications for airway management: anesthesia  Sedation level: general  Preoxygenated: yes  Patient position: sniffing and ramp  Mask difficulty assessment: 0 - not attempted    Final Airway Details  Final airway type: endotracheal airway      Successful airway: ETT  Cuffed: yes   Successful intubation technique: direct laryngoscopy  Facilitating devices/methods: intubating stylet and cricoid pressure  Endotracheal tube insertion site: oral  Blade: Giovanna  Blade size: #4  ETT size (mm): 8.0  Cormack-Lehane Classification: grade I - full view of glottis  Placement verified by: chest auscultation and capnometry   Measured from: lips  ETT to lips (cm): 23  Number of attempts at approach: 1  Ventilation between attempts: none  Number of other approaches attempted: 0  Atraumatic airway insertion

## 2020-08-21 NOTE — PROGRESS NOTES
Infectious Disease Progress Note    Patient Name: Selvin Jorge  MR#: [id number]  : 1985  Admission Date: 2020  Date: 20   Time: 3:18 PM   Author: Boston Vickers MD    OBJECTIVE:  No more fevers  Normal white count  Going to the OR today    Antibiotics:        ROS  All other systems negative vital Signs:    Temp:  [36.5 °C (97.7 °F)-37.7 °C (99.8 °F)] 37.7 °C (99.8 °F)  Heart Rate:  [58-77] 63  Resp:  [12-21] 12  BP: (117-132)/(65-77) 132/77    Temp (72hrs), Av.8 °C (98.2 °F), Min:35.7 °C (96.2 °F), Max:37.8 °C (100.1 °F)      Physical Exam    Gen: Aox3  HEENT: OP clear  Neck: Supple  LAD: No cervical LAD  Lungs: CTAB  CV: RRR no murmurs  Abd: Wound VAC in place  Ext: No c/c/e  Skin: no rash  Neuro: II-XII intact        Lines, Drains, Airways, Wounds:  Peripheral IV 20 Left Wrist (Active)   Number of days: 3       Peripheral IV 20 Right Forearm (Active)   Number of days: 2       NG/OG Tube 20 Right nostril (Active)   Number of days: 2       Urethral Catheter Latex 16 Fr (Active)   Number of days: 2       Surgical Incision Abdomen (Active)   Number of days: 2       Labs:    Lab Results   Component Value Date    WBC 6.82 2020    HGB 13.3 (L) 2020    HCT 40.1 2020    MCV 93.5 2020     2020     Lab Results   Component Value Date    GLUCOSE 91 2020    CALCIUM 7.7 (L) 2020     2020    K 3.9 2020    CO2 26 2020     2020    BUN 14 2020    CREATININE 0.7 (L) 2020     Lab Results   Component Value Date    ALT 45 2020    AST 67 (H) 2020    ALKPHOS 38 2020    BILITOT 1.9 (H) 2020         Patient Active Problem List   Diagnosis Code   • Volvulus of small intestine (CMS/HCC) K56.2   • Crohn's disease of colon (CMS/HCC) K50.10   • Volvulus (CMS/HCC) K56.2   • On mechanically assisted ventilation (CMS/HCC) Z99.11     Volvulus of small intestine  (CMS/[facility])  Assessment & Plan  Patient to receive antibiotics preop as needed  I will sign off        Boston Vickers MD  8/21/20203:18 PM

## 2020-08-21 NOTE — PLAN OF CARE
Problem: Adult Inpatient Plan of Care  Goal: Plan of Care Review  Outcome: Progressing  Flowsheets (Taken 8/21/2020 8161)  Plan of Care Reviewed With: patient; spouse  Outcome Summary: Pt post OR, wound vac still in, no closure today. PCA ongoing. 2 L NC. VSS.

## 2020-08-21 NOTE — PROGRESS NOTES
Pulmonary & Critical Care Progress Note    Subjective    No acute events overnight.  Seems more alert. Pain adequately controlled.  For OR again today for washout.      Objective   Medications  Scheduled Meds:  • enoxaparin  40 mg subcutaneous Daily (6a)   • metroNIDAZOLE  500 mg intravenous Pre-op   • pantoprazole  40 mg intravenous q24h     Continuous Infusions:  • D5 % and 0.45 % sodium chloride   80 mL/hr at 08/21/20 0600   • HYDROmorphone in 0.9 % NaCl         PRN Meds:.•  calcium gluconate  •  glucose **OR** dextrose **OR** glucagon **OR** dextrose in water  •  labetaloL  •  LORazepam  •  magnesium sulfate  •  morphine  •  HYDROmorphone in 0.9 % NaCl **AND** naloxone (NARCAN) IV syringe 0.04 mg/mL **AND** Vital Signs, Pulse Oximetry, Sedation Score, and End Tidal CO2 (not required for Comfort Care patients) **AND** End Tidal CO2 Monitoring **AND** Notify physician (specify):Systolic blood pressure less than: 100; Respiratory rate less than: 7 **AND** For PCA discontinuation: reassess patient and document within a minimum of 1 hour (refer to Patient Controlled Analgesia Policy) **AND** PCA Nursing Documentation  •  ondansetron  •  potassium chloride **OR** potassium chloride **OR** potassium chloride **OR** potassium chloride **OR** potassium bicarbonate **OR** potassium bicarbonate     Vital Signs  Temp:  [36.5 °C (97.7 °F)-36.9 °C (98.5 °F)] 36.5 °C (97.7 °F)  Heart Rate:  [58-77] 66  Resp:  [13-21] 19  BP: (116-132)/(62-77) 124/69       Intake/Output Summary (Last 24 hours) at 8/21/2020 1119  Last data filed at 8/21/2020 0713  Gross per 24 hour   Intake 1860.25 ml   Output 2230 ml   Net -369.75 ml       Telemetry  sinus rhythm     Laboratory  Recent Results (from the past 48 hour(s))   CBC and differential    Collection Time: 08/20/20  4:57 AM   Result Value Ref Range    WBC 7.14 3.80 - 10.50 K/uL    RBC 4.40 (L) 4.50 - 5.80 M/uL    Hemoglobin 13.6 (L) 13.7 - 17.5 g/dL    Hematocrit 41.2 40.1 - 51.0 %    MCV  93.6 83.0 - 98.0 fL    MCH 30.9 28.0 - 33.2 pg    MCHC 33.0 32.2 - 36.5 g/dL    RDW 13.6 11.6 - 14.4 %    Platelets 203 150 - 350 K/uL    MPV 9.5 9.4 - 12.4 fL    Differential Type Auto     nRBC 0.0 <=0.0 %    Immature Granulocytes 0.3 %    Neutrophils 71.8 %    Lymphocytes 19.0 %    Monocytes 8.5 %    Eosinophils 0.1 %    Basophils 0.3 %    Immature Granulocytes, Absolute 0.02 0.00 - 0.08 K/uL    Neutrophils, Absolute 5.12 1.70 - 7.00 K/uL    Lymphocytes, Absolute 1.36 1.20 - 3.50 K/uL    Monocytes, Absolute 0.61 0.30 - 1.00 K/uL    Eosinophils, Absolute 0.01 (L) 0.04 - 0.54 K/uL    Basophils, Absolute 0.02 0.01 - 0.10 K/uL   Basic metabolic panel    Collection Time: 08/20/20  4:57 AM   Result Value Ref Range    Sodium 139 136 - 144 mEQ/L    Potassium 4.1 3.6 - 5.1 mEQ/L    Chloride 108 98 - 109 mEQ/L    CO2 27 22 - 32 mEQ/L    BUN 17 8 - 20 mg/dL    Creatinine 0.8 0.8 - 1.3 mg/dL    Glucose 95 70 - 99 mg/dL    Calcium 7.7 (L) 8.9 - 10.3 mg/dL    eGFR >60.0 >=60.0 mL/min/1.73m*2    Anion Gap 4 3 - 15 mEQ/L   Magnesium    Collection Time: 08/20/20  4:57 AM   Result Value Ref Range    Magnesium 1.9 1.8 - 2.5 mg/dL   Phosphorus    Collection Time: 08/20/20  4:57 AM   Result Value Ref Range    Phosphorus 2.8 2.4 - 4.7 mg/dL   CBC and differential    Collection Time: 08/21/20  3:18 AM   Result Value Ref Range    WBC 6.82 3.80 - 10.50 K/uL    RBC 4.29 (L) 4.50 - 5.80 M/uL    Hemoglobin 13.3 (L) 13.7 - 17.5 g/dL    Hematocrit 40.1 40.1 - 51.0 %    MCV 93.5 83.0 - 98.0 fL    MCH 31.0 28.0 - 33.2 pg    MCHC 33.2 32.2 - 36.5 g/dL    RDW 13.6 11.6 - 14.4 %    Platelets 195 150 - 350 K/uL    MPV 9.5 9.4 - 12.4 fL    Differential Type Auto     nRBC 0.0 <=0.0 %    Immature Granulocytes 0.3 %    Neutrophils 63.8 %    Lymphocytes 27.3 %    Monocytes 7.3 %    Eosinophils 1.0 %    Basophils 0.3 %    Immature Granulocytes, Absolute 0.02 0.00 - 0.08 K/uL    Neutrophils, Absolute 4.35 1.70 - 7.00 K/uL    Lymphocytes, Absolute 1.86 1.20  - 3.50 K/uL    Monocytes, Absolute 0.50 0.30 - 1.00 K/uL    Eosinophils, Absolute 0.07 0.04 - 0.54 K/uL    Basophils, Absolute 0.02 0.01 - 0.10 K/uL   Basic metabolic panel    Collection Time: 08/21/20  3:18 AM   Result Value Ref Range    Sodium 137 136 - 144 mEQ/L    Potassium 3.9 3.6 - 5.1 mEQ/L    Chloride 105 98 - 109 mEQ/L    CO2 26 22 - 32 mEQ/L    BUN 14 8 - 20 mg/dL    Creatinine 0.7 (L) 0.8 - 1.3 mg/dL    Glucose 91 70 - 99 mg/dL    Calcium 7.7 (L) 8.9 - 10.3 mg/dL    eGFR >60.0 >=60.0 mL/min/1.73m*2    Anion Gap 6 3 - 15 mEQ/L   Magnesium    Collection Time: 08/21/20  3:18 AM   Result Value Ref Range    Magnesium 1.8 1.8 - 2.5 mg/dL   Phosphorus    Collection Time: 08/21/20  3:18 AM   Result Value Ref Range    Phosphorus 2.1 (L) 2.4 - 4.7 mg/dL   Protime-INR    Collection Time: 08/21/20  3:18 AM   Result Value Ref Range    PT 14.7 (H) 12.2 - 14.5 sec    INR 1.2   INR   PTT    Collection Time: 08/21/20  3:18 AM   Result Value Ref Range    PTT 30 23 - 35 sec       Imaging  No results found.      Physical Exam  General: no diaphoresis, no acute distress  Head: normocephalic, atraumatic  ENT: pupils equal & reactive, no discharge  Neck: supple, no jugular venous distension  Cardiovascular: regular, normal rate & rhythm  Pulmonary: decreased at bases  Abdomen: approp tender, hypoactive, +vac in place  Extremities: non-edematous, non-erythematous  Neurologic: grossly non-focal, EOM intact  Skin: warm, dry       Assessment/Plan   * Volvulus (CMS/HCC)  Assessment & Plan  H/o Crohn's disease s/p total proctocolectomy with J pouch  8/18 volvulus reduction, ex lap, wash out, temporary closure    - wound care per surgical team - vac in place  - continue periop abx  - NGT in place, monitor output  - lowest effective pain control  - NPO for now  - lovenox for DVT prophylaxis  - supportive care  - encourage incentive spirometry        Code status: Full Code        Acting as a scribe in the presence of Dr. Prince Anderson  COMPA Benjamin  I have reviewed and agree with the note. Geovanny Anderson MD

## 2020-08-22 ENCOUNTER — APPOINTMENT (INPATIENT)
Dept: RADIOLOGY | Facility: HOSPITAL | Age: 35
DRG: 330 | End: 2020-08-22
Attending: SURGERY
Payer: COMMERCIAL

## 2020-08-22 LAB
ANION GAP SERPL CALC-SCNC: 6 MEQ/L (ref 3–15)
BASOPHILS # BLD: 0.01 K/UL (ref 0.01–0.1)
BASOPHILS NFR BLD: 0.2 %
BUN SERPL-MCNC: 16 MG/DL (ref 8–20)
CA-I BLD-SCNC: 1.08 MMOL/L (ref 1.15–1.27)
CALCIUM SERPL-MCNC: 7.7 MG/DL (ref 8.9–10.3)
CHLORIDE SERPL-SCNC: 101 MEQ/L (ref 98–109)
CO2 SERPL-SCNC: 30 MEQ/L (ref 22–32)
CREAT SERPL-MCNC: 0.5 MG/DL (ref 0.8–1.3)
DIFFERENTIAL METHOD BLD: ABNORMAL
EOSINOPHIL # BLD: 0.1 K/UL (ref 0.04–0.54)
EOSINOPHIL NFR BLD: 1.6 %
ERYTHROCYTE [DISTWIDTH] IN BLOOD BY AUTOMATED COUNT: 13.2 % (ref 11.6–14.4)
GFR SERPL CREATININE-BSD FRML MDRD: >60 ML/MIN/1.73M*2
GLUCOSE BLD-MCNC: 102 MG/DL (ref 70–99)
GLUCOSE BLD-MCNC: 104 MG/DL (ref 70–99)
GLUCOSE BLD-MCNC: 106 MG/DL (ref 70–99)
GLUCOSE SERPL-MCNC: 98 MG/DL (ref 70–99)
HCT VFR BLDCO AUTO: 39.7 % (ref 40.1–51)
HGB BLD-MCNC: 13.4 G/DL (ref 13.7–17.5)
IMM GRANULOCYTES # BLD AUTO: 0.01 K/UL (ref 0–0.08)
IMM GRANULOCYTES NFR BLD AUTO: 0.2 %
LYMPHOCYTES # BLD: 1.24 K/UL (ref 1.2–3.5)
LYMPHOCYTES NFR BLD: 20.2 %
MAGNESIUM SERPL-MCNC: 1.9 MG/DL (ref 1.8–2.5)
MCH RBC QN AUTO: 31.5 PG (ref 28–33.2)
MCHC RBC AUTO-ENTMCNC: 33.8 G/DL (ref 32.2–36.5)
MCV RBC AUTO: 93.2 FL (ref 83–98)
MONOCYTES # BLD: 0.45 K/UL (ref 0.3–1)
MONOCYTES NFR BLD: 7.3 %
NEUTROPHILS # BLD: 4.32 K/UL (ref 1.7–7)
NEUTS SEG NFR BLD: 70.5 %
NRBC BLD-RTO: 0 %
PDW BLD AUTO: 9.4 FL (ref 9.4–12.4)
PHOSPHATE SERPL-MCNC: 2.5 MG/DL (ref 2.4–4.7)
PLATELET # BLD AUTO: 216 K/UL (ref 150–350)
POCT TEST: ABNORMAL
POTASSIUM SERPL-SCNC: 3.6 MEQ/L (ref 3.6–5.1)
PREALB SERPL-MCNC: 13 MG/DL (ref 18–38)
RBC # BLD AUTO: 4.26 M/UL (ref 4.5–5.8)
SARS-COV-2 RNA RESP QL NAA+PROBE: NEGATIVE
SODIUM SERPL-SCNC: 137 MEQ/L (ref 136–144)
TRIGL SERPL-MCNC: 66 MG/DL (ref 30–149)
WBC # BLD AUTO: 6.13 K/UL (ref 3.8–10.5)

## 2020-08-22 PROCEDURE — 80048 BASIC METABOLIC PNL TOTAL CA: CPT | Performed by: SURGERY

## 2020-08-22 PROCEDURE — 84100 ASSAY OF PHOSPHORUS: CPT | Performed by: SURGERY

## 2020-08-22 PROCEDURE — 25000000 HC PHARMACY GENERAL: Performed by: SURGERY

## 2020-08-22 PROCEDURE — U0002 COVID-19 LAB TEST NON-CDC: HCPCS | Performed by: SURGERY

## 2020-08-22 PROCEDURE — 83735 ASSAY OF MAGNESIUM: CPT | Performed by: SURGERY

## 2020-08-22 PROCEDURE — 63600000 HC DRUGS/DETAIL CODE: Performed by: SURGERY

## 2020-08-22 PROCEDURE — 84478 ASSAY OF TRIGLYCERIDES: CPT | Performed by: SURGERY

## 2020-08-22 PROCEDURE — 36415 COLL VENOUS BLD VENIPUNCTURE: CPT | Performed by: SURGERY

## 2020-08-22 PROCEDURE — 85025 COMPLETE CBC W/AUTO DIFF WBC: CPT | Performed by: SURGERY

## 2020-08-22 PROCEDURE — 71045 X-RAY EXAM CHEST 1 VIEW: CPT

## 2020-08-22 PROCEDURE — 63600000 HC DRUGS/DETAIL CODE: Performed by: STUDENT IN AN ORGANIZED HEALTH CARE EDUCATION/TRAINING PROGRAM

## 2020-08-22 PROCEDURE — 84134 ASSAY OF PREALBUMIN: CPT | Performed by: SURGERY

## 2020-08-22 PROCEDURE — 99024 POSTOP FOLLOW-UP VISIT: CPT | Performed by: SURGERY

## 2020-08-22 PROCEDURE — 21400000 HC ROOM AND CARE CCU/INTERMEDIATE

## 2020-08-22 PROCEDURE — 82330 ASSAY OF CALCIUM: CPT | Performed by: SURGERY

## 2020-08-22 RX ORDER — FUROSEMIDE 10 MG/ML
20 INJECTION INTRAMUSCULAR; INTRAVENOUS ONCE
Status: COMPLETED | OUTPATIENT
Start: 2020-08-22 | End: 2020-08-22

## 2020-08-22 RX ORDER — FUROSEMIDE 10 MG/ML
10 INJECTION INTRAMUSCULAR; INTRAVENOUS ONCE
Status: DISCONTINUED | OUTPATIENT
Start: 2020-08-22 | End: 2020-08-22

## 2020-08-22 RX ADMIN — Medication: at 13:47

## 2020-08-22 RX ADMIN — Medication 10 ML: at 13:56

## 2020-08-22 RX ADMIN — PANTOPRAZOLE SODIUM 40 MG: 40 INJECTION, POWDER, LYOPHILIZED, FOR SOLUTION INTRAVENOUS at 09:27

## 2020-08-22 RX ADMIN — LORAZEPAM 0.5 MG: 2 INJECTION INTRAMUSCULAR; INTRAVENOUS at 21:21

## 2020-08-22 RX ADMIN — Medication 10 ML: at 04:16

## 2020-08-22 RX ADMIN — Medication 10 ML: at 19:58

## 2020-08-22 RX ADMIN — LORAZEPAM 0.5 MG: 2 INJECTION INTRAMUSCULAR; INTRAVENOUS at 05:23

## 2020-08-22 RX ADMIN — RETINOL, ERGOCALCIFEROL, .ALPHA.-TOCOPHEROL ACETATE, DL-, PHYTONADIONE, ASCORBIC ACID, NIACINAMIDE, RIBOFLAVIN 5-PHOSPHATE SODIUM, THIAMINE HYDROCHLORIDE, PYRIDOXINE HYDROCHLORIDE, DEXPANTHENOL, BIOTIN, FOLIC ACID, AND CYANOCOBALAMIN: KIT at 01:13

## 2020-08-22 RX ADMIN — FUROSEMIDE 20 MG: 10 INJECTION, SOLUTION INTRAMUSCULAR; INTRAVENOUS at 09:27

## 2020-08-22 RX ADMIN — ENOXAPARIN SODIUM 40 MG: 100 INJECTION SUBCUTANEOUS at 05:10

## 2020-08-22 RX ADMIN — RETINOL, ERGOCALCIFEROL, .ALPHA.-TOCOPHEROL ACETATE, DL-, PHYTONADIONE, ASCORBIC ACID, NIACINAMIDE, RIBOFLAVIN 5-PHOSPHATE SODIUM, THIAMINE HYDROCHLORIDE, PYRIDOXINE HYDROCHLORIDE, DEXPANTHENOL, BIOTIN, FOLIC ACID, AND CYANOCOBALAMIN: KIT at 22:23

## 2020-08-22 NOTE — PROGRESS NOTES
Critical Care Progress Note    SUBJECTIVE    NAEON, afebrile, 2L NC  S/p washout and abethra placement yesterday  PICC placed overnight, TPN started  Pain currently well controlled  Denies any shortness of breath    OBJECTIVE     VITAL SIGNS    Temp:  [36.6 °C (97.9 °F)-37.7 °C (99.8 °F)] 37 °C (98.6 °F)  Heart Rate:  [52-83] 58  Resp:  [11-23] 16  BP: (115-154)/(63-91) 120/73       Intake/Output Summary (Last 24 hours) at 8/22/2020 1228  Last data filed at 8/22/2020 0600  Gross per 24 hour   Intake 2678.75 ml   Output 3951 ml   Net -1272.25 ml       O2 Requirement:  2L NC    Telemetry: sinus rhythm      MEDICATIONS    enoxaparin 40 mg Daily (6a)   pantoprazole 40 mg q24h   sodium chloride 10 mL q8h INT          LAB RESULTS  Lab Results   Component Value Date    WBC 6.13 08/22/2020    HGB 13.4 (L) 08/22/2020    HCT 39.7 (L) 08/22/2020    MCV 93.2 08/22/2020     08/22/2020     Lab Results   Component Value Date    GLUCOSE 98 08/22/2020    CALCIUM 7.7 (L) 08/22/2020     08/22/2020    K 3.6 08/22/2020    CO2 30 08/22/2020     08/22/2020    BUN 16 08/22/2020    CREATININE 0.5 (L) 08/22/2020     Results from last 7 days   Lab Units 08/22/20  0410   MAGNESIUM mg/dL 1.9     Lab Results   Component Value Date    CALCIUM 7.7 (L) 08/22/2020    PHOS 2.5 08/22/2020     Lab Results   Component Value Date    INR 1.2 08/21/2020    INR 1.1 08/18/2020     Lab Results   Component Value Date    PTT 30 08/21/2020     Lab Results   Component Value Date    ALT 22 08/21/2020    AST 24 08/21/2020    ALKPHOS 30 (L) 08/21/2020    BILITOT 1.4 (H) 08/21/2020       ABG  Results from last 7 days   Lab Units 08/19/20  0506   PH ART pH 7.46*   PCO2 ART mm Hg 40   PO2 ART mm Hg 149*   HCO3 ART mEQ/L 28   O2 SAT ART % 99*   BASE EXC ART mEQ/L 4.2         CULTURES  Microbiology Results     Procedure Component Value Units Date/Time    Sputum culture / smear Expectorated Sputum [[id number]] Collected:  08/18/20 4961    Specimen:   Expectorated Sputum Updated:  08/20/20 1348    Narrative:       The following orders were created for panel order Sputum culture / smear Expectorated Sputum.  Procedure                               Abnormality         Status                     ---------                               -----------         ------                     Sputum Gram Stain (Lab O...[[id number]]                      Final result               Sputum Culture (Lab Only...[[id number]]  Normal              Final result                 Please view results for these tests on the individual orders.    Sputum Gram Stain (Lab Only) Expectorated Sputum [[id number]] Collected:  08/18/20 1621    Specimen:  Expectorated Sputum Updated:  08/18/20 2111     Gram Stain Result No WBC Seen      No organisms seen      No Epithelial Cells Seen    Sputum Culture (Lab Only) Expectorated Sputum [[id number]]  (Normal) Collected:  08/18/20 1621    Specimen:  Expectorated Sputum Updated:  08/20/20 1348     Culture Normal Jennifer    SARS-CoV-2 (COVID-19), PCR Nasopharynx [[id number]]  (Normal) Collected:  08/18/20 0246    Specimen:  Nasopharyngeal Swab from Nasopharynx Updated:  08/18/20 0349     SARS-CoV-2 (COVID-19) Negative          Laboratory results were independently reviewed      RADIOLOGY  X-ray Chest 1 View    Result Date: 8/22/2020  CLINICAL HISTORY: picc line Repositioned. PRIOR STUDY: Chest x-ray 8/21/2020.  Chest x-ray 8/19/2020. COMMENT: Frontal view of the chest was obtained. FINDINGS: Redemonstration of endogastric tube which follows the path of the esophagus with the tip terminating out of the field of view.  Right-sided down PICC line catheter with the tip at the SVC/right atrial junction.  The cardiomediastinal silhouette is stable. There is no pulmonary vascular congestion.  Minimal basilar atelectasis.  There is no pleural effusion or pneumothorax. The visualized osseous structures are unremarkable.     IMPRESSION: 1.  No acute cardiopulmonary process.  Right-sided PICC with the tip terminating at the right atrial/SVC junction expected location. I certify that I have personally reviewed this study and agree with this report. Radha Tate MD    X-ray Chest 1 View    Addendum Date: 8/21/2020    Findings recommendation was discussed with and read back by the patient's nurse Elicia 7:42 PM 8/21/2020.    Result Date: 8/21/2020  CLINICAL HISTORY: PICC line.     IMPRESSION: Right PICC appears to terminate in the region of the right atrium. Suggest withdrawing the PICC by about 2 cm with repeat x-ray. COMMENT: Comparison: 8/19/2020 study. Erect portable AP view of the chest.  There is an enteric tube following the expected morphology of the esophagus and stomach the tip out of the field-of-view. There has been placement of a right-sided PICC line.  This likely terminates in the right atrium.  Consider withdrawing by 2 cm with repeat x-ray.  Minimal left basilar atelectasis.  Otherwise lungs appear grossly clear.  No evidence of pneumothorax or pleural effusion.  Stable cardiomediastinal contours.  Gaseous distention of upper loops of bowel redemonstrated.         PHYSICAL EXAM    GEN: WDWN male in NAD  HENT: PERRLA, EOMI, no deformity  NECK: supple, no JVD  CARD: RRR, no rubs, murmur, gallops  RESP: CTA, no wheeze, rales, crackles  Gi: soft, NTND, woundvac  EXT: no edema, pulses throughout  SKIN: warm, dry, no rash  NEURO: AAOx3, nonfocal        ASSESSMENT & PLAN    * Volvulus (CMS/HCC)  Assessment & Plan  H/o Crohn's disease s/p total proctocolectomy with J pouch  8/18 volvulus reduction, ex lap, wash out, temporary closure    - wound care per surgical team - vac in place  - continue periop abx  - NGT in place, monitor output  - lowest effective pain control  - NPO for now, TPN started  - supportive care  - CXR with developing atelectasis   - encourage incentive spirometry   - recommend OOB when ok per surgery  - lovenox for DVT prophylaxis        Acting as a scribe in the  presence of COMPA Marquez   I have reviewed and agree with the above note.  Ramiro Rose MD   8/23/2020  2:11 PM

## 2020-08-22 NOTE — NURSING NOTE
Paged dr Emery about PICC line tip positioned in atrium. She stated that it is ok to start TPN infusing if pt has no dysrhythmia. Picc line will be readjusted in the morning

## 2020-08-22 NOTE — CONSULTS
Spiritual support message to wife Joe via telechaplacy due to Covid and left message with additional  resources available. [phone]

## 2020-08-22 NOTE — PROGRESS NOTES
General Surgery Daily Progress Note    Subjective     Interval History:   S/p exlap, detorsion of midgut volvulus, abthera vac placement 8/18 and 2nd look for washout and replacement of abthera on 8/19, s/p replacement of abthera 8/21    Continues to do well. Pain controlled. Received 10 mg lasix overnight and had robust response with 1400 mL out initially and then an additional 600 mL. PICC placed overnight and TPN started    Objective     Vital signs in last 24 hours:  Temp:  [36.6 °C (97.9 °F)-37.7 °C (99.8 °F)] 36.6 °C (97.9 °F)  Heart Rate:  [60-83] 65  Resp:  [12-23] 19  BP: (115-154)/(63-91) 115/72      Intake/Output Summary (Last 24 hours) at 8/22/2020 0606  Last data filed at 8/22/2020 0515  Gross per 24 hour   Intake 2538.75 ml   Output 4781 ml   Net -2242.25 ml     Intake/Output this shift:  I/O this shift:  In: 1968.8 [I.V.:1760]  Out: 2700 [Urine:1900; Other:800]    Physical Exam    General: AAOx3, NAD  Heart: RRR  Lungs: sating well on RA, no respiratory distress  Abd: Abthera vac in place, good suction, no leak, thin serosang drainage  Extremities: soft, nontender, no edema, good distal pulses    VTE Assessment: I have reassessed and the patient's VTE risk and treatment plan is appropriate.    Labs  Labs are pending.    Imaging  I have reviewed the Imaging from the last 24 hrs. Tip of PICC in R atrium      Assessment/Plan     E  Mr Jorge is a 35M with history of Crohn's disease s/p total proctocolectomy with J pouch here with midgut volvulus now s/p exlap, detorsion of midgut volvulus and abthera vac placement 8/18 and washout and replacement 8/19, 8/21    - OR Monday for possible closure  - Will plan to diurese this week, plan to give another dose lasix 10 mg this am  - Continue dPCA for pain control  - NGT LCWS, npo for now  - TPN, will reorder.   - Will discuss w PICC team need to pull back catheter because tip in RA. No evidence of arrhythmias on monitor.  - Abthera vac to 125 mmHg suction, will  monitor output  - lovenox dvt ppx    Altagracia Emery MD     Surgery attending:     I performed a history and physical examination of the patient and discussed the management with the Resident. I reviewed the Resident's note and agree with the documented findings and plan of care, except for my comments below or within the additional notes today.    35-year-old male with history of Crohn's disease status post total proctocolectomy with J-pouch who presented with a volvulus POD #4 status post exploratory laparotomy, detorsion of volvulus, and abthera wound vac placement, POD#3 after washout, Abthera replacement, POD#1 after washout, Abthera replacement. Pain well controlled with Dilaudid PCA.  NG tube with only 350 cc output.  Urine output 2.8 L. On exam, afebrile vital signs stable.  ABThera dressing clean dry and intact with good seal.  Abdomen appropriately tender to palpation.  Labs reviewed: White blood cell count 6.1, Cr 0.5. Plan to proceed back to the operating room Monday for ex lap, washout, abthera wound vac placement vs closure with Dr. William. Diuresis with lasix. [place]rero. Strict I/O. Lovenox DVT prophylaxis. My partners to cover starting 8/23 while I am away, and patient and family aware.      Chelsi Adams MD

## 2020-08-22 NOTE — NURSING NOTE
VAT NOTE:  Alpha Paged Radiology for CXR PICC read at 21:00.  No reply at that time. Phone Paged Radiology to IVT/VAT Office number at 10:45 and talked with Resident Radiologist (Stephanie Montalvo).  She stated that the PICC still terminated in the [facility], however the final read would not be available until tomorrow in the AM (8/22/20).  I called the [facility] floor and talked with the Primary care RN to asked if the pt has been experincing any ectopy or arrhythmias and they stated that the pt has been free of any arrhythmias.  TPN has been ordered for the pt but I explained to the [facility] staff that they will need to have an order to allow the use of the PICC line in it's current placement from the pt's attending in order to start infusion of the TPN.  Pt has two patent IV lines for IVF and non-central line medications, if needed until the PICC team can manage the PICC line after the confirmation read from radiology in the AM. I will attempted to contact the on-call Radiologist at this time to attempt confirmation.

## 2020-08-22 NOTE — PLAN OF CARE
Problem: Adult Inpatient Plan of Care  Goal: Plan of Care Review  Outcome: Progressing  Flowsheets (Taken 8/22/2020 0213)  Plan of Care Reviewed With: patient  Outcome Summary: Pt continues on PCA, VS are stable, TPA initiated. PICC line will need readjustment in the morning, Wound vac drains serosanguinous fluid. Pt is calm, cooperative, plan of care rewieved with Pt.  Goal: Patient-Specific Goal (Individualization)  Outcome: Progressing  Goal: Absence of Hospital-Acquired Illness or Injury  Outcome: Progressing  Goal: Optimal Comfort and Wellbeing  Outcome: Progressing  Goal: Readiness for Transition of Care  Outcome: Progressing  Goal: Rounds/Family Conference  Outcome: Progressing

## 2020-08-22 NOTE — NURSING NOTE
VAT NOTE:  Paged the on-call Radiologist at 11:02pm.  No reply at this time.  At 11:25 called Resident Radiologist back (Stephanie Montalvo) to help advise me wether an on-call radiologist would be able to confirm placement at this time due to the TPN order.  Stephanie Montalvo and I had the conversation to ask the Primary care RN notified and have them confer with the Pt's Attending (Dr. Adams) to address the start of the TPN order with the PICC in it's current position or place.  Called the Primary RN back and explained the situation, she stated that she would call the Pt's attending with the information for a physician's order for plan of care of the central line medication.

## 2020-08-23 LAB
ANION GAP SERPL CALC-SCNC: 7 MEQ/L (ref 3–15)
BASOPHILS # BLD: 0.02 K/UL (ref 0.01–0.1)
BASOPHILS NFR BLD: 0.3 %
BUN SERPL-MCNC: 15 MG/DL (ref 8–20)
CALCIUM SERPL-MCNC: 7.9 MG/DL (ref 8.9–10.3)
CHLORIDE SERPL-SCNC: 100 MEQ/L (ref 98–109)
CO2 SERPL-SCNC: 25 MEQ/L (ref 22–32)
CREAT SERPL-MCNC: 0.7 MG/DL (ref 0.8–1.3)
DIFFERENTIAL METHOD BLD: ABNORMAL
EOSINOPHIL # BLD: 0.11 K/UL (ref 0.04–0.54)
EOSINOPHIL NFR BLD: 1.6 %
ERYTHROCYTE [DISTWIDTH] IN BLOOD BY AUTOMATED COUNT: 12.9 % (ref 11.6–14.4)
GFR SERPL CREATININE-BSD FRML MDRD: >60 ML/MIN/1.73M*2
GLUCOSE BLD-MCNC: 94 MG/DL (ref 70–99)
GLUCOSE BLD-MCNC: 95 MG/DL (ref 70–99)
GLUCOSE SERPL-MCNC: 98 MG/DL (ref 70–99)
HCT VFR BLDCO AUTO: 43.2 % (ref 40.1–51)
HGB BLD-MCNC: 14.7 G/DL (ref 13.7–17.5)
IMM GRANULOCYTES # BLD AUTO: 0.02 K/UL (ref 0–0.08)
IMM GRANULOCYTES NFR BLD AUTO: 0.3 %
LYMPHOCYTES # BLD: 1.51 K/UL (ref 1.2–3.5)
LYMPHOCYTES NFR BLD: 21.8 %
MAGNESIUM SERPL-MCNC: 1.8 MG/DL (ref 1.8–2.5)
MCH RBC QN AUTO: 31.2 PG (ref 28–33.2)
MCHC RBC AUTO-ENTMCNC: 34 G/DL (ref 32.2–36.5)
MCV RBC AUTO: 91.7 FL (ref 83–98)
MONOCYTES # BLD: 0.51 K/UL (ref 0.3–1)
MONOCYTES NFR BLD: 7.4 %
NEUTROPHILS # BLD: 4.75 K/UL (ref 1.7–7)
NEUTS SEG NFR BLD: 68.6 %
NRBC BLD-RTO: 0 %
PDW BLD AUTO: 8.9 FL (ref 9.4–12.4)
PHOSPHATE SERPL-MCNC: 2.7 MG/DL (ref 2.4–4.7)
PLATELET # BLD AUTO: 216 K/UL (ref 150–350)
POCT TEST: NORMAL
POCT TEST: NORMAL
POTASSIUM SERPL-SCNC: 3.6 MEQ/L (ref 3.6–5.1)
RBC # BLD AUTO: 4.71 M/UL (ref 4.5–5.8)
SODIUM SERPL-SCNC: 132 MEQ/L (ref 136–144)
WBC # BLD AUTO: 6.92 K/UL (ref 3.8–10.5)

## 2020-08-23 PROCEDURE — 63600000 HC DRUGS/DETAIL CODE: Performed by: SURGERY

## 2020-08-23 PROCEDURE — 83735 ASSAY OF MAGNESIUM: CPT | Performed by: SURGERY

## 2020-08-23 PROCEDURE — 99024 POSTOP FOLLOW-UP VISIT: CPT | Performed by: SURGERY

## 2020-08-23 PROCEDURE — 36415 COLL VENOUS BLD VENIPUNCTURE: CPT | Performed by: SURGERY

## 2020-08-23 PROCEDURE — 84100 ASSAY OF PHOSPHORUS: CPT | Performed by: SURGERY

## 2020-08-23 PROCEDURE — 21400000 HC ROOM AND CARE CCU/INTERMEDIATE

## 2020-08-23 PROCEDURE — 25000000 HC PHARMACY GENERAL: Performed by: SURGERY

## 2020-08-23 PROCEDURE — 85025 COMPLETE CBC W/AUTO DIFF WBC: CPT | Performed by: SURGERY

## 2020-08-23 PROCEDURE — 80048 BASIC METABOLIC PNL TOTAL CA: CPT | Performed by: SURGERY

## 2020-08-23 RX ORDER — CEFAZOLIN SODIUM 2 G/50ML
2 SOLUTION INTRAVENOUS ONCE
Status: DISCONTINUED | OUTPATIENT
Start: 2020-08-24 | End: 2020-08-24

## 2020-08-23 RX ORDER — FUROSEMIDE 10 MG/ML
20 INJECTION INTRAMUSCULAR; INTRAVENOUS ONCE
Status: COMPLETED | OUTPATIENT
Start: 2020-08-23 | End: 2020-08-23

## 2020-08-23 RX ORDER — FUROSEMIDE 10 MG/ML
20 INJECTION INTRAMUSCULAR; INTRAVENOUS ONCE
Status: DISCONTINUED | OUTPATIENT
Start: 2020-08-23 | End: 2020-08-23

## 2020-08-23 RX ADMIN — FUROSEMIDE 20 MG: 10 INJECTION, SOLUTION INTRAMUSCULAR; INTRAVENOUS at 08:29

## 2020-08-23 RX ADMIN — PANTOPRAZOLE SODIUM 40 MG: 40 INJECTION, POWDER, LYOPHILIZED, FOR SOLUTION INTRAVENOUS at 08:29

## 2020-08-23 RX ADMIN — POTASSIUM CHLORIDE 20 MEQ: 200 INJECTION, SOLUTION INTRAVENOUS at 08:41

## 2020-08-23 RX ADMIN — LORAZEPAM 0.5 MG: 2 INJECTION INTRAMUSCULAR; INTRAVENOUS at 06:02

## 2020-08-23 RX ADMIN — Medication 10 ML: at 18:39

## 2020-08-23 RX ADMIN — Medication: at 04:59

## 2020-08-23 RX ADMIN — ENOXAPARIN SODIUM 40 MG: 100 INJECTION SUBCUTANEOUS at 05:55

## 2020-08-23 RX ADMIN — RETINOL, ERGOCALCIFEROL, .ALPHA.-TOCOPHEROL ACETATE, DL-, PHYTONADIONE, ASCORBIC ACID, NIACINAMIDE, RIBOFLAVIN 5-PHOSPHATE SODIUM, THIAMINE HYDROCHLORIDE, PYRIDOXINE HYDROCHLORIDE, DEXPANTHENOL, BIOTIN, FOLIC ACID, AND CYANOCOBALAMIN: KIT at 22:30

## 2020-08-23 RX ADMIN — Medication 10 ML: at 03:20

## 2020-08-23 RX ADMIN — MAGNESIUM SULFATE IN WATER 2 G: 40 INJECTION, SOLUTION INTRAVENOUS at 08:40

## 2020-08-23 RX ADMIN — Medication 10 ML: at 13:01

## 2020-08-23 NOTE — PROGRESS NOTES
Critical Care Progress Note    SUBJECTIVE    NAEON, low grade fever, 2L NC  NGT removed, remains on TPN  20mg IV lasix yesterday w/ good response  Another 20mg of Lasix ordered today  Using IS 10x/hr, unwilling to get OOB d/t pain  Tentative plan for OR on Monday    OBJECTIVE     VITAL SIGNS    Temp:  [36.7 °C (98.1 °F)-37.8 °C (100 °F)] 36.7 °C (98.1 °F)  Heart Rate:  [50-63] 54  Resp:  [13-28] 18  BP: (110-132)/(66-79) 112/66       Intake/Output Summary (Last 24 hours) at 8/23/2020 1347  Last data filed at 8/23/2020 1116  Gross per 24 hour   Intake 1234.42 ml   Output 5200 ml   Net -3965.58 ml       O2 Requirement:  2L NC    Telemetry: sinus bradycardia      MEDICATIONS    enoxaparin 40 mg Daily (6a)   pantoprazole 40 mg q24h   sodium chloride 10 mL q8h INT          LAB RESULTS  Lab Results   Component Value Date    WBC 6.92 08/23/2020    HGB 14.7 08/23/2020    HCT 43.2 08/23/2020    MCV 91.7 08/23/2020     08/23/2020     Lab Results   Component Value Date    GLUCOSE 98 08/23/2020    CALCIUM 7.9 (L) 08/23/2020     (L) 08/23/2020    K 3.6 08/23/2020    CO2 25 08/23/2020     08/23/2020    BUN 15 08/23/2020    CREATININE 0.7 (L) 08/23/2020     Results from last 7 days   Lab Units 08/23/20  0509   MAGNESIUM mg/dL 1.8     Lab Results   Component Value Date    CALCIUM 7.9 (L) 08/23/2020    PHOS 2.7 08/23/2020     Lab Results   Component Value Date    INR 1.2 08/21/2020    INR 1.1 08/18/2020     Lab Results   Component Value Date    PTT 30 08/21/2020     Lab Results   Component Value Date    ALT 22 08/21/2020    AST 24 08/21/2020    ALKPHOS 30 (L) 08/21/2020    BILITOT 1.4 (H) 08/21/2020       ABG  Results from last 7 days   Lab Units 08/19/20  0506   PH ART pH 7.46*   PCO2 ART mm Hg 40   PO2 ART mm Hg 149*   HCO3 ART mEQ/L 28   O2 SAT ART % 99*   BASE EXC ART mEQ/L 4.2         CULTURES  Microbiology Results     Procedure Component Value Units Date/Time    SARS-CoV-2 (COVID-19), PCR Nasopharynx  [[id number]]  (Normal) Collected:  08/22/20 2016    Specimen:  Nasopharyngeal Swab from Nasopharynx Updated:  08/22/20 2130     SARS-CoV-2 (COVID-19) Negative    Sputum culture / smear Expectorated Sputum [[id number]] Collected:  08/18/20 1621    Specimen:  Expectorated Sputum Updated:  08/20/20 1348    Narrative:       The following orders were created for panel order Sputum culture / smear Expectorated Sputum.  Procedure                               Abnormality         Status                     ---------                               -----------         ------                     Sputum Gram Stain (Lab O...[[id number]]                      Final result               Sputum Culture (Lab Only...[[id number]]  Normal              Final result                 Please view results for these tests on the individual orders.    Sputum Gram Stain (Lab Only) Expectorated Sputum [[id number]] Collected:  08/18/20 1621    Specimen:  Expectorated Sputum Updated:  08/18/20 2111     Gram Stain Result No WBC Seen      No organisms seen      No Epithelial Cells Seen    Sputum Culture (Lab Only) Expectorated Sputum [[id number]]  (Normal) Collected:  08/18/20 1621    Specimen:  Expectorated Sputum Updated:  08/20/20 1348     Culture Normal Jennifer    SARS-CoV-2 (COVID-19), PCR Nasopharynx [[id number]]  (Normal) Collected:  08/18/20 0246    Specimen:  Nasopharyngeal Swab from Nasopharynx Updated:  08/18/20 0349     SARS-CoV-2 (COVID-19) Negative          Laboratory results were independently reviewed      RADIOLOGY  X-ray Chest 1 View    Result Date: 8/22/2020  CLINICAL HISTORY: Follow-up evaluation. COMPARISON: Chest radiograph 8/21/2020. COMMENT: Frontal erect portable view of the chest was obtained. Lines and tubes: Right PICC with tip at the cavoatrial junction.  Nasogastric tube courses below the level of the diaphragm with tip to the field of view. Lungs/pleura: Mild bibasilar atelectasis.  No focal consolidation. No pleural effusion or  pneumothorax. Cardiomediastinal silhouette: Stable. Upper abdomen: Gaseous distention of left upper quadrant bowel loops.     IMPRESSION: No acute cardiopulmonary disease.  Stable lines and tubes. I certify that I have reviewed this examination and agree with this report. Radha Tate MD      PHYSICAL EXAM    GEN: WDWN male in NAD  HENT: PERRLA, EOMI, no deformity  NECK: supple, no JVD  CARD: RRR, no rubs, murmur, gallops  RESP: CTA, no wheeze, rales, crackles  Gi: soft, NTND, wound vac in place  EXT: no edema, pulses throughout  SKIN: warm, dry, no rash  NEURO: AAOx3, nonfocal       ASSESSMENT & PLAN    * Volvulus (CMS/HCC)  Assessment & Plan  H/o Crohn's disease s/p total proctocolectomy with J pouch  8/18 volvulus reduction, ex lap, wash out, temporary closure    - wound care per surgical team - vac in place  - continue periop abx  - NGT in place, monitor output  - lowest effective pain control  - NPO for now, TPN started  - supportive care  - CXR with developing atelectasis   - encourage incentive spirometry   - recommend OOB when ok per surgery  - lovenox for DVT prophylaxis        Acting as a scribe in the presence of COMPA Marquez   I have reviewed and agree with the above note.  Ramiro Rose MD    8/23/2020  2:20 PM

## 2020-08-23 NOTE — RESULT ENCOUNTER NOTE
Patient's COVID-19 test result is negative.  Patient is currently admitted to [facility].

## 2020-08-23 NOTE — PROGRESS NOTES
General Surgery Daily Progress Note    Subjective     Interval History:   S/p exlap, detorsion of midgut volvulus, abthera vac placement 8/18 and 2nd look for washout and replacement of abthera on 8/19, s/p replacement of abthera 8/21    No acute events overnight.  NG tube removed yesterday and doing well since.  Denies nausea, vomiting, fevers, or chills.  That still to suction with adequate seal and serous output.  Continues TPN for nutrition.  Notes pain is relatively well controlled with Dilaudid PCA.  Diuresing well with 20 of IV Lasix.  Plan for the OR on Monday.    Objective     Vital signs in last 24 hours:  Temp:  [36.8 °C (98.3 °F)-37.8 °C (100 °F)] 37.7 °C (99.8 °F)  Heart Rate:  [51-68] 56  Resp:  [11-28] 17  BP: (115-137)/(68-81) 122/74      Intake/Output Summary (Last 24 hours) at 8/23/2020 0658  Last data filed at 8/23/2020 0600  Gross per 24 hour   Intake 1034.8 ml   Output 3800 ml   Net -2765.2 ml     Intake/Output this shift:  I/O this shift:  In: 1034.8 [I.V.:134.8]  Out: 1700 [Urine:1350; Other:350]    Physical Exam    General: AAOx3, NAD  Heart: RRR  Lungs: sating well on RA, no respiratory distress  Abd: Abthera vac in place, good suction, no leak, thin serosang drainage  Extremities: soft, nontender, no edema, good distal pulses    VTE Assessment: I have reassessed and the patient's VTE risk and treatment plan is appropriate.    Labs  Labs are pending.    Imaging  I have reviewed the Imaging from the last 24 hrs. Tip of PICC in R atrium      Assessment/Plan     E  Mr Jorge is a 35M with history of Crohn's disease s/p total proctocolectomy with J pouch here with midgut volvulus now s/p exlap, detorsion of midgut volvulus and abthera vac placement 8/18 and washout and replacement 8/19, 8/21    - OR Monday for possible closure  - Will plan to diurese this weekend, plan to give another dose lasix 20 mg this am  - Continue dPCA for pain control  - NGT LCWS, npo for now  - TPN, will reorder.   -  Abthera vac to 125 mmHg suction, will monitor output  - lovenox dvt ppx    Eric Yao MD     ATTENDING SURGEON:    I saw and evaluated the patient.  I discussed the case with the Resident and agree with the findings and plan as documented in the note except for my comments below or within the additional notes today.     VAC in place.  Abdomen soft.  For OR tomorrow to close abdomen.    Please page with any questions or concerns.  Pager [phone]     Laz Davidson MD

## 2020-08-23 NOTE — NURSING NOTE
Report from day shift rn; pt awake and alert, watching tv and talking on phone; pt has pca dilaudid in use for pain control; vs stable; u/o good per catheter; wound vac to mid/lower abdomen with serous drainage noted; lung clear to upper lobes; slightly diminished to lower lobes; o2 per n/c; abdomen rounded, slightly firm and tender with faint bowel sounds; pt has not had BM in several days; [place] picc line with tpn at 75cc/hr; 2015 ice and cold cloth given to pt for feelings of being hot; temp--99.6; cold water swabs at bedside; pt is npo; 2120 pt asking for ativan for sleep now; 2215 resting quietly; feeling calmer; vs stable; new tpn with tubing and filter added to picc line to infuse at 75cc/hr; 2300 asleep and quiet; appears comfortable; no changes now; 0030   Asleep and quiet; appears comfortable; u/o good; wound vac intact; 0145 asleep and quiet; pca dilaudid in use for pain control; 0235 pt arouses to call of name and check of pca pump; no changes; 0310 vs stable; u/o good; pt sleeps unless disturbed; 0420 asleep, arouses for check; u/o good per catheter; pca in use for pain control; pt sleeps unless disturbed; wound vac intact with serous drainage; 0505 pca dilaudid changed now; 0510 blood drawn for am labs now; pt alert and oriented; vs stable; u/o good; no changes; MD in to check on pt; no new orders; 0605 ativan 0.5mg iv given for anxiety now; 0630 sedated and quiet; no changes;

## 2020-08-24 ENCOUNTER — ANESTHESIA (INPATIENT)
Dept: OPERATING ROOM | Facility: HOSPITAL | Age: 35
DRG: 330 | End: 2020-08-24
Payer: COMMERCIAL

## 2020-08-24 ENCOUNTER — APPOINTMENT (INPATIENT)
Dept: RADIOLOGY | Facility: HOSPITAL | Age: 35
DRG: 330 | End: 2020-08-24
Attending: SURGERY
Payer: COMMERCIAL

## 2020-08-24 ENCOUNTER — ANESTHESIA EVENT (INPATIENT)
Dept: OPERATING ROOM | Facility: HOSPITAL | Age: 35
DRG: 330 | End: 2020-08-24
Payer: COMMERCIAL

## 2020-08-24 LAB
ANION GAP SERPL CALC-SCNC: 8 MEQ/L (ref 3–15)
BASOPHILS # BLD: 0.02 K/UL (ref 0.01–0.1)
BASOPHILS NFR BLD: 0.3 %
BUN SERPL-MCNC: 16 MG/DL (ref 8–20)
CALCIUM SERPL-MCNC: 8.2 MG/DL (ref 8.9–10.3)
CHLORIDE SERPL-SCNC: 99 MEQ/L (ref 98–109)
CO2 SERPL-SCNC: 25 MEQ/L (ref 22–32)
CREAT SERPL-MCNC: 0.6 MG/DL (ref 0.8–1.3)
DIFFERENTIAL METHOD BLD: ABNORMAL
EOSINOPHIL # BLD: 0.11 K/UL (ref 0.04–0.54)
EOSINOPHIL NFR BLD: 1.6 %
ERYTHROCYTE [DISTWIDTH] IN BLOOD BY AUTOMATED COUNT: 12.7 % (ref 11.6–14.4)
GFR SERPL CREATININE-BSD FRML MDRD: >60 ML/MIN/1.73M*2
GLUCOSE BLD-MCNC: 100 MG/DL (ref 70–99)
GLUCOSE BLD-MCNC: 113 MG/DL (ref 70–99)
GLUCOSE BLD-MCNC: 89 MG/DL (ref 70–99)
GLUCOSE BLD-MCNC: 97 MG/DL (ref 70–99)
GLUCOSE SERPL-MCNC: 95 MG/DL (ref 70–99)
HCT VFR BLDCO AUTO: 46.6 % (ref 40.1–51)
HGB BLD-MCNC: 16 G/DL (ref 13.7–17.5)
IMM GRANULOCYTES # BLD AUTO: 0.03 K/UL (ref 0–0.08)
IMM GRANULOCYTES NFR BLD AUTO: 0.4 %
LYMPHOCYTES # BLD: 1.87 K/UL (ref 1.2–3.5)
LYMPHOCYTES NFR BLD: 26.5 %
MAGNESIUM SERPL-MCNC: 1.9 MG/DL (ref 1.8–2.5)
MCH RBC QN AUTO: 31.3 PG (ref 28–33.2)
MCHC RBC AUTO-ENTMCNC: 34.3 G/DL (ref 32.2–36.5)
MCV RBC AUTO: 91 FL (ref 83–98)
MONOCYTES # BLD: 0.56 K/UL (ref 0.3–1)
MONOCYTES NFR BLD: 7.9 %
NEUTROPHILS # BLD: 4.47 K/UL (ref 1.7–7)
NEUTS SEG NFR BLD: 63.3 %
NRBC BLD-RTO: 0 %
PDW BLD AUTO: 8.9 FL (ref 9.4–12.4)
PHOSPHATE SERPL-MCNC: 3.1 MG/DL (ref 2.4–4.7)
PLATELET # BLD AUTO: 231 K/UL (ref 150–350)
POCT TEST: ABNORMAL
POCT TEST: ABNORMAL
POCT TEST: NORMAL
POCT TEST: NORMAL
POTASSIUM SERPL-SCNC: 3.5 MEQ/L (ref 3.6–5.1)
RBC # BLD AUTO: 5.12 M/UL (ref 4.5–5.8)
SODIUM SERPL-SCNC: 132 MEQ/L (ref 136–144)
WBC # BLD AUTO: 7.06 K/UL (ref 3.8–10.5)

## 2020-08-24 PROCEDURE — 36415 COLL VENOUS BLD VENIPUNCTURE: CPT | Performed by: SURGERY

## 2020-08-24 PROCEDURE — 25800000 HC PHARMACY IV SOLUTIONS: Performed by: STUDENT IN AN ORGANIZED HEALTH CARE EDUCATION/TRAINING PROGRAM

## 2020-08-24 PROCEDURE — 63600000 HC DRUGS/DETAIL CODE: Performed by: ANESTHESIOLOGY

## 2020-08-24 PROCEDURE — 21400000 HC ROOM AND CARE CCU/INTERMEDIATE

## 2020-08-24 PROCEDURE — 63600000 HC DRUGS/DETAIL CODE: Performed by: STUDENT IN AN ORGANIZED HEALTH CARE EDUCATION/TRAINING PROGRAM

## 2020-08-24 PROCEDURE — 63600000 HC DRUGS/DETAIL CODE: Performed by: NURSE ANESTHETIST, CERTIFIED REGISTERED

## 2020-08-24 PROCEDURE — 63600000 HC DRUGS/DETAIL CODE: Performed by: SURGERY

## 2020-08-24 PROCEDURE — 71000011 HC PACU PHASE 1 EA ADDL MIN: Performed by: SURGERY

## 2020-08-24 PROCEDURE — 25000000 HC PHARMACY GENERAL: Performed by: STUDENT IN AN ORGANIZED HEALTH CARE EDUCATION/TRAINING PROGRAM

## 2020-08-24 PROCEDURE — 36000013 HC OR LEVEL 3 EA ADDL MIN: Performed by: SURGERY

## 2020-08-24 PROCEDURE — 25000000 HC PHARMACY GENERAL: Performed by: SURGERY

## 2020-08-24 PROCEDURE — 84100 ASSAY OF PHOSPHORUS: CPT | Performed by: SURGERY

## 2020-08-24 PROCEDURE — 85025 COMPLETE CBC W/AUTO DIFF WBC: CPT | Performed by: SURGERY

## 2020-08-24 PROCEDURE — 71000001 HC PACU PHASE 1 INITIAL 30MIN: Performed by: SURGERY

## 2020-08-24 PROCEDURE — 3E1M38Z IRRIGATION OF PERITONEAL CAVITY USING IRRIGATING SUBSTANCE, PERCUTANEOUS APPROACH: ICD-10-PCS | Performed by: SURGERY

## 2020-08-24 PROCEDURE — 25000000 HC PHARMACY GENERAL: Performed by: NURSE ANESTHETIST, CERTIFIED REGISTERED

## 2020-08-24 PROCEDURE — 36000003 HC OR LEVEL 3 INITIAL 30MIN: Performed by: SURGERY

## 2020-08-24 PROCEDURE — 99024 POSTOP FOLLOW-UP VISIT: CPT | Performed by: SURGERY

## 2020-08-24 PROCEDURE — 80048 BASIC METABOLIC PNL TOTAL CA: CPT | Performed by: SURGERY

## 2020-08-24 PROCEDURE — 83735 ASSAY OF MAGNESIUM: CPT | Performed by: SURGERY

## 2020-08-24 PROCEDURE — 27200000 HC STERILE SUPPLY: Performed by: SURGERY

## 2020-08-24 PROCEDURE — 71045 X-RAY EXAM CHEST 1 VIEW: CPT

## 2020-08-24 PROCEDURE — 37000001 HC ANESTHESIA GENERAL: Performed by: SURGERY

## 2020-08-24 PROCEDURE — 49000 EXPLORATION OF ABDOMEN: CPT | Mod: 58 | Performed by: SURGERY

## 2020-08-24 RX ORDER — HYDROMORPHONE HYDROCHLORIDE 1 MG/ML
0.5 INJECTION, SOLUTION INTRAMUSCULAR; INTRAVENOUS; SUBCUTANEOUS
Status: DISCONTINUED | OUTPATIENT
Start: 2020-08-24 | End: 2020-08-25

## 2020-08-24 RX ORDER — FENTANYL CITRATE 50 UG/ML
50 INJECTION, SOLUTION INTRAMUSCULAR; INTRAVENOUS
Status: DISCONTINUED | OUTPATIENT
Start: 2020-08-24 | End: 2020-08-25

## 2020-08-24 RX ORDER — PHENYLEPHRINE HYDROCHLORIDE 10 MG/ML
INJECTION INTRAVENOUS AS NEEDED
Status: DISCONTINUED | OUTPATIENT
Start: 2020-08-24 | End: 2020-08-24 | Stop reason: SURG

## 2020-08-24 RX ORDER — METOCLOPRAMIDE HYDROCHLORIDE 5 MG/ML
10 INJECTION INTRAMUSCULAR; INTRAVENOUS
Status: DISCONTINUED | OUTPATIENT
Start: 2020-08-24 | End: 2020-08-25

## 2020-08-24 RX ORDER — HYDROMORPHONE HYDROCHLORIDE 1 MG/ML
INJECTION, SOLUTION INTRAMUSCULAR; INTRAVENOUS; SUBCUTANEOUS AS NEEDED
Status: DISCONTINUED | OUTPATIENT
Start: 2020-08-24 | End: 2020-08-24 | Stop reason: SURG

## 2020-08-24 RX ORDER — METRONIDAZOLE 500 MG/100ML
500 INJECTION, SOLUTION INTRAVENOUS ONCE
Status: COMPLETED | OUTPATIENT
Start: 2020-08-24 | End: 2020-08-24

## 2020-08-24 RX ORDER — ACETAMINOPHEN 325 MG/1
650 TABLET ORAL ONCE AS NEEDED
Status: DISCONTINUED | OUTPATIENT
Start: 2020-08-24 | End: 2020-08-25

## 2020-08-24 RX ORDER — MIDAZOLAM HYDROCHLORIDE 2 MG/2ML
INJECTION, SOLUTION INTRAMUSCULAR; INTRAVENOUS AS NEEDED
Status: DISCONTINUED | OUTPATIENT
Start: 2020-08-24 | End: 2020-08-24 | Stop reason: SURG

## 2020-08-24 RX ORDER — FUROSEMIDE 10 MG/ML
20 INJECTION INTRAMUSCULAR; INTRAVENOUS ONCE
Status: COMPLETED | OUTPATIENT
Start: 2020-08-24 | End: 2020-08-24

## 2020-08-24 RX ORDER — LIDOCAINE HYDROCHLORIDE 10 MG/ML
INJECTION, SOLUTION INFILTRATION; PERINEURAL AS NEEDED
Status: DISCONTINUED | OUTPATIENT
Start: 2020-08-24 | End: 2020-08-24 | Stop reason: SURG

## 2020-08-24 RX ORDER — KETOROLAC TROMETHAMINE 30 MG/ML
INJECTION, SOLUTION INTRAMUSCULAR; INTRAVENOUS AS NEEDED
Status: DISCONTINUED | OUTPATIENT
Start: 2020-08-24 | End: 2020-08-24 | Stop reason: SURG

## 2020-08-24 RX ORDER — NEOSTIGMINE METHYLSULFATE 1 MG/ML
INJECTION INTRAVENOUS AS NEEDED
Status: DISCONTINUED | OUTPATIENT
Start: 2020-08-24 | End: 2020-08-24 | Stop reason: SURG

## 2020-08-24 RX ORDER — ACETAMINOPHEN 325 MG/1
975 TABLET ORAL
Status: DISCONTINUED | OUTPATIENT
Start: 2020-08-24 | End: 2020-08-27 | Stop reason: HOSPADM

## 2020-08-24 RX ORDER — SODIUM CHLORIDE 9 MG/ML
INJECTION, SOLUTION INTRAVENOUS CONTINUOUS PRN
Status: DISCONTINUED | OUTPATIENT
Start: 2020-08-24 | End: 2020-08-24 | Stop reason: SURG

## 2020-08-24 RX ORDER — DEXTROSE 40 %
15-30 GEL (GRAM) ORAL AS NEEDED
Status: DISCONTINUED | OUTPATIENT
Start: 2020-08-24 | End: 2020-08-25

## 2020-08-24 RX ORDER — BUPIVACAINE HYDROCHLORIDE 2.5 MG/ML
INJECTION, SOLUTION EPIDURAL; INFILTRATION; INTRACAUDAL AS NEEDED
Status: DISCONTINUED | OUTPATIENT
Start: 2020-08-24 | End: 2020-08-24 | Stop reason: HOSPADM

## 2020-08-24 RX ORDER — ONDANSETRON HYDROCHLORIDE 2 MG/ML
4 INJECTION, SOLUTION INTRAVENOUS
Status: DISCONTINUED | OUTPATIENT
Start: 2020-08-24 | End: 2020-08-25

## 2020-08-24 RX ORDER — ONDANSETRON HYDROCHLORIDE 2 MG/ML
INJECTION, SOLUTION INTRAVENOUS AS NEEDED
Status: DISCONTINUED | OUTPATIENT
Start: 2020-08-24 | End: 2020-08-24 | Stop reason: SURG

## 2020-08-24 RX ORDER — LIDOCAINE HYDROCHLORIDE 10 MG/ML
INJECTION, SOLUTION INFILTRATION; PERINEURAL AS NEEDED
Status: DISCONTINUED | OUTPATIENT
Start: 2020-08-24 | End: 2020-08-24

## 2020-08-24 RX ORDER — IBUPROFEN 200 MG
16-32 TABLET ORAL AS NEEDED
Status: DISCONTINUED | OUTPATIENT
Start: 2020-08-24 | End: 2020-08-25

## 2020-08-24 RX ORDER — PROPOFOL 10 MG/ML
INJECTION, EMULSION INTRAVENOUS AS NEEDED
Status: DISCONTINUED | OUTPATIENT
Start: 2020-08-24 | End: 2020-08-24 | Stop reason: SURG

## 2020-08-24 RX ORDER — DEXAMETHASONE SODIUM PHOSPHATE 4 MG/ML
INJECTION, SOLUTION INTRA-ARTICULAR; INTRALESIONAL; INTRAMUSCULAR; INTRAVENOUS; SOFT TISSUE AS NEEDED
Status: DISCONTINUED | OUTPATIENT
Start: 2020-08-24 | End: 2020-08-24 | Stop reason: SURG

## 2020-08-24 RX ORDER — DEXTROSE 50 % IN WATER (D50W) INTRAVENOUS SYRINGE
25 AS NEEDED
Status: DISCONTINUED | OUTPATIENT
Start: 2020-08-24 | End: 2020-08-25

## 2020-08-24 RX ORDER — SODIUM CHLORIDE 0.9 G/100ML
INJECTION, SOLUTION IRRIGATION AS NEEDED
Status: DISCONTINUED | OUTPATIENT
Start: 2020-08-24 | End: 2020-08-24 | Stop reason: HOSPADM

## 2020-08-24 RX ORDER — GLYCOPYRROLATE 0.6MG/3ML
SYRINGE (ML) INTRAVENOUS AS NEEDED
Status: DISCONTINUED | OUTPATIENT
Start: 2020-08-24 | End: 2020-08-24 | Stop reason: SURG

## 2020-08-24 RX ORDER — LIDOCAINE HYDROCHLORIDE 5 MG/ML
INJECTION, SOLUTION INFILTRATION; PERINEURAL AS NEEDED
Status: DISCONTINUED | OUTPATIENT
Start: 2020-08-24 | End: 2020-08-24 | Stop reason: HOSPADM

## 2020-08-24 RX ORDER — ROCURONIUM BROMIDE 10 MG/ML
INJECTION, SOLUTION INTRAVENOUS AS NEEDED
Status: DISCONTINUED | OUTPATIENT
Start: 2020-08-24 | End: 2020-08-24 | Stop reason: SURG

## 2020-08-24 RX ADMIN — HYDROMORPHONE HYDROCHLORIDE 0.5 MG: 1 INJECTION, SOLUTION INTRAMUSCULAR; INTRAVENOUS; SUBCUTANEOUS at 19:36

## 2020-08-24 RX ADMIN — LIDOCAINE HYDROCHLORIDE 5 ML: 10 INJECTION, SOLUTION INFILTRATION; PERINEURAL at 18:01

## 2020-08-24 RX ADMIN — FENTANYL CITRATE 100 MCG: 50 INJECTION INTRAMUSCULAR; INTRAVENOUS at 18:01

## 2020-08-24 RX ADMIN — ONDANSETRON 4 MG: 2 INJECTION INTRAMUSCULAR; INTRAVENOUS at 19:03

## 2020-08-24 RX ADMIN — LORAZEPAM 0.5 MG: 2 INJECTION INTRAMUSCULAR; INTRAVENOUS at 03:02

## 2020-08-24 RX ADMIN — FENTANYL CITRATE 50 MCG: 50 INJECTION INTRAMUSCULAR; INTRAVENOUS at 19:23

## 2020-08-24 RX ADMIN — ROCURONIUM BROMIDE 10 MG: 10 INJECTION, SOLUTION INTRAVENOUS at 18:42

## 2020-08-24 RX ADMIN — KETOROLAC TROMETHAMINE 15 MG: 30 INJECTION, SOLUTION INTRAMUSCULAR at 19:03

## 2020-08-24 RX ADMIN — ROCURONIUM BROMIDE 50 MG: 10 INJECTION, SOLUTION INTRAVENOUS at 18:01

## 2020-08-24 RX ADMIN — Medication: at 02:34

## 2020-08-24 RX ADMIN — MAGNESIUM SULFATE IN WATER 2 G: 40 INJECTION, SOLUTION INTRAVENOUS at 04:25

## 2020-08-24 RX ADMIN — ENOXAPARIN SODIUM 40 MG: 100 INJECTION SUBCUTANEOUS at 06:29

## 2020-08-24 RX ADMIN — METRONIDAZOLE 500 MG: 500 INJECTION, SOLUTION INTRAVENOUS at 08:43

## 2020-08-24 RX ADMIN — POTASSIUM CHLORIDE 20 MEQ: 200 INJECTION, SOLUTION INTRAVENOUS at 06:28

## 2020-08-24 RX ADMIN — FUROSEMIDE 20 MG: 10 INJECTION, SOLUTION INTRAMUSCULAR; INTRAVENOUS at 08:42

## 2020-08-24 RX ADMIN — SODIUM CHLORIDE: 900 INJECTION, SOLUTION INTRAVENOUS at 17:44

## 2020-08-24 RX ADMIN — CEFAZOLIN 2 G: 330 INJECTION, POWDER, FOR SOLUTION INTRAMUSCULAR; INTRAVENOUS at 17:00

## 2020-08-24 RX ADMIN — FENTANYL CITRATE 50 MCG: 50 INJECTION INTRAMUSCULAR; INTRAVENOUS at 20:10

## 2020-08-24 RX ADMIN — LORAZEPAM 0.5 MG: 2 INJECTION INTRAMUSCULAR; INTRAVENOUS at 21:33

## 2020-08-24 RX ADMIN — Medication 10 ML: at 02:43

## 2020-08-24 RX ADMIN — PANTOPRAZOLE SODIUM 40 MG: 40 INJECTION, POWDER, LYOPHILIZED, FOR SOLUTION INTRAVENOUS at 08:42

## 2020-08-24 RX ADMIN — ROCURONIUM BROMIDE 20 MG: 10 INJECTION, SOLUTION INTRAVENOUS at 18:33

## 2020-08-24 RX ADMIN — PHENYLEPHRINE HYDROCHLORIDE 200 MCG: 10 INJECTION INTRAVENOUS at 18:07

## 2020-08-24 RX ADMIN — RETINOL, ERGOCALCIFEROL, .ALPHA.-TOCOPHEROL ACETATE, DL-, PHYTONADIONE, ASCORBIC ACID, NIACINAMIDE, RIBOFLAVIN 5-PHOSPHATE SODIUM, THIAMINE HYDROCHLORIDE, PYRIDOXINE HYDROCHLORIDE, DEXPANTHENOL, BIOTIN, FOLIC ACID, AND CYANOCOBALAMIN 150 ML: KIT at 17:44

## 2020-08-24 RX ADMIN — PROPOFOL 200 MG: 10 INJECTION, EMULSION INTRAVENOUS at 18:01

## 2020-08-24 RX ADMIN — HYDROMORPHONE HYDROCHLORIDE 0.5 MG: 1 INJECTION, SOLUTION INTRAMUSCULAR; INTRAVENOUS; SUBCUTANEOUS at 20:37

## 2020-08-24 RX ADMIN — FENTANYL CITRATE 100 MCG: 50 INJECTION INTRAMUSCULAR; INTRAVENOUS at 18:00

## 2020-08-24 RX ADMIN — SMOFLIPID: 6; 6; 5; 3 INJECTION, EMULSION INTRAVENOUS at 23:22

## 2020-08-24 RX ADMIN — PHENYLEPHRINE HYDROCHLORIDE 100 MCG: 10 INJECTION INTRAVENOUS at 18:11

## 2020-08-24 RX ADMIN — NEOSTIGMINE METHYLSULFATE 5 MG: 1 INJECTION INTRAVENOUS at 19:14

## 2020-08-24 RX ADMIN — MIDAZOLAM HYDROCHLORIDE 2 MG: 1 INJECTION, SOLUTION INTRAMUSCULAR; INTRAVENOUS at 17:54

## 2020-08-24 RX ADMIN — Medication 1 MG: at 19:14

## 2020-08-24 RX ADMIN — DEXAMETHASONE SODIUM PHOSPHATE 4 MG: 4 INJECTION, SOLUTION INTRA-ARTICULAR; INTRALESIONAL; INTRAMUSCULAR; INTRAVENOUS; SOFT TISSUE at 18:36

## 2020-08-24 RX ADMIN — FENTANYL CITRATE 50 MCG: 50 INJECTION INTRAMUSCULAR; INTRAVENOUS at 19:24

## 2020-08-24 RX ADMIN — FENTANYL CITRATE 50 MCG: 50 INJECTION INTRAMUSCULAR; INTRAVENOUS at 19:54

## 2020-08-24 ASSESSMENT — LIFESTYLE VARIABLES: TOBACCO_USE: 0

## 2020-08-24 NOTE — PROGRESS NOTES
General Surgery Daily Progress Note    Subjective     Interval History:   S/p exlap, detorsion of midgut volvulus, abthera vac placement 8/18 and 2nd look for washout and replacement of abthera on 8/19, s/p replacement of abthera 8/21    No acute events overnight.  NPO. Pain is adequately controlled w/ PCA. On TPN. Denies nausea, vomiting, fevers, or chills. Daily diuresis w/ 20 IV Lasix, UOP 3.4L.  Plan for OR today.    Objective     Vital signs in last 24 hours:  Temp:  [36.7 °C (98.1 °F)-36.9 °C (98.4 °F)] 36.9 °C (98.4 °F)  Heart Rate:  [49-63] 63  Resp:  [14-23] 22  BP: (110-124)/(66-75) 124/71      Intake/Output Summary (Last 24 hours) at 8/24/2020 0719  Last data filed at 8/24/2020 0645  Gross per 24 hour   Intake 252.07 ml   Output 3500 ml   Net -3247.93 ml     Intake/Output this shift:  No intake/output data recorded.    Physical Exam    General: AAOx3, NAD  Heart: RRR  Lungs: sating well on RA, no respiratory distress  Abd: Abthera vac in place, good suction, no leak, thin serosang drainage  Extremities: soft, nontender, no edema, good distal pulses    VTE Assessment: I have reassessed and the patient's VTE risk and treatment plan is appropriate.    Labs  CBC Results       08/24/20 08/23/20 08/22/20                    0257 0509 0410         WBC 7.06 6.92 6.13         RBC 5.12 4.71 4.26         HGB 16.0 14.7 13.4         HCT 46.6 43.2 39.7         MCV 91.0 91.7 93.2         MCH 31.3 31.2 31.5         MCHC 34.3 34.0 33.8          216 216                     BMP Results       08/24/20 08/23/20 08/22/20                    0257 0509 0410          132 137         K 3.5 3.6 3.6         Cl 99 100 101         CO2 25 25 30         Glucose 95 98 98         BUN 16 15 16         Creatinine 0.6 0.7 0.5         Calcium 8.2 7.9 7.7         Anion Gap 8 7 6         EGFR >60.0 >60.0 >60.0                     Assessment/Plan     Mr Jorge is a 35M with history of Crohn's disease s/p total proctocolectomy with J  pouch here with midgut volvulus now s/p exlap, detorsion of midgut volvulus and abthera vac placement 8/18 and washout and replacement 8/19, 8/21    - OR today for possible closure  - Continue dPCA for pain control  - f/u AM labs, replete electrolytes PRN  - Keep NPO  - c/w TPN, will reorder  - Abthera vac to 125 mmHg suction, will monitor output  - lovenox dvt ppx    Carolyn Levin MD   #[id number]    ATTENDING: I am assuming the care of this patient for Dr. Adams this week.  Denies comlaints. Pain controlled.  NO flatus or BM but feels he does not have the core strength for this right now.  No nausea.  AVSS.  UO 3.4L.  Vac 150, serous.  abd soft, approp tender, ND, vac intact.  Labs OK. A/P: S/p exp lap for midgut volvulus with open abdomen.  Diuresed over the weekend for hopeful definitive closure today.  Procedure reviewed in detail with pt including R/B/A.  He understands risk of bleeding, infection, injury to surrounding structures, possibility he is not able to be closed today and needs further surgery, hernia formation, etc.  He wishes to proceed.  Can start PO after surgery.

## 2020-08-24 NOTE — PATIENT CARE CONFERENCE
Care Progression Rounds Note  Date: 8/24/2020  Time: 9:10 AM     Patient Name: Selvin Jorge     Medical Record Number: [id number]   YOB: 1985  Sex: Male      Room/Bed: [facility]    Admitting Diagnosis: Volvulus (CMS/HCC) [K56.2]  Volvulus (CMS/HCC) [K56.2]   Admit Date/Time: 8/17/2020 10:36 PM    Primary Diagnosis: Volvulus (CMS/HCC)  Principal Problem: Volvulus (CMS/HCC)    GMLOS: 10.4  Anticipated Discharge Date: 8/26/2020    AM-PAC  Mobility Score: 6    Discharge Planning:  Anticipated Discharge Disposition: home with assistance, home with home health services    Barriers to Discharge:  Barriers to Discharge: Medical issues not resolved  Comment: Planned abdominal washout in OR today    Participants:  respiratory therapy, , social work/services, dietitian/nutrition services, nursing, physical therapy

## 2020-08-24 NOTE — OP NOTE
Pre-op dx: previous small bowel volvulus s/p exploratory laparotomy, open abdomen    Postop diagnosis: same    Procedure: exploratory laparotomy, washout, abdominal wall closure    Surgeon: Nataliia    Assistant: PGY-4    Anesthesia: General endotracheal anesthesia    EBL: Minimal    Specimens: none    Indications for procedure: Patient is a 35 y.o. male with a h/o previous total colectomy and J-pouch who recently developed a small bowel volvulus requiring urgent exploratory laparotomy and detorsion.  Abdomen was left open.  He has had several washouts and is brought to the OR today for hopeful closure..   Risks and benefits of surgery were discussed with him, including but not limited to bleeding, infection, injury to surrounding structures, possiblity he can not be closed, hernia development, etc.  he   was willing to proceed.    Procedure: After the patient was correctly identified as Selvin Jorge, he was brought to the operating room and transferred to the operating table in the supine position.  General endotracheal anesthesia was administered without difficulty.  Patient's abdomen was prepped and draped over the abthera in the usual sterile fashion.  The abthera was removed.  The abdomen was explored.  The bowel loops were adhesed together but appeared completely viable and much less dilated.  The gutters and pelvis were copiously irrigated.  No attempt was made to lyse adhesions or separate bowel loops.       We inspected for hemostasis which was excellent.   We placed Kochers on the fascia on each side and brought the fascia together.  It appeared that he would close relatively easily without undue tension or increase in pulmonary pressures.  Running #1 looped PDS was then used to close the fascia.  Internal retention sutures of 0-vicryl were placed every three to four bites.  The fascia appeared healthy.  We then irrigated the wound.  We infiltrated local anesthesia into the fascia.  We closed skin with  skin staples.  A layer of chloraprep was placed.  Closed wound vac was  applied.  Abdominal binder was placed.  Patient was awoken from anesthesia and transferred to recovery room in stable condition.  All instrument and sponge counts were correct at the end of the case.

## 2020-08-24 NOTE — ANESTHESIA POSTPROCEDURE EVALUATION
Patient: Selvin Jorge    Procedure Summary     Date:  08/24/20 Room / Location:  [facility] P[facility] OR  / [facility] OR PAV    Anesthesia Start:  1744 Anesthesia Stop:  1932    Procedure:  LAPAROTOMY EXPLORATORY, Abthera VAC placement, possible closure (N/A Abdomen) Diagnosis:       Volvulus (CMS/HCC)      (Volvulus (CMS/HCC) [K56.2])    Surgeon:  Ashlee William MD Responsible Provider:  Laurie Tao MD    Anesthesia Type:  general ASA Status:  3          Anesthesia Type: general  PACU Vitals  8/24/2020 1924 - 8/24/2020 1940 8/24/2020 1930             BP:  (!) 157/99    Temp:  36.8 °C (98.3 °F)    Pulse:  61    Resp:  15    SpO2:  100 %            Anesthesia Post Evaluation    Pain management: adequate  Patient location during evaluation: PACU  Patient participation: complete - patient participated  Level of consciousness: awake and alert  Cardiovascular status: acceptable  Airway Patency: adequate  Respiratory status: acceptable  Hydration status: acceptable  Anesthetic complications: no

## 2020-08-24 NOTE — PLAN OF CARE
Problem: Adult Inpatient Plan of Care  Goal: Readiness for Transition of Care  Outcome: Progressing  RNCC- Met with Patient who is comfortable, denies any concerns. Plan for OR today  - potential closure.   Pt is currently using a wound vac, hopeful this can be discontinued prior to dc. We will follow.    Pt has never had any in-home services in the past,  but is aware. His dc plan  is to be discharged to his in-law's home in [place],Will be staying with them until September 10th. We will continue to follow and provide coordination of services pending medical progress.

## 2020-08-24 NOTE — ANESTHESIOLOGIST PRE-PROCEDURE ATTESTATION
Pre-Procedure Patient Identification:  I am the Primary Anesthesiologist and have identified the patient on 08/24/20 at 6:21 PM.   I have confirmed the following procedure(s) LAPAROTOMY EXPLORATORY, Abthera VAC placement, possible closure will be performed by the following surgeon/proceduralist Ashlee William MD.

## 2020-08-24 NOTE — ANESTHESIA PROCEDURE NOTES
Airway  Urgency: elective    Start Time: 8/24/2020 6:06 PM    General Information and Staff    Patient location during procedure: OR  Anesthesiologist: Laurie Tao MD  Performed: anesthesiologist     Indications and Patient Condition  Indications for airway management: anesthesia  Sedation level: deep  Preoxygenated: yes  Patient position: sniffing  Mask difficulty assessment: 1 - vent by mask    Final Airway Details  Final airway type: endotracheal airway      Successful airway: ETT    Successful intubation technique: direct laryngoscopy  Facilitating devices/methods: intubating stylet  Endotracheal tube insertion site: oral  Blade: Giovanna  Blade size: #4  ETT size (mm): 8.0  Cormack-Lehane Classification: grade IIa - partial view of glottis  Placement verified by: chest auscultation and capnometry   Measured from: lips  ETT to lips (cm): 23  Number of attempts at approach: 1  Number of other approaches attempted: 0  Atraumatic airway insertion

## 2020-08-24 NOTE — PLAN OF CARE
Problem: Adult Inpatient Plan of Care  Goal: Plan of Care Review  Outcome: Progressing  Flowsheets (Taken 8/24/2020 0811)  Progress: no change  Plan of Care Reviewed With: patient; spouse  Outcome Summary: Pt VSS, SB on the monitor, ETCO2 monitoring with PCA pump- CO2 levels within normal range, diuladid PCA, abd wound vac in place draining, NPO, TPN, holden, OR today for possibly close incision, will cont to monitor

## 2020-08-24 NOTE — PLAN OF CARE
Problem: Adult Inpatient Plan of Care  Goal: Plan of Care Review  Outcome: Progressing  Flowsheets (Taken 8/24/2020 0414)  Outcome Summary: patient on PCA pump for abdominal pain, receiving TPN; abdomen firm, bowel sounds absent, wound vac in place and draining well; on RA, received ativan x2 for pain; in normal sinus to sinus suma; planned for wash out today

## 2020-08-24 NOTE — ANESTHESIA PREPROCEDURE EVALUATION
Relevant Problems   GASTROINTESTINAL   (+) Crohn's disease of colon (CMS/HCC)       Anesthesia ROS/MED HX    Anesthesia History    Previous anesthetics  No history of anesthetic complications  Pulmonary   no histpor of tobacco use  Cardiovascular- neg   Covid19 Test Reviewed  Hematological - neg  GI/Hepatic   inflammatory bowel disease  Musculoskeletal- neg  Endo/Other  Body Habitus: Normal  ROS/MED HX Comments:    GI/Hepatic/Renal: Volvulus currently      HPI: Mr Jorge is a 35M with history of Crohn's disease s/p total proctocolectomy with J pouch here with midgut volvulus now s/p exlap, detorsion of midgut volvulus and abthera vac placement 8/18 and washout and replacement 8/19, 8/21. OR today for possible closure.    Past Surgical History:   Procedure Laterality Date   • ABDOMINAL SURGERY     • BOWEL RESECTION         Physical Exam    Airway   Mallampati: I   TM distance: >3 FB   Neck ROM: full  Dental - normal        Anesthesia Plan    Plan: general    Technique: general endotracheal     Lines and Monitors: PIV     Airway: direct visual laryngoscopy and oral intubation   ASA 3  Anesthetic plan and risks discussed with: patient  Induction:    intravenous   Postop Plan:   Patient Disposition: inpatient floor planned admission   Pain Management: IV analgesics

## 2020-08-24 NOTE — PROGRESS NOTES
Critical Care Progress Note    SUBJECTIVE    Pt lying in bed, currently on room air, afebrile. Wound vac in place to suction. Reports some abdominal pain, however pain well controlled with PCA. Denies any N/V, SOB, dizziness, sweats/chills.    Interval summary:  - no acute events overnight  - remains on TPN  - received 20mg lasix this morning, UOP 3.5L yesterday  - Using IS, unwilling to get OOB due to pain  - plan for OR today for possible closure    OBJECTIVE     VITAL SIGNS    Temp:  [36.8 °C (98.2 °F)-37.2 °C (99 °F)] 37.2 °C (99 °F)  Heart Rate:  [49-68] 60  Resp:  [16-36] 36  BP: (112-124)/(66-75) 114/67       Intake/Output Summary (Last 24 hours) at 8/24/2020 1324  Last data filed at 8/24/2020 1300  Gross per 24 hour   Intake 2256.2 ml   Output 3300 ml   Net -1043.8 ml       O2 Requirement:  RA    Telemetry: sinus rhythm      MEDICATIONS    ceFAZolin 2 g Once   enoxaparin 40 mg Daily (6a)   pantoprazole 40 mg q24h   sodium chloride 10 mL q8h INT          LAB RESULTS  Lab Results   Component Value Date    WBC 7.06 08/24/2020    HGB 16.0 08/24/2020    HCT 46.6 08/24/2020    MCV 91.0 08/24/2020     08/24/2020     Lab Results   Component Value Date    GLUCOSE 95 08/24/2020    CALCIUM 8.2 (L) 08/24/2020     (L) 08/24/2020    K 3.5 (L) 08/24/2020    CO2 25 08/24/2020    CL 99 08/24/2020    BUN 16 08/24/2020    CREATININE 0.6 (L) 08/24/2020     Results from last 7 days   Lab Units 08/24/20  0257   MAGNESIUM mg/dL 1.9     Lab Results   Component Value Date    CALCIUM 8.2 (L) 08/24/2020    PHOS 3.1 08/24/2020     Lab Results   Component Value Date    INR 1.2 08/21/2020    INR 1.1 08/18/2020     Lab Results   Component Value Date    PTT 30 08/21/2020     Lab Results   Component Value Date    ALT 22 08/21/2020    AST 24 08/21/2020    ALKPHOS 30 (L) 08/21/2020    BILITOT 1.4 (H) 08/21/2020       ABG  Results from last 7 days   Lab Units 08/19/20  0506   PH ART pH 7.46*   PCO2 ART mm Hg 40   PO2 ART mm Hg  149*   HCO3 ART mEQ/L 28   O2 SAT ART % 99*   BASE EXC ART mEQ/L 4.2         CULTURES  Microbiology Results     Procedure Component Value Units Date/Time    SARS-CoV-2 (COVID-19), PCR Nasopharynx [[id number]]  (Normal) Collected:  08/22/20 2016    Specimen:  Nasopharyngeal Swab from Nasopharynx Updated:  08/22/20 2130     SARS-CoV-2 (COVID-19) Negative    Sputum culture / smear Expectorated Sputum [[id number]] Collected:  08/18/20 1621    Specimen:  Expectorated Sputum Updated:  08/20/20 1348    Narrative:       The following orders were created for panel order Sputum culture / smear Expectorated Sputum.  Procedure                               Abnormality         Status                     ---------                               -----------         ------                     Sputum Gram Stain (Lab O...[[id number]]                      Final result               Sputum Culture (Lab Only...[[id number]]  Normal              Final result                 Please view results for these tests on the individual orders.    Sputum Gram Stain (Lab Only) Expectorated Sputum [[id number]] Collected:  08/18/20 1621    Specimen:  Expectorated Sputum Updated:  08/18/20 2111     Gram Stain Result No WBC Seen      No organisms seen      No Epithelial Cells Seen    Sputum Culture (Lab Only) Expectorated Sputum [[id number]]  (Normal) Collected:  08/18/20 1621    Specimen:  Expectorated Sputum Updated:  08/20/20 1348     Culture Normal Jennifer    SARS-CoV-2 (COVID-19), PCR Nasopharynx [[id number]]  (Normal) Collected:  08/18/20 0246    Specimen:  Nasopharyngeal Swab from Nasopharynx Updated:  08/18/20 0349     SARS-CoV-2 (COVID-19) Negative          Laboratory results were independently reviewed      RADIOLOGY  No results found.    PHYSICAL EXAM    GEN: WDWN male in NAD  HENT: EOMI, no deformity  NECK: supple, no JVD  CARD: RRR, no rubs, murmur, gallops  RESP: CTA, no wheeze, rales, crackles  Gi: distended, wound vac in place  EXT: no edema,  pulses throughout  SKIN: warm, dry, no rash  NEURO: AAOx3, nonfocal       ASSESSMENT & PLAN    * Volvulus (CMS/HCC)  Assessment & Plan  H/o Crohn's disease s/p total proctocolectomy with J pouch  8/18 volvulus reduction, ex lap, wash out, temporary closure  8/19 2nd look for washout and replacement of abthera  8/21 s/p replacement of abthera 8/21    - wound care per surgical team - vac in place  - continue periop abx  - NGT removed  - lowest effective pain control: dPCA  - NPO for now, TPN started  - supportive care  - CXR with developing atelectasis   - encourage incentive spirometry   - recommend OOB when ok per surgery  - lovenox for DVT prophylaxis, PPI for GI ppx  - OR today for possible closure        Acting as a scribe in the presence of Dr. Angel Corea.  COMPA Francisco   Documentation prepared by the Physician Assistant/Resident in my presence, I have reviewed and agree with this note. I have spent a total of  35 minutes of time.   Dr Corea.

## 2020-08-25 LAB
ANION GAP SERPL CALC-SCNC: 8 MEQ/L (ref 3–15)
BASOPHILS # BLD: 0.02 K/UL (ref 0.01–0.1)
BASOPHILS NFR BLD: 0.3 %
BUN SERPL-MCNC: 19 MG/DL (ref 8–20)
CALCIUM SERPL-MCNC: 8.1 MG/DL (ref 8.9–10.3)
CHLORIDE SERPL-SCNC: 102 MEQ/L (ref 98–109)
CO2 SERPL-SCNC: 24 MEQ/L (ref 22–32)
CREAT SERPL-MCNC: 0.8 MG/DL (ref 0.8–1.3)
DIFFERENTIAL METHOD BLD: ABNORMAL
EOSINOPHIL # BLD: 0.03 K/UL (ref 0.04–0.54)
EOSINOPHIL NFR BLD: 0.4 %
ERYTHROCYTE [DISTWIDTH] IN BLOOD BY AUTOMATED COUNT: 12.7 % (ref 11.6–14.4)
GFR SERPL CREATININE-BSD FRML MDRD: >60 ML/MIN/1.73M*2
GLUCOSE BLD-MCNC: 114 MG/DL (ref 70–99)
GLUCOSE BLD-MCNC: 120 MG/DL (ref 70–99)
GLUCOSE BLD-MCNC: 97 MG/DL (ref 70–99)
GLUCOSE SERPL-MCNC: 107 MG/DL (ref 70–99)
HCT VFR BLDCO AUTO: 44.4 % (ref 40.1–51)
HGB BLD-MCNC: 15 G/DL (ref 13.7–17.5)
IMM GRANULOCYTES # BLD AUTO: 0.01 K/UL (ref 0–0.08)
IMM GRANULOCYTES NFR BLD AUTO: 0.1 %
LYMPHOCYTES # BLD: 1.18 K/UL (ref 1.2–3.5)
LYMPHOCYTES NFR BLD: 17.5 %
MAGNESIUM SERPL-MCNC: 1.9 MG/DL (ref 1.8–2.5)
MCH RBC QN AUTO: 30.7 PG (ref 28–33.2)
MCHC RBC AUTO-ENTMCNC: 33.8 G/DL (ref 32.2–36.5)
MCV RBC AUTO: 90.8 FL (ref 83–98)
MONOCYTES # BLD: 0.59 K/UL (ref 0.3–1)
MONOCYTES NFR BLD: 8.8 %
NEUTROPHILS # BLD: 4.9 K/UL (ref 1.7–7)
NEUTS SEG NFR BLD: 72.9 %
NRBC BLD-RTO: 0 %
PDW BLD AUTO: 9.2 FL (ref 9.4–12.4)
PHOSPHATE SERPL-MCNC: 3.2 MG/DL (ref 2.4–4.7)
PLATELET # BLD AUTO: 283 K/UL (ref 150–350)
POCT TEST: ABNORMAL
POCT TEST: ABNORMAL
POCT TEST: NORMAL
POTASSIUM SERPL-SCNC: 4 MEQ/L (ref 3.6–5.1)
RBC # BLD AUTO: 4.89 M/UL (ref 4.5–5.8)
SODIUM SERPL-SCNC: 134 MEQ/L (ref 136–144)
WBC # BLD AUTO: 6.73 K/UL (ref 3.8–10.5)

## 2020-08-25 PROCEDURE — 99024 POSTOP FOLLOW-UP VISIT: CPT | Performed by: SURGERY

## 2020-08-25 PROCEDURE — 63600000 HC DRUGS/DETAIL CODE: Performed by: STUDENT IN AN ORGANIZED HEALTH CARE EDUCATION/TRAINING PROGRAM

## 2020-08-25 PROCEDURE — 84100 ASSAY OF PHOSPHORUS: CPT | Performed by: STUDENT IN AN ORGANIZED HEALTH CARE EDUCATION/TRAINING PROGRAM

## 2020-08-25 PROCEDURE — 83735 ASSAY OF MAGNESIUM: CPT | Performed by: STUDENT IN AN ORGANIZED HEALTH CARE EDUCATION/TRAINING PROGRAM

## 2020-08-25 PROCEDURE — 85025 COMPLETE CBC W/AUTO DIFF WBC: CPT | Performed by: STUDENT IN AN ORGANIZED HEALTH CARE EDUCATION/TRAINING PROGRAM

## 2020-08-25 PROCEDURE — 36415 COLL VENOUS BLD VENIPUNCTURE: CPT | Performed by: STUDENT IN AN ORGANIZED HEALTH CARE EDUCATION/TRAINING PROGRAM

## 2020-08-25 PROCEDURE — 25000000 HC PHARMACY GENERAL: Performed by: STUDENT IN AN ORGANIZED HEALTH CARE EDUCATION/TRAINING PROGRAM

## 2020-08-25 PROCEDURE — 80048 BASIC METABOLIC PNL TOTAL CA: CPT | Performed by: STUDENT IN AN ORGANIZED HEALTH CARE EDUCATION/TRAINING PROGRAM

## 2020-08-25 PROCEDURE — 63700000 HC SELF-ADMINISTRABLE DRUG: Performed by: STUDENT IN AN ORGANIZED HEALTH CARE EDUCATION/TRAINING PROGRAM

## 2020-08-25 PROCEDURE — 12000000 HC ROOM AND CARE MED/SURG

## 2020-08-25 PROCEDURE — 25000000 HC PHARMACY GENERAL: Performed by: PHYSICIAN ASSISTANT

## 2020-08-25 RX ORDER — ESCITALOPRAM OXALATE 20 MG/1
40 TABLET ORAL DAILY
Status: DISCONTINUED | OUTPATIENT
Start: 2020-08-25 | End: 2020-08-27 | Stop reason: HOSPADM

## 2020-08-25 RX ORDER — OXYCODONE HYDROCHLORIDE 5 MG/1
5 TABLET ORAL EVERY 4 HOURS PRN
Status: DISCONTINUED | OUTPATIENT
Start: 2020-08-25 | End: 2020-08-27 | Stop reason: HOSPADM

## 2020-08-25 RX ORDER — OXYCODONE HYDROCHLORIDE 5 MG/1
10 TABLET ORAL EVERY 4 HOURS PRN
Status: DISCONTINUED | OUTPATIENT
Start: 2020-08-25 | End: 2020-08-27 | Stop reason: HOSPADM

## 2020-08-25 RX ORDER — LORAZEPAM 2 MG/ML
1 INJECTION INTRAMUSCULAR ONCE
Status: COMPLETED | OUTPATIENT
Start: 2020-08-26 | End: 2020-08-25

## 2020-08-25 RX ADMIN — Medication 10 ML: at 21:22

## 2020-08-25 RX ADMIN — LORAZEPAM 0.5 MG: 2 INJECTION INTRAMUSCULAR; INTRAVENOUS at 10:45

## 2020-08-25 RX ADMIN — LORAZEPAM 1 MG: 2 INJECTION INTRAMUSCULAR; INTRAVENOUS at 23:54

## 2020-08-25 RX ADMIN — ACETAMINOPHEN 975 MG: 325 TABLET, FILM COATED ORAL at 08:05

## 2020-08-25 RX ADMIN — PANTOPRAZOLE SODIUM 40 MG: 40 INJECTION, POWDER, LYOPHILIZED, FOR SOLUTION INTRAVENOUS at 08:04

## 2020-08-25 RX ADMIN — SMOFLIPID: 6; 6; 5; 3 INJECTION, EMULSION INTRAVENOUS at 23:44

## 2020-08-25 RX ADMIN — ESCITALOPRAM OXALATE 40 MG: 20 TABLET ORAL at 08:04

## 2020-08-25 RX ADMIN — ACETAMINOPHEN 975 MG: 325 TABLET, FILM COATED ORAL at 12:43

## 2020-08-25 RX ADMIN — LORAZEPAM 0.5 MG: 2 INJECTION INTRAMUSCULAR; INTRAVENOUS at 03:02

## 2020-08-25 RX ADMIN — Medication 10 ML: at 03:00

## 2020-08-25 RX ADMIN — ACETAMINOPHEN 975 MG: 325 TABLET, FILM COATED ORAL at 20:23

## 2020-08-25 RX ADMIN — ENOXAPARIN SODIUM 40 MG: 100 INJECTION SUBCUTANEOUS at 05:36

## 2020-08-25 NOTE — PLAN OF CARE
Problem: Adult Inpatient Plan of Care  Goal: Plan of Care Review  Outcome: Progressing  Flowsheets  Taken 8/25/2020 1727  Progress: improving  Outcome Summary: pt stable transfer from [facility], ambulating by self, steady on feet, d/c monitor, denies pain, TE drain in tact  Taken 8/25/2020 1640  Plan of Care Reviewed With: patient

## 2020-08-25 NOTE — PATIENT CARE CONFERENCE
Care Progression Rounds Note  Date: 8/25/2020  Time: 9:29 AM     Patient Name: Selvin Jorge     Medical Record Number: [id number]   YOB: 1985  Sex: Male      Room/Bed: [facility]    Admitting Diagnosis: Volvulus (CMS/HCC) [K56.2]  Volvulus (CMS/HCC) [K56.2]   Admit Date/Time: 8/17/2020 10:36 PM    Primary Diagnosis: Volvulus (CMS/HCC)  Principal Problem: Volvulus (CMS/HCC)    GMLOS: 10.4  Anticipated Discharge Date: 8/28/2020    AM-PAC  Mobility Score: 6    Discharge Planning:  Anticipated Discharge Disposition: home with assistance, home with home health services    Barriers to Discharge:  Barriers to Discharge: Medical issues not resolved  Comment: TE drain, PCA    Participants:  respiratory therapy, , social work/services, dietitian/nutrition services, nursing, physical therapy

## 2020-08-25 NOTE — PROGRESS NOTES
General Surgery Daily Progress Note    Subjective     Interval History:   S/p exlap, detorsion of midgut volvulus, abthera vac placement 8/18 and 2nd look for washout and replacement of abthera on 8/19, s/p replacement of abthera 8/21 now s/p fascial closure with TE vac skin dressing placement 8/24    NAEON. Doing well this morning. Pain controlled with tylenol and PO oxy. Tolerating CLD, no nausea or vomiting. Reports flatus and BM. No fever, chills, chest pain or SOB.    Objective     Vital signs in last 24 hours:  Temp:  [36.7 °C (98 °F)-36.8 °C (98.3 °F)] 36.7 °C (98 °F)  Heart Rate:  [50-65] 56  Resp:  [18-48] 18  BP: (135-149)/(75-79) 142/76      Intake/Output Summary (Last 24 hours) at 8/26/2020 0650  Last data filed at 8/25/2020 1826  Gross per 24 hour   Intake 375 ml   Output 325 ml   Net 50 ml     Intake/Output this shift:  No intake/output data recorded.    Physical Exam    General: AAOx3, NAD  Heart: RRR  Lungs: sating well on RA, no respiratory distress  Abd: midline TE skin vac in place, holding suction, appropriately TTP, no active drainage, no tightness of abdomen  Extremities: soft, nontender, no edema, good distal pulses    VTE Assessment: I have reassessed and the patient's VTE risk and treatment plan is appropriate.    Labs  CBC Results       08/24/20 08/23/20 08/22/20                    0257 0509 0410         WBC 7.06 6.92 6.13         RBC 5.12 4.71 4.26         HGB 16.0 14.7 13.4         HCT 46.6 43.2 39.7         MCV 91.0 91.7 93.2         MCH 31.3 31.2 31.5         MCHC 34.3 34.0 33.8          216 216                     BMP Results       08/24/20 08/23/20 08/22/20                    0257 0509 0410          132 137         K 3.5 3.6 3.6         Cl 99 100 101         CO2 25 25 30         Glucose 95 98 98         BUN 16 15 16         Creatinine 0.6 0.7 0.5         Calcium 8.2 7.9 7.7         Anion Gap 8 7 6         EGFR >60.0 >60.0 >60.0                     Assessment/Plan      Mr Jorge is a 35M with history of Crohn's disease s/p total proctocolectomy with J pouch here with midgut volvulus now s/p exlap, detorsion of midgut volvulus and abthera vac placement 8/18 and washout and replacement 8/19, 8/21 now s/p fascial closure with TE vac skin dressing 8/24    - continue CLD, ADAT  - continue oral oxycodone, standing tylenol,   - f/u AM labs, replete electrolytes PRN  - continue TPN for now  - TE skin vac suction, will monitor output  - lovenox dvt ppx  - OOB, ambulate    Lindsay Weil, MD   #[id number]      ATTENDING: Doing very well.  (+) BM.  Tolerating clears.  AVSS. abd soft, approp tender, min distended; dressing clean.  Labs OK.  A/P: stable POD 2 - advance diet, PO pain meds, DVT proph, OOB.

## 2020-08-25 NOTE — PLAN OF CARE
Problem: Adult Inpatient Plan of Care  Goal: Plan of Care Review  Flowsheets (Taken 8/25/2020 1402)  Progress: improving  Plan of Care Reviewed With: patient  Outcome Summary: Pt in bed and reports he has been tolerating clears and has had three BMs today.  Pt does report intermittent nausea, but it does not seem to be related to when he takes PO liquids.  Surgery hoping to be able to advance diet tomorrow (8/26) and stop TPN.  Current TPN (100 grams protein, 250 grams dextrose, 55 grams SMOF lipid) = 1800 kcals.    Goals:   Pt to meet 100% of needs via TPN and/or PO    Recommendations:  Advance diet as medically able to low fiber  If pt to remain on TPN recommend increase protein to goal of 130 grams/day.  Recommend 130 grams protein, 250 grams dextrose, 55 grams SMOF lipid if pt to continue on clears and TPN.   Monitor bowel function and need for bowel regimen   Monitor weight

## 2020-08-25 NOTE — PROGRESS NOTES
Critical Care Progress Note    SUBJECTIVE    Pt is s/p fascial closure with TE vac skin dressing placement yesterday. Reports pain was worse overnight, not using dilaudid PCA because thinks it is making him more constipated. Reports some nausea, and 2 BMs this morning. Denies any vomiting, SOB, dizziness, sweats/chills.    Interval summary:  - no acute events overnight  - remains on TPN, advancing to CLD  - continue abdominal binder  - encourage OOB, IS  - stable for transfer to floor    OBJECTIVE     VITAL SIGNS    Temp:  [36.8 °C (98.2 °F)-37.1 °C (98.8 °F)] 36.8 °C (98.3 °F)  Heart Rate:  [50-68] 60  Resp:  [15-48] 41  BP: (122-158)/() 136/75       Intake/Output Summary (Last 24 hours) at 8/25/2020 1244  Last data filed at 8/25/2020 0900  Gross per 24 hour   Intake 2671.25 ml   Output 3240 ml   Net -568.75 ml       O2 Requirement:  RA    Telemetry: sinus rhythm      MEDICATIONS    acetaminophen 975 mg q6h INT   enoxaparin 40 mg Daily (6a)   escitalopram 40 mg Daily   pantoprazole 40 mg q24h   sodium chloride 10 mL q8h INT          LAB RESULTS  Lab Results   Component Value Date    WBC 6.73 08/25/2020    HGB 15.0 08/25/2020    HCT 44.4 08/25/2020    MCV 90.8 08/25/2020     08/25/2020     Lab Results   Component Value Date    GLUCOSE 107 (H) 08/25/2020    CALCIUM 8.1 (L) 08/25/2020     (L) 08/25/2020    K 4.0 08/25/2020    CO2 24 08/25/2020     08/25/2020    BUN 19 08/25/2020    CREATININE 0.8 08/25/2020     Results from last 7 days   Lab Units 08/25/20  0535   MAGNESIUM mg/dL 1.9     Lab Results   Component Value Date    CALCIUM 8.1 (L) 08/25/2020    PHOS 3.2 08/25/2020     Lab Results   Component Value Date    INR 1.2 08/21/2020    INR 1.1 08/18/2020     Lab Results   Component Value Date    PTT 30 08/21/2020     Lab Results   Component Value Date    ALT 22 08/21/2020    AST 24 08/21/2020    ALKPHOS 30 (L) 08/21/2020    BILITOT 1.4 (H) 08/21/2020       ABG  Results from last 7 days    Lab Units 08/19/20  0506   PH ART pH 7.46*   PCO2 ART mm Hg 40   PO2 ART mm Hg 149*   HCO3 ART mEQ/L 28   O2 SAT ART % 99*   BASE EXC ART mEQ/L 4.2         CULTURES  Microbiology Results     Procedure Component Value Units Date/Time    SARS-CoV-2 (COVID-19), PCR Nasopharynx [[id number]]  (Normal) Collected:  08/22/20 2016    Specimen:  Nasopharyngeal Swab from Nasopharynx Updated:  08/22/20 2130     SARS-CoV-2 (COVID-19) Negative    Sputum culture / smear Expectorated Sputum [[id number]] Collected:  08/18/20 1621    Specimen:  Expectorated Sputum Updated:  08/20/20 1348    Narrative:       The following orders were created for panel order Sputum culture / smear Expectorated Sputum.  Procedure                               Abnormality         Status                     ---------                               -----------         ------                     Sputum Gram Stain (Lab O...[[id number]]                      Final result               Sputum Culture (Lab Only...[[id number]]  Normal              Final result                 Please view results for these tests on the individual orders.    Sputum Gram Stain (Lab Only) Expectorated Sputum [[id number]] Collected:  08/18/20 1621    Specimen:  Expectorated Sputum Updated:  08/18/20 2111     Gram Stain Result No WBC Seen      No organisms seen      No Epithelial Cells Seen    Sputum Culture (Lab Only) Expectorated Sputum [[id number]]  (Normal) Collected:  08/18/20 1621    Specimen:  Expectorated Sputum Updated:  08/20/20 1348     Culture Normal Jennifer    SARS-CoV-2 (COVID-19), PCR Nasopharynx [[id number]]  (Normal) Collected:  08/18/20 0246    Specimen:  Nasopharyngeal Swab from Nasopharynx Updated:  08/18/20 0349     SARS-CoV-2 (COVID-19) Negative          Laboratory results were independently reviewed      RADIOLOGY  X-ray Chest 1 View    Result Date: 8/24/2020  CLINICAL HISTORY: Recent volvulus status post reduction.  History of Crohn's disease. COMPARISON: 8/22/2020.  COMMENT: Portable upright view the chest on 8/24/2020 at 10:12 AM demonstrates a small left effusion and slight left subsegmental atelectasis.  The right lung appears clear.  There is no pneumothorax or abnormal mediastinal widening.  The heart size is normal.  The PICC line tip is at the cavoatrial junction.  The osseous structures are normal.     IMPRESSION: Mild subsegmental atelectasis and small effusion on the left.      PHYSICAL EXAM    GEN: WDWN male in NAD  HENT: EOMI, no deformity  NECK: supple, no JVD  CARD: RRR, no rubs, murmur, gallops  RESP: CTA, no wheeze, rales, crackles  Gi: abd binder in place  EXT: no edema, pulses throughout  SKIN: warm, dry, no rash  NEURO: AAOx3, nonfocal       ASSESSMENT & PLAN    * Volvulus (CMS/HCC)  Assessment & Plan  H/o Crohn's disease s/p total proctocolectomy with J pouch  8/18 volvulus reduction, ex lap, wash out, temporary closure  8/19 2nd look for washout and replacement of abthera  8/21 s/p replacement of abthera  8/24 s/p fascial closure with TE vac skin dressing placement    - wound care per surgical team - abd binder in place  - s/p periop abx  - NGT removed  - adequate pain control  - continue TPN, start CLD   - supportive care  - CXR with developing atelectasis   - encourage incentive spirometry   - encourage OOB  - lovenox for DVT prophylaxis, PPI for GI ppx  - stable for transfer to floor        Acting as a scribe in the presence of Dr. Angel Corea.  COMPA Francisco   Documentation prepared by the Physician Assistant/Resident in my presence, I have reviewed and agree with this note. I have spent a total of  35 minutes of time.   Dr Corea.

## 2020-08-25 NOTE — PROGRESS NOTES
General Surgery Daily Progress Note    Subjective     Interval History:   S/p exlap, detorsion of midgut volvulus, abthera vac placement 8/18 and 2nd look for washout and replacement of abthera on 8/19, s/p replacement of abthera 8/21 now s/p fascial closure with TE vac skin dressing placement 8/24    Doing better this am. Pain controlled with dilaudid pca. Did not receive tylenol overnight. Tolerating sips of water, no nausea or vomiting. Borrero in place making good urine. Denies flatus or BM.     Objective     Vital signs in last 24 hours:  Temp:  [36.8 °C (98.2 °F)-37.2 °C (99 °F)] 36.8 °C (98.2 °F)  Heart Rate:  [51-68] 51  Resp:  [15-36] 17  BP: (112-158)/() 132/81      Intake/Output Summary (Last 24 hours) at 8/25/2020 0549  Last data filed at 8/25/2020 0200  Gross per 24 hour   Intake 4551.25 ml   Output 3415 ml   Net 1136.25 ml     Intake/Output this shift:  I/O this shift:  In: 1896.3 [I.V.:600]  Out: 405 [Urine:400; Blood:5]    Physical Exam    General: AAOx3, NAD  Heart: RRR  Lungs: sating well on RA, no respiratory distress  Abd: midline TE skin vac in place, holding suction, appropriately TTP, no active drainage, no tightness of abdomen  Extremities: soft, nontender, no edema, good distal pulses    VTE Assessment: I have reassessed and the patient's VTE risk and treatment plan is appropriate.    Labs  CBC Results       08/24/20 08/23/20 08/22/20                    0257 0509 0410         WBC 7.06 6.92 6.13         RBC 5.12 4.71 4.26         HGB 16.0 14.7 13.4         HCT 46.6 43.2 39.7         MCV 91.0 91.7 93.2         MCH 31.3 31.2 31.5         MCHC 34.3 34.0 33.8          216 216                     BMP Results       08/24/20 08/23/20 08/22/20                    0257 0509 0410          132 137         K 3.5 3.6 3.6         Cl 99 100 101         CO2 25 25 30         Glucose 95 98 98         BUN 16 15 16         Creatinine 0.6 0.7 0.5         Calcium 8.2 7.9 7.7         Anion Gap 8  7 6         EGFR >60.0 >60.0 >60.0                     Assessment/Plan     Mr Jorge is a 35M with history of Crohn's disease s/p total proctocolectomy with J pouch here with midgut volvulus now s/p exlap, detorsion of midgut volvulus and abthera vac placement 8/18 and washout and replacement 8/19, 8/21 now s/p fascial closure with TE vac skin dressing 8/24    - advance to clear liquid diet  - oral oxycodone, standing tylenol, d/c pca  - d/c holden, void check  - f/u AM labs, replete electrolytes PRN  - continue TPN for now  - TE skin vac suction, will monitor output  - lovenox dvt ppx  - OOB, ambulate  - transfer to [facility]    Altagracia Emery MD   #[id number]    ATTENDING: Did well overnight.  No c/o this AM.  AVSS.  UO adequate.  abd soft, approp tender, min distended; dressing clean.  Labs OK.  A/P: POD 1 s/p closure of abdominal wall - transfer to floor, start clears,  OOB/DVT prophylaxis, transition to PO pain meds.  Cont abd binder.

## 2020-08-26 LAB
ANION GAP SERPL CALC-SCNC: 8 MEQ/L (ref 3–15)
BASOPHILS # BLD: 0.02 K/UL (ref 0.01–0.1)
BASOPHILS NFR BLD: 0.3 %
BUN SERPL-MCNC: 15 MG/DL (ref 8–20)
CALCIUM SERPL-MCNC: 8.2 MG/DL (ref 8.9–10.3)
CHLORIDE SERPL-SCNC: 103 MEQ/L (ref 98–109)
CO2 SERPL-SCNC: 24 MEQ/L (ref 22–32)
CREAT SERPL-MCNC: 0.8 MG/DL (ref 0.8–1.3)
DIFFERENTIAL METHOD BLD: ABNORMAL
EOSINOPHIL # BLD: 0.15 K/UL (ref 0.04–0.54)
EOSINOPHIL NFR BLD: 2.2 %
ERYTHROCYTE [DISTWIDTH] IN BLOOD BY AUTOMATED COUNT: 12.8 % (ref 11.6–14.4)
GFR SERPL CREATININE-BSD FRML MDRD: >60 ML/MIN/1.73M*2
GLUCOSE BLD-MCNC: 108 MG/DL (ref 70–99)
GLUCOSE BLD-MCNC: 111 MG/DL (ref 70–99)
GLUCOSE BLD-MCNC: 125 MG/DL (ref 70–99)
GLUCOSE BLD-MCNC: 125 MG/DL (ref 70–99)
GLUCOSE SERPL-MCNC: 103 MG/DL (ref 70–99)
HCT VFR BLDCO AUTO: 38.7 % (ref 40.1–51)
HGB BLD-MCNC: 13.4 G/DL (ref 13.7–17.5)
IMM GRANULOCYTES # BLD AUTO: 0.03 K/UL (ref 0–0.08)
IMM GRANULOCYTES NFR BLD AUTO: 0.4 %
LYMPHOCYTES # BLD: 1.09 K/UL (ref 1.2–3.5)
LYMPHOCYTES NFR BLD: 16.3 %
MAGNESIUM SERPL-MCNC: 1.9 MG/DL (ref 1.8–2.5)
MCH RBC QN AUTO: 31.2 PG (ref 28–33.2)
MCHC RBC AUTO-ENTMCNC: 34.6 G/DL (ref 32.2–36.5)
MCV RBC AUTO: 90 FL (ref 83–98)
MONOCYTES # BLD: 0.7 K/UL (ref 0.3–1)
MONOCYTES NFR BLD: 10.5 %
NEUTROPHILS # BLD: 4.7 K/UL (ref 1.7–7)
NEUTS SEG NFR BLD: 70.3 %
NRBC BLD-RTO: 0 %
PDW BLD AUTO: 9.4 FL (ref 9.4–12.4)
PHOSPHATE SERPL-MCNC: 2.8 MG/DL (ref 2.4–4.7)
PLATELET # BLD AUTO: 288 K/UL (ref 150–350)
POCT TEST: ABNORMAL
POTASSIUM SERPL-SCNC: 3.5 MEQ/L (ref 3.6–5.1)
RBC # BLD AUTO: 4.3 M/UL (ref 4.5–5.8)
SODIUM SERPL-SCNC: 135 MEQ/L (ref 136–144)
WBC # BLD AUTO: 6.69 K/UL (ref 3.8–10.5)

## 2020-08-26 PROCEDURE — 80048 BASIC METABOLIC PNL TOTAL CA: CPT | Performed by: STUDENT IN AN ORGANIZED HEALTH CARE EDUCATION/TRAINING PROGRAM

## 2020-08-26 PROCEDURE — 99024 POSTOP FOLLOW-UP VISIT: CPT | Performed by: SURGERY

## 2020-08-26 PROCEDURE — 63700000 HC SELF-ADMINISTRABLE DRUG: Performed by: PHYSICIAN ASSISTANT

## 2020-08-26 PROCEDURE — 85025 COMPLETE CBC W/AUTO DIFF WBC: CPT | Performed by: STUDENT IN AN ORGANIZED HEALTH CARE EDUCATION/TRAINING PROGRAM

## 2020-08-26 PROCEDURE — 84100 ASSAY OF PHOSPHORUS: CPT | Performed by: STUDENT IN AN ORGANIZED HEALTH CARE EDUCATION/TRAINING PROGRAM

## 2020-08-26 PROCEDURE — 63600000 HC DRUGS/DETAIL CODE: Performed by: STUDENT IN AN ORGANIZED HEALTH CARE EDUCATION/TRAINING PROGRAM

## 2020-08-26 PROCEDURE — 63700000 HC SELF-ADMINISTRABLE DRUG: Performed by: STUDENT IN AN ORGANIZED HEALTH CARE EDUCATION/TRAINING PROGRAM

## 2020-08-26 PROCEDURE — 25000000 HC PHARMACY GENERAL: Performed by: STUDENT IN AN ORGANIZED HEALTH CARE EDUCATION/TRAINING PROGRAM

## 2020-08-26 PROCEDURE — 36415 COLL VENOUS BLD VENIPUNCTURE: CPT | Performed by: STUDENT IN AN ORGANIZED HEALTH CARE EDUCATION/TRAINING PROGRAM

## 2020-08-26 PROCEDURE — 12000000 HC ROOM AND CARE MED/SURG

## 2020-08-26 PROCEDURE — 83735 ASSAY OF MAGNESIUM: CPT | Performed by: STUDENT IN AN ORGANIZED HEALTH CARE EDUCATION/TRAINING PROGRAM

## 2020-08-26 RX ORDER — CLONAZEPAM 1 MG/1
2 TABLET ORAL NIGHTLY
Status: DISCONTINUED | OUTPATIENT
Start: 2020-08-26 | End: 2020-08-27 | Stop reason: HOSPADM

## 2020-08-26 RX ADMIN — OXYCODONE HYDROCHLORIDE 10 MG: 5 TABLET ORAL at 04:29

## 2020-08-26 RX ADMIN — OXYCODONE HYDROCHLORIDE 10 MG: 5 TABLET ORAL at 17:57

## 2020-08-26 RX ADMIN — Medication 10 ML: at 03:36

## 2020-08-26 RX ADMIN — Medication 10 ML: at 21:14

## 2020-08-26 RX ADMIN — OXYCODONE HYDROCHLORIDE 10 MG: 5 TABLET ORAL at 08:30

## 2020-08-26 RX ADMIN — ACETAMINOPHEN 975 MG: 325 TABLET, FILM COATED ORAL at 08:14

## 2020-08-26 RX ADMIN — CLONAZEPAM 2 MG: 1 TABLET ORAL at 21:13

## 2020-08-26 RX ADMIN — ESCITALOPRAM OXALATE 40 MG: 20 TABLET ORAL at 08:15

## 2020-08-26 RX ADMIN — Medication 10 ML: at 11:22

## 2020-08-26 RX ADMIN — MAGNESIUM SULFATE IN WATER 2 G: 40 INJECTION, SOLUTION INTRAVENOUS at 08:18

## 2020-08-26 RX ADMIN — ENOXAPARIN SODIUM 40 MG: 100 INJECTION SUBCUTANEOUS at 06:17

## 2020-08-26 RX ADMIN — ACETAMINOPHEN 975 MG: 325 TABLET, FILM COATED ORAL at 18:50

## 2020-08-26 RX ADMIN — ACETAMINOPHEN 975 MG: 325 TABLET, FILM COATED ORAL at 12:52

## 2020-08-26 RX ADMIN — LORAZEPAM 0.5 MG: 2 INJECTION INTRAMUSCULAR; INTRAVENOUS at 06:19

## 2020-08-26 RX ADMIN — POTASSIUM CHLORIDE 20 MEQ: 750 TABLET, FILM COATED, EXTENDED RELEASE ORAL at 08:15

## 2020-08-26 RX ADMIN — PANTOPRAZOLE SODIUM 40 MG: 40 INJECTION, POWDER, LYOPHILIZED, FOR SOLUTION INTRAVENOUS at 08:17

## 2020-08-26 NOTE — PLAN OF CARE
Problem: Adult Inpatient Plan of Care  Goal: Plan of Care Review  Outcome: Progressing  Flowsheets (Taken 8/26/2020 0600)  Progress: improving  Plan of Care Reviewed With: patient  Outcome Summary: Pt stable transfer from [facility], up ad veronica ambulating in room. TPN running at 75 mL, tolerating clears diet as well. Lial drain intact. Ativan given for anxiety. Pain controlled with oxycodone and tylenol.

## 2020-08-26 NOTE — PLAN OF CARE
Problem: Adult Inpatient Plan of Care  Goal: Plan of Care Review  Outcome: Progressing  Flowsheets (Taken 8/26/2020 5266)  Progress: improving  Plan of Care Reviewed With: patient  Outcome Summary: Pt tolerating full liquid diet. oxycodone and tylenol for pain control. TPN conitnued, to be d/c tonight. OOB and ambulating by seld. TE dressing with abd binder intact. Voiding.

## 2020-08-27 VITALS
TEMPERATURE: 97 F | HEART RATE: 48 BPM | SYSTOLIC BLOOD PRESSURE: 129 MMHG | BODY MASS INDEX: 24.25 KG/M2 | HEIGHT: 67 IN | DIASTOLIC BLOOD PRESSURE: 72 MMHG | OXYGEN SATURATION: 97 % | WEIGHT: 154.5 LBS | RESPIRATION RATE: 16 BRPM

## 2020-08-27 LAB
ANION GAP SERPL CALC-SCNC: 7 MEQ/L (ref 3–15)
BASOPHILS # BLD: 0.04 K/UL (ref 0.01–0.1)
BASOPHILS NFR BLD: 0.5 %
BUN SERPL-MCNC: 13 MG/DL (ref 8–20)
CALCIUM SERPL-MCNC: 8.8 MG/DL (ref 8.9–10.3)
CHLORIDE SERPL-SCNC: 105 MEQ/L (ref 98–109)
CO2 SERPL-SCNC: 27 MEQ/L (ref 22–32)
CREAT SERPL-MCNC: 0.8 MG/DL (ref 0.8–1.3)
DIFFERENTIAL METHOD BLD: ABNORMAL
EOSINOPHIL # BLD: 0.17 K/UL (ref 0.04–0.54)
EOSINOPHIL NFR BLD: 2.1 %
ERYTHROCYTE [DISTWIDTH] IN BLOOD BY AUTOMATED COUNT: 13.1 % (ref 11.6–14.4)
GFR SERPL CREATININE-BSD FRML MDRD: >60 ML/MIN/1.73M*2
GLUCOSE SERPL-MCNC: 82 MG/DL (ref 70–99)
HCT VFR BLDCO AUTO: 44.2 % (ref 40.1–51)
HGB BLD-MCNC: 15 G/DL (ref 13.7–17.5)
IMM GRANULOCYTES # BLD AUTO: 0.02 K/UL (ref 0–0.08)
IMM GRANULOCYTES NFR BLD AUTO: 0.3 %
LYMPHOCYTES # BLD: 2.51 K/UL (ref 1.2–3.5)
LYMPHOCYTES NFR BLD: 31.6 %
MAGNESIUM SERPL-MCNC: 2 MG/DL (ref 1.8–2.5)
MCH RBC QN AUTO: 30.9 PG (ref 28–33.2)
MCHC RBC AUTO-ENTMCNC: 33.9 G/DL (ref 32.2–36.5)
MCV RBC AUTO: 91.1 FL (ref 83–98)
MONOCYTES # BLD: 0.66 K/UL (ref 0.3–1)
MONOCYTES NFR BLD: 8.3 %
NEUTROPHILS # BLD: 4.54 K/UL (ref 1.7–7)
NEUTS SEG NFR BLD: 57.2 %
NRBC BLD-RTO: 0 %
PDW BLD AUTO: 9.3 FL (ref 9.4–12.4)
PHOSPHATE SERPL-MCNC: 3.7 MG/DL (ref 2.4–4.7)
PLATELET # BLD AUTO: 317 K/UL (ref 150–350)
POTASSIUM SERPL-SCNC: 4.2 MEQ/L (ref 3.6–5.1)
RBC # BLD AUTO: 4.85 M/UL (ref 4.5–5.8)
SODIUM SERPL-SCNC: 139 MEQ/L (ref 136–144)
WBC # BLD AUTO: 7.94 K/UL (ref 3.8–10.5)

## 2020-08-27 PROCEDURE — 63700000 HC SELF-ADMINISTRABLE DRUG: Performed by: STUDENT IN AN ORGANIZED HEALTH CARE EDUCATION/TRAINING PROGRAM

## 2020-08-27 PROCEDURE — 99024 POSTOP FOLLOW-UP VISIT: CPT | Performed by: SURGERY

## 2020-08-27 PROCEDURE — 80048 BASIC METABOLIC PNL TOTAL CA: CPT | Performed by: STUDENT IN AN ORGANIZED HEALTH CARE EDUCATION/TRAINING PROGRAM

## 2020-08-27 PROCEDURE — 63600000 HC DRUGS/DETAIL CODE: Performed by: STUDENT IN AN ORGANIZED HEALTH CARE EDUCATION/TRAINING PROGRAM

## 2020-08-27 PROCEDURE — 63700000 HC SELF-ADMINISTRABLE DRUG: Performed by: PHYSICIAN ASSISTANT

## 2020-08-27 PROCEDURE — 85025 COMPLETE CBC W/AUTO DIFF WBC: CPT | Performed by: STUDENT IN AN ORGANIZED HEALTH CARE EDUCATION/TRAINING PROGRAM

## 2020-08-27 PROCEDURE — 200200 PR NO CHARGE: Performed by: SURGERY

## 2020-08-27 PROCEDURE — 36415 COLL VENOUS BLD VENIPUNCTURE: CPT | Performed by: STUDENT IN AN ORGANIZED HEALTH CARE EDUCATION/TRAINING PROGRAM

## 2020-08-27 PROCEDURE — 83735 ASSAY OF MAGNESIUM: CPT | Performed by: STUDENT IN AN ORGANIZED HEALTH CARE EDUCATION/TRAINING PROGRAM

## 2020-08-27 PROCEDURE — 84100 ASSAY OF PHOSPHORUS: CPT | Performed by: STUDENT IN AN ORGANIZED HEALTH CARE EDUCATION/TRAINING PROGRAM

## 2020-08-27 PROCEDURE — 25000000 HC PHARMACY GENERAL: Performed by: STUDENT IN AN ORGANIZED HEALTH CARE EDUCATION/TRAINING PROGRAM

## 2020-08-27 RX ORDER — OXYCODONE HYDROCHLORIDE 5 MG/1
5 TABLET ORAL EVERY 4 HOURS PRN
Qty: 10 TABLET | Refills: 0 | Status: SHIPPED | OUTPATIENT
Start: 2020-08-27 | End: 2020-09-01

## 2020-08-27 RX ORDER — ACETAMINOPHEN 325 MG/1
975 TABLET ORAL
Qty: 360 TABLET | Refills: 0
Start: 2020-08-27 | End: 2020-09-26

## 2020-08-27 RX ADMIN — PANTOPRAZOLE SODIUM 40 MG: 40 INJECTION, POWDER, LYOPHILIZED, FOR SOLUTION INTRAVENOUS at 08:31

## 2020-08-27 RX ADMIN — OXYCODONE HYDROCHLORIDE 10 MG: 5 TABLET ORAL at 00:06

## 2020-08-27 RX ADMIN — ESCITALOPRAM OXALATE 40 MG: 20 TABLET ORAL at 08:30

## 2020-08-27 RX ADMIN — ENOXAPARIN SODIUM 40 MG: 100 INJECTION SUBCUTANEOUS at 06:22

## 2020-08-27 RX ADMIN — OXYCODONE HYDROCHLORIDE 5 MG: 5 TABLET ORAL at 04:42

## 2020-08-27 RX ADMIN — ACETAMINOPHEN 975 MG: 325 TABLET, FILM COATED ORAL at 08:30

## 2020-08-27 RX ADMIN — OXYCODONE HYDROCHLORIDE 5 MG: 5 TABLET ORAL at 11:08

## 2020-08-27 RX ADMIN — ACETAMINOPHEN 975 MG: 325 TABLET, FILM COATED ORAL at 00:06

## 2020-08-27 RX ADMIN — Medication 10 ML: at 04:43

## 2020-08-27 NOTE — NURSING NOTE
Reviewed disharge instructions, pt understands oxy to  at pharmacy. Pt having wife come for . Will schedule follow up to remove staples in 1 week.

## 2020-08-27 NOTE — DISCHARGE INSTRUCTIONS
Selvin Jorge    8/27/2020          DISCHARGE INSTRUCTIONS FOR  LAPAROTOMY     DESCRIPTION OF PROCEDURE:  You have had an exploratory laparotomy with washout and multiple take backs for wash outs for bowel obstruction with subsequent closure.     WOUND CARE:  • Your incision is closed with staples that do need to be removed.  As your body heals, you may notice mild redness or discomfort around the incision.  You may notice some irritation around the staples.    DRESSINGS:  • You can keep the dressing off your incision and open to air.  Wash daily.   • If your clothing rubs on the incision or you have a small amount of drainage from the incision, you may keep it covered with a dressing.    WASHING:  • Once the dressing has been removed, you may get the incision wet in a shower.  • Gently clean the incisions with regular soap and water daily and pat dry.    ACTIVITY:  • Once you get home, you should refrain from any heavy lifting (nothing over 10 pounds) for 4 weeks.  Your muscles are not healed to full strength until that time.  • Most patients find that 1-2 weeks of rest from physical activity speeds their healing.  • As you heal, you will notice increased fatigue during the next 6-8 weeks.  This is a normal part of the healing process.  When you feel tired, you should rest.    DRIVING:  • You may drive once you are pain free and off any pain medication stronger than Tylenol.  You cannot hurt your incision driving a car however if you are having discomfort or taking pain medicine, your reflexes will be slower and your chances of an accident much higher.      PAIN:  • You will have a moderate amount of pain for 3-4 days.  After that the pain will gradually lessen.    MEDICATION:  • After surgery you should immediately resume any regular medications that you take.  • For pain:  o Tylenol 325 mg, take 2-3 tablets every 6 hours as needed for mild pain.  o Oxycodone 5 mg,  1 tablet every 4 hours as needed for  severe pain.    DIET:  • You may resume your regular diet upon coming home.  • Some patients notice an upset stomach after surgery.  If your stomach is upset, eat lightly and avoid heavily spiced or fatty foods.    REGULARITY:  • Pain from surgery, inactivity, and pain medication may upset your usual bowel habits.  They will return to normal as you heal.  • If needed, use Milk of Magnesia, 1 oz. twice daily to help you resume regular habits.You can take miralax and colace over the counter to keep your bowels moving.     WHEN TO CALL US: [phone] for an appointment in about 7-10 days. You can follow up with Dr Adams or Dr William.   • You should call prior to your regular follow-up appointment if you notice:  ? Significant redness around the incision.  ? Temperature of 101 degrees or above. (Temperatures to 100.4 are normal after surgery)  ? Significant increase in the amount of pain.      **Someone is always available to answer your questions, so please call if you are concerned.        [address]  [facility], [address]  [place] [address]

## 2020-08-27 NOTE — PLAN OF CARE
Problem: Adult Inpatient Plan of Care  Goal: Plan of Care Review  Outcome: Progressing  Flowsheets (Taken 8/27/2020 0603)  Progress: improving  Plan of Care Reviewed With: patient  Outcome Summary: Oxycodone and tylenol for pain. TPN D/Bora. Patient tolerated full liquids. OOB independently. TE dressing w/ abd binder. Urinating, and having bowel movements.

## 2020-08-27 NOTE — PROGRESS NOTES
General Surgery Daily Progress Note    Subjective   No acute events overnight.  Patient notes feeling significantly better with flatus and multiple episodes of diarrhea overnight.  Denies any significant pain or tenderness and has been ambulating.  No fever, chills, shortness of breath, or chest pains.  Tolerating full liquid diet.      Objective     Vital signs in last 24 hours:  Temp:  [36.7 °C (98.1 °F)-37 °C (98.6 °F)] 37 °C (98.6 °F)  Heart Rate:  [54-65] 54  Resp:  [18-20] 20  BP: (122-140)/(74-80) 140/80    No intake or output data in the 24 hours ending 08/27/20 0601  Intake/Output this shift:  No intake/output data recorded.    Physical Exam    General: AAOx3, NAD  Heart: RRR  Lungs: sating well on RA, no respiratory distress  Abd: midline TE skin vac in place, holding suction, appropriately TTP, no active drainage, no tightness of abdomen  Extremities: soft, nontender, no edema, good distal pulses    VTE Assessment: I have reassessed and the patient's VTE risk and treatment plan is appropriate.    Labs  CBC Results       08/24/20 08/23/20 08/22/20                    0257 0509 0410         WBC 7.06 6.92 6.13         RBC 5.12 4.71 4.26         HGB 16.0 14.7 13.4         HCT 46.6 43.2 39.7         MCV 91.0 91.7 93.2         MCH 31.3 31.2 31.5         MCHC 34.3 34.0 33.8          216 216                     BMP Results       08/24/20 08/23/20 08/22/20                    0257 0509 0410          132 137         K 3.5 3.6 3.6         Cl 99 100 101         CO2 25 25 30         Glucose 95 98 98         BUN 16 15 16         Creatinine 0.6 0.7 0.5         Calcium 8.2 7.9 7.7         Anion Gap 8 7 6         EGFR >60.0 >60.0 >60.0                     Assessment/Plan     Mr Jorge is a 35M with history of Crohn's disease s/p total proctocolectomy with J pouch here with midgut volvulus now s/p exlap, detorsion of midgut volvulus and abthera vac placement 8/18 and washout and replacement 8/19, 8/21 now s/p  fascial closure with TE vac skin dressing 8/24    -We will advance to low residue diet this a.m.  -continue oral oxycodone, standing tylenol,   -f/u AM labs, replete electrolytes PRN  -DC TPN  -TE skin vac suction, will monitor output.  Will DC upon discharge home  -lovenox dvt ppx  -OOB, ambulate  -Plan for possible discharge either today versus tomorrow    Eric Yao MD   #[id number]    ATTENDING: Doing very well.  Will d/c to home.

## 2020-08-27 NOTE — DISCHARGE SUMMARY
Inpatient Discharge Summary    BRIEF OVERVIEW  Admitting Provider: Chelsi Adams MD  Discharge Provider: Chelsi Adams MD  Primary Care Physician at Discharge: Pt States, No Pcp [phone]     Admission Date: 8/17/2020     Discharge Date: 8/27/2020    Primary Discharge Diagnosis  Bowel obstruction     Secondary Discharge Diagnosis  Crohns     Discharge Disposition  Final discharge disposition not confirmed  Code Status at Discharge: Full Code    Discharge Medications     Medication List      START taking these medications    acetaminophen 325 mg tablet  Commonly known as:  TYLENOL  Take 3 tablets (975 mg total) by mouth every 6 (six) hours.  Dose:  975 mg     oxyCODONE 5 mg immediate release tablet  Commonly known as:  ROXICODONE  Take 1 tablet (5 mg total) by mouth every 4 (four) hours as needed for severe pain for up to 5 days.  Dose:  5 mg        CONTINUE taking these medications    escitalopram 20 mg tablet  Commonly known as:  LEXAPRO  Take 40 mg by mouth daily.  Dose:  40 mg     testosterone cypionate 100 mg/mL injection  Commonly known as:  DEPO-TESTOTERONE  100 mg once a week.  Dose:  100 mg            Active Issues Requiring Follow-up      Outpatient Follow-Up  Encounter Information     You do not currently have any appointments scheduled.              Test Results Pending at Discharge   Order Current Status    RAINBOW LAVENDER Collected (08/17/20 2244)    RAINBOW  GREEN Collected (08/17/20 2244)    [place] Draw Panel In process          DETAILS OF HOSPITAL STAY    Presenting Problem/History of Present Illness  Volvulus (CMS/HCC) [K56.2]  Volvulus (CMS/HCC) [K56.2]      Operative Procedures Performed  Procedure(s):  LAPAROTOMY EXPLORATORY, Abthera VAC placement, possible closure      Hospital Course  The patient was taken to the endo suite for decompressive scope by GI after CT evidence of volvulus in the ER while being evaluated for abdominal pain.  Several hours after scope he had severe worsening  pain that was uncontrollable and he was taken to the OR emergently on 8/18/2020 for exploratory laparotomy, ,washout and ABTHERA placement by Dr Adams. Please see the dictated op note for further details.  He tolerated the procedure well and was transferred to the ICU/NCICU postoperatively for a several days while he went back to the OR for multiple take backs for washouts and vac changes. He was then transferred to the  after he was closed on 8/24/2020 and the diet was advanced in a stepwise fashion once He regained bowel function. He was on dvt ppx during their hospitalization. He was voiding independently. Incisions were c/d/i without any erythema or drainage.  At the time of discharge, the patient was tolerating a diet and ambulating without difficulty.HIs TE dressing was d/c prior to discharge.  He was considered stable for discharge.  Wound care instructions and restrictions were reviewed with patient and the verbalized understanding.  The patient should f/u in the office in approximately 1-2 weeks or sooner with any questions, concerns or signs of infection.

## 2020-08-31 ENCOUNTER — TELEPHONE (OUTPATIENT)
Dept: SURGERY | Facility: CLINIC | Age: 35
End: 2020-08-31

## 2020-09-03 ENCOUNTER — TRANSCRIPTION ENCOUNTER (OUTPATIENT)
Age: 35
End: 2020-09-03

## 2020-09-03 ENCOUNTER — OFFICE VISIT (OUTPATIENT)
Dept: SURGERY | Facility: CLINIC | Age: 35
End: 2020-09-03
Payer: COMMERCIAL

## 2020-09-03 VITALS — WEIGHT: 132 LBS | HEIGHT: 67 IN | BODY MASS INDEX: 20.72 KG/M2

## 2020-09-03 DIAGNOSIS — K56.2: Primary | ICD-10-CM

## 2020-09-03 DIAGNOSIS — K50.119 CROHN'S DISEASE OF COLON WITH COMPLICATION (CMS/HCC): ICD-10-CM

## 2020-09-03 PROCEDURE — 99024 POSTOP FOLLOW-UP VISIT: CPT | Performed by: SURGERY

## 2020-09-03 RX ORDER — CLONAZEPAM 0.5 MG/1
0.5 TABLET ORAL
COMMUNITY
Start: 2020-08-04

## 2020-09-03 RX ORDER — ADALIMUMAB 40MG/0.4ML
KIT SUBCUTANEOUS
COMMUNITY
Start: 2020-08-24

## 2020-09-03 ASSESSMENT — ENCOUNTER SYMPTOMS
STRIDOR: 0
DYSURIA: 0
ADENOPATHY: 0
FACIAL SWELLING: 0
WHEEZING: 0
CHOKING: 0
FATIGUE: 0
CHILLS: 0
DIARRHEA: 0
COLOR CHANGE: 0
CONSTIPATION: 0
VOMITING: 0
CHEST TIGHTNESS: 0
APNEA: 0
ARTHRALGIAS: 0
UNEXPECTED WEIGHT CHANGE: 0
BRUISES/BLEEDS EASILY: 0
RECTAL PAIN: 0
NERVOUS/ANXIOUS: 0
WEAKNESS: 0
ABDOMINAL DISTENTION: 0
ACTIVITY CHANGE: 0
FLANK PAIN: 0
SHORTNESS OF BREATH: 0
NAUSEA: 0
BLOOD IN STOOL: 0
HEADACHES: 0
COUGH: 0
FREQUENCY: 0
APPETITE CHANGE: 0
SEIZURES: 0
ANAL BLEEDING: 0
MYALGIAS: 0
ABDOMINAL PAIN: 0
NUMBNESS: 0
BACK PAIN: 0
SORE THROAT: 0
HEMATURIA: 0
LIGHT-HEADEDNESS: 0
FEVER: 0
DIZZINESS: 0
TROUBLE SWALLOWING: 0

## 2020-09-03 NOTE — PROGRESS NOTES
Surgery Postop Visit    History of Present Illness:  Mr. Jorge is a 35 y.o. male who initially was diagnosed with ulcerative colitis in 2006 and then had a significant flare in 2008, and he underwent a total proctocolectomy w/ ileoanal anastomosis at the [facility]. He was then diagnosed with Crohn's years later and has been on Humira for the last two years (every Friday injection). He reported to the ED 8/18/20 at [facility] with the onset of abdominal pain, dull, throbbing, located diffusely in the abdomen with abdominal distention. He has been nauseated and had two episode of emesis, following PO intake.  He reported a similar episode 2 months ago in [facility] and was found to have a volvulus. He was admitted with plans for endoscopic decompression the following morning. However, by morning, he was passing flatus and his pain had ceased so he was told that the volvulus had resolved. He underwent endoscopic decompression by GI Dr. Jose.  Post procedure, he had worsening abdominal pain, and he was taken to the operating room 8/18/20 for exploratory laparotomy, reduction of small bowel volvulus, washout, temporary abdominal wall closure with Abthera.  Given the marked dilation of the bowel, the abdomen was unable to be closed at that time.  He required Abthera vac changes, and washouts 8/19/20 and 8/21/20 and was able to be closed on 8/24/20. He diet was slowly advanced, and he was discharged home on 8/27/20 after he was tolerating a regular diet, his pain was adequately controlled on medicines by mouth, and he was having bowel function.      Interval history: He presents today for postop follow-up visit.  Since he was discharged home, he has been tolerating a diet without any nausea or vomiting.  He denies any any fevers or chills.  His pain is well controlled. He is having regular bowel function.  He has been taking Colace 3 times a day and is having about 8 bowel movements per day which is more than  his normal 3 bowel movements per day with the J-pouch.  He is walking regular.  Overall, he is improving each day.    He denies nausea, vomiting, fevers, chills, hemoptysis, hematochezia, headaches or visual changes.      Problem List:   Patient Active Problem List   Diagnosis   • Volvulus of small intestine (CMS/HCC)   • Crohn's disease of colon (CMS/HCC)   • Volvulus (CMS/HCC)   • On mechanically assisted ventilation (CMS/HCC)     Past Medical History:  Past Medical History:   Diagnosis Date   • Crohn's disease (CMS/HCC)      Past Surgical History:  Past Surgical History:   Procedure Laterality Date   • ABDOMINAL SURGERY     • BOWEL RESECTION       Social History:  Social History     Tobacco Use   • Smoking status: Never Smoker   • Smokeless tobacco: Never Used   Substance Use Topics   • Alcohol use: Never     Frequency: Never   • Drug use: Not on file     Family History:  History reviewed. No pertinent family history.  Allergies:  No Known Allergies  Medications:    Current Outpatient Medications:   •  acetaminophen (TYLENOL) 325 mg tablet, Take 3 tablets (975 mg total) by mouth every 6 (six) hours., Disp: 360 tablet, Rfl: 0  •  clonazePAM (klonoPIN) 0.5 mg tablet, Take 0.5 mg by mouth 2 (two) times a day., Disp: , Rfl:   •  escitalopram (LEXAPRO) 20 mg tablet, Take 40 mg by mouth daily., Disp: , Rfl:   •  testosterone cypionate (DEPO-TESTOTERONE) 100 mg/mL injection, 100 mg once a week., Disp: , Rfl:   •  HUMIRA,CF, 40 mg/0.4 mL syringe kit, , Disp: , Rfl:     Review of Systems:  Review of Systems   Constitutional: Negative for activity change, appetite change, chills, fatigue, fever and unexpected weight change.   HENT: Negative for congestion, facial swelling, sneezing, sore throat and trouble swallowing.    Eyes: Negative for visual disturbance.   Respiratory: Negative for apnea, cough, choking, chest tightness, shortness of breath, wheezing and stridor.    Cardiovascular: Negative for chest pain and leg  swelling.   Gastrointestinal: Negative for abdominal distention, abdominal pain, anal bleeding, blood in stool, constipation, diarrhea, nausea, rectal pain and vomiting.   Endocrine: Negative for polyuria.   Genitourinary: Negative for dysuria, flank pain, frequency, hematuria and urgency.   Musculoskeletal: Negative for arthralgias, back pain and myalgias.   Skin: Negative for color change, pallor and rash.   Allergic/Immunologic: Negative for immunocompromised state.   Neurological: Negative for dizziness, seizures, syncope, weakness, light-headedness, numbness and headaches.   Hematological: Negative for adenopathy. Does not bruise/bleed easily.   Psychiatric/Behavioral: The patient is not nervous/anxious.      Physical Examination:  Physical Exam   Constitutional: He is oriented to person, place, and time. He appears well-developed and well-nourished. No distress.   HENT:   Head: Normocephalic and atraumatic.   Nose: Nose normal.   Mouth/Throat: Oropharynx is clear and moist.   Eyes: No scleral icterus.   Neck: Normal range of motion. Neck supple. No JVD present. No tracheal deviation present. No thyromegaly present.   Cardiovascular: Normal rate, regular rhythm, normal heart sounds and intact distal pulses. Exam reveals no gallop and no friction rub.   No murmur heard.  Pulmonary/Chest: Effort normal and breath sounds normal. No stridor. No respiratory distress. He has no wheezes. He has no rales. He exhibits no tenderness.   Abdominal: Soft. Bowel sounds are normal. He exhibits no distension and no mass. There is no tenderness. There is no rebound and no guarding.       Musculoskeletal: Normal range of motion. He exhibits no edema, tenderness or deformity.   Lymphadenopathy:     He has no cervical adenopathy.     He has no axillary adenopathy.        Right: No inguinal adenopathy present.        Left: No inguinal adenopathy present.   Neurological: He is alert and oriented to person, place, and time.   Skin:  Skin is warm and dry. No rash noted. No erythema. No pallor.   Psychiatric: He has a normal mood and affect.   Nursing note and vitals reviewed.    Laboratory Review:  Lab Results   Component Value Date    WBC 7.94 08/27/2020    RBC 4.85 08/27/2020    HGB 15.0 08/27/2020    HCT 44.2 08/27/2020    NEUTROABS 4.54 08/27/2020    MCV 91.1 08/27/2020    MCH 30.9 08/27/2020    MCHC 33.9 08/27/2020    RDW 13.1 08/27/2020     08/27/2020    MPV 9.3 (L) 08/27/2020     Lab Results   Component Value Date    BUN 13 08/27/2020    CREATININE 0.8 08/27/2020    EGFR >60.0 08/27/2020     08/27/2020    K 4.2 08/27/2020     08/27/2020    CO2 27 08/27/2020    CALCIUM 8.8 (L) 08/27/2020    BILITOT 1.4 (H) 08/21/2020    ALKPHOS 30 (L) 08/21/2020    AST 24 08/21/2020    ALT 22 08/21/2020     Assessment/Plan:     Problem List Items Addressed This Visit        Digestive    Volvulus of small intestine (CMS/HCC) - Primary    Crohn's disease of colon (CMS/HCC)        He had an uneventful hospital and postoperative recovery, and is doing well status post exploratory laparotomy, reduction of small bowel volvulus, washout, temporary abdominal wall closure with Abthera on 8/18/20 with definitive closure on 8/24/20.  He denies any fevers or chills.  He is tolerating his diet without nausea or vomiting.  He is having regular bowel movements.  His pain is adequately controlled on Tylenol.  His staples were removed in the office today, and steri strips were placed over the incision.     He is making a nice recovery.  He was instructed not to do any heavy lifting for a total of 6 weeks after her operation. He can use Tylenol or a mild anti-inflammatory as needed for pain at this point.  I would like to see him back again in 3 to 4 weeks for repeat exam.  He can restart his Humira.     All of his questions were answered.  He knows to call with any questions or concerns.          Chelsi Adams MD  09/03/20

## 2020-09-04 ENCOUNTER — TRANSCRIPTION ENCOUNTER (OUTPATIENT)
Age: 35
End: 2020-09-04

## 2020-09-09 ENCOUNTER — APPOINTMENT (OUTPATIENT)
Dept: GASTROENTEROLOGY | Facility: CLINIC | Age: 35
End: 2020-09-09
Payer: COMMERCIAL

## 2020-09-09 VITALS
HEART RATE: 63 BPM | WEIGHT: 136 LBS | SYSTOLIC BLOOD PRESSURE: 108 MMHG | TEMPERATURE: 98 F | RESPIRATION RATE: 16 BRPM | DIASTOLIC BLOOD PRESSURE: 70 MMHG | BODY MASS INDEX: 21.35 KG/M2 | HEIGHT: 67 IN | OXYGEN SATURATION: 94 %

## 2020-09-09 PROCEDURE — 99214 OFFICE O/P EST MOD 30 MIN: CPT

## 2020-09-09 NOTE — PHYSICAL EXAM
[General Appearance - Alert] : alert [General Appearance - In No Acute Distress] : in no acute distress [Sclera] : the sclera and conjunctiva were normal [Neck Appearance] : the appearance of the neck was normal [Hearing Threshold Finger Rub Not [place]] : hearing was normal [Heart Sounds] : normal S1 and S2 [Exaggerated Use Of Accessory Muscles For Inspiration] : no accessory muscle use [Abdomen Soft] : soft [Bowel Sounds] : normal bowel sounds [No CVA Tenderness] : no ~M costovertebral angle tenderness [Abnormal Walk] : normal gait [Skin Color & Pigmentation] : normal skin color and pigmentation [No Spinal Tenderness] : no spinal tenderness [] : no rash [Oriented To Time, Place, And Person] : oriented to person, place, and time

## 2020-09-10 NOTE — HISTORY OF PRESENT ILLNESS
[Heartburn] : denies heartburn [Nausea] : denies nausea [Vomiting] : denies vomiting [Constipation] : denies constipation [Diarrhea] : denies diarrhea [Abdominal Pain] : denies abdominal pain [Yellow Skin Or Eyes (Jaundice)] : denies jaundice [Abdominal Swelling] : denies abdominal swelling [Inflammatory Bowel Disease] : inflammatory bowel disease [Rectal Pain] : denies rectal pain [Abdominal Surgery] : abdominal surgery [Good Compliance] : good compliance with treatment [Wt Loss ___ Lbs] : no recent weight loss [de-identified] : 35 yr old M with PMHx significant for UC s/p TPC J pouch (2006) s/p recurrent pouchitis 2015, 2017; and found to have disease in small bowel so converted to diagnosis of Crohns disease, he is currently on monotherapy with Humira since June 2018 after having bout of DILI from IFX, who presents for follow up in clinical remission. Underwent surgery for volvulus in August. \par \par In June 2020, patient was admitted to [facility] for volvulus. CT revealed swirling of the mesentery and bowel just proximal to j-pouch anastomosis with dilation of proximal bowel concerning for volvulus. Luckily it resolved with conservative care. Patient then saw Dr. Hansen and underwent Gastrografin for further assessment, which didn't reveal any underlying abnormality. \par \par In Aug 2020, patient was visiting his in-laws in [place], when he developed abdominal pain, n/v and got admitted at [facility]. Here he was diagnosed with volvulus and underwent open surgery. Details of surgery are unknown as of now but per patient no resection done. During hospitalization patient skipped 2 doses of Humira but now he is back on qweekly regime. \par \par Underwent pouchoscopy earlier this year in Jan 2020 which revealed normal pouch body, normal efferent and afferent limb. No evidence of CD. Anastomotic ulcer, similar in appearance to 2019 pouchoscopy was seen again. Likely ischemic in nature. \par \par Apart from this per patient his CD has been in remission with no specific GI complains. he is having 5 BM every day. No urgency. No blood in stool. No abdominal pain or rectal discomfort. No bloating. No weight loss or appetite changes. No n/v, fevers/chills.

## 2020-09-10 NOTE — CONSULT LETTER
[Dear  ___] : Dear  [unfilled], [Courtesy Letter:] : I had the pleasure of seeing your patient, [unfilled], in my office today. [Please see my note below.] : Please see my note below. [Sincerely,] : Sincerely, [FreeTextEntry3] : Gerald Bernal MD\par Associate Professor of Medicine\par Director IBD Program\par [facility]\par  [DrJenna  ___] : Dr. MURRAY

## 2020-09-10 NOTE — ASSESSMENT
[FreeTextEntry1] : 33 yo M with UC s/p J pouch s/p recurrent pouchitis, likely CD of the pouch, now in clinical remission on ADA after switching from IFX for DILI. S/p recent abdominal surgery for volvulus of the pre-pouch ileum.  We believe he did not require resection based on what was told to him, but that simply the volvulus was reduced.. \par \par # Crohn's of J Pouch -\par - clinical remission with ADA, IFX stopped for DILI\par - continue Humira, qweekly dosing\par - repeat blood tests after 2 more doses of Humira. 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 \par - Anastomotic ulcer, similar in appearance to 2019 pouchoscopy was seen again.\par - \par \par # Volvulus\par S/p surgery at [facility]. \par Patient recovering well. \par Will get records - op reports, imaging and disc images from [facility] and [place]. \par If the volvulus was simply reduced without tacking of the mesentery it's unclear what his risk  of recurrence might be.\par \par # HCM\par - DERM referral given\par - DEXA ordered \par - anxiety stable on lexapro/klonopin; seeing psychiatrist\par - denies tobacco use, no NSAID use\par \par Follow up in 8-10 weeks.\par \par Patient discussed with Dr. Bernal\par Danna Brown\par IBD fellow

## 2020-09-21 ENCOUNTER — TELEPHONE (OUTPATIENT)
Dept: SURGERY | Facility: CLINIC | Age: 35
End: 2020-09-21

## 2020-09-21 NOTE — TELEPHONE ENCOUNTER
Spoke with pt he states that there is a small stitch that is coming out. He states he had a scar that the scab came off and exposed this stitch. Would like to know what he should do.

## 2020-09-21 NOTE — TELEPHONE ENCOUNTER
Not a problem.  He already has an appt scheduled to see me next Monday, and I will trim the suture then.     Thanks,   EL

## 2020-09-21 NOTE — TELEPHONE ENCOUNTER
Selvin had surgery about 1 month ago and he has a stitch that is coming out and would like to speak to someone about this.

## 2020-09-28 ENCOUNTER — OFFICE VISIT (OUTPATIENT)
Dept: SURGERY | Facility: CLINIC | Age: 35
End: 2020-09-28
Payer: COMMERCIAL

## 2020-09-28 VITALS — BODY MASS INDEX: 21.19 KG/M2 | WEIGHT: 135 LBS | HEIGHT: 67 IN

## 2020-09-28 DIAGNOSIS — K56.2: Primary | ICD-10-CM

## 2020-09-28 PROCEDURE — 99024 POSTOP FOLLOW-UP VISIT: CPT | Performed by: SURGERY

## 2020-09-28 ASSESSMENT — ENCOUNTER SYMPTOMS
CHEST TIGHTNESS: 0
CHOKING: 0
FLANK PAIN: 0
BRUISES/BLEEDS EASILY: 0
BACK PAIN: 0
ANAL BLEEDING: 0
ABDOMINAL PAIN: 0
FEVER: 0
UNEXPECTED WEIGHT CHANGE: 0
SEIZURES: 0
NUMBNESS: 0
ABDOMINAL DISTENTION: 0
ACTIVITY CHANGE: 0
HEMATURIA: 0
DIZZINESS: 0
ADENOPATHY: 0
DYSURIA: 0
SHORTNESS OF BREATH: 0
MYALGIAS: 0
SORE THROAT: 0
LIGHT-HEADEDNESS: 0
CONSTIPATION: 0
VOMITING: 0
STRIDOR: 0
DIARRHEA: 0
ARTHRALGIAS: 0
RECTAL PAIN: 0
APPETITE CHANGE: 0
WHEEZING: 0
NERVOUS/ANXIOUS: 0
TROUBLE SWALLOWING: 0
CHILLS: 0
COUGH: 0
FREQUENCY: 0
FATIGUE: 0
COLOR CHANGE: 0
APNEA: 0
NAUSEA: 0
FACIAL SWELLING: 0
BLOOD IN STOOL: 0
WEAKNESS: 0
HEADACHES: 0

## 2020-09-28 NOTE — PATIENT INSTRUCTIONS
He had an uneventful hospital and postoperative recovery, and is doing well status post exploratory laparotomy, reduction of small bowel volvulus, washout, temporary abdominal wall closure with Abthera on 8/18/20 with definitive closure on 8/24/20.  He denies any fevers or chills.  He is tolerating his diet without nausea or vomiting.  He is having regular bowel movements.  His pain is adequately controlled on Tylenol.      He is making a nice recovery.  He was instructed not to do any heavy lifting for a total of 6 weeks after her operation. He can use Tylenol or a mild anti-inflammatory as needed for pain at this point.  He knows to follow-up with me as needed.

## 2020-09-28 NOTE — PROGRESS NOTES
Surgery Postop Visit    History of Present Illness:  Mr. Jorge is a 35 y.o. male who initially was diagnosed with ulcerative colitis in 2006 and then had a significant flare in 2008, and he underwent a total proctocolectomy w/ ileoanal anastomosis at the [facility]. He was then diagnosed with Crohn's years later and has been on Humira for the last two years (every Friday injection). He reported to the ED 8/18/20 at [facility] with the onset of abdominal pain, dull, throbbing, located diffusely in the abdomen with abdominal distention. He has been nauseated and had two episode of emesis, following PO intake.  He reported a similar episode 2 months ago in [facility] and was found to have a volvulus. He was admitted with plans for endoscopic decompression the following morning. However, by morning, he was passing flatus and his pain had ceased so he was told that the volvulus had resolved. He underwent endoscopic decompression by GI Dr. Jose.  Post procedure, he had worsening abdominal pain, and he was taken to the operating room 8/18/20 for exploratory laparotomy, reduction of small bowel volvulus, washout, temporary abdominal wall closure with Abthera.  Given the marked dilation of the bowel, the abdomen was unable to be closed at that time.  He required Abthera vac changes, and washouts 8/19/20 and 8/21/20 and was able to be closed on 8/24/20. He diet was slowly advanced, and he was discharged home on 8/27/20 after he was tolerating a regular diet, his pain was adequately controlled on medicines by mouth, and he was having bowel function.      Interval history: He presents today for postop follow-up visit.  Since he was last seen, he has been tolerating a diet without any nausea or vomiting.  He denies any any fevers or chills.  His pain is well controlled. He is having regular bowel function, normal for him with the J pouch. He is walking regularly, and starting to do more activity.  Overall, he is  improving each day.  He noticed a suture that he could feel at the middle of his incision, but it no longer coming through the skin as it was before.    He denies nausea, vomiting, fevers, chills, hemoptysis, hematochezia, headaches or visual changes.      Problem List:   Patient Active Problem List   Diagnosis   • Volvulus of small intestine (CMS/HCC)   • Crohn's disease of colon (CMS/HCC)   • Volvulus (CMS/HCC)   • On mechanically assisted ventilation (CMS/HCC)     Past Medical History:  Past Medical History:   Diagnosis Date   • Crohn's disease (CMS/HCC)      Past Surgical History:  Past Surgical History:   Procedure Laterality Date   • ABDOMINAL SURGERY     • BOWEL RESECTION       Social History:  Social History     Tobacco Use   • Smoking status: Never Smoker   • Smokeless tobacco: Never Used   Substance Use Topics   • Alcohol use: Never     Frequency: Never   • Drug use: Not on file     Family History:  History reviewed. No pertinent family history.  Allergies:  No Known Allergies  Medications:    Current Outpatient Medications:   •  clonazePAM (klonoPIN) 0.5 mg tablet, Take 0.5 mg by mouth 2 (two) times a day., Disp: , Rfl:   •  escitalopram (LEXAPRO) 20 mg tablet, Take 40 mg by mouth daily., Disp: , Rfl:   •  HUMIRA,CF, 40 mg/0.4 mL syringe kit, , Disp: , Rfl:   •  testosterone cypionate (DEPO-TESTOTERONE) 100 mg/mL injection, 100 mg once a week., Disp: , Rfl:     Review of Systems:  Review of Systems   Constitutional: Negative for activity change, appetite change, chills, fatigue, fever and unexpected weight change.   HENT: Negative for congestion, facial swelling, sneezing, sore throat and trouble swallowing.    Eyes: Negative for visual disturbance.   Respiratory: Negative for apnea, cough, choking, chest tightness, shortness of breath, wheezing and stridor.    Cardiovascular: Negative for chest pain and leg swelling.   Gastrointestinal: Negative for abdominal distention, abdominal pain, anal bleeding,  blood in stool, constipation, diarrhea, nausea, rectal pain and vomiting.   Endocrine: Negative for polyuria.   Genitourinary: Negative for dysuria, flank pain, frequency, hematuria and urgency.   Musculoskeletal: Negative for arthralgias, back pain and myalgias.   Skin: Negative for color change, pallor and rash.   Allergic/Immunologic: Negative for immunocompromised state.   Neurological: Negative for dizziness, seizures, syncope, weakness, light-headedness, numbness and headaches.   Hematological: Negative for adenopathy. Does not bruise/bleed easily.   Psychiatric/Behavioral: The patient is not nervous/anxious.      Physical Examination:  Physical Exam   Constitutional: He is oriented to person, place, and time. He appears well-developed and well-nourished. No distress.   HENT:   Head: Normocephalic and atraumatic.   Nose: Nose normal.   Mouth/Throat: Oropharynx is clear and moist.   Eyes: No scleral icterus.   Neck: Normal range of motion. Neck supple. No JVD present. No tracheal deviation present. No thyromegaly present.   Cardiovascular: Normal rate, regular rhythm, normal heart sounds and intact distal pulses. Exam reveals no gallop and no friction rub.   No murmur heard.  Pulmonary/Chest: Effort normal and breath sounds normal. No stridor. No respiratory distress. He has no wheezes. He has no rales. He exhibits no tenderness.   Abdominal: Soft. Bowel sounds are normal. He exhibits no distension and no mass. There is no abdominal tenderness. There is no rebound and no guarding.       Musculoskeletal: Normal range of motion.         General: No tenderness, deformity or edema.   Lymphadenopathy:     He has no cervical adenopathy.     He has no axillary adenopathy.   Neurological: He is alert and oriented to person, place, and time.   Skin: Skin is warm and dry. No rash noted. No erythema. No pallor.   Psychiatric: He has a normal mood and affect.   Nursing note and vitals reviewed.    Assessment/Plan:      Problem List Items Addressed This Visit        Digestive    Volvulus of small intestine (CMS/HCC) - Primary        He had an uneventful hospital and postoperative recovery, and is doing well status post exploratory laparotomy, reduction of small bowel volvulus, washout, temporary abdominal wall closure with Abthera on 8/18/20 with definitive closure on 8/24/20.  He denies any fevers or chills.  He is tolerating his diet without nausea or vomiting.  He is having regular bowel movements.  His pain is adequately controlled on Tylenol.  He has no evidence of a hernia on exam in the office today.  We discussed how the suture that he feels underneath the surface of the skin is dissolvable time, and since he is athletic and physically fit with not much subcutaneous fat this is the reason why he feels it.    He is making a nice recovery.  He was instructed not to do any heavy lifting for a total of 6 weeks after her operation. He can use Tylenol or a mild anti-inflammatory as needed for pain at this point.  He knows to follow-up with me as needed.      All of his questions were answered.  He knows to call with any questions or concerns.          Chelsi Adams MD  09/28/20

## 2020-12-22 ENCOUNTER — RX RENEWAL (OUTPATIENT)
Age: 35
End: 2020-12-22

## 2020-12-28 ENCOUNTER — TRANSCRIPTION ENCOUNTER (OUTPATIENT)
Age: 35
End: 2020-12-28

## 2021-07-09 ENCOUNTER — RX RENEWAL (OUTPATIENT)
Age: 36
End: 2021-07-09

## 2021-07-09 ENCOUNTER — TRANSCRIPTION ENCOUNTER (OUTPATIENT)
Age: 36
End: 2021-07-09

## 2021-07-19 ENCOUNTER — TRANSCRIPTION ENCOUNTER (OUTPATIENT)
Age: 36
End: 2021-07-19

## 2021-07-23 ENCOUNTER — TRANSCRIPTION ENCOUNTER (OUTPATIENT)
Age: 36
End: 2021-07-23

## 2021-07-26 ENCOUNTER — TRANSCRIPTION ENCOUNTER (OUTPATIENT)
Age: 36
End: 2021-07-26

## 2021-10-14 ENCOUNTER — RX RENEWAL (OUTPATIENT)
Age: 36
End: 2021-10-14

## 2021-11-30 ENCOUNTER — TRANSCRIPTION ENCOUNTER (OUTPATIENT)
Age: 36
End: 2021-11-30

## 2021-12-14 NOTE — ANESTHESIA PROCEDURE NOTES
Airway  Urgency: elective    Start Time: 8/18/2020 12:38 PM  Airway not difficult    General Information and Staff    Patient location during procedure: OR  Anesthesiologist: Franchesca Edwards MD  Resident/CRNA: Ladonna Morgan CRNA  Performed: resident/CRNA     Indications and Patient Condition  Indications for airway management: anesthesia  Sedation level: general  Preoxygenated: yes  Patient position: sniffing  MILS maintained throughout  Mask difficulty assessment: 0 - not attempted (RSI)    Final Airway Details  Final airway type: endotracheal airway      Successful airway: ETT  Cuffed: yes   Successful intubation technique: video laryngoscopy  Facilitating devices/methods: intubating stylet  Endotracheal tube insertion site: oral  Blade: Giovanna  Blade size: #3  ETT size (mm): 8.0  Cormack-Lehane Classification: grade I - full view of glottis  Placement verified by: chest auscultation and capnometry   Measured from: lips  ETT to lips (cm): 24  Number of attempts at approach: 1  Ventilation between attempts: none  Number of other approaches attempted: 0  Atraumatic airway insertion      Additional Comments  Dentition intact            
fall

## 2021-12-24 ENCOUNTER — NON-APPOINTMENT (OUTPATIENT)
Age: 36
End: 2021-12-24

## 2022-06-10 NOTE — ED PROVIDER NOTE - NS_EDPROVIDERDISPOUSERTYPE_ED_A_ED
this nurse called [facility] RN spoke with Shana regarding patient refusal of going to the ER,   Attending Attestation (For Attendings USE Only)...

## 2022-09-29 ENCOUNTER — NON-APPOINTMENT (OUTPATIENT)
Age: 37
End: 2022-09-29

## 2022-09-29 ENCOUNTER — TRANSCRIPTION ENCOUNTER (OUTPATIENT)
Age: 37
End: 2022-09-29

## 2022-09-30 ENCOUNTER — TRANSCRIPTION ENCOUNTER (OUTPATIENT)
Age: 37
End: 2022-09-30

## 2022-09-30 ENCOUNTER — NON-APPOINTMENT (OUTPATIENT)
Age: 37
End: 2022-09-30

## 2022-09-30 ENCOUNTER — APPOINTMENT (OUTPATIENT)
Dept: COLORECTAL SURGERY | Facility: CLINIC | Age: 37
End: 2022-09-30

## 2022-09-30 VITALS
BODY MASS INDEX: 24.17 KG/M2 | HEART RATE: 49 BPM | SYSTOLIC BLOOD PRESSURE: 119 MMHG | TEMPERATURE: 98.3 F | HEIGHT: 67 IN | DIASTOLIC BLOOD PRESSURE: 74 MMHG | WEIGHT: 154 LBS

## 2022-09-30 PROCEDURE — 99213 OFFICE O/P EST LOW 20 MIN: CPT | Mod: 25,57

## 2022-09-30 PROCEDURE — 46040 I&D ISCHIORCT&/PERIRCT ABSC: CPT

## 2022-09-30 NOTE — PHYSICAL EXAM
[Abdomen Masses] : No abdominal masses [Abdomen Tenderness] : ~T No ~M abdominal tenderness [No HSM] : no hepatosplenomegaly [JVD] : no jugular venous distention  [Normal Breath Sounds] : Normal breath sounds [No Rash or Lesion] : No rash or lesion [Alert] : alert [Calm] : calm [de-identified] : Left anterior lateral perianal margin with erythema and fluctuance.  Tender.  Risk, benefits and alternatives discussed.  Incision and drainage performed.  10 cc of purulent material obtained Yes

## 2022-09-30 NOTE — HISTORY OF PRESENT ILLNESS
[FreeTextEntry1] : 34 y/o M presents for evaluation of recently developed rectal "bump"\par \par H/o UC (diagnosed in his early 20's) s/p IPPA in 2008 with Dr. Wexner at [facility], with recurrent pouchitis in 2015 or 2017. Found to have disease in the small bowel, diagnosis changed from UC to Crohns Disease. Follows Dr. Gerald Bernal. Continues to be on weekly Humira\par In June/2020 Patient hospitalized at [facility] for decreased flatus and abdominal pain\par \par CT A/P 6/6/20\par - s/p proctocolectomy w/ ileal j-pouch formation\par - swirling of the mesentery and bowel just proximal to the J-pouch anastomosis with dilation of proximal bowel concerning for volvulus\par -prostatomegaly, similar to 2015 \par \par Volvulus resolved w/conservative care. Patient underwent Gastrografin without evidence of underlying abnormality.  \par \par \par Pouchoscopy completed 1/9/20 with Dr. Bernal\par - normal pouch body\par - normal efferent and afferent limb, no evidence of CD\par - anastomotic ulcer, similar in appearance to 2019 pouchoscopy. Likely ischemic in nature, biopsies taken\par  Final Diagnosis\par Pouch ulcer, biopsy:\par - Small bowel mucosa with mild active enteritis.\par - Negative for dysplasia.\par \par Aug / 2020 patient admitted to [facility] for abdominal pain and N/V. Workup c/w volvulus and patient underwent open surgery. Details unknown per pt, no resection done. \par \par Patient recently developed painful bump at the anus and concerned for hemorrhoid in setting of patients h/o Crohn's r/o abscess. Advised by NP Jessica David to our office for further evaluation and treatment discussion.  \par \par c/o bump near anus x 3 days which he has been using Hemorrhoid cream and suppository, as well as Calmoseptine which provides protection\par Took Kratom for two days which caused "compaction" of stools and he felt backed up and felt the bump get larger after BM\par Taking showers, baths daily\par Denies fever or chills\par Not taking pain medications\par Typically follows low FODMAP diet, korean yams, rice cakes, proteins, protein mix. 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

## 2022-09-30 NOTE — ASSESSMENT
[FreeTextEntry1] : I reviewed with the patient the underlying risk of possible anal fistula disease and risk of secondary recurrences given his Crohn's disease.  Advised local wound care.\par \par Follow-up 3 weeks.

## 2022-10-21 ENCOUNTER — APPOINTMENT (OUTPATIENT)
Dept: COLORECTAL SURGERY | Facility: CLINIC | Age: 37
End: 2022-10-21

## 2022-10-21 VITALS
BODY MASS INDEX: 24.33 KG/M2 | HEIGHT: 67 IN | SYSTOLIC BLOOD PRESSURE: 126 MMHG | WEIGHT: 155 LBS | HEART RATE: 59 BPM | TEMPERATURE: 99.1 F | DIASTOLIC BLOOD PRESSURE: 81 MMHG

## 2022-10-21 PROCEDURE — 99024 POSTOP FOLLOW-UP VISIT: CPT

## 2022-10-21 NOTE — PHYSICAL EXAM
[No HSM] : no hepatosplenomegaly [Normal] : was normal [None] : there was no rectal mass  [Normal Breath Sounds] : Normal breath sounds [No Rash or Lesion] : No rash or lesion [Alert] : alert [Calm] : calm [Abdomen Masses] : No abdominal masses [Abdomen Tenderness] : ~T No ~M abdominal tenderness [JVD] : no jugular venous distention  [de-identified] : Left  lateral perianal margin nodularity at the prior incision and drainage site.  No identifiable opening/fistula.

## 2022-10-21 NOTE — HISTORY OF PRESENT ILLNESS
[FreeTextEntry1] : 36 y/o M presents for follow up evaluation of perianal abscess, s/p I&D on 9/30/22\par \par H/o UC (diagnosed in his early 20's) s/p IPPA in 2008 with Dr. Wexner at [facility], with recurrent pouchitis in 2015 or 2017. Found to have disease in the small bowel, diagnosis changed from UC to Crohns Disease. Follows Dr. Gerald Bernal. Continues to be on weekly Humira\par In June/2020 Patient hospitalized at [facility] for decreased flatus and abdominal pain\par \par CT A/P 6/6/20\par - s/p proctocolectomy w/ ileal j-pouch formation\par - swirling of the mesentery and bowel just proximal to the J-pouch anastomosis with dilation of proximal bowel concerning for volvulus\par -prostatomegaly, similar to 2015 \par \par Volvulus resolved w/conservative care. Patient underwent Gastrografin without evidence of underlying abnormality. \par \par \par Pouchoscopy completed 1/9/20 with Dr. Bernal\par - normal pouch body\par - normal efferent and afferent limb, no evidence of CD\par - anastomotic ulcer, similar in appearance to 2019 pouchoscopy. Likely ischemic in nature, biopsies taken\par  Final Diagnosis\par Pouch ulcer, biopsy:\par - Small bowel mucosa with mild active enteritis.\par - Negative for dysplasia.\par \par Aug / 2020 patient admitted to [facility] for abdominal pain and N/V. Workup c/w volvulus and patient underwent open surgery. Details unknown per pt, no resection done. \par \par Patient developed painful bump at the anus with concern for hemorrhoid in the setting of patients h/o Crohn's r/o abscess. \par \par Most recently seen in the office 9/30/22 as per request of REGINA David to r/o abscess. +Left anterior lateral perianal margin with erythema and fluctuance, and tender s/p I &D. 10 cc of purulent material obtained. Reviewed potential risk of possible anal fistula disease and risk of recurrence in setting of patients h/o Crohn's disease. Local wound care advised\par \par Pt presents for three week follow up\par Pt reports he is doing well. Denies fever, body aches, chills or pain\par Denies swelling to prior I&D site, denies discharge. Admits to scant BRB when he wipes. He is cleaning area with daily shower and wet wipes, then applies calmoseptine\par \par BH: 8 times daily, soft and formed which is typical for him (J- pouch)\par Denies abdominal pain, or mucus or blood in stools\par Continues on weekly Humira, last injection two days ago

## 2022-10-21 NOTE — ASSESSMENT
[FreeTextEntry1] : I reviewed with the patient his findings on exam are consistent with potential underlying anal fistula disease.  Recommend MRI for further evaluation.  Risk of recurrent anal abscess has been detailed and discussed.  Potential need for further review and future incision and drainage and possible seton insertion was outlined.  All questions answered.\par \par

## 2022-10-24 ENCOUNTER — APPOINTMENT (OUTPATIENT)
Dept: MRI IMAGING | Facility: CLINIC | Age: 37
End: 2022-10-24

## 2022-10-24 ENCOUNTER — OUTPATIENT (OUTPATIENT)
Dept: OUTPATIENT SERVICES | Facility: HOSPITAL | Age: 37
LOS: 1 days | End: 2022-10-24

## 2022-10-24 ENCOUNTER — RESULT REVIEW (OUTPATIENT)
Age: 37
End: 2022-10-24

## 2022-10-24 PROCEDURE — 72197 MRI PELVIS W/O & W/DYE: CPT | Mod: 26

## 2022-10-27 ENCOUNTER — TRANSCRIPTION ENCOUNTER (OUTPATIENT)
Age: 37
End: 2022-10-27

## 2022-10-28 ENCOUNTER — APPOINTMENT (OUTPATIENT)
Dept: COLORECTAL SURGERY | Facility: CLINIC | Age: 37
End: 2022-10-28

## 2022-10-28 VITALS
BODY MASS INDEX: 24.33 KG/M2 | DIASTOLIC BLOOD PRESSURE: 82 MMHG | TEMPERATURE: 98.2 F | HEIGHT: 67 IN | WEIGHT: 155 LBS | HEART RATE: 56 BPM | SYSTOLIC BLOOD PRESSURE: 136 MMHG

## 2022-10-28 DIAGNOSIS — K60.3 ANAL FISTULA: ICD-10-CM

## 2022-10-28 PROCEDURE — 99215 OFFICE O/P EST HI 40 MIN: CPT | Mod: 24

## 2022-10-28 RX ORDER — LORAZEPAM 1 MG/1
1 TABLET ORAL
Qty: 90 | Refills: 0 | Status: ACTIVE | COMMUNITY
Start: 2022-05-25

## 2022-10-28 NOTE — PHYSICAL EXAM
[Abdomen Masses] : No abdominal masses [Abdomen Tenderness] : ~T No ~M abdominal tenderness [No HSM] : no hepatosplenomegaly [Normal] : was normal [None] : there was no rectal mass  [JVD] : no jugular venous distention  [Normal Breath Sounds] : Normal breath sounds [No Rash or Lesion] : No rash or lesion [Alert] : alert [Calm] : calm [de-identified] : Left  lateral perianal margin nodularity with fistula opening and drainage.  Fistula probe and concern for internal opening at the level of the ileoanal anastomosis

## 2022-10-28 NOTE — HISTORY OF PRESENT ILLNESS
[FreeTextEntry1] : 36 y/o M presents for follow up evaluation of perianal abscess, s/p I&D on 9/30/22\par \par H/o UC (diagnosed in his early 20's) s/p IPPA in 2008 with Dr. Wexner at [facility], with recurrent pouchitis in 2015 or 2017. Found to have disease in the small bowel, diagnosis changed from UC to Crohns Disease. Follows Dr. Gerald Bernal. Continues to be on weekly Humira\par In June/2020 Patient hospitalized at [facility] for decreased flatus and abdominal pain\par \par CT A/P 6/6/20\par - s/p proctocolectomy w/ ileal j-pouch formation\par - swirling of the mesentery and bowel just proximal to the J-pouch anastomosis with dilation of proximal bowel concerning for volvulus\par -prostatomegaly, similar to 2015 \par \par Volvulus resolved w/conservative care. Patient underwent Gastrografin without evidence of underlying abnormality. \par \par \par Pouchoscopy completed 1/9/20 with Dr. Bernal\par - normal pouch body\par - normal efferent and afferent limb, no evidence of CD\par - anastomotic ulcer, similar in appearance to 2019 pouchoscopy. Likely ischemic in nature, biopsies taken\par  Final Diagnosis\par Pouch ulcer, biopsy:\par - Small bowel mucosa with mild active enteritis.\par - Negative for dysplasia.\par \par Aug / 2020 patient admitted to [facility] for abdominal pain and N/V. Workup c/w volvulus and patient underwent open surgery. Details unknown per pt, no resection done. \par \par Patient developed painful bump at the anus with concern for hemorrhoid in the setting of patients h/o Crohn's r/o abscess. \par \par 9/30/22 office visit- as per request of REGINA David to r/o abscess. +Left anterior lateral perianal margin with erythema and fluctuance, and tender s/p I &D. 10 cc of purulent material obtained. Reviewed potential risk of possible anal fistula disease and risk of recurrence in setting of patients h/o Crohn's disease. Local wound care advised\par \par Last visit 10/21/22: Findings on exam are consistent with potential underlying anal fistula disease. Recommended MRI for further evaluation. Risk of recurrent anal abscess has been detailed and discussed. Potential need for further review and future incision and drainage and possible seton insertion was outlined. 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 Mucosal opening not well visualized.\par \par Patient is here today for a follow up to discuss recommended MR Pelvis. \par \par BH: 8 times daily, soft and formed which is typical for him (J- pouch)\par Denies abdominal pain, or mucus or blood in stools\par Continues on weekly Humira??, last injection??\par \par \par Patient reports persistent irritation at the site of the fistula with intermittent swelling and drainage.

## 2022-10-28 NOTE — ASSESSMENT
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  I have discussed with him options for treatment.  He is anxious to pursue definitive repair.  Appropriate consideration including second opinion with Dr pham offered.\par \par I have discussed with him the concerns regarding fistula repair including the risk of pouch failure, compromise of pouch function, permanent leakage and incontinence.  All questions answered appropriate literature given for his review.

## 2022-10-31 ENCOUNTER — TRANSCRIPTION ENCOUNTER (OUTPATIENT)
Age: 37
End: 2022-10-31

## 2022-10-31 NOTE — HISTORY OF PRESENT ILLNESS
[FreeTextEntry1] : 37 year old male pt history of Crohn's disease s/p I & D of perianal abscess on 9/30/22  by Dr. Dyllan Hansen.\par s/p IPPA in 2008 with Dr. 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 Mucosal opening not well visualized\par \par Pouchoscopy 1/9/2020- by Dr. Bernal normal\par \par Here for a consult

## 2022-11-01 ENCOUNTER — NON-APPOINTMENT (OUTPATIENT)
Age: 37
End: 2022-11-01

## 2022-11-02 ENCOUNTER — APPOINTMENT (OUTPATIENT)
Dept: COLORECTAL SURGERY | Facility: CLINIC | Age: 37
End: 2022-11-02

## 2022-11-03 ENCOUNTER — APPOINTMENT (OUTPATIENT)
Dept: GASTROENTEROLOGY | Facility: CLINIC | Age: 37
End: 2022-11-03

## 2022-11-18 ENCOUNTER — NON-APPOINTMENT (OUTPATIENT)
Age: 37
End: 2022-11-18

## 2022-11-21 ENCOUNTER — APPOINTMENT (OUTPATIENT)
Dept: COLORECTAL SURGERY | Facility: CLINIC | Age: 37
End: 2022-11-21

## 2022-11-21 VITALS
WEIGHT: 148 LBS | BODY MASS INDEX: 23.23 KG/M2 | SYSTOLIC BLOOD PRESSURE: 145 MMHG | HEIGHT: 67 IN | HEART RATE: 77 BPM | TEMPERATURE: 98.7 F | DIASTOLIC BLOOD PRESSURE: 86 MMHG

## 2022-11-21 DIAGNOSIS — K61.0 ANAL ABSCESS: ICD-10-CM

## 2022-11-21 PROCEDURE — 99024 POSTOP FOLLOW-UP VISIT: CPT

## 2022-11-21 PROCEDURE — 46040 I&D ISCHIORCT&/PERIRCT ABSC: CPT | Mod: 58

## 2022-11-21 NOTE — PHYSICAL EXAM
[FreeTextEntry1] : Medical assistant was present for duration of examination.\par \par In the left lateral position there is a persistent abscess/skin tag.\par \par The area was anesthetized and using a 15 blade the skin tag/skin overlying the abscess cavity was unroofed. \par \par Hemostasis achieved with direct pressure and cautery.\par \par Patient tolerated procedure well.

## 2022-11-21 NOTE — ASSESSMENT
[FreeTextEntry1] : 37 year old man who was initially diagnosed with UC and underwent IPAA, subsequently developed multiple bouts of pouchitis, had perianal involvement and small bowel involvement and diagnosis was switched to Crohn's disease. He has had a left perianal abscess with fistulous communication to the pouch. He is being managed on Humira. Today in the office the skin overlying the abscess was unroofed. He has an appointment to see Dr. Jarrell Meyers at [facility] in a couple weeks to discuss surgical options to promote healing of the fistula.

## 2022-11-21 NOTE — HISTORY OF PRESENT ILLNESS
[FreeTextEntry1] : 36 yo M w/ Crohn's disease presents for follow up, h/o I&D perianal abscess 9/30/22\par \par h/o UC (diagnosed in his early 20's) s/p IPPA in 2008 with Dr. Wexner at [facility], with recurrent pouchitis in 2015 or 2017. Found to have disease in the small bowel, diagnosis changed from UC to Crohns Disease. Follows Dr. Gerald Bernal. Continues to be on weekly Humira\par In June/2020 Patient hospitalized at [facility] for decreased flatus and abdominal pain\par CT a/p completed 6/6/20 c/f volvulus which resolved w/ conservative care. Pt underwent gastrograffin w/o evidence of underlying abnormality\par \par Pouchoscopy completed 1/9/20 with Dr. Bernal\par - normal pouch body\par - normal efferent and afferent limb, no evidence of CD\par - anastomotic ulcer, similar in appearance to 2019 pouchoscopy. Likely ischemic in nature, biopsies taken\par  Final Diagnosis\par Pouch ulcer, biopsy:\par - Small bowel mucosa with mild active enteritis.\par - Negative for dysplasia.\par \par Aug / 2020 patient admitted to [facility] for abdominal pain and N/V. Workup c/w volvulus and patient underwent open surgery. Details unknown per pt, no resection done. \par \par Patient developed painful bump at the anus with concern for hemorrhoid in the setting of patients h/o Crohn's r/o abscess. \par \par 9/30/22 office visit- as per request of REGINA David to r/o abscess. +Left anterior lateral perianal margin with erythema and fluctuance, and tender s/p I &D. 10 cc of purulent material obtained. Reviewed potential risk of possible anal fistula disease and risk of recurrence in setting of patients h/o Crohn's disease. Local wound care advised\par \par Seen 10/21/22: Findings on exam are consistent with potential underlying anal fistula disease. Recommended MRI for further evaluation. Risk of recurrent anal abscess has been detailed and discussed. Potential need for further review and future incision and drainage and possible seton insertion was outlined. 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 Mucosal opening not well visualized.\par \par Last seen 10/28/22, exam noted left lateral perianal margin nodularity w/ fistula opening and drainage. Fistula probed and c/f internal opening at level of ileoanal anastomosis. Pt advised complex pouch anal fistula and recommended consultation w/ Dr. Meyers. \par In interim, patient's wife contacted SALVATORE Bernal's office on 11/1/22 to inform that patient was found down that morning and hospitalized in the CCU at [facility] for heart failure and pneumonia\par Pt had appointment w/ CRS Dr. Mary on 11/2, but missed the appointment due to hospitalization.\par \par He has an appointment to see Dr. Meyers at [facility] in 2 weeks.\par \par Yesterday he noticed that the left lateral abscess spontaneously decompressed.\par

## 2022-12-05 ENCOUNTER — APPOINTMENT (OUTPATIENT)
Dept: GASTROENTEROLOGY | Facility: CLINIC | Age: 37
End: 2022-12-05

## 2022-12-05 VITALS
BODY MASS INDEX: 22.76 KG/M2 | WEIGHT: 145 LBS | HEART RATE: 95 BPM | OXYGEN SATURATION: 98 % | TEMPERATURE: 96.7 F | RESPIRATION RATE: 16 BRPM | HEIGHT: 67 IN | SYSTOLIC BLOOD PRESSURE: 125 MMHG | DIASTOLIC BLOOD PRESSURE: 80 MMHG

## 2022-12-05 PROCEDURE — 99214 OFFICE O/P EST MOD 30 MIN: CPT

## 2022-12-06 LAB
ALBUMIN SERPL ELPH-MCNC: 4.7 G/DL
ALP BLD-CCNC: 45 U/L
ALT SERPL-CCNC: 53 U/L
ANION GAP SERPL CALC-SCNC: 10 MMOL/L
AST SERPL-CCNC: 43 U/L
BASOPHILS # BLD AUTO: 0.03 K/UL
BASOPHILS NFR BLD AUTO: 0.4 %
BILIRUB SERPL-MCNC: 1 MG/DL
BUN SERPL-MCNC: 19 MG/DL
CALCIUM SERPL-MCNC: 10.4 MG/DL
CHLORIDE SERPL-SCNC: 98 MMOL/L
CO2 SERPL-SCNC: 27 MMOL/L
CREAT SERPL-MCNC: 0.88 MG/DL
CRP SERPL-MCNC: <3 MG/L
EGFR: 114 ML/MIN/1.73M2
EOSINOPHIL # BLD AUTO: 0.08 K/UL
EOSINOPHIL NFR BLD AUTO: 1 %
GLUCOSE SERPL-MCNC: 97 MG/DL
HCT VFR BLD CALC: 48.3 %
HGB BLD-MCNC: 15.7 G/DL
IMM GRANULOCYTES NFR BLD AUTO: 0.1 %
LYMPHOCYTES # BLD AUTO: 3.36 K/UL
LYMPHOCYTES NFR BLD AUTO: 43.7 %
MAN DIFF?: NORMAL
MCHC RBC-ENTMCNC: 28.6 PG
MCHC RBC-ENTMCNC: 32.5 GM/DL
MCV RBC AUTO: 88.1 FL
MONOCYTES # BLD AUTO: 0.53 K/UL
MONOCYTES NFR BLD AUTO: 6.9 %
NEUTROPHILS # BLD AUTO: 3.68 K/UL
NEUTROPHILS NFR BLD AUTO: 47.9 %
PLATELET # BLD AUTO: 214 K/UL
POTASSIUM SERPL-SCNC: 4 MMOL/L
PROT SERPL-MCNC: 8 G/DL
RBC # BLD: 5.48 M/UL
RBC # FLD: 16.6 %
SODIUM SERPL-SCNC: 135 MMOL/L
WBC # FLD AUTO: 7.69 K/UL

## 2022-12-07 LAB — DSDNA AB SER-ACNC: 80 IU/ML

## 2022-12-08 LAB
ANA PAT FLD IF-IMP: ABNORMAL
ANA SER IF-ACNC: ABNORMAL

## 2022-12-09 NOTE — ASSESSMENT
[FreeTextEntry1] : Pt is s 38 y/o male with recurrent volvulus requiring surgery and initial Dx of UC s/p J pouch (2006) with recurrent pouchitis 2015, 2017; and found to have disease in small bowel so converted to diagnosis of Crohns disease,currently on monotherapy with weekly Humira since June 2018 after having bout of DILI from IFX, presenting for f/u. Pt last seen 9/2020 and was doing well at that time until a few months ago when he developed an abscess and fistula with I&D 9/3022 by Dr Hansen. \par \par Pt recently hospitalized for ~3 weeks due to aspiration pna? s/p sinus infection and use of multiple sedative/hypnotics.  After hospital DC, previously drained abscess recurred, saw Dr Hansen, and is scheduled for eval with Dr Meyers at [facility]. Now also has some fecal incontinence with ~7 loose BM daily (baseline of 4) with diffuse joint pains similar to what he experienced in the past when IFX failed. Denies abdominal pain, bleeding, N/V. Pouchoscopy 1/9/20 with anastomotic ulcer, similar in appearance to 2019 pouchoscopy and pathology neg for dysplasia. \par \par # Crohn's of small bowel/J Pouch, in flare\par - given development of abscess and fistula on ADA,  and development of diffuse arthralgias are suggestive of drug induced lupus, would rec DC humira at this time\par - order for ADA level/abs \par - f/u Dr Meyers next week for surgical plan regarding pouch and abscess/fistula\par - f/u CBC, CMP, CRP\par - f/u GRIFFIN, and DS-DNA for eval of diffuse joint pains, r/o drug induced lupus like deposits/disease\par - stool test for c diff, GI PCR, and calpro\par - trial rifaximin BID\par - [facility] records requested\par \par # Ischemic Ulcer at anastomosis\par - previously discussed Hyperbaric Oxygent Treatment to prevent long term complications of ischemia, however will reevaluate pending surgical plan regarding pouch (dr Meyers)\par \par # Recurrent volvulus- resolved/stable\par - Will get records - op reports, imaging and disc images from [facility]. \par If the volvulus was simply reduced without tacking of the mesentery it's unclear what his risk of recurrence might be.\par \par Follow up tele in 4-6 weeks\par \par \par Richardson Walker DO\par [facility] IBD Fellow

## 2022-12-09 NOTE — PHYSICAL EXAM
[Alert] : alert [No Acute Distress] : no acute distress [Sclera] : the sclera and conjunctiva were normal [Hearing Threshold Finger Rub Not [place]] : hearing was normal [Normal Appearance] : the appearance of the neck was normal [No Acc Muscle Use] : no accessory muscle use [Auscultation Breath Sounds / Voice Sounds] : lungs were clear to auscultation bilaterally [Heart Rate And Rhythm] : heart rate was normal and rhythm regular [Normal S1, S2] : normal S1 and S2 [Bowel Sounds] : normal bowel sounds [Abdomen Tenderness] : non-tender [No Masses] : no abdominal mass palpated [Abdomen Soft] : soft [Supraclavicular Lymph Nodes Enlarged Bilaterally] : no supraclavicular lymphadenopathy [Cervical Lymph Nodes Enlarged Anterior Bilaterally] : no anterior cervical lymphadenopathy [No CVA Tenderness] : no CVA  tenderness [No Spinal Tenderness] : no spinal tenderness [Abnormal Walk] : normal gait [] : no rash [No Focal Deficits] : no focal deficits [Oriented To Time, Place, And Person] : oriented to person, place, and time [Normal Insight/Judgment] : insight and judgment were intact [de-identified] : multiple surgical scars, well healed [de-identified] : one perianal fistula noted at left mid/posterior position. No drainage, fluctuance, or tenderness

## 2022-12-09 NOTE — HISTORY OF PRESENT ILLNESS
[FreeTextEntry1] : Pt is s 36 y/o male with recurrent volvulus requiring surgery and initial Dx of UC s/p J pouch (2006) with recurrent pouchitis 2015, 2017; and found to have disease in small bowel so converted to diagnosis of Crohns disease,currently on monotherapy with Humira since June 2018 after having bout of DILI from IFX, presenting for f/u. Pt last seen 9/2020 and was doing well at that time until a few months ago when he developed an abscess and fistula with I&D 9/3022 by Dr Hansen. Pt recently hospitalized for ~3 weeks due to aspiration pna? s/p severe sinus infection and use of multiple sedative/hypnotics. Only missed one dose of ADA over course of illness. After hospital DC, previously drained abscess recurred, saw Dr Hansen, and is scheduled for eval with Dr Meyers at [facility]. Since abscess started again, he now has some fecal incontinence with ~7 loose BM daily (baseline of 4). also with diffuse joint pains similar to what he experienced in the past when IFX failed. Denies abdominal pain, bleeding, N/V.\par \par Pouchoscopy 1/9/20:\par - normal pouch body\par - normal efferent and afferent limb, no evidence of CD\par - anastomotic ulcer, similar in appearance to 2019 pouchoscopy. Likely ischemic in nature, biopsies taken\par  Final Diagnosis\par \par Pouch ulcer, biopsy:\par - Small bowel mucosa with mild active enteritis.\par - Negative for dysplasia.\par \par \par Prior HPI:\par \par 35 yr old M with PMHx significant for UC s/p TPC J pouch (2006) s/p recurrent pouchitis 2015, 2017; and found to have disease in small bowel so converted to diagnosis of Crohns disease, he is currently on monotherapy with Humira since June 2018 after having bout of DILI from IFX, who presents for follow up in clinical remission. Underwent surgery for volvulus in August. \par \par In June 2020, patient was admitted to [facility] for volvulus. CT revealed swirling of the mesentery and bowel just proximal to j-pouch anastomosis with dilation of proximal bowel concerning for volvulus. Luckily it resolved with conservative care. Patient then saw Dr. Hansen and underwent Gastrografin for further assessment, which didn't reveal any underlying abnormality. \par \par In Aug 2020, patient was visiting his in-laws in [place], when he developed abdominal pain, n/v and got admitted at [facility]. Here he was diagnosed with volvulus and underwent open surgery. Details of surgery are unknown as of now but per patient no resection done. During hospitalization patient skipped 2 doses of Humira but now he is back on qweekly regime. \par \par Underwent pouchoscopy earlier this year in Jan 2020 which revealed normal pouch body, normal efferent and afferent limb. No evidence of CD. Anastomotic ulcer, similar in appearance to 2019 pouchoscopy was seen again. Likely ischemic in nature. \par \par Apart from this per patient his CD has been in remission with no specific GI complains. he is having 5 BM every day. No urgency. No blood in stool. No abdominal pain or rectal discomfort. No bloating. No weight loss or appetite changes. No n/v, fevers/chills.

## 2022-12-13 ENCOUNTER — TRANSCRIPTION ENCOUNTER (OUTPATIENT)
Age: 37
End: 2022-12-13

## 2023-01-18 ENCOUNTER — APPOINTMENT (OUTPATIENT)
Dept: GASTROENTEROLOGY | Facility: CLINIC | Age: 38
End: 2023-01-18
Payer: COMMERCIAL

## 2023-01-18 PROCEDURE — 99214 OFFICE O/P EST MOD 30 MIN: CPT | Mod: 95

## 2023-01-19 ENCOUNTER — TRANSCRIPTION ENCOUNTER (OUTPATIENT)
Age: 38
End: 2023-01-19

## 2023-01-19 NOTE — PHYSICAL EXAM
[Alert] : alert [No Acute Distress] : no acute distress [Oriented To Time, Place, And Person] : oriented to person, place, and time [Normal Insight/Judgment] : insight and judgment were intact

## 2023-01-19 NOTE — REASON FOR VISIT
[Home] : at home, [unfilled] , at the time of the visit. [Medical Office: ([facility])___] : at the medical office located in  [Patient] : the patient [Self] : self

## 2023-01-22 NOTE — HISTORY OF PRESENT ILLNESS
[FreeTextEntry1] : Pt is s 38 y/o male with recurrent volvulus requiring surgery and initial Dx of UC s/p J pouch (2006) with recurrent pouchitis 2015, 2017, and found to have disease in small bowel so converted to diagnosis of Crohns disease, presenting for f/u. 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 Labs with strongly positive GRIFFIN and positive DS-DNA. He was started on rifaximin for CD at that time and has been taking it once daily with decrease in BM frequency from 7 to 4 times daily. Of note, pt also currently on opioid pain meds prescribed by pain management for arthralgias.\par \par He was doing relatively well on ADA until he developed an abscess and fistula with I&D 9/2022 by Dr Hansen with recurrence of abscess (due to hospitalization for aspiration PNA? in setting of severe sinus infection and use of multiple sedative/hypnotics) with nocturnal fecal incontinence and planned for abscess/fistula surgery with Dr Meyers at [facility] early February 2023. Diffuse arthralgias have remained stable with minimal change since last visit.\par \par \par Prior HPI:\par \par Pt is s 38 y/o male with recurrent volvulus requiring surgery and initial Dx of UC s/p J pouch (2006) with recurrent pouchitis 2015, 2017; and found to have disease in small bowel so converted to diagnosis of Crohns disease,currently on monotherapy with Humira since June 2018 after having bout of DILI from IFX, presenting for f/u. Pt last seen 9/2020 and was doing well at that time until a few months ago when he developed an abscess and fistula with I&D 9/3022 by Dr Hansen. Pt recently hospitalized for ~3 weeks due to aspiration pna? s/p severe sinus infection and use of multiple sedative/hypnotics. Only missed one dose of ADA over course of illness. After hospital DC, previously drained abscess recurred, saw Dr Hansen, and is scheduled for eval with Dr Meyers at [facility]. Since abscess started again, he now has some fecal incontinence with ~7 loose BM daily (baseline of 4). also with diffuse joint pains similar to what he experienced in the past when IFX failed. Denies abdominal pain, bleeding, N/V.\par \par Pouchoscopy 1/9/20:\par - normal pouch body\par - normal efferent and afferent limb, no evidence of CD\par - anastomotic ulcer, similar in appearance to 2019 pouchoscopy. Likely ischemic in nature, biopsies taken\par  Final Diagnosis\par \par Pouch ulcer, biopsy:\par - Small bowel mucosa with mild active enteritis.\par - Negative for dysplasia.\par \par \par Prior HPI:\par \par 35 yr old M with PMHx significant for UC s/p TPC J pouch (2006) s/p recurrent pouchitis 2015, 2017; and found to have disease in small bowel so converted to diagnosis of Crohns disease, he is currently on monotherapy with Humira since June 2018 after having bout of DILI from IFX, who presents for follow up in clinical remission. Underwent surgery for volvulus in August. \par \par In June 2020, patient was admitted to [facility] for volvulus. CT revealed swirling of the mesentery and bowel just proximal to j-pouch anastomosis with dilation of proximal bowel concerning for volvulus. Luckily it resolved with conservative care. Patient then saw Dr. Hansen and underwent Gastrografin for further assessment, which didn't reveal any underlying abnormality. \par \par In Aug 2020, patient was visiting his in-laws in [place], when he developed abdominal pain, n/v and got admitted at [facility]. Here he was diagnosed with volvulus and underwent open surgery. Details of surgery are unknown as of now but per patient no resection done. During hospitalization patient skipped 2 doses of Humira but now he is back on qweekly regime. \par \par Underwent pouchoscopy earlier this year in Jan 2020 which revealed normal pouch body, normal efferent and afferent limb. No evidence of CD. Anastomotic ulcer, similar in appearance to 2019 pouchoscopy was seen again. Likely ischemic in nature. \par \par Apart from this per patient his CD has been in remission with no specific GI complains. he is having 5 BM every day. No urgency. No blood in stool. No abdominal pain or rectal discomfort. No bloating. No weight loss or appetite changes. No n/v, fevers/chills.

## 2023-01-22 NOTE — ASSESSMENT
[FreeTextEntry1] : Pt is s 36 y/o male with recurrent volvulus requiring surgery and initial Dx of UC s/p J pouch (2006) with recurrent pouchitis 2015, 2017, and found to have disease in small bowel so converted to diagnosis of Crohns disease, presenting for f/u. 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 Labs with strongly positive GRIFFIN and positive DS-DNA. He was started on rifaximin for CD at that time and has been taking it once daily with decrease in BM frequency from 7 to 4 times daily. Of note, pt also currently on opioid pain meds prescribed by pain management for arthralgias.\par \par He was doing relatively well on ADA until he developed an abscess and fistula with I&D 9/2022 by Dr Hansen with recurrence of abscess (due to hospitalization for aspiration PNA? in setting of severe sinus infection and use of multiple sedative/hypnotics) with nocturnal fecal incontinence and planned for abscess/fistula surgery with Dr Meyers at [facility] early February 2023. Diffuse arthralgias have remained stable with minimal change since last visit.\par \par # Crohn's of small bowel/J Pouch, in flare?\par - given positive GRIFFIN and DS-DNA would rec remain off ADA and eval for further therapy after upcoming abscess/fistula surgery\par - unclear if decrease in BM frequency is due to rifaximin +/- opioid; rec to use opioids sparingly and can increase rifaximin to BID and assess response\par - 12/2022 CRP wnl, pt was unable to submit stool sample for fecal calpro, C diff, GI PCR\par - regarding surgical intervention; would rec holding off on any pouch revision or ileal diversion at this time given possible good response on rifaximin and not currently optimized on medical therapy (new fistulas)\par - [facility] records requested\par \par # Ischemic Ulcer at anastomosis\par - previously discussed Hyperbaric Oxygent Treatment to prevent long term complications of ischemia, however will reevaluate pending surgical plan regarding pouch (Dr Meyers)...He's planning pouchoscopy per pt. \par \par # Recurrent volvulus- resolved/stable\par - Will request records - op reports, imaging and disc images from [facility] and [place]. \par If the volvulus was simply reduced without tacking of the mesentery it's unclear what his risk of recurrence might be.\par \par Follow up late 2/2023\par \par \par Richardson Walker DO\par [facility] IBD Fellow.

## 2023-01-24 ENCOUNTER — TRANSCRIPTION ENCOUNTER (OUTPATIENT)
Age: 38
End: 2023-01-24

## 2023-02-27 ENCOUNTER — RX RENEWAL (OUTPATIENT)
Age: 38
End: 2023-02-27

## 2023-03-01 ENCOUNTER — APPOINTMENT (OUTPATIENT)
Dept: GASTROENTEROLOGY | Facility: CLINIC | Age: 38
End: 2023-03-01
Payer: COMMERCIAL

## 2023-03-01 ENCOUNTER — RX RENEWAL (OUTPATIENT)
Age: 38
End: 2023-03-01

## 2023-03-01 VITALS
WEIGHT: 138 LBS | RESPIRATION RATE: 16 BRPM | OXYGEN SATURATION: 98 % | BODY MASS INDEX: 21.66 KG/M2 | DIASTOLIC BLOOD PRESSURE: 78 MMHG | HEIGHT: 67 IN | HEART RATE: 86 BPM | TEMPERATURE: 97.7 F | SYSTOLIC BLOOD PRESSURE: 120 MMHG

## 2023-03-01 DIAGNOSIS — K50.90 CROHN'S DISEASE, UNSPECIFIED, W/OUT COMPLICATIONS: ICD-10-CM

## 2023-03-01 DIAGNOSIS — Z00.00 ENCOUNTER FOR GENERAL ADULT MEDICAL EXAMINATION W/OUT ABNORMAL FINDINGS: ICD-10-CM

## 2023-03-01 PROCEDURE — 99214 OFFICE O/P EST MOD 30 MIN: CPT

## 2023-03-02 PROBLEM — K50.90 CROHN DISEASE: Status: ACTIVE | Noted: 2017-10-11

## 2023-03-03 NOTE — PHYSICAL EXAM
[Alert] : alert [No Acute Distress] : no acute distress [Sclera] : the sclera and conjunctiva were normal [Hearing Threshold Finger Rub Not [place]] : hearing was normal [Normal Appearance] : the appearance of the neck was normal [No Acc Muscle Use] : no accessory muscle use [Auscultation Breath Sounds / Voice Sounds] : lungs were clear to auscultation bilaterally [Heart Rate And Rhythm] : heart rate was normal and rhythm regular [Normal S1, S2] : normal S1 and S2 [Bowel Sounds] : normal bowel sounds [Abdomen Tenderness] : non-tender [No Masses] : no abdominal mass palpated [Abdomen Soft] : soft [Abnormal Walk] : normal gait [] : no rash [No Focal Deficits] : no focal deficits [Oriented To Time, Place, And Person] : oriented to person, place, and time [Normal Insight/Judgment] : insight and judgment were intact [de-identified] : ostomy site clean, without surrounding erythema or discharge. Ostomy output semiformed green/brown stool

## 2023-03-03 NOTE — HISTORY OF PRESENT ILLNESS
[FreeTextEntry1] : 36 y/o male with recurrent volvulus requiring surgery and initial Dx of UC s/p J pouch (2006) with recurrent pouchitis 2015, 2017, and found to have disease in small bowel so converted to diagnosis of CD, stopped IFX due to DILI and stopped ADA due to suspected drug induced lupus, presenting for f/u after recent ileostomy creation at [facility] by Dr Meyers with plan for future pouch reconstruction. Pt had EUA/pouchoscopy on 2/2 and found to have dilated pouch and stricture of anal cuff, suspected to be causing his discomfort with BM and nocturnal stool incontinence. 2/14 Ileostomy complicated by post op ileus requiring 5 days of NGT and discharged after 14 days. Pt feeling generally well since the surgery with ostomy output decreasing and becoming more formed. Eating well and sleeping better now that nocturnal BM/incontinence is not an issue anymore. 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 Labs with strongly positive GRIFFIN and positive DS-DNA. He was started on rifaximin for CD at that time and was taking it once daily with decrease in BM frequency from 7 to 4 times daily but unclear if due to rifaximin or opioid analgesics prescribed by pain management. \par \par Prior HPI:\par \par Pt is s 36 y/o male with recurrent volvulus requiring surgery and initial Dx of UC s/p J pouch (2006) with recurrent pouchitis 2015, 2017, and found to have disease in small bowel so converted to diagnosis of Crohns disease, presenting for f/u. 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 Labs with strongly positive GRIFFIN and positive DS-DNA. He was started on rifaximin for CD at that time and has been taking it once daily with decrease in BM frequency from 7 to 4 times daily. Of note, pt also currently on opioid pain meds prescribed by pain management for arthralgias.\par \par He was doing relatively well on ADA until he developed an abscess and fistula with I&D 9/2022 by Dr Hansen with recurrence of abscess (due to hospitalization for aspiration PNA? in setting of severe sinus infection and use of multiple sedative/hypnotics) with nocturnal fecal incontinence and planned for abscess/fistula surgery with Dr Meyers at [facility] early February 2023. Diffuse arthralgias have remained stable with minimal change since last visit.\par \par \par Prior HPI:\par \par Pt is s 36 y/o male with recurrent volvulus requiring surgery and initial Dx of UC s/p J pouch (2006) with recurrent pouchitis 2015, 2017; and found to have disease in small bowel so converted to diagnosis of Crohns disease,currently on monotherapy with Humira since June 2018 after having bout of DILI from IFX, presenting for f/u. Pt last seen 9/2020 and was doing well at that time until a few months ago when he developed an abscess and fistula with I&D 9/3022 by Dr Hansen. Pt recently hospitalized for ~3 weeks due to aspiration pna? s/p severe sinus infection and use of multiple sedative/hypnotics. Only missed one dose of ADA over course of illness. After hospital DC, previously drained abscess recurred, saw Dr Hansen, and is scheduled for eval with Dr Meyers at [facility]. Since abscess started again, he now has some fecal incontinence with ~7 loose BM daily (baseline of 4). also with diffuse joint pains similar to what he experienced in the past when IFX failed. Denies abdominal pain, bleeding, N/V.\par \par Pouchoscopy 1/9/20:\par - normal pouch body\par - normal efferent and afferent limb, no evidence of CD\par - anastomotic ulcer, similar in appearance to 2019 pouchoscopy. Likely ischemic in nature, biopsies taken\par  Final Diagnosis\par \par Pouch ulcer, biopsy:\par - Small bowel mucosa with mild active enteritis.\par - Negative for dysplasia.\par \par \par Prior HPI:\par \par 35 yr old M with PMHx significant for UC s/p TPC J pouch (2006) s/p recurrent pouchitis 2015, 2017; and found to have disease in small bowel so converted to diagnosis of Crohns disease, he is currently on monotherapy with Humira since June 2018 after having bout of DILI from IFX, who presents for follow up in clinical remission. Underwent surgery for volvulus in August. \par \par In June 2020, patient was admitted to [facility] for volvulus. CT revealed swirling of the mesentery and bowel just proximal to j-pouch anastomosis with dilation of proximal bowel concerning for volvulus. Luckily it resolved with conservative care. Patient then saw Dr. Hansen and underwent Gastrografin for further assessment, which didn't reveal any underlying abnormality. \par \par In Aug 2020, patient was visiting his in-laws in [place], when he developed abdominal pain, n/v and got admitted at [facility]. Here he was diagnosed with volvulus and underwent open surgery. Details of surgery are unknown as of now but per patient no resection done. During hospitalization patient skipped 2 doses of Humira but now he is back on qweekly regime. \par \[facility] Underwent pouchoscopy earlier this year in Jan 2020 which revealed normal pouch body, normal efferent and afferent limb. No evidence of CD. Anastomotic ulcer, similar in appearance to 2019 pouchoscopy was seen again. Likely ischemic in nature. \par \par Apart from this per patient his CD has been in remission with no specific GI complains. he is having 5 BM every day. No urgency. No blood in stool. No abdominal pain or rectal discomfort. No bloating. No weight loss or appetite changes. No n/v, fevers/chills.

## 2023-03-03 NOTE — ASSESSMENT
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 EUA/pouchoscopy on 2/2 with dilated pouch and stricture of anal cuff, suspected to be causing his discomfort with BM and nocturnal stool incontinence. 2/14 Ileostomy complicated by post op ileus requiring 5 days of NGT and discharged after 14 days. Pt feeling generally well since the surgery with ostomy output decreasing and becoming more formed. Eating well and sleeping better now that nocturnal BM/incontinence is not an issue anymore. \par \par # Crohn's of small bowel/J Pouch; s/p ileostomy creation\par - as no evidence of active disease on 1/2020 and recent pouchoscopy at [facility], comfortable to hold off further biologic therapy at this time and would reevaluate as surgical plan progresses. 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 \par If the volvulus was simply reduced without tacking of the mesentery it's unclear what his risk of recurrence might be.\par \par Follow up 3 months\par \par \par Richardson Walker DO\par [facility] IBD Fellow

## 2023-03-08 ENCOUNTER — TRANSCRIPTION ENCOUNTER (OUTPATIENT)
Age: 38
End: 2023-03-08

## 2023-05-11 ENCOUNTER — APPOINTMENT (OUTPATIENT)
Dept: GASTROENTEROLOGY | Facility: CLINIC | Age: 38
End: 2023-05-11

## 2023-07-03 ENCOUNTER — APPOINTMENT (OUTPATIENT)
Dept: GASTROENTEROLOGY | Facility: CLINIC | Age: 38
End: 2023-07-03
Payer: COMMERCIAL

## 2023-07-03 PROCEDURE — 99214 OFFICE O/P EST MOD 30 MIN: CPT | Mod: 95

## 2023-07-03 NOTE — ASSESSMENT
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  Complicated post op state req. resiting of ostomy after herniation/necrosis and then f/b recurrent psbo's that req. reoperation to tack down a twisted loop / ELIGIO.\par \par # Crohn's of small bowel/J Pouch; s/p ileostomy creation\par - as no evidence of active disease on 1/2020 and recent pouchoscopy at [facility], comfortable to hold off further biologic therapy at this time and would reevaluate as surgical plan progresses. 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 \par - Given he feels well and now that hopefully the culprit loop has been tacked down, he is not at risk for these volvulus type episodes, it would not be unreasonable to hold off further surgery indefinitely if he's got a good QOL with ileostomy. \par - recently reviewed [facility] imaging via [facility].\par \par \par Follow up 6 months\par \par

## 2023-07-03 NOTE — HISTORY OF PRESENT ILLNESS
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  [facility]  2/14 Ileostomy complicated by post op ileus requiring 5 days of NGT and discharged after 14 days. \par \par Post op telescoping/herniation/necrosis, had to resect 6 inc, REDO OSTOMY\par several psbo's since - conservative mgmt; catheter into ostomy worked first; 2nd time -REOPERATION to take down scar tissue; tacked small bowel to abdomen to keep anatomic orientation; This may have been the same process that had hospitalized him with a volvulus picture.\par \par Was ON TPN, x 1mo; week later nausea after stopping; tolerating meals though; he requested CT scan - some mild perihepatic ascites.\par \par We had been in touch recently regarding his concerns re: perihep ascies.\par \par Currently feeling OK.  Tolerating oral and managing weight. Nausea is resolved. \par \par .\par Medication Exposures: \par Pt previously on IFX with good response but stopped due to DILI, was on monotherapy with ADA from 6/2018 until 12/2022 when it was stopped due to development of diffuse arthralgias and suspected drug induced lupus. Labs with strongly positive GRIFFIN and positive DS-DNA. He was started on rifaximin for CD at that time and was taking it once daily with decrease in BM frequency from 7 to 4 times daily but unclear if due to rifaximin or opioid analgesics prescribed by pain management. \par \par

## 2023-07-03 NOTE — REASON FOR VISIT
[Home] : at home, [unfilled] , at the time of the visit. [Medical Office: ([facility])___] : at the medical office located in  [Patient] : the patient [Self] : self

## 2023-07-03 NOTE — PHYSICAL EXAM
[Alert] : alert [No Acute Distress] : no acute distress [Sclera] : the sclera and conjunctiva were normal [Hearing Threshold Finger Rub Not [place]] : hearing was normal [Normal Appearance] : the appearance of the neck was normal [No Acc Muscle Use] : no accessory muscle use [Auscultation Breath Sounds / Voice Sounds] : lungs were clear to auscultation bilaterally [Heart Rate And Rhythm] : heart rate was normal and rhythm regular [Normal S1, S2] : normal S1 and S2 [Bowel Sounds] : normal bowel sounds [Abdomen Tenderness] : non-tender [No Masses] : no abdominal mass palpated [Abdomen Soft] : soft [Abnormal Walk] : normal gait [] : no rash [No Focal Deficits] : no focal deficits [Oriented To Time, Place, And Person] : oriented to person, place, and time [Normal Insight/Judgment] : insight and judgment were intact [de-identified] : ostomy site clean, without surrounding erythema or discharge. Ostomy output semiformed green/brown stool

## 2023-08-29 ENCOUNTER — APPOINTMENT (OUTPATIENT)
Dept: GASTROENTEROLOGY | Facility: CLINIC | Age: 38
End: 2023-08-29
Payer: COMMERCIAL

## 2023-08-29 PROCEDURE — 99213 OFFICE O/P EST LOW 20 MIN: CPT | Mod: 95

## 2023-08-29 RX ORDER — MONTELUKAST 10 MG/1
10 TABLET, FILM COATED ORAL
Qty: 30 | Refills: 0 | Status: DISCONTINUED | COMMUNITY
Start: 2022-10-11 | End: 2023-08-29

## 2023-08-29 RX ORDER — ESZOPICLONE 3 MG/1
3 TABLET, FILM COATED ORAL
Qty: 24 | Refills: 0 | Status: DISCONTINUED | COMMUNITY
Start: 2022-05-25 | End: 2023-08-29

## 2023-08-29 RX ORDER — TRAZODONE HYDROCHLORIDE 300 MG/1
TABLET ORAL
Refills: 0 | Status: ACTIVE | COMMUNITY

## 2023-08-29 RX ORDER — ADALIMUMAB 40MG/0.4ML
40 KIT SUBCUTANEOUS
Qty: 2 | Refills: 4 | Status: DISCONTINUED | COMMUNITY
Start: 2018-07-06 | End: 2023-08-29

## 2023-08-29 RX ORDER — OSELTAMIVIR PHOSPHATE 75 MG/1
75 CAPSULE ORAL
Qty: 10 | Refills: 0 | Status: DISCONTINUED | COMMUNITY
Start: 2022-05-06 | End: 2023-08-29

## 2023-08-29 RX ORDER — MIRTAZAPINE 30 MG/1
30 TABLET, FILM COATED ORAL
Refills: 0 | Status: ACTIVE | COMMUNITY

## 2023-08-29 RX ORDER — METHYLPREDNISOLONE 4 MG/1
4 TABLET ORAL
Qty: 21 | Refills: 0 | Status: DISCONTINUED | COMMUNITY
Start: 2022-10-11 | End: 2023-08-29

## 2023-08-29 RX ORDER — ESZOPICLONE 1 MG/1
1 TABLET, FILM COATED ORAL
Qty: 30 | Refills: 0 | Status: DISCONTINUED | COMMUNITY
Start: 2022-05-04 | End: 2023-08-29

## 2023-08-29 RX ORDER — RIFAXIMIN 550 MG/1
550 TABLET ORAL
Qty: 60 | Refills: 0 | Status: DISCONTINUED | COMMUNITY
Start: 2022-12-05 | End: 2023-08-29

## 2023-08-29 NOTE — REASON FOR VISIT
[Follow-up] : a follow-up of an existing diagnosis [Home] : at home, [unfilled] , at the time of the visit. [Medical Office: ([facility])___] : at the medical office located in  [Patient] : the patient [Self] : self

## 2023-08-29 NOTE — ASSESSMENT
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  Complicated post op state req. revision of ostomy after herniation/necrosis and then f/b recurrent psbo's that req. reoperation to tack down a twisted loop / ELIGIO. Had another ?ELIGIO in July and SBO causing septic shock and has been on TPN and oral clear liquid diet since feeling overall stable with no obstructions until yesterday. In ER at [facility] for work-up although symptoms now resolving. Also notes chronic nausea for 1 month but mild.   # Crohn's of small bowel/J Pouch; s/p ileostomy creation - as no evidence of active disease on 1/2020 and recent pouchoscopy at [facility], comfortable to hold off further biologic therapy at this time and would reevaluate as surgical plan progresses. 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  - it would not be unreasonable to hold off further surgery indefinitely if he's got a good QOL with ileostomy.  - he is in [facility] ER now undergoing CT; pt will update us on results - cont TPN/clear liquid diet as per RD at [facility] and Dr. Meyers   Chronic nausea - being worked up with Dr. Meyers - consider multipharmacy while on limited PO diet tara narcotics - cont to taper as per pain management    Follow up PRN - he understands that his care is mostly being driven by Dr. Meyers for surgical management given no active Crohn's but he prefers to update us  Dr. Bernabe notified of status

## 2023-11-02 ENCOUNTER — APPOINTMENT (OUTPATIENT)
Dept: HEPATOLOGY | Facility: CLINIC | Age: 38
End: 2023-11-02

## 2023-11-06 ENCOUNTER — APPOINTMENT (OUTPATIENT)
Dept: ULTRASOUND IMAGING | Facility: CLINIC | Age: 38
End: 2023-11-06

## 2024-01-17 NOTE — ANESTHESIA PREPROCEDURE EVALUATION
Last office visit date: 9/21/23    Next appointment scheduled?: Yes 3/21/24    Number of refills given: 2     Relevant Problems   GASTROINTESTINAL   (+) Crohn's disease of colon (CMS/HCC)       Anesthesia ROS/MED HX      Cardiovascular   Covid19 Test Reviewed and ECG reviewed  Endo/Other  Body Habitus: Normal       Past Surgical History:   Procedure Laterality Date   • ABDOMINAL SURGERY     • BOWEL RESECTION         Physical Exam    Airway   Mallampati: II   TM distance: >3 FB   Neck ROM: full  Cardiovascular    Rhythm: regular   Rate: normalPulmonary    clear to auscultation  Dental           Anesthesia Plan    Plan: general    Technique: general endotracheal     Airway: direct visual laryngoscopy and oral intubation       patient did not smoke on day of surgery  ASA 3  Blood Products:     Use of Blood Products Discussed: Yes     Consented to blood products  Anesthetic plan and risks discussed with: patient  Induction:    intravenous   Postop Plan:   Patient Disposition: inpatient floor planned admission   Pain Management: IV analgesics

## 2024-01-25 NOTE — CONSULTS
GI CONSULT NOTE       HISTORY    LOS: 0 days     Selivn Jorge is a 35 y.o. male seen in consultation at the request of Eduardo Figueroa MD for management recommendations related to Abdominal pain. He has a history of Crohn's disease status post total colectomy and J pouch in 2008. He is from [place] and is in town visiting when he developed diffuse abdominal pain, nausea and vomiting, and decreased bowel habits throughout the day. He does have a history of a volvulus that self-resolved two months ago. He reportedly had a barium enema 11 days ago which was normal.    Unable to review outside records..    Past Medical History:   Diagnosis Date   • Crohn's disease (CMS/HCC)      Past Surgical History:   Procedure Laterality Date   • ABDOMINAL SURGERY       Social History     Social History Narrative   • Not on file     History reviewed. No pertinent family history.  No Known Allergies    Home Medications:  Not in a hospital admission.    Current Medications:      Review of Systems  All other systems were reviewed and are negative other than as stated in the HPI (10 point review).     PHYSICAL EXAM     Physical Exam  Visit Vitals  /73 (BP Location: Right upper arm, Patient Position: Lying)   Pulse (!) 55   Temp (!) 35.6 °C (96 °F) (Tympanic)   Resp 16   SpO2 100%       General appearance: alert, appears stated age and cooperative  Head: normocephalic, without abnormality, atraumatic, NG tube in place  Lungs: clear to auscultation b/l  Heart: regular rate and rhythm, S1, S2 normal, no murmur, click, rub or gallop  Abdomen: Hypoactive bowel sounds, nondistended but tympanic, non-tender  Rectal: Deferred  Extremities: extremities normal, warm and well-perfused; no cyanosis, clubbing, or edema  Skin: Skin color, texture, turgor normal. No rashes or lesions  Neurologic: Grossly normal       LABS & IMAGING   Labs  I have reviewed the patient's labs.  Current labs are within normal limits.    Results from last  I can offer 12:20pm on Monday 1/29/2024 for both concerns but I cannot facilitate appointment on 2/1/2024 as requested.   7 days   Lab Units 08/17/20  2246   SODIUM mEQ/L 134*   POTASSIUM mEQ/L 4.1   CHLORIDE mEQ/L 100   CO2 mEQ/L 24   BUN mg/dL 21*   CREATININE mg/dL 1.1   CALCIUM mg/dL 9.1   ALBUMIN g/dL 4.6   BILIRUBIN TOTAL mg/dL 1.9*   ALK PHOS IU/L 38   ALT IU/L 45   AST IU/L 67*   GLUCOSE mg/dL 86     Results from last 7 days   Lab Units 08/17/20  2245   WBC K/uL 9.07   HEMOGLOBIN g/dL 15.4   HEMATOCRIT % 46.8   PLATELETS K/uL 243           Imaging  I have reviewed the Imaging from the last 24 hrs.     Ct Abdomen Pelvis With Iv Contrast    Addendum Date: 8/18/2020    Further history was given including status post total colectomy and J-pouch. Given this additional information, the marked dilatation proximal to the anastomosis is dilated small bowel loops (not colon as was previously mentioned) with  suspected volvulus likely related to possible stricture at the anastomosis.    Result Date: 8/18/2020  IMPRESSION: Marked distention of the colon with probable transition point at the proximal rectosigmoid anastomosis, potentially on the basis of an underlying stricture with suspected secondary sigmoid volvulus.  Small amount of free fluid. Finding: Other   Acuity: Critical  Status: CLOSED The results were critically read back with DR CIRO GÓMEZ on 8/18/2020 1:37 AM. I certify that I have reviewed this examination and agree with this report. Earlene Mcgraw MD       ASSESSMENT AND PLAN     Volvulus of colon (CMS/HCC)  Assessment & Plan  35 year old male with history of Crohn's disease s/p total colectomy with J-pouch and recent volvulus two months ago now with imaging concerning for recurrent volvulus.     Plan:  - Emergent decompression via flex sig  - Ultimately may need consideration for surgery given that this the the second episode in two months  - Surgery on board; awaiting formal consult    Crohn's disease of colon (CMS/HCC)  Assessment & Plan  See plan above            Hazel Jose,   8/18/2020  3:17 AM

## 2024-05-20 NOTE — ED PROVIDER NOTE - EYES NEGATIVE STATEMENT, MLM
Problem: Nausea and Vomiting  Goal: Nausea and Vomiting Relief  Outcome: Progressing  Intervention: Prevent and Manage Nausea and Vomiting  Recent Flowsheet Documentation  Taken 5/20/2024 0345 by Bella Smith RN  Nausea/Vomiting Interventions: antiemetic  Taken 5/20/2024 0015 by Bella Smith RN  Fluid/Electrolyte Management: fluids restricted   Goal Outcome Evaluation:      Plan of Care Reviewed With: patient    Overall Patient Progress: improvingOverall Patient Progress: improving     Heart Failure Care Map  GOALS TO BE MET BEFORE DISCHARGE:    1. Decrease congestion and/or edema with diuretic therapy to achieve near optimal volume status.     Dyspnea improved: Yes, satisfactory for discharge.   Edema improved: No, further care required to meet this goal. Please explain Trace of edema on left leg        Last 24 hour I/O:   Intake/Output Summary (Last 24 hours) at 5/20/2024 0522  Last data filed at 5/20/2024 0400  Gross per 24 hour   Intake 1296 ml   Output 200 ml   Net 1096 ml           Net I/O and Weights since admission:   04/20 0700 - 05/20 0659  In: 2694 [P.O.:2694]  Out: 1900 [Urine:1900]  Net: 794     Vitals:    05/17/24 1504 05/18/24 0027 05/19/24 0600 05/20/24 0345   Weight: 99.3 kg (219 lb) 100.7 kg (222 lb) 98 kg (216 lb 0.8 oz) 98.7 kg (217 lb 8 oz)       2.  O2 sats > 90% on room air, or at prior home O2 therapy level.      Able to wean O2 this shift to keep sats above 90%?: Yes, satisfactory for discharge.   Does patient use Home O2? No          Current oxygenation status:   SpO2: 94 %     O2 Device: None (Room air),      3.  Tolerates ambulation and mobility near baseline.     Ambulation: No, further care required to meet this goal. Please explain SBA. Pt often feels dizzy and have tremors with getting up   Times patient ambulated with staff this shift: 0    Please review the Heart Failure Care Map for additional HF goal outcomes.    A & O. Denies pain. VSS on RA. Sinus rhythm. PRN  Zofran given for nausea. Intermittent tremors on right arm. Hemoglobin this morning was 7.2  Contact precautions maintained.    Bella Smith RN  5/20/2024         no discharge, no irritation, no pain, no redness, and no visual changes.

## 2024-06-18 NOTE — DATA REVIEWED
[FreeTextEntry1] : [facility]- diagnosed with UC in 2006 s/p 2-stage j pouch surgery in 2008 at [facility] in [place] with Dr. Wexner  2/2/2023- EUA/Flexible Pouchoscopy FINDINGS: long anal cuff with stricture and a hardness, patent IPAA with healthy appearing j-pouch, punctate hole at 3 o'clock with patient in lithotomy position but does not tract (evidence of healing fistula) and without drainage or abscess noted. Kenalog and local anesthetic injected perianally. I do believe this is a mechanical issue that can be addressed if he is interested in with a 3 stage redo pouch procedure.  2/14/2023- lap loop ileostomy creation   4/4/2023- Ileostomy revision due to prolapse and converted to end ileostomy FINAL DIAGNOSIS 4/4/2023 A. Small intestine; excision/resection: -Segment of small intestine with active enteritis with erosion, hemorrhage, and serosal adhesions. -Negative for granuloma and dysplasia. -Resection margins are viable.  5/3/2023- lap lysis of adhesions, enteropexy  FINDINGS: extensive interloop adhesions. Large defect between the small bowel distal to the ileostomy and the right abdominal wall with the proximal side to side anastomosis herniating through. No strangulation. Distal limb pexied to the abdominal wall to close the defect.  7/28/2023- s/p diagnostic laparoscopy with preemptive conversion to explorative laparotomy, lysis of adhesions extensive, revision of ileostomy with small bowel resection including the previous ileostomy closure site which was a side-to-side anastomosis as end ileostomy, seprafilm insertion, decompression of the small bowel  8/29/2023- CT Abdomen/Pelvis FINDINGS: BOWEL: Status post ileal pouch with right lower quadrant ileostomy creation. No evidence of bowel obstruction. Diffuse gastric wall thickening and enhancement involving the fundus and body (series 3 image 17), suspicious for gastritis. IMPRESSION: Diffuse gastric wall thickening and enhancement involving the fundus and body, suspicious for gastritis. No other acute abdominopelvic abnormality.

## 2024-06-18 NOTE — PHYSICAL EXAM
[No Rash or Lesion] : No rash or lesion [Alert] : alert [Oriented to Person] : oriented to person [Oriented to Place] : oriented to place [Oriented to Time] : oriented to time [de-identified] : [id number] [de-identified] : NC/AT, anicteric [de-identified] : no C/C/E

## 2024-06-19 ENCOUNTER — LABORATORY RESULT (OUTPATIENT)
Age: 39
End: 2024-06-19

## 2024-06-19 ENCOUNTER — APPOINTMENT (OUTPATIENT)
Dept: UROLOGY | Facility: CLINIC | Age: 39
End: 2024-06-19
Payer: COMMERCIAL

## 2024-06-19 VITALS
BODY MASS INDEX: 21.97 KG/M2 | HEART RATE: 67 BPM | WEIGHT: 140 LBS | RESPIRATION RATE: 16 BRPM | HEIGHT: 67 IN | OXYGEN SATURATION: 100 % | TEMPERATURE: 97.2 F

## 2024-06-19 DIAGNOSIS — N40.1 BENIGN PROSTATIC HYPERPLASIA WITH LOWER URINARY TRACT SYMPMS: ICD-10-CM

## 2024-06-19 DIAGNOSIS — K91.858 OTHER COMPLICATIONS OF INTESTINAL POUCH: ICD-10-CM

## 2024-06-19 DIAGNOSIS — R39.12 BENIGN PROSTATIC HYPERPLASIA WITH LOWER URINARY TRACT SYMPMS: ICD-10-CM

## 2024-06-19 PROCEDURE — 99203 OFFICE O/P NEW LOW 30 MIN: CPT

## 2024-06-19 PROCEDURE — 51798 US URINE CAPACITY MEASURE: CPT

## 2024-06-19 NOTE — HISTORY OF PRESENT ILLNESS
[FreeTextEntry1] : 39 year old male presents to the office today for consult for U-Cath. He is a patient of Dr Meyers and is scheduled for surgery with him on 6/25/2024. Patient states that he is having periodical issues urinating where it is difficult starting a stream.   AUA symptom score 2  PVR -37cc

## 2024-06-19 NOTE — PHYSICAL EXAM
[Urethral Meatus] : meatus normal [Penis Abnormality] : normal circumcised penis [Scrotum] : the scrotum was normal [Rectal Exam - Seminal Vesicles] : the seminal vesicles were normal [Epididymis] : the epididymides were normal [Testes Tenderness] : no tenderness of the testes

## 2024-06-20 ENCOUNTER — APPOINTMENT (OUTPATIENT)
Dept: COLORECTAL SURGERY | Facility: CLINIC | Age: 39
End: 2024-06-20

## 2024-06-20 LAB
APPEARANCE: CLEAR
BILIRUBIN URINE: NEGATIVE
BLOOD URINE: NEGATIVE
COLOR: NORMAL
GLUCOSE QUALITATIVE U: NEGATIVE MG/DL
KETONES URINE: ABNORMAL MG/DL
LEUKOCYTE ESTERASE URINE: ABNORMAL
NITRITE URINE: NEGATIVE
PH URINE: 6.5
PROTEIN URINE: NEGATIVE MG/DL
SPECIFIC GRAVITY URINE: 1.02
UROBILINOGEN URINE: 0.2 MG/DL

## 2024-06-24 ENCOUNTER — APPOINTMENT (OUTPATIENT)
Dept: COLORECTAL SURGERY | Facility: CLINIC | Age: 39
End: 2024-06-24
Payer: COMMERCIAL

## 2024-06-24 ENCOUNTER — OUTPATIENT (OUTPATIENT)
Dept: OUTPATIENT SERVICES | Facility: HOSPITAL | Age: 39
LOS: 1 days | End: 2024-06-24
Payer: COMMERCIAL

## 2024-06-24 VITALS
TEMPERATURE: 98 F | OXYGEN SATURATION: 100 % | HEART RATE: 63 BPM | HEIGHT: 67 IN | DIASTOLIC BLOOD PRESSURE: 77 MMHG | RESPIRATION RATE: 16 BRPM | SYSTOLIC BLOOD PRESSURE: 132 MMHG | WEIGHT: 145.95 LBS

## 2024-06-24 VITALS
HEART RATE: 65 BPM | BODY MASS INDEX: 23.07 KG/M2 | DIASTOLIC BLOOD PRESSURE: 82 MMHG | OXYGEN SATURATION: 100 % | RESPIRATION RATE: 15 BRPM | HEIGHT: 67 IN | WEIGHT: 147 LBS | SYSTOLIC BLOOD PRESSURE: 145 MMHG | TEMPERATURE: 98.2 F

## 2024-06-24 DIAGNOSIS — S42.409A UNSPECIFIED FRACTURE OF LOWER END OF UNSPECIFIED HUMERUS, INITIAL ENCOUNTER FOR CLOSED FRACTURE: Chronic | ICD-10-CM

## 2024-06-24 DIAGNOSIS — K50.90 CROHN'S DISEASE, UNSPECIFIED, WITHOUT COMPLICATIONS: ICD-10-CM

## 2024-06-24 DIAGNOSIS — Z29.9 ENCOUNTER FOR PROPHYLACTIC MEASURES, UNSPECIFIED: ICD-10-CM

## 2024-06-24 DIAGNOSIS — K91.858 OTHER COMPLICATIONS OF INTESTINAL POUCH: ICD-10-CM

## 2024-06-24 DIAGNOSIS — Z93.2 ILEOSTOMY STATUS: Chronic | ICD-10-CM

## 2024-06-24 DIAGNOSIS — Z87.19 PERSONAL HISTORY OF OTHER DISEASES OF THE DIGESTIVE SYSTEM: Chronic | ICD-10-CM

## 2024-06-24 DIAGNOSIS — Z01.818 ENCOUNTER FOR OTHER PREPROCEDURAL EXAMINATION: ICD-10-CM

## 2024-06-24 DIAGNOSIS — Z87.81 PERSONAL HISTORY OF (HEALED) TRAUMATIC FRACTURE: Chronic | ICD-10-CM

## 2024-06-24 LAB
ANION GAP SERPL CALC-SCNC: 13 MMOL/L — SIGNIFICANT CHANGE UP (ref 5–17)
BUN SERPL-MCNC: 16 MG/DL — SIGNIFICANT CHANGE UP (ref 7–23)
CALCIUM SERPL-MCNC: 9.9 MG/DL — SIGNIFICANT CHANGE UP (ref 8.4–10.5)
CHLORIDE SERPL-SCNC: 100 MMOL/L — SIGNIFICANT CHANGE UP (ref 96–108)
CO2 SERPL-SCNC: 25 MMOL/L — SIGNIFICANT CHANGE UP (ref 22–31)
CREAT SERPL-MCNC: 1.1 MG/DL — SIGNIFICANT CHANGE UP (ref 0.5–1.3)
EGFR: 88 ML/MIN/1.73M2 — SIGNIFICANT CHANGE UP
GLUCOSE SERPL-MCNC: 100 MG/DL — HIGH (ref 70–99)
HCT VFR BLD CALC: 41.6 % — SIGNIFICANT CHANGE UP (ref 39–50)
HGB BLD-MCNC: 14.3 G/DL — SIGNIFICANT CHANGE UP (ref 13–17)
MCHC RBC-ENTMCNC: 30.4 PG — SIGNIFICANT CHANGE UP (ref 27–34)
MCHC RBC-ENTMCNC: 34.4 GM/DL — SIGNIFICANT CHANGE UP (ref 32–36)
MCV RBC AUTO: 88.5 FL — SIGNIFICANT CHANGE UP (ref 80–100)
NRBC # BLD: 0 /100 WBCS — SIGNIFICANT CHANGE UP (ref 0–0)
PLATELET # BLD AUTO: 221 K/UL — SIGNIFICANT CHANGE UP (ref 150–400)
POTASSIUM SERPL-MCNC: 4.2 MMOL/L — SIGNIFICANT CHANGE UP (ref 3.5–5.3)
POTASSIUM SERPL-SCNC: 4.2 MMOL/L — SIGNIFICANT CHANGE UP (ref 3.5–5.3)
RBC # BLD: 4.7 M/UL — SIGNIFICANT CHANGE UP (ref 4.2–5.8)
RBC # FLD: 12.3 % — SIGNIFICANT CHANGE UP (ref 10.3–14.5)
SODIUM SERPL-SCNC: 138 MMOL/L — SIGNIFICANT CHANGE UP (ref 135–145)
WBC # BLD: 5.73 K/UL — SIGNIFICANT CHANGE UP (ref 3.8–10.5)
WBC # FLD AUTO: 5.73 K/UL — SIGNIFICANT CHANGE UP (ref 3.8–10.5)

## 2024-06-24 PROCEDURE — 86901 BLOOD TYPING SEROLOGIC RH(D): CPT

## 2024-06-24 PROCEDURE — 86900 BLOOD TYPING SEROLOGIC ABO: CPT

## 2024-06-24 PROCEDURE — G0463: CPT

## 2024-06-24 PROCEDURE — 80048 BASIC METABOLIC PNL TOTAL CA: CPT

## 2024-06-24 PROCEDURE — 85027 COMPLETE CBC AUTOMATED: CPT

## 2024-06-24 PROCEDURE — 86850 RBC ANTIBODY SCREEN: CPT

## 2024-06-24 PROCEDURE — 99214 OFFICE O/P EST MOD 30 MIN: CPT

## 2024-06-25 ENCOUNTER — APPOINTMENT (OUTPATIENT)
Dept: COLORECTAL SURGERY | Facility: HOSPITAL | Age: 39
End: 2024-06-25

## 2024-06-25 ENCOUNTER — APPOINTMENT (OUTPATIENT)
Dept: UROLOGY | Facility: HOSPITAL | Age: 39
End: 2024-06-25

## 2024-06-25 ENCOUNTER — INPATIENT (INPATIENT)
Facility: HOSPITAL | Age: 39
LOS: 5 days | Discharge: HOME | DRG: 387 | End: 2024-07-01
Attending: COLON & RECTAL SURGERY | Admitting: COLON & RECTAL SURGERY
Payer: COMMERCIAL

## 2024-06-25 VITALS
RESPIRATION RATE: 18 BRPM | DIASTOLIC BLOOD PRESSURE: 81 MMHG | TEMPERATURE: 98 F | SYSTOLIC BLOOD PRESSURE: 146 MMHG | HEIGHT: 67.01 IN | OXYGEN SATURATION: 100 % | WEIGHT: 145.95 LBS | HEART RATE: 56 BPM

## 2024-06-25 DIAGNOSIS — K50.90 CROHN'S DISEASE, UNSPECIFIED, WITHOUT COMPLICATIONS: ICD-10-CM

## 2024-06-25 DIAGNOSIS — S42.409A UNSPECIFIED FRACTURE OF LOWER END OF UNSPECIFIED HUMERUS, INITIAL ENCOUNTER FOR CLOSED FRACTURE: Chronic | ICD-10-CM

## 2024-06-25 DIAGNOSIS — Z87.19 PERSONAL HISTORY OF OTHER DISEASES OF THE DIGESTIVE SYSTEM: Chronic | ICD-10-CM

## 2024-06-25 DIAGNOSIS — Z93.2 ILEOSTOMY STATUS: Chronic | ICD-10-CM

## 2024-06-25 DIAGNOSIS — Z87.81 PERSONAL HISTORY OF (HEALED) TRAUMATIC FRACTURE: Chronic | ICD-10-CM

## 2024-06-25 LAB
ANION GAP SERPL CALC-SCNC: 14 MMOL/L — SIGNIFICANT CHANGE UP (ref 5–17)
APTT BLD: 26.5 SEC — SIGNIFICANT CHANGE UP (ref 24.5–35.6)
BASE EXCESS BLDA CALC-SCNC: -1.3 MMOL/L — SIGNIFICANT CHANGE UP (ref -2–3)
BUN SERPL-MCNC: 13 MG/DL — SIGNIFICANT CHANGE UP (ref 7–23)
CALCIUM SERPL-MCNC: 8.4 MG/DL — SIGNIFICANT CHANGE UP (ref 8.4–10.5)
CHLORIDE SERPL-SCNC: 101 MMOL/L — SIGNIFICANT CHANGE UP (ref 96–108)
CO2 BLDA-SCNC: 26 MMOL/L — HIGH (ref 19–24)
CO2 SERPL-SCNC: 24 MMOL/L — SIGNIFICANT CHANGE UP (ref 22–31)
CREAT SERPL-MCNC: 1.01 MG/DL — SIGNIFICANT CHANGE UP (ref 0.5–1.3)
EGFR: 97 ML/MIN/1.73M2 — SIGNIFICANT CHANGE UP
GAS PNL BLDA: SIGNIFICANT CHANGE UP
GAS PNL BLDA: SIGNIFICANT CHANGE UP
GLUCOSE SERPL-MCNC: 135 MG/DL — HIGH (ref 70–99)
HCO3 BLDA-SCNC: 25 MMOL/L — SIGNIFICANT CHANGE UP (ref 21–28)
HCT VFR BLD CALC: 39.8 % — SIGNIFICANT CHANGE UP (ref 39–50)
HGB BLD-MCNC: 13.6 G/DL — SIGNIFICANT CHANGE UP (ref 13–17)
LACTATE BLDA-MCNC: 1.9 MMOL/L — SIGNIFICANT CHANGE UP (ref 0.5–2)
MAGNESIUM SERPL-MCNC: 1.7 MG/DL — SIGNIFICANT CHANGE UP (ref 1.6–2.6)
MCHC RBC-ENTMCNC: 30.6 PG — SIGNIFICANT CHANGE UP (ref 27–34)
MCHC RBC-ENTMCNC: 34.2 GM/DL — SIGNIFICANT CHANGE UP (ref 32–36)
MCV RBC AUTO: 89.4 FL — SIGNIFICANT CHANGE UP (ref 80–100)
NRBC # BLD: 0 /100 WBCS — SIGNIFICANT CHANGE UP (ref 0–0)
PCO2 BLDA: 46 MMHG — SIGNIFICANT CHANGE UP (ref 35–48)
PH BLDA: 7.34 — LOW (ref 7.35–7.45)
PHOSPHATE SERPL-MCNC: 3.2 MG/DL — SIGNIFICANT CHANGE UP (ref 2.5–4.5)
PLATELET # BLD AUTO: 210 K/UL — SIGNIFICANT CHANGE UP (ref 150–400)
PO2 BLDA: 109 MMHG — HIGH (ref 83–108)
POTASSIUM SERPL-MCNC: 3.4 MMOL/L — LOW (ref 3.5–5.3)
POTASSIUM SERPL-SCNC: 3.4 MMOL/L — LOW (ref 3.5–5.3)
RBC # BLD: 4.45 M/UL — SIGNIFICANT CHANGE UP (ref 4.2–5.8)
RBC # FLD: 12.5 % — SIGNIFICANT CHANGE UP (ref 10.3–14.5)
SAO2 % BLDA: 98.7 % — HIGH (ref 94–98)
SODIUM SERPL-SCNC: 139 MMOL/L — SIGNIFICANT CHANGE UP (ref 135–145)
WBC # BLD: 10.36 K/UL — SIGNIFICANT CHANGE UP (ref 3.8–10.5)
WBC # FLD AUTO: 10.36 K/UL — SIGNIFICANT CHANGE UP (ref 3.8–10.5)

## 2024-06-25 PROCEDURE — 52005 CYSTO W/URTRL CATHJ: CPT

## 2024-06-25 PROCEDURE — 44625 REPAIR BOWEL OPENING: CPT

## 2024-06-25 PROCEDURE — 88307 TISSUE EXAM BY PATHOLOGIST: CPT | Mod: 26

## 2024-06-25 PROCEDURE — 45136 EXCISE ILEOANAL RESERVIOR: CPT

## 2024-06-25 DEVICE — URETERAL CATH FLEXIMA OPEN END 5FR 70CM: Type: IMPLANTABLE DEVICE | Status: FUNCTIONAL

## 2024-06-25 DEVICE — KIT A-LINE 1LUM 20G X 12CM SAFE KIT: Type: IMPLANTABLE DEVICE | Status: FUNCTIONAL

## 2024-06-25 RX ORDER — HYDROMORPHONE HCL 0.2 MG/ML
0.5 INJECTION, SOLUTION INTRAVENOUS
Refills: 0 | Status: DISCONTINUED | OUTPATIENT
Start: 2024-06-25 | End: 2024-06-25

## 2024-06-25 RX ORDER — HEPARIN SODIUM 50 [USP'U]/ML
5000 INJECTION, SOLUTION INTRAVENOUS EVERY 8 HOURS
Refills: 0 | Status: DISCONTINUED | OUTPATIENT
Start: 2024-06-26 | End: 2024-07-01

## 2024-06-25 RX ORDER — NALOXONE HYDROCHLORIDE 1 MG/ML
0.1 INJECTION PARENTERAL
Refills: 0 | Status: DISCONTINUED | OUTPATIENT
Start: 2024-06-25 | End: 2024-06-26

## 2024-06-25 RX ORDER — DEXTROSE MONOHYDRATE AND SODIUM CHLORIDE 5; .3 G/100ML; G/100ML
1000 INJECTION, SOLUTION INTRAVENOUS
Refills: 0 | Status: DISCONTINUED | OUTPATIENT
Start: 2024-06-25 | End: 2024-07-01

## 2024-06-25 RX ORDER — CEFOTETAN DISODIUM 2 G
2 VIAL (EA) INJECTION ONCE
Refills: 0 | Status: DISCONTINUED | OUTPATIENT
Start: 2024-06-25 | End: 2024-06-26

## 2024-06-25 RX ORDER — HYDROMORPHONE HCL 0.2 MG/ML
1 INJECTION, SOLUTION INTRAVENOUS ONCE
Refills: 0 | Status: DISCONTINUED | OUTPATIENT
Start: 2024-06-25 | End: 2024-06-25

## 2024-06-25 RX ORDER — ARIPIPRAZOLE 15 MG/1
1 TABLET ORAL
Refills: 0 | DISCHARGE

## 2024-06-25 RX ORDER — HYDROMORPHONE HCL 0.2 MG/ML
250 INJECTION, SOLUTION INTRAVENOUS
Refills: 0 | Status: DISCONTINUED | OUTPATIENT
Start: 2024-06-25 | End: 2024-06-26

## 2024-06-25 RX ORDER — TRAZODONE HYDROCHLORIDE 50 MG/1
0 TABLET, FILM COATED ORAL
Refills: 0 | DISCHARGE

## 2024-06-25 RX ORDER — METHOCARBAMOL 500 MG
1000 TABLET ORAL EVERY 8 HOURS
Refills: 0 | Status: DISCONTINUED | OUTPATIENT
Start: 2024-06-25 | End: 2024-06-27

## 2024-06-25 RX ORDER — HYDROMORPHONE HCL 0.2 MG/ML
250 INJECTION, SOLUTION INTRAVENOUS
Refills: 0 | Status: DISCONTINUED | OUTPATIENT
Start: 2024-06-25 | End: 2024-06-25

## 2024-06-25 RX ORDER — ONDANSETRON HYDROCHLORIDE 2 MG/ML
4 INJECTION INTRAMUSCULAR; INTRAVENOUS EVERY 6 HOURS
Refills: 0 | Status: DISCONTINUED | OUTPATIENT
Start: 2024-06-25 | End: 2024-06-26

## 2024-06-25 RX ORDER — SODIUM CHLORIDE 0.9 % (FLUSH) 0.9 %
3 SYRINGE (ML) INJECTION EVERY 8 HOURS
Refills: 0 | Status: DISCONTINUED | OUTPATIENT
Start: 2024-06-25 | End: 2024-06-25

## 2024-06-25 RX ORDER — ACETAMINOPHEN 325 MG
1000 TABLET ORAL EVERY 6 HOURS
Refills: 0 | Status: COMPLETED | OUTPATIENT
Start: 2024-06-25 | End: 2024-06-26

## 2024-06-25 RX ORDER — HYDROMORPHONE HCL 0.2 MG/ML
1 INJECTION, SOLUTION INTRAVENOUS
Refills: 0 | Status: DISCONTINUED | OUTPATIENT
Start: 2024-06-25 | End: 2024-06-26

## 2024-06-25 RX ORDER — LIDOCAINE HYDROCHLORIDE 20 MG/ML
0.2 INJECTION, SOLUTION EPIDURAL; INFILTRATION; INTRACAUDAL; PERINEURAL ONCE
Refills: 0 | Status: DISCONTINUED | OUTPATIENT
Start: 2024-06-25 | End: 2024-06-25

## 2024-06-25 RX ORDER — MIRTAZAPINE 15 MG/1
1 TABLET, FILM COATED ORAL
Refills: 0 | DISCHARGE

## 2024-06-25 RX ORDER — ESCITALOPRAM OXALATE 20 MG/1
2 TABLET, FILM COATED ORAL
Refills: 0 | DISCHARGE

## 2024-06-25 RX ORDER — KETOROLAC TROMETHAMINE 30 MG/ML
15 INJECTION, SOLUTION INTRAMUSCULAR EVERY 6 HOURS
Refills: 0 | Status: DISCONTINUED | OUTPATIENT
Start: 2024-06-25 | End: 2024-06-27

## 2024-06-25 RX ORDER — TESTOSTERONE CYPIONATE 200 MG/ML
0 INJECTION, SOLUTION INTRAMUSCULAR
Refills: 0 | DISCHARGE

## 2024-06-25 RX ORDER — GABAPENTIN
3 POWDER (GRAM) MISCELLANEOUS
Refills: 0 | DISCHARGE

## 2024-06-25 RX ORDER — HYDROMORPHONE HCL 0.2 MG/ML
5 INJECTION, SOLUTION INTRAVENOUS
Refills: 0 | Status: DISCONTINUED | OUTPATIENT
Start: 2024-06-25 | End: 2024-06-26

## 2024-06-25 RX ADMIN — HYDROMORPHONE HCL 250 MILLILITER(S): 0.2 INJECTION, SOLUTION INTRAVENOUS at 19:23

## 2024-06-25 RX ADMIN — HYDROMORPHONE HCL 1 MILLIGRAM(S): 0.2 INJECTION, SOLUTION INTRAVENOUS at 19:45

## 2024-06-25 RX ADMIN — HYDROMORPHONE HCL 250 MILLILITER(S): 0.2 INJECTION, SOLUTION INTRAVENOUS at 23:27

## 2024-06-25 RX ADMIN — HYDROMORPHONE HCL 1 MILLIGRAM(S): 0.2 INJECTION, SOLUTION INTRAVENOUS at 22:52

## 2024-06-25 RX ADMIN — Medication 220 MILLIGRAM(S): at 22:21

## 2024-06-25 RX ADMIN — DEXTROSE MONOHYDRATE AND SODIUM CHLORIDE 95 MILLILITER(S): 5; .3 INJECTION, SOLUTION INTRAVENOUS at 20:15

## 2024-06-25 RX ADMIN — HYDROMORPHONE HCL 0.5 MILLIGRAM(S): 0.2 INJECTION, SOLUTION INTRAVENOUS at 19:45

## 2024-06-25 RX ADMIN — HYDROMORPHONE HCL 0.5 MILLIGRAM(S): 0.2 INJECTION, SOLUTION INTRAVENOUS at 20:00

## 2024-06-25 RX ADMIN — HYDROMORPHONE HCL 0.5 MILLIGRAM(S): 0.2 INJECTION, SOLUTION INTRAVENOUS at 20:30

## 2024-06-25 RX ADMIN — HYDROMORPHONE HCL 250 MILLILITER(S): 0.2 INJECTION, SOLUTION INTRAVENOUS at 22:03

## 2024-06-25 RX ADMIN — HYDROMORPHONE HCL 1 MILLIGRAM(S): 0.2 INJECTION, SOLUTION INTRAVENOUS at 23:10

## 2024-06-25 RX ADMIN — HYDROMORPHONE HCL 1 MILLIGRAM(S): 0.2 INJECTION, SOLUTION INTRAVENOUS at 19:30

## 2024-06-25 RX ADMIN — HYDROMORPHONE HCL 0.5 MILLIGRAM(S): 0.2 INJECTION, SOLUTION INTRAVENOUS at 20:15

## 2024-06-25 RX ADMIN — HYDROMORPHONE HCL 5 MILLILITER(S): 0.2 INJECTION, SOLUTION INTRAVENOUS at 21:00

## 2024-06-25 RX ADMIN — HYDROMORPHONE HCL 5 MILLILITER(S): 0.2 INJECTION, SOLUTION INTRAVENOUS at 22:06

## 2024-06-25 RX ADMIN — HYDROMORPHONE HCL 250 MILLILITER(S): 0.2 INJECTION, SOLUTION INTRAVENOUS at 21:00

## 2024-06-26 LAB
ANION GAP SERPL CALC-SCNC: 14 MMOL/L — SIGNIFICANT CHANGE UP (ref 5–17)
BUN SERPL-MCNC: 13 MG/DL — SIGNIFICANT CHANGE UP (ref 7–23)
CALCIUM SERPL-MCNC: 8.4 MG/DL — SIGNIFICANT CHANGE UP (ref 8.4–10.5)
CHLORIDE SERPL-SCNC: 100 MMOL/L — SIGNIFICANT CHANGE UP (ref 96–108)
CO2 SERPL-SCNC: 22 MMOL/L — SIGNIFICANT CHANGE UP (ref 22–31)
CREAT SERPL-MCNC: 1.02 MG/DL — SIGNIFICANT CHANGE UP (ref 0.5–1.3)
EGFR: 96 ML/MIN/1.73M2 — SIGNIFICANT CHANGE UP
GLUCOSE SERPL-MCNC: 104 MG/DL — HIGH (ref 70–99)
HCT VFR BLD CALC: 40 % — SIGNIFICANT CHANGE UP (ref 39–50)
HGB BLD-MCNC: 13.8 G/DL — SIGNIFICANT CHANGE UP (ref 13–17)
MAGNESIUM SERPL-MCNC: 1.6 MG/DL — SIGNIFICANT CHANGE UP (ref 1.6–2.6)
MCHC RBC-ENTMCNC: 30.8 PG — SIGNIFICANT CHANGE UP (ref 27–34)
MCHC RBC-ENTMCNC: 34.5 GM/DL — SIGNIFICANT CHANGE UP (ref 32–36)
MCV RBC AUTO: 89.3 FL — SIGNIFICANT CHANGE UP (ref 80–100)
NRBC # BLD: 0 /100 WBCS — SIGNIFICANT CHANGE UP (ref 0–0)
PHOSPHATE SERPL-MCNC: 3.4 MG/DL — SIGNIFICANT CHANGE UP (ref 2.5–4.5)
PLATELET # BLD AUTO: 225 K/UL — SIGNIFICANT CHANGE UP (ref 150–400)
POTASSIUM SERPL-MCNC: 4.1 MMOL/L — SIGNIFICANT CHANGE UP (ref 3.5–5.3)
POTASSIUM SERPL-SCNC: 4.1 MMOL/L — SIGNIFICANT CHANGE UP (ref 3.5–5.3)
RBC # BLD: 4.48 M/UL — SIGNIFICANT CHANGE UP (ref 4.2–5.8)
RBC # FLD: 12.6 % — SIGNIFICANT CHANGE UP (ref 10.3–14.5)
SODIUM SERPL-SCNC: 136 MMOL/L — SIGNIFICANT CHANGE UP (ref 135–145)
WBC # BLD: 16.48 K/UL — HIGH (ref 3.8–10.5)
WBC # FLD AUTO: 16.48 K/UL — HIGH (ref 3.8–10.5)

## 2024-06-26 RX ORDER — ONDANSETRON HYDROCHLORIDE 2 MG/ML
4 INJECTION INTRAMUSCULAR; INTRAVENOUS EVERY 6 HOURS
Refills: 0 | Status: DISCONTINUED | OUTPATIENT
Start: 2024-06-26 | End: 2024-06-29

## 2024-06-26 RX ORDER — ESCITALOPRAM OXALATE 20 MG/1
20 TABLET, FILM COATED ORAL DAILY
Refills: 0 | Status: DISCONTINUED | OUTPATIENT
Start: 2024-06-26 | End: 2024-06-26

## 2024-06-26 RX ORDER — ROPIVACAINE HYDROCHLORIDE 2 MG/ML
200 INJECTION, SOLUTION EPIDURAL; INFILTRATION
Refills: 0 | Status: DISCONTINUED | OUTPATIENT
Start: 2024-06-26 | End: 2024-06-29

## 2024-06-26 RX ORDER — NALOXONE HYDROCHLORIDE 1 MG/ML
0.1 INJECTION PARENTERAL
Refills: 0 | Status: DISCONTINUED | OUTPATIENT
Start: 2024-06-26 | End: 2024-06-26

## 2024-06-26 RX ORDER — ESCITALOPRAM OXALATE 20 MG/1
20 TABLET, FILM COATED ORAL ONCE
Refills: 0 | Status: COMPLETED | OUTPATIENT
Start: 2024-06-26 | End: 2024-06-26

## 2024-06-26 RX ORDER — ONDANSETRON HYDROCHLORIDE 2 MG/ML
4 INJECTION INTRAMUSCULAR; INTRAVENOUS EVERY 6 HOURS
Refills: 0 | Status: DISCONTINUED | OUTPATIENT
Start: 2024-06-26 | End: 2024-06-26

## 2024-06-26 RX ORDER — NALOXONE HYDROCHLORIDE 1 MG/ML
0.1 INJECTION PARENTERAL
Refills: 0 | Status: DISCONTINUED | OUTPATIENT
Start: 2024-06-26 | End: 2024-06-29

## 2024-06-26 RX ORDER — ROPIVACAINE HYDROCHLORIDE 2 MG/ML
200 INJECTION, SOLUTION EPIDURAL; INFILTRATION
Refills: 0 | Status: DISCONTINUED | OUTPATIENT
Start: 2024-06-26 | End: 2024-06-26

## 2024-06-26 RX ORDER — HYDROMORPHONE HCL 0.2 MG/ML
0.5 INJECTION, SOLUTION INTRAVENOUS
Refills: 0 | Status: DISCONTINUED | OUTPATIENT
Start: 2024-06-26 | End: 2024-06-30

## 2024-06-26 RX ORDER — POTASSIUM CHLORIDE 600 MG/1
10 TABLET, FILM COATED, EXTENDED RELEASE ORAL
Refills: 0 | Status: COMPLETED | OUTPATIENT
Start: 2024-06-26 | End: 2024-06-26

## 2024-06-26 RX ORDER — HYDROMORPHONE HCL 0.2 MG/ML
30 INJECTION, SOLUTION INTRAVENOUS
Refills: 0 | Status: DISCONTINUED | OUTPATIENT
Start: 2024-06-26 | End: 2024-06-30

## 2024-06-26 RX ORDER — ARIPIPRAZOLE 15 MG/1
15 TABLET ORAL DAILY
Refills: 0 | Status: DISCONTINUED | OUTPATIENT
Start: 2024-06-26 | End: 2024-07-01

## 2024-06-26 RX ORDER — MAGNESIUM SULFATE 100 %
1 POWDER (GRAM) MISCELLANEOUS ONCE
Refills: 0 | Status: DISCONTINUED | OUTPATIENT
Start: 2024-06-26 | End: 2024-07-01

## 2024-06-26 RX ORDER — ESCITALOPRAM OXALATE 20 MG/1
40 TABLET, FILM COATED ORAL DAILY
Refills: 0 | Status: DISCONTINUED | OUTPATIENT
Start: 2024-06-26 | End: 2024-07-01

## 2024-06-26 RX ADMIN — HYDROMORPHONE HCL 30 MILLILITER(S): 0.2 INJECTION, SOLUTION INTRAVENOUS at 06:12

## 2024-06-26 RX ADMIN — Medication 1000 MILLIGRAM(S): at 18:43

## 2024-06-26 RX ADMIN — KETOROLAC TROMETHAMINE 15 MILLIGRAM(S): 30 INJECTION, SOLUTION INTRAMUSCULAR at 16:49

## 2024-06-26 RX ADMIN — HYDROMORPHONE HCL 5 MILLILITER(S): 0.2 INJECTION, SOLUTION INTRAVENOUS at 02:50

## 2024-06-26 RX ADMIN — Medication 1000 MILLIGRAM(S): at 00:38

## 2024-06-26 RX ADMIN — Medication 1000 MILLIGRAM(S): at 13:38

## 2024-06-26 RX ADMIN — KETOROLAC TROMETHAMINE 15 MILLIGRAM(S): 30 INJECTION, SOLUTION INTRAMUSCULAR at 02:59

## 2024-06-26 RX ADMIN — HYDROMORPHONE HCL 5 MILLILITER(S): 0.2 INJECTION, SOLUTION INTRAVENOUS at 02:04

## 2024-06-26 RX ADMIN — HEPARIN SODIUM 5000 UNIT(S): 50 INJECTION, SOLUTION INTRAVENOUS at 05:21

## 2024-06-26 RX ADMIN — HEPARIN SODIUM 5000 UNIT(S): 50 INJECTION, SOLUTION INTRAVENOUS at 21:04

## 2024-06-26 RX ADMIN — ARIPIPRAZOLE 15 MILLIGRAM(S): 15 TABLET ORAL at 13:11

## 2024-06-26 RX ADMIN — HYDROMORPHONE HCL 0.5 MILLIGRAM(S): 0.2 INJECTION, SOLUTION INTRAVENOUS at 13:31

## 2024-06-26 RX ADMIN — Medication 220 MILLIGRAM(S): at 14:14

## 2024-06-26 RX ADMIN — Medication 400 MILLIGRAM(S): at 05:20

## 2024-06-26 RX ADMIN — POTASSIUM CHLORIDE 100 MILLIEQUIVALENT(S): 600 TABLET, FILM COATED, EXTENDED RELEASE ORAL at 05:21

## 2024-06-26 RX ADMIN — POTASSIUM CHLORIDE 100 MILLIEQUIVALENT(S): 600 TABLET, FILM COATED, EXTENDED RELEASE ORAL at 02:00

## 2024-06-26 RX ADMIN — HYDROMORPHONE HCL 30 MILLILITER(S): 0.2 INJECTION, SOLUTION INTRAVENOUS at 07:48

## 2024-06-26 RX ADMIN — HYDROMORPHONE HCL 30 MILLILITER(S): 0.2 INJECTION, SOLUTION INTRAVENOUS at 20:44

## 2024-06-26 RX ADMIN — ESCITALOPRAM OXALATE 20 MILLIGRAM(S): 20 TABLET, FILM COATED ORAL at 13:11

## 2024-06-26 RX ADMIN — ROPIVACAINE HYDROCHLORIDE 200 MILLILITER(S): 2 INJECTION, SOLUTION EPIDURAL; INFILTRATION at 07:51

## 2024-06-26 RX ADMIN — Medication 1000 MILLIGRAM(S): at 05:50

## 2024-06-26 RX ADMIN — POTASSIUM CHLORIDE 100 MILLIEQUIVALENT(S): 600 TABLET, FILM COATED, EXTENDED RELEASE ORAL at 06:24

## 2024-06-26 RX ADMIN — ESCITALOPRAM OXALATE 20 MILLIGRAM(S): 20 TABLET, FILM COATED ORAL at 14:14

## 2024-06-26 RX ADMIN — HEPARIN SODIUM 5000 UNIT(S): 50 INJECTION, SOLUTION INTRAVENOUS at 14:14

## 2024-06-26 RX ADMIN — HYDROMORPHONE HCL 5 MILLILITER(S): 0.2 INJECTION, SOLUTION INTRAVENOUS at 05:04

## 2024-06-26 RX ADMIN — KETOROLAC TROMETHAMINE 15 MILLIGRAM(S): 30 INJECTION, SOLUTION INTRAMUSCULAR at 02:29

## 2024-06-26 RX ADMIN — HYDROMORPHONE HCL 30 MILLILITER(S): 0.2 INJECTION, SOLUTION INTRAVENOUS at 19:04

## 2024-06-26 RX ADMIN — Medication 400 MILLIGRAM(S): at 18:13

## 2024-06-26 RX ADMIN — Medication 400 MILLIGRAM(S): at 00:08

## 2024-06-26 RX ADMIN — HYDROMORPHONE HCL 250 MILLILITER(S): 0.2 INJECTION, SOLUTION INTRAVENOUS at 02:51

## 2024-06-26 RX ADMIN — KETOROLAC TROMETHAMINE 15 MILLIGRAM(S): 30 INJECTION, SOLUTION INTRAMUSCULAR at 16:19

## 2024-06-26 RX ADMIN — ROPIVACAINE HYDROCHLORIDE 200 MILLILITER(S): 2 INJECTION, SOLUTION EPIDURAL; INFILTRATION at 06:16

## 2024-06-26 RX ADMIN — HYDROMORPHONE HCL 250 MILLILITER(S): 0.2 INJECTION, SOLUTION INTRAVENOUS at 00:08

## 2024-06-26 RX ADMIN — Medication 400 MILLIGRAM(S): at 13:08

## 2024-06-26 RX ADMIN — ROPIVACAINE HYDROCHLORIDE 200 MILLILITER(S): 2 INJECTION, SOLUTION EPIDURAL; INFILTRATION at 19:04

## 2024-06-27 LAB
ANION GAP SERPL CALC-SCNC: 12 MMOL/L — SIGNIFICANT CHANGE UP (ref 5–17)
BUN SERPL-MCNC: 17 MG/DL — SIGNIFICANT CHANGE UP (ref 7–23)
CALCIUM SERPL-MCNC: 8.8 MG/DL — SIGNIFICANT CHANGE UP (ref 8.4–10.5)
CHLORIDE SERPL-SCNC: 100 MMOL/L — SIGNIFICANT CHANGE UP (ref 96–108)
CO2 SERPL-SCNC: 25 MMOL/L — SIGNIFICANT CHANGE UP (ref 22–31)
CREAT SERPL-MCNC: 0.89 MG/DL — SIGNIFICANT CHANGE UP (ref 0.5–1.3)
EGFR: 112 ML/MIN/1.73M2 — SIGNIFICANT CHANGE UP
GLUCOSE SERPL-MCNC: 86 MG/DL — SIGNIFICANT CHANGE UP (ref 70–99)
HCT VFR BLD CALC: 33.2 % — LOW (ref 39–50)
HCT VFR BLD CALC: 33.9 % — LOW (ref 39–50)
HGB BLD-MCNC: 11.1 G/DL — LOW (ref 13–17)
HGB BLD-MCNC: 11.4 G/DL — LOW (ref 13–17)
MAGNESIUM SERPL-MCNC: 1.7 MG/DL — SIGNIFICANT CHANGE UP (ref 1.6–2.6)
MCHC RBC-ENTMCNC: 30.1 PG — SIGNIFICANT CHANGE UP (ref 27–34)
MCHC RBC-ENTMCNC: 30.5 PG — SIGNIFICANT CHANGE UP (ref 27–34)
MCHC RBC-ENTMCNC: 33.4 GM/DL — SIGNIFICANT CHANGE UP (ref 32–36)
MCHC RBC-ENTMCNC: 33.6 GM/DL — SIGNIFICANT CHANGE UP (ref 32–36)
MCV RBC AUTO: 90 FL — SIGNIFICANT CHANGE UP (ref 80–100)
MCV RBC AUTO: 90.6 FL — SIGNIFICANT CHANGE UP (ref 80–100)
NRBC # BLD: 0 /100 WBCS — SIGNIFICANT CHANGE UP (ref 0–0)
NRBC # BLD: 0 /100 WBCS — SIGNIFICANT CHANGE UP (ref 0–0)
PHOSPHATE SERPL-MCNC: 2.4 MG/DL — LOW (ref 2.5–4.5)
PLATELET # BLD AUTO: 185 K/UL — SIGNIFICANT CHANGE UP (ref 150–400)
PLATELET # BLD AUTO: 186 K/UL — SIGNIFICANT CHANGE UP (ref 150–400)
POTASSIUM SERPL-MCNC: 3.9 MMOL/L — SIGNIFICANT CHANGE UP (ref 3.5–5.3)
POTASSIUM SERPL-SCNC: 3.9 MMOL/L — SIGNIFICANT CHANGE UP (ref 3.5–5.3)
RBC # BLD: 3.69 M/UL — LOW (ref 4.2–5.8)
RBC # BLD: 3.74 M/UL — LOW (ref 4.2–5.8)
RBC # FLD: 12.9 % — SIGNIFICANT CHANGE UP (ref 10.3–14.5)
RBC # FLD: 12.9 % — SIGNIFICANT CHANGE UP (ref 10.3–14.5)
SODIUM SERPL-SCNC: 137 MMOL/L — SIGNIFICANT CHANGE UP (ref 135–145)
WBC # BLD: 11.05 K/UL — HIGH (ref 3.8–10.5)
WBC # BLD: 9.62 K/UL — SIGNIFICANT CHANGE UP (ref 3.8–10.5)
WBC # FLD AUTO: 11.05 K/UL — HIGH (ref 3.8–10.5)
WBC # FLD AUTO: 9.62 K/UL — SIGNIFICANT CHANGE UP (ref 3.8–10.5)

## 2024-06-27 RX ORDER — METHOCARBAMOL 500 MG
750 TABLET ORAL EVERY 8 HOURS
Refills: 0 | Status: DISCONTINUED | OUTPATIENT
Start: 2024-06-27 | End: 2024-06-29

## 2024-06-27 RX ORDER — POTASSIUM PHOSPHATE, MONOBASIC POTASSIUM PHOSPHATE, DIBASIC INJECTION, 236; 224 MG/ML; MG/ML
30 SOLUTION, CONCENTRATE INTRAVENOUS ONCE
Refills: 0 | Status: COMPLETED | OUTPATIENT
Start: 2024-06-27 | End: 2024-06-27

## 2024-06-27 RX ORDER — GABAPENTIN
900 POWDER (GRAM) MISCELLANEOUS
Refills: 0 | Status: DISCONTINUED | OUTPATIENT
Start: 2024-06-27 | End: 2024-07-01

## 2024-06-27 RX ADMIN — POTASSIUM PHOSPHATE, MONOBASIC POTASSIUM PHOSPHATE, DIBASIC INJECTION, 83.33 MILLIMOLE(S): 236; 224 SOLUTION, CONCENTRATE INTRAVENOUS at 11:08

## 2024-06-27 RX ADMIN — ROPIVACAINE HYDROCHLORIDE 200 MILLILITER(S): 2 INJECTION, SOLUTION EPIDURAL; INFILTRATION at 19:04

## 2024-06-27 RX ADMIN — Medication 900 MILLIGRAM(S): at 11:05

## 2024-06-27 RX ADMIN — HEPARIN SODIUM 5000 UNIT(S): 50 INJECTION, SOLUTION INTRAVENOUS at 05:18

## 2024-06-27 RX ADMIN — Medication 220 MILLIGRAM(S): at 10:31

## 2024-06-27 RX ADMIN — KETOROLAC TROMETHAMINE 15 MILLIGRAM(S): 30 INJECTION, SOLUTION INTRAMUSCULAR at 18:03

## 2024-06-27 RX ADMIN — KETOROLAC TROMETHAMINE 15 MILLIGRAM(S): 30 INJECTION, SOLUTION INTRAMUSCULAR at 17:33

## 2024-06-27 RX ADMIN — ESCITALOPRAM OXALATE 40 MILLIGRAM(S): 20 TABLET, FILM COATED ORAL at 11:12

## 2024-06-27 RX ADMIN — HYDROMORPHONE HCL 30 MILLILITER(S): 0.2 INJECTION, SOLUTION INTRAVENOUS at 19:04

## 2024-06-27 RX ADMIN — ROPIVACAINE HYDROCHLORIDE 200 MILLILITER(S): 2 INJECTION, SOLUTION EPIDURAL; INFILTRATION at 07:20

## 2024-06-27 RX ADMIN — HEPARIN SODIUM 5000 UNIT(S): 50 INJECTION, SOLUTION INTRAVENOUS at 21:02

## 2024-06-27 RX ADMIN — Medication 900 MILLIGRAM(S): at 17:33

## 2024-06-27 RX ADMIN — KETOROLAC TROMETHAMINE 15 MILLIGRAM(S): 30 INJECTION, SOLUTION INTRAMUSCULAR at 11:12

## 2024-06-27 RX ADMIN — Medication 900 MILLIGRAM(S): at 23:17

## 2024-06-27 RX ADMIN — HEPARIN SODIUM 5000 UNIT(S): 50 INJECTION, SOLUTION INTRAVENOUS at 14:31

## 2024-06-27 RX ADMIN — KETOROLAC TROMETHAMINE 15 MILLIGRAM(S): 30 INJECTION, SOLUTION INTRAMUSCULAR at 11:42

## 2024-06-27 RX ADMIN — HYDROMORPHONE HCL 30 MILLILITER(S): 0.2 INJECTION, SOLUTION INTRAVENOUS at 07:17

## 2024-06-27 RX ADMIN — HYDROMORPHONE HCL 30 MILLILITER(S): 0.2 INJECTION, SOLUTION INTRAVENOUS at 21:57

## 2024-06-27 RX ADMIN — ARIPIPRAZOLE 15 MILLIGRAM(S): 15 TABLET ORAL at 11:12

## 2024-06-27 RX ADMIN — Medication 750 MILLIGRAM(S): at 17:34

## 2024-06-28 LAB
ANION GAP SERPL CALC-SCNC: 10 MMOL/L — SIGNIFICANT CHANGE UP (ref 5–17)
APTT BLD: 27.7 SEC — SIGNIFICANT CHANGE UP (ref 24.5–35.6)
BUN SERPL-MCNC: 16 MG/DL — SIGNIFICANT CHANGE UP (ref 7–23)
CALCIUM SERPL-MCNC: 8.9 MG/DL — SIGNIFICANT CHANGE UP (ref 8.4–10.5)
CHLORIDE SERPL-SCNC: 100 MMOL/L — SIGNIFICANT CHANGE UP (ref 96–108)
CO2 SERPL-SCNC: 26 MMOL/L — SIGNIFICANT CHANGE UP (ref 22–31)
CREAT SERPL-MCNC: 0.93 MG/DL — SIGNIFICANT CHANGE UP (ref 0.5–1.3)
EGFR: 107 ML/MIN/1.73M2 — SIGNIFICANT CHANGE UP
GLUCOSE SERPL-MCNC: 81 MG/DL — SIGNIFICANT CHANGE UP (ref 70–99)
HCT VFR BLD CALC: 31 % — LOW (ref 39–50)
HGB BLD-MCNC: 10.4 G/DL — LOW (ref 13–17)
INR BLD: 0.98 RATIO — SIGNIFICANT CHANGE UP (ref 0.85–1.18)
MAGNESIUM SERPL-MCNC: 1.8 MG/DL — SIGNIFICANT CHANGE UP (ref 1.6–2.6)
MCHC RBC-ENTMCNC: 30.6 PG — SIGNIFICANT CHANGE UP (ref 27–34)
MCHC RBC-ENTMCNC: 33.5 GM/DL — SIGNIFICANT CHANGE UP (ref 32–36)
MCV RBC AUTO: 91.2 FL — SIGNIFICANT CHANGE UP (ref 80–100)
NRBC # BLD: 0 /100 WBCS — SIGNIFICANT CHANGE UP (ref 0–0)
PHOSPHATE SERPL-MCNC: 2.3 MG/DL — LOW (ref 2.5–4.5)
PLATELET # BLD AUTO: 178 K/UL — SIGNIFICANT CHANGE UP (ref 150–400)
POTASSIUM SERPL-MCNC: 4 MMOL/L — SIGNIFICANT CHANGE UP (ref 3.5–5.3)
POTASSIUM SERPL-SCNC: 4 MMOL/L — SIGNIFICANT CHANGE UP (ref 3.5–5.3)
PROTHROM AB SERPL-ACNC: 10.8 SEC — SIGNIFICANT CHANGE UP (ref 9.5–13)
RBC # BLD: 3.4 M/UL — LOW (ref 4.2–5.8)
RBC # FLD: 13.1 % — SIGNIFICANT CHANGE UP (ref 10.3–14.5)
SODIUM SERPL-SCNC: 136 MMOL/L — SIGNIFICANT CHANGE UP (ref 135–145)
WBC # BLD: 7.59 K/UL — SIGNIFICANT CHANGE UP (ref 3.8–10.5)
WBC # FLD AUTO: 7.59 K/UL — SIGNIFICANT CHANGE UP (ref 3.8–10.5)

## 2024-06-28 RX ORDER — POTASSIUM PHOSPHATE, MONOBASIC POTASSIUM PHOSPHATE, DIBASIC INJECTION, 236; 224 MG/ML; MG/ML
30 SOLUTION, CONCENTRATE INTRAVENOUS ONCE
Refills: 0 | Status: COMPLETED | OUTPATIENT
Start: 2024-06-28 | End: 2024-06-28

## 2024-06-28 RX ADMIN — HEPARIN SODIUM 5000 UNIT(S): 50 INJECTION, SOLUTION INTRAVENOUS at 05:02

## 2024-06-28 RX ADMIN — POTASSIUM PHOSPHATE, MONOBASIC POTASSIUM PHOSPHATE, DIBASIC INJECTION, 83.33 MILLIMOLE(S): 236; 224 SOLUTION, CONCENTRATE INTRAVENOUS at 12:30

## 2024-06-28 RX ADMIN — Medication 750 MILLIGRAM(S): at 23:18

## 2024-06-28 RX ADMIN — Medication 900 MILLIGRAM(S): at 17:35

## 2024-06-28 RX ADMIN — HEPARIN SODIUM 5000 UNIT(S): 50 INJECTION, SOLUTION INTRAVENOUS at 21:06

## 2024-06-28 RX ADMIN — Medication 900 MILLIGRAM(S): at 23:18

## 2024-06-28 RX ADMIN — HYDROMORPHONE HCL 30 MILLILITER(S): 0.2 INJECTION, SOLUTION INTRAVENOUS at 19:05

## 2024-06-28 RX ADMIN — ARIPIPRAZOLE 15 MILLIGRAM(S): 15 TABLET ORAL at 12:31

## 2024-06-28 RX ADMIN — HYDROMORPHONE HCL 30 MILLILITER(S): 0.2 INJECTION, SOLUTION INTRAVENOUS at 07:08

## 2024-06-28 RX ADMIN — Medication 750 MILLIGRAM(S): at 13:09

## 2024-06-28 RX ADMIN — Medication 750 MILLIGRAM(S): at 05:04

## 2024-06-28 RX ADMIN — Medication 900 MILLIGRAM(S): at 12:31

## 2024-06-28 RX ADMIN — Medication 900 MILLIGRAM(S): at 05:02

## 2024-06-28 RX ADMIN — HEPARIN SODIUM 5000 UNIT(S): 50 INJECTION, SOLUTION INTRAVENOUS at 12:32

## 2024-06-28 RX ADMIN — ESCITALOPRAM OXALATE 40 MILLIGRAM(S): 20 TABLET, FILM COATED ORAL at 12:30

## 2024-06-28 RX ADMIN — ROPIVACAINE HYDROCHLORIDE 200 MILLILITER(S): 2 INJECTION, SOLUTION EPIDURAL; INFILTRATION at 07:09

## 2024-06-28 RX ADMIN — ROPIVACAINE HYDROCHLORIDE 200 MILLILITER(S): 2 INJECTION, SOLUTION EPIDURAL; INFILTRATION at 19:06

## 2024-06-28 RX ADMIN — ROPIVACAINE HYDROCHLORIDE 200 MILLILITER(S): 2 INJECTION, SOLUTION EPIDURAL; INFILTRATION at 18:09

## 2024-06-29 LAB
ANION GAP SERPL CALC-SCNC: 10 MMOL/L — SIGNIFICANT CHANGE UP (ref 5–17)
BUN SERPL-MCNC: 16 MG/DL — SIGNIFICANT CHANGE UP (ref 7–23)
CALCIUM SERPL-MCNC: 8.8 MG/DL — SIGNIFICANT CHANGE UP (ref 8.4–10.5)
CHLORIDE SERPL-SCNC: 101 MMOL/L — SIGNIFICANT CHANGE UP (ref 96–108)
CO2 SERPL-SCNC: 26 MMOL/L — SIGNIFICANT CHANGE UP (ref 22–31)
CREAT SERPL-MCNC: 0.87 MG/DL — SIGNIFICANT CHANGE UP (ref 0.5–1.3)
EGFR: 113 ML/MIN/1.73M2 — SIGNIFICANT CHANGE UP
GLUCOSE SERPL-MCNC: 107 MG/DL — HIGH (ref 70–99)
HCT VFR BLD CALC: 28 % — LOW (ref 39–50)
HGB BLD-MCNC: 9.6 G/DL — LOW (ref 13–17)
MAGNESIUM SERPL-MCNC: 1.8 MG/DL — SIGNIFICANT CHANGE UP (ref 1.6–2.6)
MCHC RBC-ENTMCNC: 30.9 PG — SIGNIFICANT CHANGE UP (ref 27–34)
MCHC RBC-ENTMCNC: 34.3 GM/DL — SIGNIFICANT CHANGE UP (ref 32–36)
MCV RBC AUTO: 90 FL — SIGNIFICANT CHANGE UP (ref 80–100)
NRBC # BLD: 0 /100 WBCS — SIGNIFICANT CHANGE UP (ref 0–0)
PHOSPHATE SERPL-MCNC: 3.2 MG/DL — SIGNIFICANT CHANGE UP (ref 2.5–4.5)
PLATELET # BLD AUTO: 175 K/UL — SIGNIFICANT CHANGE UP (ref 150–400)
POTASSIUM SERPL-MCNC: 3.9 MMOL/L — SIGNIFICANT CHANGE UP (ref 3.5–5.3)
POTASSIUM SERPL-SCNC: 3.9 MMOL/L — SIGNIFICANT CHANGE UP (ref 3.5–5.3)
RBC # BLD: 3.11 M/UL — LOW (ref 4.2–5.8)
RBC # FLD: 13.2 % — SIGNIFICANT CHANGE UP (ref 10.3–14.5)
SODIUM SERPL-SCNC: 137 MMOL/L — SIGNIFICANT CHANGE UP (ref 135–145)
WBC # BLD: 4.83 K/UL — SIGNIFICANT CHANGE UP (ref 3.8–10.5)
WBC # FLD AUTO: 4.83 K/UL — SIGNIFICANT CHANGE UP (ref 3.8–10.5)

## 2024-06-29 RX ORDER — TRAZODONE HYDROCHLORIDE 50 MG/1
150 TABLET, FILM COATED ORAL AT BEDTIME
Refills: 0 | Status: DISCONTINUED | OUTPATIENT
Start: 2024-06-29 | End: 2024-07-01

## 2024-06-29 RX ORDER — MIRTAZAPINE 15 MG/1
7.5 TABLET, FILM COATED ORAL AT BEDTIME
Refills: 0 | Status: DISCONTINUED | OUTPATIENT
Start: 2024-06-29 | End: 2024-07-01

## 2024-06-29 RX ORDER — PSYLLIUM HUSK 3.4 G/7G
1 POWDER, FOR SOLUTION ORAL DAILY
Refills: 0 | Status: DISCONTINUED | OUTPATIENT
Start: 2024-06-29 | End: 2024-07-01

## 2024-06-29 RX ORDER — MAGNESIUM SULFATE 100 %
2 POWDER (GRAM) MISCELLANEOUS ONCE
Refills: 0 | Status: COMPLETED | OUTPATIENT
Start: 2024-06-29 | End: 2024-06-29

## 2024-06-29 RX ORDER — METHOCARBAMOL 500 MG
1000 TABLET ORAL EVERY 8 HOURS
Refills: 0 | Status: DISCONTINUED | OUTPATIENT
Start: 2024-06-29 | End: 2024-07-01

## 2024-06-29 RX ADMIN — Medication 1000 MILLIGRAM(S): at 17:24

## 2024-06-29 RX ADMIN — Medication 750 MILLIGRAM(S): at 07:24

## 2024-06-29 RX ADMIN — MIRTAZAPINE 7.5 MILLIGRAM(S): 15 TABLET, FILM COATED ORAL at 20:03

## 2024-06-29 RX ADMIN — HYDROMORPHONE HCL 30 MILLILITER(S): 0.2 INJECTION, SOLUTION INTRAVENOUS at 07:23

## 2024-06-29 RX ADMIN — Medication 900 MILLIGRAM(S): at 12:08

## 2024-06-29 RX ADMIN — HEPARIN SODIUM 5000 UNIT(S): 50 INJECTION, SOLUTION INTRAVENOUS at 05:12

## 2024-06-29 RX ADMIN — PSYLLIUM HUSK 1 PACKET(S): 3.4 POWDER, FOR SOLUTION ORAL at 12:08

## 2024-06-29 RX ADMIN — Medication 900 MILLIGRAM(S): at 05:12

## 2024-06-29 RX ADMIN — ESCITALOPRAM OXALATE 40 MILLIGRAM(S): 20 TABLET, FILM COATED ORAL at 12:08

## 2024-06-29 RX ADMIN — Medication 25 GRAM(S): at 14:39

## 2024-06-29 RX ADMIN — ROPIVACAINE HYDROCHLORIDE 200 MILLILITER(S): 2 INJECTION, SOLUTION EPIDURAL; INFILTRATION at 07:22

## 2024-06-29 RX ADMIN — HEPARIN SODIUM 5000 UNIT(S): 50 INJECTION, SOLUTION INTRAVENOUS at 14:40

## 2024-06-29 RX ADMIN — TRAZODONE HYDROCHLORIDE 150 MILLIGRAM(S): 50 TABLET, FILM COATED ORAL at 20:03

## 2024-06-29 RX ADMIN — ARIPIPRAZOLE 15 MILLIGRAM(S): 15 TABLET ORAL at 12:08

## 2024-06-29 RX ADMIN — HYDROMORPHONE HCL 30 MILLILITER(S): 0.2 INJECTION, SOLUTION INTRAVENOUS at 19:05

## 2024-06-29 RX ADMIN — HYDROMORPHONE HCL 30 MILLILITER(S): 0.2 INJECTION, SOLUTION INTRAVENOUS at 00:09

## 2024-06-29 RX ADMIN — Medication 900 MILLIGRAM(S): at 17:23

## 2024-06-29 RX ADMIN — HEPARIN SODIUM 5000 UNIT(S): 50 INJECTION, SOLUTION INTRAVENOUS at 21:03

## 2024-06-30 LAB
ANION GAP SERPL CALC-SCNC: 11 MMOL/L — SIGNIFICANT CHANGE UP (ref 5–17)
BUN SERPL-MCNC: 12 MG/DL — SIGNIFICANT CHANGE UP (ref 7–23)
CALCIUM SERPL-MCNC: 9.1 MG/DL — SIGNIFICANT CHANGE UP (ref 8.4–10.5)
CHLORIDE SERPL-SCNC: 103 MMOL/L — SIGNIFICANT CHANGE UP (ref 96–108)
CO2 SERPL-SCNC: 25 MMOL/L — SIGNIFICANT CHANGE UP (ref 22–31)
CREAT SERPL-MCNC: 0.87 MG/DL — SIGNIFICANT CHANGE UP (ref 0.5–1.3)
EGFR: 113 ML/MIN/1.73M2 — SIGNIFICANT CHANGE UP
GLUCOSE SERPL-MCNC: 102 MG/DL — HIGH (ref 70–99)
HCT VFR BLD CALC: 29.4 % — LOW (ref 39–50)
HGB BLD-MCNC: 10.1 G/DL — LOW (ref 13–17)
MAGNESIUM SERPL-MCNC: 2.1 MG/DL — SIGNIFICANT CHANGE UP (ref 1.6–2.6)
MCHC RBC-ENTMCNC: 31.2 PG — SIGNIFICANT CHANGE UP (ref 27–34)
MCHC RBC-ENTMCNC: 34.4 GM/DL — SIGNIFICANT CHANGE UP (ref 32–36)
MCV RBC AUTO: 90.7 FL — SIGNIFICANT CHANGE UP (ref 80–100)
NRBC # BLD: 0 /100 WBCS — SIGNIFICANT CHANGE UP (ref 0–0)
PHOSPHATE SERPL-MCNC: 3.4 MG/DL — SIGNIFICANT CHANGE UP (ref 2.5–4.5)
PLATELET # BLD AUTO: 205 K/UL — SIGNIFICANT CHANGE UP (ref 150–400)
POTASSIUM SERPL-MCNC: 3.5 MMOL/L — SIGNIFICANT CHANGE UP (ref 3.5–5.3)
POTASSIUM SERPL-SCNC: 3.5 MMOL/L — SIGNIFICANT CHANGE UP (ref 3.5–5.3)
RBC # BLD: 3.24 M/UL — LOW (ref 4.2–5.8)
RBC # FLD: 13.2 % — SIGNIFICANT CHANGE UP (ref 10.3–14.5)
SODIUM SERPL-SCNC: 139 MMOL/L — SIGNIFICANT CHANGE UP (ref 135–145)
WBC # BLD: 3.76 K/UL — LOW (ref 3.8–10.5)
WBC # FLD AUTO: 3.76 K/UL — LOW (ref 3.8–10.5)

## 2024-06-30 RX ORDER — OXYCODONE HYDROCHLORIDE 100 MG/5ML
10 SOLUTION ORAL EVERY 4 HOURS
Refills: 0 | Status: DISCONTINUED | OUTPATIENT
Start: 2024-06-30 | End: 2024-06-30

## 2024-06-30 RX ORDER — HYDROMORPHONE HCL 0.2 MG/ML
0.5 INJECTION, SOLUTION INTRAVENOUS
Refills: 0 | Status: DISCONTINUED | OUTPATIENT
Start: 2024-06-30 | End: 2024-07-01

## 2024-06-30 RX ORDER — ACETAMINOPHEN 325 MG
1000 TABLET ORAL EVERY 6 HOURS
Refills: 0 | Status: DISCONTINUED | OUTPATIENT
Start: 2024-06-30 | End: 2024-07-01

## 2024-06-30 RX ORDER — OXYCODONE HYDROCHLORIDE 100 MG/5ML
5 SOLUTION ORAL EVERY 4 HOURS
Refills: 0 | Status: DISCONTINUED | OUTPATIENT
Start: 2024-06-30 | End: 2024-06-30

## 2024-06-30 RX ORDER — OXYCODONE HYDROCHLORIDE 100 MG/5ML
10 SOLUTION ORAL EVERY 4 HOURS
Refills: 0 | Status: DISCONTINUED | OUTPATIENT
Start: 2024-06-30 | End: 2024-07-01

## 2024-06-30 RX ORDER — OXYCODONE HYDROCHLORIDE 100 MG/5ML
15 SOLUTION ORAL EVERY 4 HOURS
Refills: 0 | Status: DISCONTINUED | OUTPATIENT
Start: 2024-06-30 | End: 2024-07-01

## 2024-06-30 RX ADMIN — Medication 1000 MILLIGRAM(S): at 12:13

## 2024-06-30 RX ADMIN — Medication 1000 MILLIGRAM(S): at 03:40

## 2024-06-30 RX ADMIN — Medication 900 MILLIGRAM(S): at 12:13

## 2024-06-30 RX ADMIN — ARIPIPRAZOLE 15 MILLIGRAM(S): 15 TABLET ORAL at 12:13

## 2024-06-30 RX ADMIN — TRAZODONE HYDROCHLORIDE 150 MILLIGRAM(S): 50 TABLET, FILM COATED ORAL at 20:22

## 2024-06-30 RX ADMIN — OXYCODONE HYDROCHLORIDE 15 MILLIGRAM(S): 100 SOLUTION ORAL at 16:47

## 2024-06-30 RX ADMIN — Medication 400 MILLIGRAM(S): at 23:04

## 2024-06-30 RX ADMIN — HYDROMORPHONE HCL 30 MILLILITER(S): 0.2 INJECTION, SOLUTION INTRAVENOUS at 01:02

## 2024-06-30 RX ADMIN — ESCITALOPRAM OXALATE 40 MILLIGRAM(S): 20 TABLET, FILM COATED ORAL at 12:13

## 2024-06-30 RX ADMIN — HEPARIN SODIUM 5000 UNIT(S): 50 INJECTION, SOLUTION INTRAVENOUS at 21:12

## 2024-06-30 RX ADMIN — Medication 1000 MILLIGRAM(S): at 23:34

## 2024-06-30 RX ADMIN — Medication 1000 MILLIGRAM(S): at 20:22

## 2024-06-30 RX ADMIN — HEPARIN SODIUM 5000 UNIT(S): 50 INJECTION, SOLUTION INTRAVENOUS at 16:49

## 2024-06-30 RX ADMIN — Medication 900 MILLIGRAM(S): at 18:09

## 2024-06-30 RX ADMIN — MIRTAZAPINE 7.5 MILLIGRAM(S): 15 TABLET, FILM COATED ORAL at 20:22

## 2024-06-30 RX ADMIN — OXYCODONE HYDROCHLORIDE 15 MILLIGRAM(S): 100 SOLUTION ORAL at 21:11

## 2024-06-30 RX ADMIN — HEPARIN SODIUM 5000 UNIT(S): 50 INJECTION, SOLUTION INTRAVENOUS at 05:20

## 2024-06-30 RX ADMIN — OXYCODONE HYDROCHLORIDE 15 MILLIGRAM(S): 100 SOLUTION ORAL at 21:41

## 2024-06-30 RX ADMIN — Medication 900 MILLIGRAM(S): at 05:20

## 2024-06-30 RX ADMIN — HYDROMORPHONE HCL 30 MILLILITER(S): 0.2 INJECTION, SOLUTION INTRAVENOUS at 07:20

## 2024-06-30 RX ADMIN — OXYCODONE HYDROCHLORIDE 15 MILLIGRAM(S): 100 SOLUTION ORAL at 17:15

## 2024-07-01 ENCOUNTER — TRANSCRIPTION ENCOUNTER (OUTPATIENT)
Age: 39
End: 2024-07-01

## 2024-07-01 VITALS
TEMPERATURE: 99 F | RESPIRATION RATE: 18 BRPM | DIASTOLIC BLOOD PRESSURE: 65 MMHG | SYSTOLIC BLOOD PRESSURE: 114 MMHG | OXYGEN SATURATION: 96 % | HEART RATE: 70 BPM

## 2024-07-01 LAB
ANION GAP SERPL CALC-SCNC: 10 MMOL/L — SIGNIFICANT CHANGE UP (ref 5–17)
BUN SERPL-MCNC: 13 MG/DL — SIGNIFICANT CHANGE UP (ref 7–23)
CALCIUM SERPL-MCNC: 9.2 MG/DL — SIGNIFICANT CHANGE UP (ref 8.4–10.5)
CHLORIDE SERPL-SCNC: 105 MMOL/L — SIGNIFICANT CHANGE UP (ref 96–108)
CO2 SERPL-SCNC: 25 MMOL/L — SIGNIFICANT CHANGE UP (ref 22–31)
CREAT SERPL-MCNC: 0.93 MG/DL — SIGNIFICANT CHANGE UP (ref 0.5–1.3)
EGFR: 107 ML/MIN/1.73M2 — SIGNIFICANT CHANGE UP
GLUCOSE SERPL-MCNC: 85 MG/DL — SIGNIFICANT CHANGE UP (ref 70–99)
HCT VFR BLD CALC: 31.2 % — LOW (ref 39–50)
HGB BLD-MCNC: 10.2 G/DL — LOW (ref 13–17)
MAGNESIUM SERPL-MCNC: 1.9 MG/DL — SIGNIFICANT CHANGE UP (ref 1.6–2.6)
MCHC RBC-ENTMCNC: 30.4 PG — SIGNIFICANT CHANGE UP (ref 27–34)
MCHC RBC-ENTMCNC: 32.7 GM/DL — SIGNIFICANT CHANGE UP (ref 32–36)
MCV RBC AUTO: 92.9 FL — SIGNIFICANT CHANGE UP (ref 80–100)
NRBC # BLD: 0 /100 WBCS — SIGNIFICANT CHANGE UP (ref 0–0)
PHOSPHATE SERPL-MCNC: 2.7 MG/DL — SIGNIFICANT CHANGE UP (ref 2.5–4.5)
PLATELET # BLD AUTO: 235 K/UL — SIGNIFICANT CHANGE UP (ref 150–400)
POTASSIUM SERPL-MCNC: 3.9 MMOL/L — SIGNIFICANT CHANGE UP (ref 3.5–5.3)
POTASSIUM SERPL-SCNC: 3.9 MMOL/L — SIGNIFICANT CHANGE UP (ref 3.5–5.3)
RBC # BLD: 3.36 M/UL — LOW (ref 4.2–5.8)
RBC # FLD: 13.2 % — SIGNIFICANT CHANGE UP (ref 10.3–14.5)
SODIUM SERPL-SCNC: 140 MMOL/L — SIGNIFICANT CHANGE UP (ref 135–145)
WBC # BLD: 5.63 K/UL — SIGNIFICANT CHANGE UP (ref 3.8–10.5)
WBC # FLD AUTO: 5.63 K/UL — SIGNIFICANT CHANGE UP (ref 3.8–10.5)

## 2024-07-01 PROCEDURE — C9399: CPT

## 2024-07-01 PROCEDURE — C1758: CPT

## 2024-07-01 PROCEDURE — 82947 ASSAY GLUCOSE BLOOD QUANT: CPT

## 2024-07-01 PROCEDURE — 85610 PROTHROMBIN TIME: CPT

## 2024-07-01 PROCEDURE — 85730 THROMBOPLASTIN TIME PARTIAL: CPT

## 2024-07-01 PROCEDURE — 83735 ASSAY OF MAGNESIUM: CPT

## 2024-07-01 PROCEDURE — 97116 GAIT TRAINING THERAPY: CPT

## 2024-07-01 PROCEDURE — 85014 HEMATOCRIT: CPT

## 2024-07-01 PROCEDURE — C1769: CPT

## 2024-07-01 PROCEDURE — 83605 ASSAY OF LACTIC ACID: CPT

## 2024-07-01 PROCEDURE — 97530 THERAPEUTIC ACTIVITIES: CPT

## 2024-07-01 PROCEDURE — 82803 BLOOD GASES ANY COMBINATION: CPT

## 2024-07-01 PROCEDURE — 85018 HEMOGLOBIN: CPT

## 2024-07-01 PROCEDURE — 82435 ASSAY OF BLOOD CHLORIDE: CPT

## 2024-07-01 PROCEDURE — 82330 ASSAY OF CALCIUM: CPT

## 2024-07-01 PROCEDURE — 97161 PT EVAL LOW COMPLEX 20 MIN: CPT

## 2024-07-01 PROCEDURE — 84100 ASSAY OF PHOSPHORUS: CPT

## 2024-07-01 PROCEDURE — 85027 COMPLETE CBC AUTOMATED: CPT

## 2024-07-01 PROCEDURE — 80048 BASIC METABOLIC PNL TOTAL CA: CPT

## 2024-07-01 PROCEDURE — 84295 ASSAY OF SERUM SODIUM: CPT

## 2024-07-01 PROCEDURE — 88307 TISSUE EXAM BY PATHOLOGIST: CPT

## 2024-07-01 PROCEDURE — 84132 ASSAY OF SERUM POTASSIUM: CPT

## 2024-07-01 RX ORDER — OXYCODONE HYDROCHLORIDE 100 MG/5ML
1 SOLUTION ORAL
Refills: 0 | DISCHARGE

## 2024-07-01 RX ORDER — METHOCARBAMOL 500 MG
2 TABLET ORAL
Qty: 90 | Refills: 0
Start: 2024-07-01 | End: 2024-07-15

## 2024-07-01 RX ORDER — SOD PHOS DI, MONO/K PHOS MONO 250 MG
1 TABLET ORAL ONCE
Refills: 0 | Status: COMPLETED | OUTPATIENT
Start: 2024-07-01 | End: 2024-07-01

## 2024-07-01 RX ORDER — PSYLLIUM HUSK 3.4 G/7G
0 POWDER, FOR SOLUTION ORAL
Qty: 0 | Refills: 0 | DISCHARGE
Start: 2024-07-01

## 2024-07-01 RX ORDER — OXYCODONE HYDROCHLORIDE 100 MG/5ML
1 SOLUTION ORAL
Qty: 10 | Refills: 0
Start: 2024-07-01

## 2024-07-01 RX ORDER — ACETAMINOPHEN 325 MG
2 TABLET ORAL
Qty: 0 | Refills: 0 | DISCHARGE
Start: 2024-07-01

## 2024-07-01 RX ADMIN — OXYCODONE HYDROCHLORIDE 15 MILLIGRAM(S): 100 SOLUTION ORAL at 05:28

## 2024-07-01 RX ADMIN — Medication 1000 MILLIGRAM(S): at 06:31

## 2024-07-01 RX ADMIN — OXYCODONE HYDROCHLORIDE 10 MILLIGRAM(S): 100 SOLUTION ORAL at 09:58

## 2024-07-01 RX ADMIN — ESCITALOPRAM OXALATE 40 MILLIGRAM(S): 20 TABLET, FILM COATED ORAL at 12:44

## 2024-07-01 RX ADMIN — Medication 900 MILLIGRAM(S): at 12:44

## 2024-07-01 RX ADMIN — Medication 1000 MILLIGRAM(S): at 12:51

## 2024-07-01 RX ADMIN — Medication 1 PACKET(S): at 12:44

## 2024-07-01 RX ADMIN — Medication 900 MILLIGRAM(S): at 06:03

## 2024-07-01 RX ADMIN — ARIPIPRAZOLE 15 MILLIGRAM(S): 15 TABLET ORAL at 12:44

## 2024-07-01 RX ADMIN — OXYCODONE HYDROCHLORIDE 15 MILLIGRAM(S): 100 SOLUTION ORAL at 10:26

## 2024-07-01 RX ADMIN — PSYLLIUM HUSK 1 PACKET(S): 3.4 POWDER, FOR SOLUTION ORAL at 12:44

## 2024-07-01 RX ADMIN — OXYCODONE HYDROCHLORIDE 15 MILLIGRAM(S): 100 SOLUTION ORAL at 05:58

## 2024-07-01 RX ADMIN — OXYCODONE HYDROCHLORIDE 15 MILLIGRAM(S): 100 SOLUTION ORAL at 11:26

## 2024-07-01 RX ADMIN — OXYCODONE HYDROCHLORIDE 10 MILLIGRAM(S): 100 SOLUTION ORAL at 13:16

## 2024-07-01 RX ADMIN — Medication 400 MILLIGRAM(S): at 06:01

## 2024-07-01 RX ADMIN — Medication 400 MILLIGRAM(S): at 12:45

## 2024-07-01 RX ADMIN — OXYCODONE HYDROCHLORIDE 10 MILLIGRAM(S): 100 SOLUTION ORAL at 08:58

## 2024-07-01 RX ADMIN — HEPARIN SODIUM 5000 UNIT(S): 50 INJECTION, SOLUTION INTRAVENOUS at 05:27

## 2024-07-05 ENCOUNTER — EMERGENCY (EMERGENCY)
Facility: HOSPITAL | Age: 39
LOS: 1 days | Discharge: HOME | End: 2024-07-05
Attending: EMERGENCY MEDICINE
Payer: COMMERCIAL

## 2024-07-05 VITALS
RESPIRATION RATE: 18 BRPM | WEIGHT: 136.03 LBS | HEART RATE: 83 BPM | SYSTOLIC BLOOD PRESSURE: 128 MMHG | HEIGHT: 67 IN | OXYGEN SATURATION: 100 % | TEMPERATURE: 99 F | DIASTOLIC BLOOD PRESSURE: 80 MMHG

## 2024-07-05 VITALS
RESPIRATION RATE: 16 BRPM | HEART RATE: 72 BPM | TEMPERATURE: 98 F | DIASTOLIC BLOOD PRESSURE: 75 MMHG | SYSTOLIC BLOOD PRESSURE: 116 MMHG | OXYGEN SATURATION: 97 %

## 2024-07-05 DIAGNOSIS — S42.409A UNSPECIFIED FRACTURE OF LOWER END OF UNSPECIFIED HUMERUS, INITIAL ENCOUNTER FOR CLOSED FRACTURE: Chronic | ICD-10-CM

## 2024-07-05 DIAGNOSIS — Z87.19 PERSONAL HISTORY OF OTHER DISEASES OF THE DIGESTIVE SYSTEM: Chronic | ICD-10-CM

## 2024-07-05 PROBLEM — F41.9 ANXIETY DISORDER, UNSPECIFIED: Chronic | Status: ACTIVE | Noted: 2024-06-24

## 2024-07-05 PROBLEM — G89.29 OTHER CHRONIC PAIN: Chronic | Status: ACTIVE | Noted: 2024-06-24

## 2024-07-05 LAB
ALBUMIN SERPL ELPH-MCNC: 4 G/DL — SIGNIFICANT CHANGE UP (ref 3.3–5)
ALP SERPL-CCNC: 198 U/L — HIGH (ref 40–120)
ALT FLD-CCNC: 205 U/L — HIGH (ref 10–45)
ANION GAP SERPL CALC-SCNC: 13 MMOL/L — SIGNIFICANT CHANGE UP (ref 5–17)
APTT BLD: 32.6 SEC — SIGNIFICANT CHANGE UP (ref 24.5–35.6)
AST SERPL-CCNC: 114 U/L — HIGH (ref 10–40)
BASE EXCESS BLDV CALC-SCNC: 0.1 MMOL/L — SIGNIFICANT CHANGE UP (ref -2–3)
BASOPHILS # BLD AUTO: 0.02 K/UL — SIGNIFICANT CHANGE UP (ref 0–0.2)
BASOPHILS NFR BLD AUTO: 0.3 % — SIGNIFICANT CHANGE UP (ref 0–2)
BILIRUB SERPL-MCNC: 0.8 MG/DL — SIGNIFICANT CHANGE UP (ref 0.2–1.2)
BUN SERPL-MCNC: 15 MG/DL — SIGNIFICANT CHANGE UP (ref 7–23)
CA-I SERPL-SCNC: 1.12 MMOL/L — LOW (ref 1.15–1.33)
CALCIUM SERPL-MCNC: 10.1 MG/DL — SIGNIFICANT CHANGE UP (ref 8.4–10.5)
CHLORIDE BLDV-SCNC: 108 MMOL/L — SIGNIFICANT CHANGE UP (ref 96–108)
CHLORIDE SERPL-SCNC: 100 MMOL/L — SIGNIFICANT CHANGE UP (ref 96–108)
CO2 BLDV-SCNC: 27 MMOL/L — HIGH (ref 22–26)
CO2 SERPL-SCNC: 24 MMOL/L — SIGNIFICANT CHANGE UP (ref 22–31)
CREAT SERPL-MCNC: 0.94 MG/DL — SIGNIFICANT CHANGE UP (ref 0.5–1.3)
EGFR: 106 ML/MIN/1.73M2 — SIGNIFICANT CHANGE UP
EOSINOPHIL # BLD AUTO: 0.27 K/UL — SIGNIFICANT CHANGE UP (ref 0–0.5)
EOSINOPHIL NFR BLD AUTO: 4 % — SIGNIFICANT CHANGE UP (ref 0–6)
GAS PNL BLDV: 136 MMOL/L — SIGNIFICANT CHANGE UP (ref 136–145)
GAS PNL BLDV: SIGNIFICANT CHANGE UP
GLUCOSE BLDV-MCNC: 93 MG/DL — SIGNIFICANT CHANGE UP (ref 70–99)
GLUCOSE SERPL-MCNC: 107 MG/DL — HIGH (ref 70–99)
HCO3 BLDV-SCNC: 25 MMOL/L — SIGNIFICANT CHANGE UP (ref 22–29)
HCT VFR BLD CALC: 38.5 % — LOW (ref 39–50)
HCT VFR BLDA CALC: 34 % — LOW (ref 39–51)
HGB BLD CALC-MCNC: 11.2 G/DL — LOW (ref 12.6–17.4)
HGB BLD-MCNC: 12.4 G/DL — LOW (ref 13–17)
IMM GRANULOCYTES NFR BLD AUTO: 0.3 % — SIGNIFICANT CHANGE UP (ref 0–0.9)
INR BLD: 1.03 RATIO — SIGNIFICANT CHANGE UP (ref 0.85–1.18)
LACTATE BLDV-MCNC: 1.2 MMOL/L — SIGNIFICANT CHANGE UP (ref 0.5–2)
LIDOCAIN IGE QN: 38 U/L — SIGNIFICANT CHANGE UP (ref 7–60)
LYMPHOCYTES # BLD AUTO: 1.52 K/UL — SIGNIFICANT CHANGE UP (ref 1–3.3)
LYMPHOCYTES # BLD AUTO: 22.3 % — SIGNIFICANT CHANGE UP (ref 13–44)
MAGNESIUM SERPL-MCNC: 2 MG/DL — SIGNIFICANT CHANGE UP (ref 1.6–2.6)
MCHC RBC-ENTMCNC: 30 PG — SIGNIFICANT CHANGE UP (ref 27–34)
MCHC RBC-ENTMCNC: 32.2 GM/DL — SIGNIFICANT CHANGE UP (ref 32–36)
MCV RBC AUTO: 93 FL — SIGNIFICANT CHANGE UP (ref 80–100)
MONOCYTES # BLD AUTO: 0.35 K/UL — SIGNIFICANT CHANGE UP (ref 0–0.9)
MONOCYTES NFR BLD AUTO: 5.1 % — SIGNIFICANT CHANGE UP (ref 2–14)
NEUTROPHILS # BLD AUTO: 4.64 K/UL — SIGNIFICANT CHANGE UP (ref 1.8–7.4)
NEUTROPHILS NFR BLD AUTO: 68 % — SIGNIFICANT CHANGE UP (ref 43–77)
NRBC # BLD: 0 /100 WBCS — SIGNIFICANT CHANGE UP (ref 0–0)
PCO2 BLDV: 43 MMHG — SIGNIFICANT CHANGE UP (ref 42–55)
PH BLDV: 7.38 — SIGNIFICANT CHANGE UP (ref 7.32–7.43)
PLATELET # BLD AUTO: 403 K/UL — HIGH (ref 150–400)
PO2 BLDV: 43 MMHG — SIGNIFICANT CHANGE UP (ref 25–45)
POTASSIUM BLDV-SCNC: 5 MMOL/L — SIGNIFICANT CHANGE UP (ref 3.5–5.1)
POTASSIUM SERPL-MCNC: 4.2 MMOL/L — SIGNIFICANT CHANGE UP (ref 3.5–5.3)
POTASSIUM SERPL-SCNC: 4.2 MMOL/L — SIGNIFICANT CHANGE UP (ref 3.5–5.3)
PROT SERPL-MCNC: 7.9 G/DL — SIGNIFICANT CHANGE UP (ref 6–8.3)
PROTHROM AB SERPL-ACNC: 11.3 SEC — SIGNIFICANT CHANGE UP (ref 9.5–13)
RBC # BLD: 4.14 M/UL — LOW (ref 4.2–5.8)
RBC # FLD: 13.5 % — SIGNIFICANT CHANGE UP (ref 10.3–14.5)
SAO2 % BLDV: 75.9 % — SIGNIFICANT CHANGE UP (ref 67–88)
SODIUM SERPL-SCNC: 137 MMOL/L — SIGNIFICANT CHANGE UP (ref 135–145)
WBC # BLD: 6.82 K/UL — SIGNIFICANT CHANGE UP (ref 3.8–10.5)
WBC # FLD AUTO: 6.82 K/UL — SIGNIFICANT CHANGE UP (ref 3.8–10.5)

## 2024-07-05 PROCEDURE — 36415 COLL VENOUS BLD VENIPUNCTURE: CPT

## 2024-07-05 PROCEDURE — 86901 BLOOD TYPING SEROLOGIC RH(D): CPT

## 2024-07-05 PROCEDURE — 86900 BLOOD TYPING SEROLOGIC ABO: CPT

## 2024-07-05 PROCEDURE — 83735 ASSAY OF MAGNESIUM: CPT

## 2024-07-05 PROCEDURE — 99285 EMERGENCY DEPT VISIT HI MDM: CPT

## 2024-07-05 PROCEDURE — 82330 ASSAY OF CALCIUM: CPT

## 2024-07-05 PROCEDURE — 83690 ASSAY OF LIPASE: CPT

## 2024-07-05 PROCEDURE — 82435 ASSAY OF BLOOD CHLORIDE: CPT

## 2024-07-05 PROCEDURE — 85018 HEMOGLOBIN: CPT

## 2024-07-05 PROCEDURE — 74177 CT ABD & PELVIS W/CONTRAST: CPT | Mod: 26,MC

## 2024-07-05 PROCEDURE — 85730 THROMBOPLASTIN TIME PARTIAL: CPT

## 2024-07-05 PROCEDURE — 84295 ASSAY OF SERUM SODIUM: CPT

## 2024-07-05 PROCEDURE — 74177 CT ABD & PELVIS W/CONTRAST: CPT | Mod: MC

## 2024-07-05 PROCEDURE — 96374 THER/PROPH/DIAG INJ IV PUSH: CPT | Mod: XU

## 2024-07-05 PROCEDURE — 85610 PROTHROMBIN TIME: CPT

## 2024-07-05 PROCEDURE — 96375 TX/PRO/DX INJ NEW DRUG ADDON: CPT | Mod: XU

## 2024-07-05 PROCEDURE — 80053 COMPREHEN METABOLIC PANEL: CPT

## 2024-07-05 PROCEDURE — 85014 HEMATOCRIT: CPT

## 2024-07-05 PROCEDURE — 86850 RBC ANTIBODY SCREEN: CPT

## 2024-07-05 PROCEDURE — 83605 ASSAY OF LACTIC ACID: CPT

## 2024-07-05 PROCEDURE — 82947 ASSAY GLUCOSE BLOOD QUANT: CPT

## 2024-07-05 PROCEDURE — 82803 BLOOD GASES ANY COMBINATION: CPT

## 2024-07-05 PROCEDURE — 85025 COMPLETE CBC W/AUTO DIFF WBC: CPT

## 2024-07-05 PROCEDURE — 99284 EMERGENCY DEPT VISIT MOD MDM: CPT | Mod: 25

## 2024-07-05 PROCEDURE — 10140 I&D HMTMA SEROMA/FLUID COLLJ: CPT

## 2024-07-05 PROCEDURE — 84132 ASSAY OF SERUM POTASSIUM: CPT

## 2024-07-05 RX ORDER — IOHEXOL 350 MG/ML
30 INJECTION, SOLUTION INTRAVENOUS ONCE
Refills: 0 | Status: DISCONTINUED | OUTPATIENT
Start: 2024-07-05 | End: 2024-07-05

## 2024-07-05 RX ORDER — HYDROMORPHONE HCL 0.2 MG/ML
1 INJECTION, SOLUTION INTRAVENOUS ONCE
Refills: 0 | Status: DISCONTINUED | OUTPATIENT
Start: 2024-07-05 | End: 2024-07-05

## 2024-07-05 RX ORDER — MORPHINE SULFATE 100 MG/1
4 TABLET, EXTENDED RELEASE ORAL ONCE
Refills: 0 | Status: DISCONTINUED | OUTPATIENT
Start: 2024-07-05 | End: 2024-07-05

## 2024-07-05 RX ORDER — SODIUM CHLORIDE 0.9 % (FLUSH) 0.9 %
1000 SYRINGE (ML) INJECTION ONCE
Refills: 0 | Status: COMPLETED | OUTPATIENT
Start: 2024-07-05 | End: 2024-07-05

## 2024-07-05 RX ADMIN — MORPHINE SULFATE 4 MILLIGRAM(S): 100 TABLET, EXTENDED RELEASE ORAL at 08:39

## 2024-07-05 RX ADMIN — HYDROMORPHONE HCL 1 MILLIGRAM(S): 0.2 INJECTION, SOLUTION INTRAVENOUS at 11:58

## 2024-07-05 RX ADMIN — Medication 1000 MILLILITER(S): at 08:39

## 2024-07-06 ENCOUNTER — EMERGENCY (EMERGENCY)
Facility: HOSPITAL | Age: 39
LOS: 1 days | Discharge: HOME | End: 2024-07-06
Attending: EMERGENCY MEDICINE
Payer: COMMERCIAL

## 2024-07-06 VITALS
DIASTOLIC BLOOD PRESSURE: 71 MMHG | HEART RATE: 66 BPM | RESPIRATION RATE: 16 BRPM | OXYGEN SATURATION: 100 % | SYSTOLIC BLOOD PRESSURE: 129 MMHG | TEMPERATURE: 98 F

## 2024-07-06 VITALS
WEIGHT: 136.91 LBS | RESPIRATION RATE: 16 BRPM | HEIGHT: 67 IN | TEMPERATURE: 99 F | DIASTOLIC BLOOD PRESSURE: 85 MMHG | OXYGEN SATURATION: 96 % | SYSTOLIC BLOOD PRESSURE: 136 MMHG | HEART RATE: 71 BPM

## 2024-07-06 DIAGNOSIS — Z87.19 PERSONAL HISTORY OF OTHER DISEASES OF THE DIGESTIVE SYSTEM: Chronic | ICD-10-CM

## 2024-07-06 DIAGNOSIS — Z87.81 PERSONAL HISTORY OF (HEALED) TRAUMATIC FRACTURE: Chronic | ICD-10-CM

## 2024-07-06 DIAGNOSIS — S42.409A UNSPECIFIED FRACTURE OF LOWER END OF UNSPECIFIED HUMERUS, INITIAL ENCOUNTER FOR CLOSED FRACTURE: Chronic | ICD-10-CM

## 2024-07-06 DIAGNOSIS — Z93.2 ILEOSTOMY STATUS: Chronic | ICD-10-CM

## 2024-07-06 PROCEDURE — 99284 EMERGENCY DEPT VISIT MOD MDM: CPT

## 2024-07-06 RX ORDER — OXYCODONE HYDROCHLORIDE 100 MG/5ML
15 SOLUTION ORAL ONCE
Refills: 0 | Status: DISCONTINUED | OUTPATIENT
Start: 2024-07-06 | End: 2024-07-06

## 2024-07-06 RX ADMIN — OXYCODONE HYDROCHLORIDE 15 MILLIGRAM(S): 100 SOLUTION ORAL at 08:10

## 2024-07-07 ENCOUNTER — EMERGENCY (EMERGENCY)
Facility: HOSPITAL | Age: 39
LOS: 1 days | Discharge: HOME | End: 2024-07-07
Attending: EMERGENCY MEDICINE
Payer: COMMERCIAL

## 2024-07-07 VITALS
HEART RATE: 80 BPM | HEIGHT: 67 IN | RESPIRATION RATE: 17 BRPM | DIASTOLIC BLOOD PRESSURE: 75 MMHG | OXYGEN SATURATION: 98 % | SYSTOLIC BLOOD PRESSURE: 120 MMHG | TEMPERATURE: 98 F | WEIGHT: 138.01 LBS

## 2024-07-07 VITALS
SYSTOLIC BLOOD PRESSURE: 111 MMHG | OXYGEN SATURATION: 99 % | TEMPERATURE: 98 F | RESPIRATION RATE: 18 BRPM | DIASTOLIC BLOOD PRESSURE: 73 MMHG | HEART RATE: 88 BPM

## 2024-07-07 DIAGNOSIS — Z87.81 PERSONAL HISTORY OF (HEALED) TRAUMATIC FRACTURE: Chronic | ICD-10-CM

## 2024-07-07 DIAGNOSIS — S42.409A UNSPECIFIED FRACTURE OF LOWER END OF UNSPECIFIED HUMERUS, INITIAL ENCOUNTER FOR CLOSED FRACTURE: Chronic | ICD-10-CM

## 2024-07-07 DIAGNOSIS — Z87.19 PERSONAL HISTORY OF OTHER DISEASES OF THE DIGESTIVE SYSTEM: Chronic | ICD-10-CM

## 2024-07-07 DIAGNOSIS — Z93.2 ILEOSTOMY STATUS: Chronic | ICD-10-CM

## 2024-07-07 LAB
ALBUMIN SERPL ELPH-MCNC: 4.1 G/DL — SIGNIFICANT CHANGE UP (ref 3.3–5)
ALP SERPL-CCNC: 180 U/L — HIGH (ref 40–120)
ALT FLD-CCNC: 183 U/L — HIGH (ref 10–45)
ANION GAP SERPL CALC-SCNC: 12 MMOL/L — SIGNIFICANT CHANGE UP (ref 5–17)
APTT BLD: 32.3 SEC — SIGNIFICANT CHANGE UP (ref 24.5–35.6)
AST SERPL-CCNC: 106 U/L — HIGH (ref 10–40)
BASE EXCESS BLDV CALC-SCNC: 6.2 MMOL/L — HIGH (ref -2–3)
BASOPHILS # BLD AUTO: 0.03 K/UL — SIGNIFICANT CHANGE UP (ref 0–0.2)
BASOPHILS NFR BLD AUTO: 0.4 % — SIGNIFICANT CHANGE UP (ref 0–2)
BILIRUB SERPL-MCNC: 0.8 MG/DL — SIGNIFICANT CHANGE UP (ref 0.2–1.2)
BLD GP AB SCN SERPL QL: NEGATIVE — SIGNIFICANT CHANGE UP
BUN SERPL-MCNC: 15 MG/DL — SIGNIFICANT CHANGE UP (ref 7–23)
CA-I SERPL-SCNC: 1.25 MMOL/L — SIGNIFICANT CHANGE UP (ref 1.15–1.33)
CALCIUM SERPL-MCNC: 9.9 MG/DL — SIGNIFICANT CHANGE UP (ref 8.4–10.5)
CHLORIDE BLDV-SCNC: 103 MMOL/L — SIGNIFICANT CHANGE UP (ref 96–108)
CHLORIDE SERPL-SCNC: 100 MMOL/L — SIGNIFICANT CHANGE UP (ref 96–108)
CO2 BLDV-SCNC: 34 MMOL/L — HIGH (ref 22–26)
CO2 SERPL-SCNC: 25 MMOL/L — SIGNIFICANT CHANGE UP (ref 22–31)
CREAT SERPL-MCNC: 0.94 MG/DL — SIGNIFICANT CHANGE UP (ref 0.5–1.3)
EGFR: 106 ML/MIN/1.73M2 — SIGNIFICANT CHANGE UP
EOSINOPHIL # BLD AUTO: 0.18 K/UL — SIGNIFICANT CHANGE UP (ref 0–0.5)
EOSINOPHIL NFR BLD AUTO: 2.4 % — SIGNIFICANT CHANGE UP (ref 0–6)
GAS PNL BLDV: 132 MMOL/L — LOW (ref 136–145)
GAS PNL BLDV: SIGNIFICANT CHANGE UP
GAS PNL BLDV: SIGNIFICANT CHANGE UP
GLUCOSE BLDV-MCNC: 101 MG/DL — HIGH (ref 70–99)
GLUCOSE SERPL-MCNC: 103 MG/DL — HIGH (ref 70–99)
HCO3 BLDV-SCNC: 33 MMOL/L — HIGH (ref 22–29)
HCT VFR BLD CALC: 39.2 % — SIGNIFICANT CHANGE UP (ref 39–50)
HCT VFR BLDA CALC: 40 % — SIGNIFICANT CHANGE UP (ref 39–51)
HGB BLD CALC-MCNC: 13.4 G/DL — SIGNIFICANT CHANGE UP (ref 12.6–17.4)
HGB BLD-MCNC: 12.9 G/DL — LOW (ref 13–17)
IMM GRANULOCYTES NFR BLD AUTO: 0.3 % — SIGNIFICANT CHANGE UP (ref 0–0.9)
INR BLD: 1.04 RATIO — SIGNIFICANT CHANGE UP (ref 0.85–1.18)
LACTATE BLDV-MCNC: 1.6 MMOL/L — SIGNIFICANT CHANGE UP (ref 0.5–2)
LIDOCAIN IGE QN: 28 U/L — SIGNIFICANT CHANGE UP (ref 7–60)
LYMPHOCYTES # BLD AUTO: 1.95 K/UL — SIGNIFICANT CHANGE UP (ref 1–3.3)
LYMPHOCYTES # BLD AUTO: 26.2 % — SIGNIFICANT CHANGE UP (ref 13–44)
MAGNESIUM SERPL-MCNC: 2 MG/DL — SIGNIFICANT CHANGE UP (ref 1.6–2.6)
MCHC RBC-ENTMCNC: 31 PG — SIGNIFICANT CHANGE UP (ref 27–34)
MCHC RBC-ENTMCNC: 32.9 GM/DL — SIGNIFICANT CHANGE UP (ref 32–36)
MCV RBC AUTO: 94.2 FL — SIGNIFICANT CHANGE UP (ref 80–100)
MONOCYTES # BLD AUTO: 0.45 K/UL — SIGNIFICANT CHANGE UP (ref 0–0.9)
MONOCYTES NFR BLD AUTO: 6.1 % — SIGNIFICANT CHANGE UP (ref 2–14)
NEUTROPHILS # BLD AUTO: 4.8 K/UL — SIGNIFICANT CHANGE UP (ref 1.8–7.4)
NEUTROPHILS NFR BLD AUTO: 64.6 % — SIGNIFICANT CHANGE UP (ref 43–77)
NRBC # BLD: 0 /100 WBCS — SIGNIFICANT CHANGE UP (ref 0–0)
PCO2 BLDV: 54 MMHG — SIGNIFICANT CHANGE UP (ref 42–55)
PH BLDV: 7.39 — SIGNIFICANT CHANGE UP (ref 7.32–7.43)
PLATELET # BLD AUTO: 423 K/UL — HIGH (ref 150–400)
PO2 BLDV: 33 MMHG — SIGNIFICANT CHANGE UP (ref 25–45)
POTASSIUM BLDV-SCNC: 5 MMOL/L — SIGNIFICANT CHANGE UP (ref 3.5–5.1)
POTASSIUM SERPL-MCNC: 5 MMOL/L — SIGNIFICANT CHANGE UP (ref 3.5–5.3)
POTASSIUM SERPL-SCNC: 5 MMOL/L — SIGNIFICANT CHANGE UP (ref 3.5–5.3)
PROT SERPL-MCNC: 7.8 G/DL — SIGNIFICANT CHANGE UP (ref 6–8.3)
PROTHROM AB SERPL-ACNC: 11.4 SEC — SIGNIFICANT CHANGE UP (ref 9.5–13)
RBC # BLD: 4.16 M/UL — LOW (ref 4.2–5.8)
RBC # FLD: 13.3 % — SIGNIFICANT CHANGE UP (ref 10.3–14.5)
RH IG SCN BLD-IMP: POSITIVE — SIGNIFICANT CHANGE UP
SAO2 % BLDV: 53.4 % — LOW (ref 67–88)
SODIUM SERPL-SCNC: 137 MMOL/L — SIGNIFICANT CHANGE UP (ref 135–145)
WBC # BLD: 7.43 K/UL — SIGNIFICANT CHANGE UP (ref 3.8–10.5)
WBC # FLD AUTO: 7.43 K/UL — SIGNIFICANT CHANGE UP (ref 3.8–10.5)

## 2024-07-07 PROCEDURE — 84132 ASSAY OF SERUM POTASSIUM: CPT

## 2024-07-07 PROCEDURE — 83690 ASSAY OF LIPASE: CPT

## 2024-07-07 PROCEDURE — 36415 COLL VENOUS BLD VENIPUNCTURE: CPT

## 2024-07-07 PROCEDURE — 96361 HYDRATE IV INFUSION ADD-ON: CPT

## 2024-07-07 PROCEDURE — 80053 COMPREHEN METABOLIC PANEL: CPT

## 2024-07-07 PROCEDURE — 96374 THER/PROPH/DIAG INJ IV PUSH: CPT | Mod: XU

## 2024-07-07 PROCEDURE — 83735 ASSAY OF MAGNESIUM: CPT

## 2024-07-07 PROCEDURE — 82330 ASSAY OF CALCIUM: CPT

## 2024-07-07 PROCEDURE — 86900 BLOOD TYPING SEROLOGIC ABO: CPT

## 2024-07-07 PROCEDURE — 82803 BLOOD GASES ANY COMBINATION: CPT

## 2024-07-07 PROCEDURE — 82947 ASSAY GLUCOSE BLOOD QUANT: CPT

## 2024-07-07 PROCEDURE — 82435 ASSAY OF BLOOD CHLORIDE: CPT

## 2024-07-07 PROCEDURE — 99284 EMERGENCY DEPT VISIT MOD MDM: CPT | Mod: 25

## 2024-07-07 PROCEDURE — 74177 CT ABD & PELVIS W/CONTRAST: CPT | Mod: MC

## 2024-07-07 PROCEDURE — 74177 CT ABD & PELVIS W/CONTRAST: CPT | Mod: 26,MC

## 2024-07-07 PROCEDURE — 99285 EMERGENCY DEPT VISIT HI MDM: CPT

## 2024-07-07 PROCEDURE — 85730 THROMBOPLASTIN TIME PARTIAL: CPT

## 2024-07-07 PROCEDURE — 85610 PROTHROMBIN TIME: CPT

## 2024-07-07 PROCEDURE — 83605 ASSAY OF LACTIC ACID: CPT

## 2024-07-07 PROCEDURE — 96376 TX/PRO/DX INJ SAME DRUG ADON: CPT

## 2024-07-07 PROCEDURE — 85014 HEMATOCRIT: CPT

## 2024-07-07 PROCEDURE — 86901 BLOOD TYPING SEROLOGIC RH(D): CPT

## 2024-07-07 PROCEDURE — 85018 HEMOGLOBIN: CPT

## 2024-07-07 PROCEDURE — 86850 RBC ANTIBODY SCREEN: CPT

## 2024-07-07 PROCEDURE — 85025 COMPLETE CBC W/AUTO DIFF WBC: CPT

## 2024-07-07 PROCEDURE — 84295 ASSAY OF SERUM SODIUM: CPT

## 2024-07-07 RX ORDER — MORPHINE SULFATE 100 MG/1
4 TABLET, EXTENDED RELEASE ORAL ONCE
Refills: 0 | Status: DISCONTINUED | OUTPATIENT
Start: 2024-07-07 | End: 2024-07-07

## 2024-07-07 RX ORDER — SODIUM CHLORIDE 0.9 % (FLUSH) 0.9 %
1000 SYRINGE (ML) INJECTION ONCE
Refills: 0 | Status: COMPLETED | OUTPATIENT
Start: 2024-07-07 | End: 2024-07-07

## 2024-07-07 RX ORDER — OXYCODONE HYDROCHLORIDE 100 MG/5ML
15 SOLUTION ORAL ONCE
Refills: 0 | Status: DISCONTINUED | OUTPATIENT
Start: 2024-07-07 | End: 2024-07-07

## 2024-07-07 RX ADMIN — MORPHINE SULFATE 4 MILLIGRAM(S): 100 TABLET, EXTENDED RELEASE ORAL at 11:19

## 2024-07-07 RX ADMIN — MORPHINE SULFATE 4 MILLIGRAM(S): 100 TABLET, EXTENDED RELEASE ORAL at 18:09

## 2024-07-07 RX ADMIN — MORPHINE SULFATE 4 MILLIGRAM(S): 100 TABLET, EXTENDED RELEASE ORAL at 16:59

## 2024-07-07 RX ADMIN — MORPHINE SULFATE 4 MILLIGRAM(S): 100 TABLET, EXTENDED RELEASE ORAL at 13:47

## 2024-07-07 RX ADMIN — MORPHINE SULFATE 4 MILLIGRAM(S): 100 TABLET, EXTENDED RELEASE ORAL at 13:52

## 2024-07-07 RX ADMIN — Medication 1000 MILLILITER(S): at 18:18

## 2024-07-07 RX ADMIN — MORPHINE SULFATE 4 MILLIGRAM(S): 100 TABLET, EXTENDED RELEASE ORAL at 17:23

## 2024-07-07 RX ADMIN — Medication 1000 MILLILITER(S): at 13:47

## 2024-07-07 RX ADMIN — Medication 1000 MILLILITER(S): at 11:19

## 2024-07-08 LAB — SURGICAL PATHOLOGY STUDY: SIGNIFICANT CHANGE UP

## 2024-07-16 ENCOUNTER — APPOINTMENT (OUTPATIENT)
Dept: COLORECTAL SURGERY | Facility: CLINIC | Age: 39
End: 2024-07-16
Payer: COMMERCIAL

## 2024-07-16 DIAGNOSIS — Z98.890 OTHER SPECIFIED POSTPROCEDURAL STATES: ICD-10-CM

## 2024-07-16 DIAGNOSIS — K91.858 OTHER COMPLICATIONS OF INTESTINAL POUCH: ICD-10-CM

## 2024-07-16 PROCEDURE — 99024 POSTOP FOLLOW-UP VISIT: CPT

## 2024-07-17 ENCOUNTER — APPOINTMENT (OUTPATIENT)
Dept: COLORECTAL SURGERY | Facility: CLINIC | Age: 39
End: 2024-07-17

## 2024-08-26 ENCOUNTER — APPOINTMENT (OUTPATIENT)
Dept: COLORECTAL SURGERY | Facility: CLINIC | Age: 39
End: 2024-08-26

## 2024-08-27 DIAGNOSIS — R19.7 DIARRHEA, UNSPECIFIED: ICD-10-CM

## 2024-09-20 ENCOUNTER — APPOINTMENT (OUTPATIENT)
Dept: UROLOGY | Facility: CLINIC | Age: 39
End: 2024-09-20

## 2024-09-24 ENCOUNTER — OUTPATIENT (OUTPATIENT)
Dept: OUTPATIENT SERVICES | Facility: HOSPITAL | Age: 39
LOS: 1 days | End: 2024-09-24
Payer: COMMERCIAL

## 2024-09-24 ENCOUNTER — APPOINTMENT (OUTPATIENT)
Dept: RADIOLOGY | Facility: HOSPITAL | Age: 39
End: 2024-09-24

## 2024-09-24 ENCOUNTER — APPOINTMENT (OUTPATIENT)
Dept: MRI IMAGING | Facility: CLINIC | Age: 39
End: 2024-09-24
Payer: COMMERCIAL

## 2024-09-24 DIAGNOSIS — Z93.2 ILEOSTOMY STATUS: Chronic | ICD-10-CM

## 2024-09-24 DIAGNOSIS — Z87.19 PERSONAL HISTORY OF OTHER DISEASES OF THE DIGESTIVE SYSTEM: Chronic | ICD-10-CM

## 2024-09-24 DIAGNOSIS — Z87.81 PERSONAL HISTORY OF (HEALED) TRAUMATIC FRACTURE: Chronic | ICD-10-CM

## 2024-09-24 DIAGNOSIS — S42.409A UNSPECIFIED FRACTURE OF LOWER END OF UNSPECIFIED HUMERUS, INITIAL ENCOUNTER FOR CLOSED FRACTURE: Chronic | ICD-10-CM

## 2024-09-24 DIAGNOSIS — K91.858 OTHER COMPLICATIONS OF INTESTINAL POUCH: ICD-10-CM

## 2024-09-24 PROCEDURE — 72197 MRI PELVIS W/O & W/DYE: CPT | Mod: 26

## 2024-09-24 PROCEDURE — 74270 X-RAY XM COLON 1CNTRST STD: CPT | Mod: 26

## 2024-09-24 PROCEDURE — 72197 MRI PELVIS W/O & W/DYE: CPT

## 2024-09-24 PROCEDURE — 74270 X-RAY XM COLON 1CNTRST STD: CPT

## 2024-09-24 PROCEDURE — A9585: CPT

## 2024-09-27 ENCOUNTER — NON-APPOINTMENT (OUTPATIENT)
Age: 39
End: 2024-09-27

## 2024-10-16 ENCOUNTER — APPOINTMENT (OUTPATIENT)
Dept: COLORECTAL SURGERY | Facility: CLINIC | Age: 39
End: 2024-10-16

## 2024-11-01 ENCOUNTER — INPATIENT (INPATIENT)
Facility: HOSPITAL | Age: 39
LOS: 2 days | Discharge: HOME | DRG: 392 | End: 2024-11-04
Attending: COLON & RECTAL SURGERY | Admitting: COLON & RECTAL SURGERY
Payer: COMMERCIAL

## 2024-11-01 VITALS
TEMPERATURE: 98 F | SYSTOLIC BLOOD PRESSURE: 123 MMHG | DIASTOLIC BLOOD PRESSURE: 79 MMHG | HEART RATE: 80 BPM | OXYGEN SATURATION: 100 % | HEIGHT: 67 IN | WEIGHT: 145.06 LBS | RESPIRATION RATE: 18 BRPM

## 2024-11-01 DIAGNOSIS — R19.8 OTHER SPECIFIED SYMPTOMS AND SIGNS INVOLVING THE DIGESTIVE SYSTEM AND ABDOMEN: ICD-10-CM

## 2024-11-01 DIAGNOSIS — Z87.19 PERSONAL HISTORY OF OTHER DISEASES OF THE DIGESTIVE SYSTEM: Chronic | ICD-10-CM

## 2024-11-01 DIAGNOSIS — Z87.81 PERSONAL HISTORY OF (HEALED) TRAUMATIC FRACTURE: Chronic | ICD-10-CM

## 2024-11-01 DIAGNOSIS — S42.409A UNSPECIFIED FRACTURE OF LOWER END OF UNSPECIFIED HUMERUS, INITIAL ENCOUNTER FOR CLOSED FRACTURE: Chronic | ICD-10-CM

## 2024-11-01 DIAGNOSIS — Z93.2 ILEOSTOMY STATUS: Chronic | ICD-10-CM

## 2024-11-01 LAB
ALBUMIN SERPL ELPH-MCNC: 5.7 G/DL — HIGH (ref 3.3–5)
ALP SERPL-CCNC: 131 U/L — HIGH (ref 40–120)
ALT FLD-CCNC: 95 U/L — HIGH (ref 10–45)
ANION GAP SERPL CALC-SCNC: 15 MMOL/L — SIGNIFICANT CHANGE UP (ref 5–17)
APTT BLD: 34.7 SEC — SIGNIFICANT CHANGE UP (ref 24.5–35.6)
AST SERPL-CCNC: 63 U/L — HIGH (ref 10–40)
BASOPHILS # BLD AUTO: 0 K/UL — SIGNIFICANT CHANGE UP (ref 0–0.2)
BASOPHILS NFR BLD AUTO: 0 % — SIGNIFICANT CHANGE UP (ref 0–2)
BILIRUB SERPL-MCNC: 2.2 MG/DL — HIGH (ref 0.2–1.2)
BUN SERPL-MCNC: 14 MG/DL — SIGNIFICANT CHANGE UP (ref 7–23)
C DIFF GDH STL QL: NEGATIVE — SIGNIFICANT CHANGE UP
C DIFF GDH STL QL: SIGNIFICANT CHANGE UP
CALCIUM SERPL-MCNC: 10.1 MG/DL — SIGNIFICANT CHANGE UP (ref 8.4–10.5)
CHLORIDE SERPL-SCNC: 96 MMOL/L — SIGNIFICANT CHANGE UP (ref 96–108)
CO2 SERPL-SCNC: 24 MMOL/L — SIGNIFICANT CHANGE UP (ref 22–31)
CREAT SERPL-MCNC: 1.14 MG/DL — SIGNIFICANT CHANGE UP (ref 0.5–1.3)
EGFR: 84 ML/MIN/1.73M2 — SIGNIFICANT CHANGE UP
EOSINOPHIL # BLD AUTO: 0 K/UL — SIGNIFICANT CHANGE UP (ref 0–0.5)
EOSINOPHIL NFR BLD AUTO: 0 % — SIGNIFICANT CHANGE UP (ref 0–6)
GAS PNL BLDV: SIGNIFICANT CHANGE UP
GLUCOSE SERPL-MCNC: 103 MG/DL — HIGH (ref 70–99)
HCT VFR BLD CALC: 51.3 % — HIGH (ref 39–50)
HGB BLD-MCNC: 17.7 G/DL — HIGH (ref 13–17)
INR BLD: 0.96 RATIO — SIGNIFICANT CHANGE UP (ref 0.85–1.16)
LIDOCAIN IGE QN: 22 U/L — SIGNIFICANT CHANGE UP (ref 7–60)
LYMPHOCYTES # BLD AUTO: 0.44 K/UL — LOW (ref 1–3.3)
LYMPHOCYTES # BLD AUTO: 3.5 % — LOW (ref 13–44)
MAGNESIUM SERPL-MCNC: 1.8 MG/DL — SIGNIFICANT CHANGE UP (ref 1.6–2.6)
MANUAL SMEAR VERIFICATION: SIGNIFICANT CHANGE UP
MCHC RBC-ENTMCNC: 30.5 PG — SIGNIFICANT CHANGE UP (ref 27–34)
MCHC RBC-ENTMCNC: 34.5 G/DL — SIGNIFICANT CHANGE UP (ref 32–36)
MCV RBC AUTO: 88.3 FL — SIGNIFICANT CHANGE UP (ref 80–100)
MONOCYTES # BLD AUTO: 0.54 K/UL — SIGNIFICANT CHANGE UP (ref 0–0.9)
MONOCYTES NFR BLD AUTO: 4.3 % — SIGNIFICANT CHANGE UP (ref 2–14)
NEUTROPHILS # BLD AUTO: 11.52 K/UL — HIGH (ref 1.8–7.4)
NEUTROPHILS NFR BLD AUTO: 92.2 % — HIGH (ref 43–77)
PHOSPHATE SERPL-MCNC: 3.8 MG/DL — SIGNIFICANT CHANGE UP (ref 2.5–4.5)
PLAT MORPH BLD: NORMAL — SIGNIFICANT CHANGE UP
PLATELET # BLD AUTO: 244 K/UL — SIGNIFICANT CHANGE UP (ref 150–400)
POTASSIUM SERPL-MCNC: 4.3 MMOL/L — SIGNIFICANT CHANGE UP (ref 3.5–5.3)
POTASSIUM SERPL-SCNC: 4.3 MMOL/L — SIGNIFICANT CHANGE UP (ref 3.5–5.3)
PROT SERPL-MCNC: 9.1 G/DL — HIGH (ref 6–8.3)
PROTHROM AB SERPL-ACNC: 11 SEC — SIGNIFICANT CHANGE UP (ref 9.9–13.4)
RBC # BLD: 5.81 M/UL — HIGH (ref 4.2–5.8)
RBC # FLD: 12.9 % — SIGNIFICANT CHANGE UP (ref 10.3–14.5)
RBC BLD AUTO: NORMAL — SIGNIFICANT CHANGE UP
SODIUM SERPL-SCNC: 135 MMOL/L — SIGNIFICANT CHANGE UP (ref 135–145)
WBC # BLD: 12.49 K/UL — HIGH (ref 3.8–10.5)
WBC # FLD AUTO: 12.49 K/UL — HIGH (ref 3.8–10.5)

## 2024-11-01 PROCEDURE — 99285 EMERGENCY DEPT VISIT HI MDM: CPT

## 2024-11-01 PROCEDURE — 74177 CT ABD & PELVIS W/CONTRAST: CPT | Mod: 26

## 2024-11-01 RX ORDER — PANTOPRAZOLE SODIUM 40 MG/1
40 TABLET, DELAYED RELEASE ORAL ONCE
Refills: 0 | Status: COMPLETED | OUTPATIENT
Start: 2024-11-01 | End: 2024-11-01

## 2024-11-01 RX ORDER — ENOXAPARIN SODIUM 40MG/0.4ML
40 SYRINGE (ML) SUBCUTANEOUS EVERY 24 HOURS
Refills: 0 | Status: DISCONTINUED | OUTPATIENT
Start: 2024-11-01 | End: 2024-11-04

## 2024-11-01 RX ADMIN — Medication 1000 MILLILITER(S): at 19:57

## 2024-11-01 RX ADMIN — PANTOPRAZOLE SODIUM 40 MILLIGRAM(S): 40 TABLET, DELAYED RELEASE ORAL at 19:57

## 2024-11-01 RX ADMIN — Medication 1000 MILLILITER(S): at 21:49

## 2024-11-01 NOTE — ED PROVIDER NOTE - OBJECTIVE STATEMENT
This is a 39-year-old gentleman with ulcerative colitis status post J-pouch surgery.  Now he is coming in with substantial ostomy output.  His usual daily output is 1.5 L and he thinks he is put out at least 5 L today.  Not really having abdominal pain however.  No throwing up.  Output is clear in color slightly brownish. -Gerard Valentine MD-

## 2024-11-01 NOTE — ED PROVIDER NOTE - RAPID ASSESSMENT
38 yo male Anxiety, Depression, Diagnosed with UC in 2006 s/p 2-stage j pouch surgery in 2008; Hx small bowel obstruction-requiring Ileostomy in 2023 and multiple ileostomy revisions presents to the ED complaining of increased ostomy output since 9 AM.  Reports every hour is putting out roughly 1 L since 9 AM.  Does in some generalized weakness and fatigue.  Denies overt abdominal pain, vomiting, fever/chills, recent travel, recent sick contacts.  Called surgeon's office and was advised to come to ED.    Patient was seen as a rapid assessment (QPA) patient. This is an incomplete workup and it is intended that the patient will be seen in the main emergency department. The patient will be seen and further worked up in the main Emergency Department and their care will be completed by the main Emergency Department team along with a thorough physical exam. Receiving team will follow up on labs, analgesia, any clinical imaging, reassess and disposition as clinically indicated. All decisions regarding the progression of care will be made at their discretion. - Irwin Merrill PA-C

## 2024-11-01 NOTE — ED PROVIDER NOTE - CLINICAL SUMMARY MEDICAL DECISION MAKING FREE TEXT BOX
39M PMH anxiety, depression, UC diagnosed in 2006 s/p 2 stage J  pouch surgeries 2008, SBO s/p ileostomy 2023 c/b multiple revisions presented for high output ileostomy. Pt states he usually makes 1500 cc output per 24 hr period, however currently he has put out 5 L since this am. The output is non watery, the same pasty consistency as before, just increased amount. Pt reports he feels dehydrated. He called his surgeon and was sent to ED. On Labs, his CBC is with leukocytosis and left shift. CMP with bilirubinemia transaminitis and elevated alk phos. Pt sxs could be due to partial SBO, entero-enteric fistula, lymphatic obstruction, resolving ileus (either post-operative vs medication induced)  or an abscess. Less likely dietary causes since pt did not change his diet. Will get abdominal imaging and consult surgery. Will administer IVF and PPI. 39M PMH anxiety, depression, UC diagnosed in 2006 s/p 2 stage J  pouch surgeries 2008, SBO s/p ileostomy 2023 c/b multiple revisions presented for high output ileostomy. Pt states he usually makes 1500 cc output per 24 hr period, however currently he has put out 5 L since this am. The output is non watery, the same pasty consistency as before, just increased amount. Pt reports he feels dehydrated. He called his surgeon and was sent to ED. On Labs, his CBC is with leukocytosis and left shift. CMP with bilirubinemia transaminitis and elevated alk phos. Pt sxs could be due to partial SBO, entero-enteric fistula, lymphatic obstruction, resolving ileus (either post-operative vs medication induced)  or an abscess. Less likely dietary causes since pt did not change his diet. Will get abdominal imaging and consult surgery. Will administer IVF and PPI. Pt will be admitted for further surgical evaluation. 39M PMH anxiety, depression, UC diagnosed in 2006 s/p 2 stage J  pouch surgeries 2008, SBO s/p ileostomy 2023 c/b multiple revisions presented for high output ileostomy. Pt states he usually makes 1500 cc output per 24 hr period, however currently he has put out 5 L since this am. The output is non watery, the same pasty consistency as before, just increased amount. Pt reports he feels dehydrated. He called his surgeon and was sent to ED. On Labs, his CBC is with leukocytosis and left shift. CMP with bilirubinemia transaminitis and elevated alk phos. Pt sxs could be due to partial SBO, entero-enteric fistula, lymphatic obstruction, resolving ileus (either post-operative vs medication induced)  or an abscess. Less likely dietary causes since pt did not change his diet. Will get abdominal imaging and consult surgery. Will administer IVF and PPI. Pt will be admitted for further surgical evaluation.      CBC CMP IV fluids to match his output CT abdomen pelvis with IV contrast and admit for dehydration. -Gerard Valentine MD-

## 2024-11-01 NOTE — ED ADULT NURSE NOTE - OBJECTIVE STATEMENT
39 year old male, A&Ox4, PMH UC (diagnosed in 2006) s/p 2-stage j pouch surgery (2008), SBO requiring ileostomy (2023) with multiple ileostomy revisions  presenting to ED complaining of increased ostomy output starting at 9am. States he has been emptying the pouch every hour approximately 1 L since 9am. Patient contacted surgeon and was advised to come to ED. Patient also endorsing generalized weakness and fatigue. Denies CP, SOB, HA, nausea, vomiting, abdominal pain, difficulty urinating, fevers and chills. Heart rate and rhythm regular. Respirations clear and equal bilaterally. Abdomen soft, nontender and nondistended. Peripheral pulses strong and equal bilaterally. IV placed and labs drawn. Safety and comfort measures maintained.

## 2024-11-01 NOTE — ED PROVIDER NOTE - PHYSICAL EXAMINATION
T(C): 36.9 (11-01-24 @ 19:02), Max: 36.9 (11-01-24 @ 16:09)  HR: 75 (11-01-24 @ 19:02) (75 - 80)  BP: 120/75 (11-01-24 @ 19:02) (120/75 - 123/79)  RR: 16 (11-01-24 @ 19:02) (16 - 18)  SpO2: 99% (11-01-24 @ 19:02) (99% - 100%)    CONSTITUTIONAL: Well groomed, no apparent distress  EYES: PERRLA and symmetric, EOMI, No conjunctival or scleral injection, non-icteric  ENMT: Oral mucosa with moist membranes. Normal dentition; no pharyngeal injection or exudates  NECK: Supple, symmetric and without tracheal deviation   RESP: No respiratory distress, no use of accessory muscles; CTA b/l, no WRR  CV: RRR, +S1S2, no MRG; no JVD; no peripheral edema  GI: Soft, NT, ND, no rebound, no guarding; no palpable masses; no hepatosplenomegaly; no hernia palpated, pasty output in ileostomy light brown color  LYMPH: No cervical LAD or tenderness; no axillary LAD or tenderness; no inguinal LAD or tenderness  MSK: Normal gait; No digital clubbing or cyanosis; examination of the (head/neck/spine/ribs/pelvis, RUE, LUE, RLE, LLE) without misalignment,   Normal ROM without pain, no spinal tenderness, normal muscle strength/tone  SKIN: No rashes or ulcers noted; no subcutaneous nodules or induration palpable  NEURO: CN II-XII intact; normal reflexes in upper and lower extremities, sensation intact in upper and lower extremities b/l to light touch   PSYCH: Appropriate insight/judgment; A+O x 3, mood and affect appropriate, recent/remote memory intact T(C): 36.9 (11-01-24 @ 19:02), Max: 36.9 (11-01-24 @ 16:09)  HR: 75 (11-01-24 @ 19:02) (75 - 80)  BP: 120/75 (11-01-24 @ 19:02) (120/75 - 123/79)  RR: 16 (11-01-24 @ 19:02) (16 - 18)  SpO2: 99% (11-01-24 @ 19:02) (99% - 100%)    CONSTITUTIONAL: Well groomed, no apparent distress  EYES: PERRLA and symmetric, EOMI, No conjunctival or scleral injection, non-icteric  ENMT: Oral mucosa with moist membranes. Normal dentition; no pharyngeal injection or exudates  NECK: Supple, symmetric and without tracheal deviation   RESP: No respiratory distress, no use of accessory muscles; CTA b/l, no WRR  CV: RRR, +S1S2, no MRG; no JVD; no peripheral edema  GI: Soft, NT, ND, no rebound, no guarding; no palpable masses; no hepatosplenomegaly; no hernia palpated, pasty output in ileostomy light brown color  LYMPH: No cervical LAD or tenderness; no axillary LAD or tenderness; no inguinal LAD or tenderness  MSK: Normal gait; No digital clubbing or cyanosis; examination of the (head/neck/spine/ribs/pelvis, RUE, LUE, RLE,       LLE) without misalignment,   Normal ROM without pain, no spinal tenderness, normal muscle strength/tone  SKIN: No rashes or ulcers noted; no subcutaneous nodules or induration palpable  NEURO: CN II-XII intact; normal reflexes in upper and lower extremities, sensation intact in upper and lower extremities b/l to light touch   PSYCH: Appropriate insight/judgment; A+O x 3, mood and affect appropriate, recent/remote memory intact        Awake alert talking no acute distress.  Regular rate and rhythm.  Dry mucous membranes.  Abdomen is soft nontender.  Ostomy is in place with brownish fluid.  No leg edema.  -Gerard Valentine MD-

## 2024-11-01 NOTE — ED CLERICAL - NS ED CLERK NOTE PRE-ARRIVAL INFORMATION; ADDITIONAL PRE-ARRIVAL INFORMATION
CC/Reason For referral: Dehydration.  Ileostomy with high output  Preferred Consultant(if applicable):  Dr Meyers  Who admits for you (if needed):  Do you have documents you would like to fax over?  Would you still like to speak to an ED attending? No call back necessary

## 2024-11-01 NOTE — ED PROVIDER NOTE - ATTENDING CONTRIBUTION TO CARE
Benefits, risks, and possible complications of procedure explained to patient/caregiver who verbalized understanding and gave verbal consent.
I documented in the respective areas of the chart.

## 2024-11-02 DIAGNOSIS — Z90.49 ACQUIRED ABSENCE OF OTHER SPECIFIED PARTS OF DIGESTIVE TRACT: Chronic | ICD-10-CM

## 2024-11-02 DIAGNOSIS — Z98.890 OTHER SPECIFIED POSTPROCEDURAL STATES: Chronic | ICD-10-CM

## 2024-11-02 LAB
ALBUMIN SERPL ELPH-MCNC: 4.2 G/DL — SIGNIFICANT CHANGE UP (ref 3.3–5)
ALP SERPL-CCNC: 97 U/L — SIGNIFICANT CHANGE UP (ref 40–120)
ALT FLD-CCNC: 70 U/L — HIGH (ref 10–45)
ANION GAP SERPL CALC-SCNC: 12 MMOL/L — SIGNIFICANT CHANGE UP (ref 5–17)
APTT BLD: 30.9 SEC — SIGNIFICANT CHANGE UP (ref 24.5–35.6)
AST SERPL-CCNC: 40 U/L — SIGNIFICANT CHANGE UP (ref 10–40)
BILIRUB SERPL-MCNC: 3.1 MG/DL — HIGH (ref 0.2–1.2)
BLD GP AB SCN SERPL QL: NEGATIVE — SIGNIFICANT CHANGE UP
BUN SERPL-MCNC: 13 MG/DL — SIGNIFICANT CHANGE UP (ref 7–23)
CALCIUM SERPL-MCNC: 9 MG/DL — SIGNIFICANT CHANGE UP (ref 8.4–10.5)
CHLORIDE SERPL-SCNC: 99 MMOL/L — SIGNIFICANT CHANGE UP (ref 96–108)
CO2 SERPL-SCNC: 24 MMOL/L — SIGNIFICANT CHANGE UP (ref 22–31)
CREAT SERPL-MCNC: 1.05 MG/DL — SIGNIFICANT CHANGE UP (ref 0.5–1.3)
EC EAEA GENE STL QL NAA+PROBE: DETECTED
EGFR: 93 ML/MIN/1.73M2 — SIGNIFICANT CHANGE UP
GI PCR PANEL: DETECTED
GLUCOSE SERPL-MCNC: 106 MG/DL — HIGH (ref 70–99)
HCT VFR BLD CALC: 43.1 % — SIGNIFICANT CHANGE UP (ref 39–50)
HGB BLD-MCNC: 14.9 G/DL — SIGNIFICANT CHANGE UP (ref 13–17)
INR BLD: 1.11 RATIO — SIGNIFICANT CHANGE UP (ref 0.85–1.16)
MAGNESIUM SERPL-MCNC: 1.6 MG/DL — SIGNIFICANT CHANGE UP (ref 1.6–2.6)
MCHC RBC-ENTMCNC: 29.9 PG — SIGNIFICANT CHANGE UP (ref 27–34)
MCHC RBC-ENTMCNC: 34.6 G/DL — SIGNIFICANT CHANGE UP (ref 32–36)
MCV RBC AUTO: 86.5 FL — SIGNIFICANT CHANGE UP (ref 80–100)
NOROVIRUS GI+II RNA STL QL NAA+NON-PROBE: DETECTED
NRBC # BLD: 0 /100 WBCS — SIGNIFICANT CHANGE UP (ref 0–0)
PHOSPHATE SERPL-MCNC: 2.4 MG/DL — LOW (ref 2.5–4.5)
PLATELET # BLD AUTO: 214 K/UL — SIGNIFICANT CHANGE UP (ref 150–400)
POTASSIUM SERPL-MCNC: 3.7 MMOL/L — SIGNIFICANT CHANGE UP (ref 3.5–5.3)
POTASSIUM SERPL-SCNC: 3.7 MMOL/L — SIGNIFICANT CHANGE UP (ref 3.5–5.3)
PROT SERPL-MCNC: 7.1 G/DL — SIGNIFICANT CHANGE UP (ref 6–8.3)
PROTHROM AB SERPL-ACNC: 12.7 SEC — SIGNIFICANT CHANGE UP (ref 9.9–13.4)
RBC # BLD: 4.98 M/UL — SIGNIFICANT CHANGE UP (ref 4.2–5.8)
RBC # FLD: 12.8 % — SIGNIFICANT CHANGE UP (ref 10.3–14.5)
RH IG SCN BLD-IMP: POSITIVE — SIGNIFICANT CHANGE UP
SODIUM SERPL-SCNC: 135 MMOL/L — SIGNIFICANT CHANGE UP (ref 135–145)
WBC # BLD: 7.7 K/UL — SIGNIFICANT CHANGE UP (ref 3.8–10.5)
WBC # FLD AUTO: 7.7 K/UL — SIGNIFICANT CHANGE UP (ref 3.8–10.5)

## 2024-11-02 RX ORDER — GABAPENTIN 300 MG/1
900 CAPSULE ORAL
Refills: 0 | Status: DISCONTINUED | OUTPATIENT
Start: 2024-11-02 | End: 2024-11-02

## 2024-11-02 RX ORDER — MAGNESIUM SULFATE IN 0.9% NACL 2 G/50 ML
2 INTRAVENOUS SOLUTION, PIGGYBACK (ML) INTRAVENOUS ONCE
Refills: 0 | Status: COMPLETED | OUTPATIENT
Start: 2024-11-02 | End: 2024-11-02

## 2024-11-02 RX ORDER — GABAPENTIN 300 MG/1
300 CAPSULE ORAL
Refills: 0 | Status: DISCONTINUED | OUTPATIENT
Start: 2024-11-02 | End: 2024-11-02

## 2024-11-02 RX ORDER — ESCITALOPRAM 10 MG/1
20 TABLET, FILM COATED ORAL DAILY
Refills: 0 | Status: DISCONTINUED | OUTPATIENT
Start: 2024-11-02 | End: 2024-11-04

## 2024-11-02 RX ORDER — ARIPIPRAZOLE 2 MG/1
15 TABLET ORAL DAILY
Refills: 0 | Status: DISCONTINUED | OUTPATIENT
Start: 2024-11-02 | End: 2024-11-04

## 2024-11-02 RX ORDER — SODIUM PHOSPHATE, MONOBASIC, MONOHYDRATE AND SODIUM PHOSPHATE, DIBASIC ANHYDROUS 276; 142 MG/ML; MG/ML
30 INJECTION, SOLUTION INTRAVENOUS ONCE
Refills: 0 | Status: COMPLETED | OUTPATIENT
Start: 2024-11-02 | End: 2024-11-03

## 2024-11-02 RX ORDER — GABAPENTIN 300 MG/1
600 CAPSULE ORAL ONCE
Refills: 0 | Status: COMPLETED | OUTPATIENT
Start: 2024-11-02 | End: 2024-11-02

## 2024-11-02 RX ORDER — POTASSIUM CHLORIDE 10 MEQ
10 TABLET, EXTENDED RELEASE ORAL
Refills: 0 | Status: COMPLETED | OUTPATIENT
Start: 2024-11-02 | End: 2024-11-02

## 2024-11-02 RX ORDER — GABAPENTIN 300 MG/1
900 CAPSULE ORAL
Refills: 0 | Status: DISCONTINUED | OUTPATIENT
Start: 2024-11-02 | End: 2024-11-04

## 2024-11-02 RX ORDER — INFLUENZ VIR VAC TV P-SURF2003 15MCG/.5ML
0.5 SYRINGE (ML) INTRAMUSCULAR ONCE
Refills: 0 | Status: DISCONTINUED | OUTPATIENT
Start: 2024-11-02 | End: 2024-11-04

## 2024-11-02 RX ORDER — ACETAMINOPHEN 500 MG
975 TABLET ORAL EVERY 6 HOURS
Refills: 0 | Status: DISCONTINUED | OUTPATIENT
Start: 2024-11-02 | End: 2024-11-04

## 2024-11-02 RX ORDER — TRAZODONE HYDROCHLORIDE 100 MG/1
150 TABLET ORAL AT BEDTIME
Refills: 0 | Status: DISCONTINUED | OUTPATIENT
Start: 2024-11-02 | End: 2024-11-04

## 2024-11-02 RX ADMIN — ESCITALOPRAM 20 MILLIGRAM(S): 10 TABLET, FILM COATED ORAL at 12:13

## 2024-11-02 RX ADMIN — GABAPENTIN 900 MILLIGRAM(S): 300 CAPSULE ORAL at 17:29

## 2024-11-02 RX ADMIN — Medication 100 MILLIEQUIVALENT(S): at 22:12

## 2024-11-02 RX ADMIN — Medication 100 MILLIEQUIVALENT(S): at 19:47

## 2024-11-02 RX ADMIN — Medication 100 MILLIEQUIVALENT(S): at 17:34

## 2024-11-02 RX ADMIN — Medication 25 GRAM(S): at 17:34

## 2024-11-02 RX ADMIN — GABAPENTIN 600 MILLIGRAM(S): 300 CAPSULE ORAL at 05:50

## 2024-11-02 RX ADMIN — GABAPENTIN 300 MILLIGRAM(S): 300 CAPSULE ORAL at 05:12

## 2024-11-02 RX ADMIN — GABAPENTIN 900 MILLIGRAM(S): 300 CAPSULE ORAL at 12:13

## 2024-11-02 NOTE — H&P ADULT - NSHPPHYSICALEXAM_GEN_ALL_CORE
T(C): 36.9 (11-02-24 @ 00:08), Max: 36.9 (11-01-24 @ 16:09)  HR: 59 (11-02-24 @ 00:08) (59 - 86)  BP: 132/67 (11-02-24 @ 00:08) (110/69 - 132/67)  RR: 17 (11-02-24 @ 00:08) (16 - 18)  SpO2: 98% (11-02-24 @ 00:08) (96% - 100%)    CONSTITUTIONAL: Well groomed, no apparent distress  ENMT: Oral mucosa with dry membranes  RESP: No respiratory distress on room air  CV: RRR  GI: Soft, NT, ND, no rebound, no guarding; ileostomy with high volume clear liquid output  NEURO: CN II-XII intact  PSYCH: Appropriate insight/judgment; A+O x 3

## 2024-11-02 NOTE — H&P ADULT - NSICDXPASTSURGICALHX_GEN_ALL_CORE_FT
PAST SURGICAL HISTORY:  Fracture of elbow     H/O ileostomy     H/O small bowel obstruction     H/O total colectomy     History of hand fracture     Ileostomy present

## 2024-11-02 NOTE — H&P ADULT - HISTORY OF PRESENT ILLNESS
39 M PMH Anxiety, Depression, Diagnosed with UC in 2006 s/p 2-stage j pouch surgery in 2008; Hx small bowel obstruction-requiring Ileostomy in 2023, J pouch re-do and DLI 6/25 presents to the ED complaining of increased ostomy output since 9 AM.  Reports every hour is putting out roughly 1 L since 9 AM. Does in some generalized weakness and fatigue. States that someone he was around yesterday recently had a GI bug. Has been unable to keep up oral intake to keep up with current ileostomy output. Denies overt abdominal pain, vomiting, fever/chills, recent travel. Called surgeon's office and was advised to come to ED.    Of note, patient scheduled for reversal of DLI Nov 14.

## 2024-11-02 NOTE — PATIENT PROFILE ADULT - NSPROPTRIGHTSUPPORTNAME_GEN_A_NUR
As of yesterday no growth. This is a preliminary result with the final expected today.
Adriana Perkins
7 (severe pain)

## 2024-11-02 NOTE — H&P ADULT - ASSESSMENT
39 M PMH Anxiety, Depression, Diagnosed with UC in 2006 s/p 2-stage j pouch surgery in 2008; Hx small bowel obstruction-requiring Ileostomy in 2023, J pouch re-do and DLI 6/25 presents to the ED complaining of increased ostomy output since 9 AM.  Reports every hour is putting out roughly 1 L since 9 AM. In ED, AVSS. WBC 12.5. T Bili 2.2. Pending imaging; however, patient requiring admission for fluid resuscitation and management of high ileostomy output.     Plan:  - admit to pink team surgery, Dr. Awan  - NPO w sips  - IVF @ 125  - Lovenox  - No abx necessary unless GI PCR comes back positive  - C diff neg, f/u GI PCR  - f/u final CT read  - holding some home meds w concern for nonabsorption    Discussed with Dr. Awan  [facility]  [phone]   39 M PMH Anxiety, Depression, Diagnosed with UC in 2006 s/p 2-stage j pouch surgery in 2008; Hx small bowel obstruction-requiring Ileostomy in 2023, J pouch re-do and DLI 6/25 presents to the ED complaining of increased ostomy output since 9 AM.  Reports every hour is putting out roughly 1 L since 9 AM. In ED, AVSS. WBC 12.5. T Bili 2.2. CT within normal limits; however, patient requiring admission for fluid resuscitation and management of high ileostomy output.     Plan:  - admit to pink team surgery, Dr. Awan  - NPO w sips  - IVF @ 125  - Lovenox  - No abx necessary unless GI PCR comes back positive  - C diff neg, f/u GI PCR  - holding some home meds w concern for nonabsorption    Discussed with Dr. [facility]  [phone]

## 2024-11-02 NOTE — H&P ADULT - NSHPLABSRESULTS_GEN_ALL_CORE
LABS:  cret                        17.7   12.49 )-----------( 244      ( 01 Nov 2024 18:05 )             51.3     11-01    135  |  96  |  14  ----------------------------<  103[H]  4.3   |  24  |  1.14    Ca    10.1      01 Nov 2024 18:05  Phos  3.8     11-01  Mg     1.8     11-01    TPro  9.1[H]  /  Alb  5.7[H]  /  TBili  2.2[H]  /  DBili  x   /  AST  63[H]  /  ALT  95[H]  /  AlkPhos  131[H]  11-01    PT/INR - ( 01 Nov 2024 18:05 )   PT: 11.0 sec;   INR: 0.96 ratio         PTT - ( 01 Nov 2024 18:05 )  PTT:34.7 sec    Albumin: 5.7 g/dL (11-01-24 @ 18:05)

## 2024-11-03 LAB
ANION GAP SERPL CALC-SCNC: 10 MMOL/L — SIGNIFICANT CHANGE UP (ref 5–17)
BUN SERPL-MCNC: 11 MG/DL — SIGNIFICANT CHANGE UP (ref 7–23)
CALCIUM SERPL-MCNC: 8.8 MG/DL — SIGNIFICANT CHANGE UP (ref 8.4–10.5)
CHLORIDE SERPL-SCNC: 105 MMOL/L — SIGNIFICANT CHANGE UP (ref 96–108)
CO2 SERPL-SCNC: 24 MMOL/L — SIGNIFICANT CHANGE UP (ref 22–31)
CREAT SERPL-MCNC: 0.96 MG/DL — SIGNIFICANT CHANGE UP (ref 0.5–1.3)
EGFR: 103 ML/MIN/1.73M2 — SIGNIFICANT CHANGE UP
GLUCOSE SERPL-MCNC: 89 MG/DL — SIGNIFICANT CHANGE UP (ref 70–99)
HCT VFR BLD CALC: 40.7 % — SIGNIFICANT CHANGE UP (ref 39–50)
HGB BLD-MCNC: 13.9 G/DL — SIGNIFICANT CHANGE UP (ref 13–17)
MAGNESIUM SERPL-MCNC: 2.1 MG/DL — SIGNIFICANT CHANGE UP (ref 1.6–2.6)
MCHC RBC-ENTMCNC: 29.4 PG — SIGNIFICANT CHANGE UP (ref 27–34)
MCHC RBC-ENTMCNC: 34.2 G/DL — SIGNIFICANT CHANGE UP (ref 32–36)
MCV RBC AUTO: 86.2 FL — SIGNIFICANT CHANGE UP (ref 80–100)
NRBC # BLD: 0 /100 WBCS — SIGNIFICANT CHANGE UP (ref 0–0)
PHOSPHATE SERPL-MCNC: 3 MG/DL — SIGNIFICANT CHANGE UP (ref 2.5–4.5)
PLATELET # BLD AUTO: 215 K/UL — SIGNIFICANT CHANGE UP (ref 150–400)
POTASSIUM SERPL-MCNC: 3.9 MMOL/L — SIGNIFICANT CHANGE UP (ref 3.5–5.3)
POTASSIUM SERPL-SCNC: 3.9 MMOL/L — SIGNIFICANT CHANGE UP (ref 3.5–5.3)
RBC # BLD: 4.72 M/UL — SIGNIFICANT CHANGE UP (ref 4.2–5.8)
RBC # FLD: 13.2 % — SIGNIFICANT CHANGE UP (ref 10.3–14.5)
SODIUM SERPL-SCNC: 139 MMOL/L — SIGNIFICANT CHANGE UP (ref 135–145)
WBC # BLD: 5.5 K/UL — SIGNIFICANT CHANGE UP (ref 3.8–10.5)
WBC # FLD AUTO: 5.5 K/UL — SIGNIFICANT CHANGE UP (ref 3.8–10.5)

## 2024-11-03 RX ORDER — PSYLLIUM SEED
1 POWDER (GRAM) ORAL
Refills: 0 | Status: DISCONTINUED | OUTPATIENT
Start: 2024-11-03 | End: 2024-11-04

## 2024-11-03 RX ORDER — LOPERAMIDE HCL 2 MG
2 TABLET ORAL
Refills: 0 | Status: DISCONTINUED | OUTPATIENT
Start: 2024-11-03 | End: 2024-11-04

## 2024-11-03 RX ADMIN — Medication 1 PACKET(S): at 17:54

## 2024-11-03 RX ADMIN — Medication 1 PACKET(S): at 09:57

## 2024-11-03 RX ADMIN — GABAPENTIN 900 MILLIGRAM(S): 300 CAPSULE ORAL at 17:54

## 2024-11-03 RX ADMIN — ESCITALOPRAM 20 MILLIGRAM(S): 10 TABLET, FILM COATED ORAL at 11:55

## 2024-11-03 RX ADMIN — TRAZODONE HYDROCHLORIDE 150 MILLIGRAM(S): 100 TABLET ORAL at 21:08

## 2024-11-03 RX ADMIN — Medication 2 MILLIGRAM(S): at 11:55

## 2024-11-03 RX ADMIN — GABAPENTIN 900 MILLIGRAM(S): 300 CAPSULE ORAL at 11:55

## 2024-11-03 RX ADMIN — SODIUM PHOSPHATE, MONOBASIC, MONOHYDRATE AND SODIUM PHOSPHATE, DIBASIC ANHYDROUS 85 MILLIMOLE(S): 276; 142 INJECTION, SOLUTION INTRAVENOUS at 09:57

## 2024-11-03 RX ADMIN — Medication 2 MILLIGRAM(S): at 17:54

## 2024-11-03 RX ADMIN — Medication 40 MILLIGRAM(S): at 09:57

## 2024-11-03 NOTE — PROGRESS NOTE ADULT - ASSESSMENT
39 M PMH Anxiety, Depression, Diagnosed with UC in 2006 s/p 2-stage j pouch surgery in 2008; Hx small bowel obstruction-requiring Ileostomy in 2023, J pouch re-do and DLI 6/25 presents to the ED complaining of increased ostomy output since 9 AM.  Reports every hour is putting out roughly 1 L since 9 AM. In ED, AVSS. WBC 12.5. T Bili 2.2. CT within normal limits; however, patient requiring admission for fluid resuscitation and management of high ileostomy output.     Plan:  - LFD   - monitor ostomy output   - started on home dose immoidum   - IVF @ 125  - Lovenox  - GI PCR + for EAEC, norovirus   - C diff negative     [facility]  [phone]

## 2024-11-03 NOTE — PROVIDER CONTACT NOTE (MEDICATION) - ASSESSMENT
Pt is A&Ox4, VS as charted. Pt has no complaints of pain or discomfort. Pt refusing trazadone and gabapentin. RN provided education to the patient and pt verbalized understanding.

## 2024-11-04 ENCOUNTER — TRANSCRIPTION ENCOUNTER (OUTPATIENT)
Age: 39
End: 2024-11-04

## 2024-11-04 VITALS
DIASTOLIC BLOOD PRESSURE: 82 MMHG | RESPIRATION RATE: 18 BRPM | TEMPERATURE: 98 F | OXYGEN SATURATION: 99 % | HEART RATE: 65 BPM | SYSTOLIC BLOOD PRESSURE: 121 MMHG

## 2024-11-04 LAB
ANION GAP SERPL CALC-SCNC: 10 MMOL/L — SIGNIFICANT CHANGE UP (ref 5–17)
BUN SERPL-MCNC: 11 MG/DL — SIGNIFICANT CHANGE UP (ref 7–23)
CALCIUM SERPL-MCNC: 8.8 MG/DL — SIGNIFICANT CHANGE UP (ref 8.4–10.5)
CHLORIDE SERPL-SCNC: 105 MMOL/L — SIGNIFICANT CHANGE UP (ref 96–108)
CO2 SERPL-SCNC: 23 MMOL/L — SIGNIFICANT CHANGE UP (ref 22–31)
CREAT SERPL-MCNC: 0.9 MG/DL — SIGNIFICANT CHANGE UP (ref 0.5–1.3)
EGFR: 111 ML/MIN/1.73M2 — SIGNIFICANT CHANGE UP
GLUCOSE SERPL-MCNC: 86 MG/DL — SIGNIFICANT CHANGE UP (ref 70–99)
HCT VFR BLD CALC: 40.6 % — SIGNIFICANT CHANGE UP (ref 39–50)
HGB BLD-MCNC: 13.5 G/DL — SIGNIFICANT CHANGE UP (ref 13–17)
MAGNESIUM SERPL-MCNC: 1.8 MG/DL — SIGNIFICANT CHANGE UP (ref 1.6–2.6)
MCHC RBC-ENTMCNC: 28.9 PG — SIGNIFICANT CHANGE UP (ref 27–34)
MCHC RBC-ENTMCNC: 33.3 G/DL — SIGNIFICANT CHANGE UP (ref 32–36)
MCV RBC AUTO: 86.9 FL — SIGNIFICANT CHANGE UP (ref 80–100)
NRBC # BLD: 0 /100 WBCS — SIGNIFICANT CHANGE UP (ref 0–0)
PHOSPHATE SERPL-MCNC: 3.8 MG/DL — SIGNIFICANT CHANGE UP (ref 2.5–4.5)
PLATELET # BLD AUTO: 223 K/UL — SIGNIFICANT CHANGE UP (ref 150–400)
POTASSIUM SERPL-MCNC: 3.6 MMOL/L — SIGNIFICANT CHANGE UP (ref 3.5–5.3)
POTASSIUM SERPL-SCNC: 3.6 MMOL/L — SIGNIFICANT CHANGE UP (ref 3.5–5.3)
RBC # BLD: 4.67 M/UL — SIGNIFICANT CHANGE UP (ref 4.2–5.8)
RBC # FLD: 13.2 % — SIGNIFICANT CHANGE UP (ref 10.3–14.5)
SODIUM SERPL-SCNC: 138 MMOL/L — SIGNIFICANT CHANGE UP (ref 135–145)
WBC # BLD: 3.84 K/UL — SIGNIFICANT CHANGE UP (ref 3.8–10.5)
WBC # FLD AUTO: 3.84 K/UL — SIGNIFICANT CHANGE UP (ref 3.8–10.5)

## 2024-11-04 PROCEDURE — 85018 HEMOGLOBIN: CPT

## 2024-11-04 PROCEDURE — 85610 PROTHROMBIN TIME: CPT

## 2024-11-04 PROCEDURE — 82435 ASSAY OF BLOOD CHLORIDE: CPT

## 2024-11-04 PROCEDURE — 80053 COMPREHEN METABOLIC PANEL: CPT

## 2024-11-04 PROCEDURE — 99285 EMERGENCY DEPT VISIT HI MDM: CPT | Mod: 25

## 2024-11-04 PROCEDURE — 82330 ASSAY OF CALCIUM: CPT

## 2024-11-04 PROCEDURE — G0378: CPT

## 2024-11-04 PROCEDURE — 87449 NOS EACH ORGANISM AG IA: CPT

## 2024-11-04 PROCEDURE — 86900 BLOOD TYPING SEROLOGIC ABO: CPT

## 2024-11-04 PROCEDURE — 84295 ASSAY OF SERUM SODIUM: CPT

## 2024-11-04 PROCEDURE — 86901 BLOOD TYPING SEROLOGIC RH(D): CPT

## 2024-11-04 PROCEDURE — 85730 THROMBOPLASTIN TIME PARTIAL: CPT

## 2024-11-04 PROCEDURE — 96374 THER/PROPH/DIAG INJ IV PUSH: CPT

## 2024-11-04 PROCEDURE — 85025 COMPLETE CBC W/AUTO DIFF WBC: CPT

## 2024-11-04 PROCEDURE — 85027 COMPLETE CBC AUTOMATED: CPT

## 2024-11-04 PROCEDURE — 86850 RBC ANTIBODY SCREEN: CPT

## 2024-11-04 PROCEDURE — 84132 ASSAY OF SERUM POTASSIUM: CPT

## 2024-11-04 PROCEDURE — 80048 BASIC METABOLIC PNL TOTAL CA: CPT

## 2024-11-04 PROCEDURE — 74177 CT ABD & PELVIS W/CONTRAST: CPT | Mod: MC

## 2024-11-04 PROCEDURE — 83605 ASSAY OF LACTIC ACID: CPT

## 2024-11-04 PROCEDURE — 87507 IADNA-DNA/RNA PROBE TQ 12-25: CPT

## 2024-11-04 PROCEDURE — 84100 ASSAY OF PHOSPHORUS: CPT

## 2024-11-04 PROCEDURE — 83735 ASSAY OF MAGNESIUM: CPT

## 2024-11-04 PROCEDURE — 83690 ASSAY OF LIPASE: CPT

## 2024-11-04 PROCEDURE — 85014 HEMATOCRIT: CPT

## 2024-11-04 PROCEDURE — 82947 ASSAY GLUCOSE BLOOD QUANT: CPT

## 2024-11-04 PROCEDURE — 87324 CLOSTRIDIUM AG IA: CPT

## 2024-11-04 PROCEDURE — 82803 BLOOD GASES ANY COMBINATION: CPT

## 2024-11-04 RX ORDER — POTASSIUM CHLORIDE 10 MEQ
40 TABLET, EXTENDED RELEASE ORAL ONCE
Refills: 0 | Status: COMPLETED | OUTPATIENT
Start: 2024-11-04 | End: 2024-11-04

## 2024-11-04 RX ORDER — LOPERAMIDE HCL 2 MG
1 TABLET ORAL
Qty: 120 | Refills: 0
Start: 2024-11-04 | End: 2024-12-03

## 2024-11-04 RX ADMIN — Medication 2 MILLIGRAM(S): at 13:26

## 2024-11-04 RX ADMIN — Medication 40 MILLIGRAM(S): at 13:25

## 2024-11-04 RX ADMIN — Medication 2 MILLIGRAM(S): at 05:08

## 2024-11-04 RX ADMIN — GABAPENTIN 900 MILLIGRAM(S): 300 CAPSULE ORAL at 13:35

## 2024-11-04 RX ADMIN — GABAPENTIN 900 MILLIGRAM(S): 300 CAPSULE ORAL at 08:39

## 2024-11-04 RX ADMIN — Medication 2 MILLIGRAM(S): at 00:03

## 2024-11-04 RX ADMIN — Medication 40 MILLIEQUIVALENT(S): at 13:35

## 2024-11-04 RX ADMIN — ESCITALOPRAM 20 MILLIGRAM(S): 10 TABLET, FILM COATED ORAL at 13:26

## 2024-11-04 RX ADMIN — Medication 1 PACKET(S): at 05:08

## 2024-11-04 NOTE — DISCHARGE NOTE PROVIDER - HOSPITAL COURSE
39 M PMH Anxiety, Depression, Diagnosed with UC in 2006 s/p 2-stage j pouch surgery in 2008; Hx small bowel obstruction-requiring Ileostomy in 2023, J pouch re-do and DLI 6/25 presents to the ED complaining of increased ostomy output since 9 AM.  Reports every hour is putting out roughly 1 L since 9 AM. Does in some generalized weakness and fatigue. States that someone he was around yesterday recently had a GI bug. Has been unable to keep up oral intake to keep up with current ileostomy output. Denies overt abdominal pain, vomiting, fever/chills, recent travel. Called surgeon's office and was advised to come to ED.  Of note, patient scheduled for reversal of DLI Nov 14.   He was admitted to the colorectal service, was kept NPO and was started on IV antibiotics.  GI PCR panel sent, which was positive for Norovirus and ECAC.  Diet was advanced as tolerated.  He is ambulating, voiding, is tolerating a diet and is stable for discharge home.  He will follow-up with Dr. Meyers within 1-2 weeks.

## 2024-11-04 NOTE — DISCHARGE NOTE NURSING/CASE MANAGEMENT/SOCIAL WORK - PATIENT PORTAL LINK FT
You can access the FollowMyHealth Patient Portal offered by [facility] by registering at the following website: [email or web address]. By joining [facility]’s FollowMyHealth portal, you will also be able to view your health information using other applications (apps) compatible with our system.

## 2024-11-04 NOTE — PROGRESS NOTE ADULT - SUBJECTIVE AND OBJECTIVE BOX
SURGERY DAILY PROGRESS NOTE:       Subjective / Overnight events:  No acute overnight events.  Tolerating diet, denies N/V.  pain controlled.      Objective:      MEDICATIONS  (STANDING):  ARIPiprazole 15 milliGRAM(s) Oral daily  enoxaparin Injectable 40 milliGRAM(s) SubCutaneous every 24 hours  escitalopram 20 milliGRAM(s) Oral daily  gabapentin 900 milliGRAM(s) Oral four times a day  influenza   Vaccine 0.5 milliLiter(s) IntraMuscular once  lactated ringers. 1000 milliLiter(s) (125 mL/Hr) IV Continuous <Continuous>  loperamide 2 milliGRAM(s) Oral four times a day  potassium chloride   Solution 40 milliEquivalent(s) Oral once  psyllium Powder 1 Packet(s) Oral two times a day  traZODone 150 milliGRAM(s) Oral at bedtime    MEDICATIONS  (PRN):  acetaminophen     Tablet .. 975 milliGRAM(s) Oral every 6 hours PRN Mild Pain (1 - 3)      Vital Signs Last 24 Hrs  T(C): 36.7 (04 Nov 2024 08:45), Max: 37 (03 Nov 2024 16:39)  T(F): 98.1 (04 Nov 2024 08:45), Max: 98.6 (03 Nov 2024 16:39)  HR: 74 (04 Nov 2024 08:45) (46 - 74)  BP: 116/79 (04 Nov 2024 08:45) (104/53 - 134/82)  BP(mean): --  RR: 18 (04 Nov 2024 08:45) (18 - 18)  SpO2: 98% (04 Nov 2024 08:45) (94% - 100%)    Parameters below as of 04 Nov 2024 08:45  Patient On (Oxygen Delivery Method): room air        I&O's Detail    03 Nov 2024 07:01  -  04 Nov 2024 07:00  --------------------------------------------------------  IN:    Lactated Ringers: 1500 mL    Oral Fluid: 880 mL  Total IN: 2380 mL    OUT:    Ileostomy (mL): 1850 mL    Voided (mL): 2600 mL  Total OUT: 4450 mL    Total NET: -2070 mL      04 Nov 2024 07:01  -  04 Nov 2024 10:00  --------------------------------------------------------  IN:  Total IN: 0 mL    OUT:    Ileostomy (mL): 200 mL    Voided (mL): 400 mL  Total OUT: 600 mL    Total NET: -600 mL          Daily     Daily     LABS:                        13.5   3.84  )-----------( 223      ( 04 Nov 2024 07:17 )             40.6     11-04    138  |  105  |  11  ----------------------------<  86  3.6   |  23  |  0.90    Ca    8.8      04 Nov 2024 07:17  Phos  3.8     11-04  Mg     1.8     11-04        Urinalysis Basic - ( 04 Nov 2024 07:17 )    Color: x / Appearance: x / SG: x / pH: x  Gluc: 86 mg/dL / Ketone: x  / Bili: x / Urobili: x   Blood: x / Protein: x / Nitrite: x   Leuk Esterase: x / RBC: x / WBC x   Sq Epi: x / Non Sq Epi: x / Bacteria: x          	  CONSTITUTIONAL: Well groomed, no apparent distress  ENMT: Oral mucosa with dry membranes  RESP: No respiratory distress on room air  CV: RRR  GI: Soft, NT, ND, no rebound, no guarding; ileostomy with high volume clear liquid output  NEURO: CN II-XII intact  PSYCH: Appropriate insight/judgment; A+O x 3  
SURGERY DAILY PROGRESS NOTE    24 Hour/Overnight Events: No acute events overnight    SUBJECTIVE: Patient seen and evaluated on AM rounds. Pt is resting comfortably in bed with no acute complaints. Tolerating diet. Ambulating well. Ostomy working.   Denies fevers/chills, chest pain, dyspnea, abdominal pain.     ------------------------------------------------------------------------------------------------------------  OBJECTIVE:  Vital Signs Last 24 Hrs  T(C): 36.8 (03 Nov 2024 09:04), Max: 37.1 (02 Nov 2024 16:40)  T(F): 98.2 (03 Nov 2024 09:04), Max: 98.7 (02 Nov 2024 16:40)  HR: 67 (03 Nov 2024 09:04) (58 - 79)  BP: 115/66 (03 Nov 2024 09:04) (110/62 - 129/70)  BP(mean): --  RR: 18 (03 Nov 2024 09:04) (18 - 18)  SpO2: 97% (03 Nov 2024 09:04) (94% - 98%)    Parameters below as of 03 Nov 2024 09:04  Patient On (Oxygen Delivery Method): room air      I&O's Detail    02 Nov 2024 08:01  -  03 Nov 2024 07:00  --------------------------------------------------------  IN:    Lactated Ringers: 3000 mL    Oral Fluid: 820 mL  Total IN: 3820 mL    OUT:    Ileostomy (mL): 1825 mL    Voided (mL): 1345 mL  Total OUT: 3170 mL    Total NET: 650 mL          LABS:                        13.9   5.50  )-----------( 215      ( 03 Nov 2024 07:09 )             40.7     11-03    139  |  105  |  11  ----------------------------<  89  3.9   |  24  |  0.96    Ca    8.8      03 Nov 2024 07:31  Phos  3.0     11-03  Mg     2.1     11-03    TPro  7.1  /  Alb  4.2  /  TBili  3.1[H]  /  DBili  x   /  AST  40  /  ALT  70[H]  /  AlkPhos  97  11-02    LIVER FUNCTIONS - ( 02 Nov 2024 06:39 )  Alb: 4.2 g/dL / Pro: 7.1 g/dL / ALK PHOS: 97 U/L / ALT: 70 U/L / AST: 40 U/L / GGT: x           PT/INR - ( 02 Nov 2024 06:40 )   PT: 12.7 sec;   INR: 1.11 ratio         PTT - ( 02 Nov 2024 06:40 )  PTT:30.9 sec  Urinalysis Basic - ( 03 Nov 2024 07:31 )    Color: x / Appearance: x / SG: x / pH: x  Gluc: 89 mg/dL / Ketone: x  / Bili: x / Urobili: x   Blood: x / Protein: x / Nitrite: x   Leuk Esterase: x / RBC: x / WBC x   Sq Epi: x / Non Sq Epi: x / Bacteria: x      CONSTITUTIONAL: Well groomed, no apparent distress  ENMT: Oral mucosa with dry membranes  RESP: No respiratory distress on room air  CV: RRR  GI: Soft, NT, ND, no rebound, no guarding; ileostomy with high volume clear liquid output  NEURO: CN II-XII intact  PSYCH: Appropriate insight/judgment; A+O x 3

## 2024-11-04 NOTE — PROGRESS NOTE ADULT - ASSESSMENT
39 M PMH Anxiety, Depression, Diagnosed with UC in 2006 s/p 2-stage j pouch surgery in 2008; Hx small bowel obstruction-requiring Ileostomy in 2023, J pouch re-do and DLI 6/25 presents to the ED complaining of increased ostomy output since 9 AM.  Reports every hour is putting out roughly 1 L since 9 AM. In ED, AVSS. WBC 12.5. T Bili 2.2. CT within normal limits; however, patient requiring admission for fluid resuscitation and management of high ileostomy output.     Plan:  - LRD  - monitor ostomy output   - immodium  - IVF @ 125  - Lovenox  - GI PCR + for EAEC, norovirus   - C diff negative     [facility] [phone]

## 2024-11-04 NOTE — DISCHARGE NOTE PROVIDER - CARE PROVIDER_API CALL
Jarrell Meyers  Colon/Rectal Surgery  [address] [place] [id number]  Phone: [phone]  Fax: [phone]  Follow Up Time: 1 week

## 2024-11-04 NOTE — DISCHARGE NOTE PROVIDER - NSDCMRMEDTOKEN_GEN_ALL_CORE_FT
Abilify 15 mg oral tablet: 1 tab(s) orally once a day  acetaminophen 500 mg oral capsule: 2 tab(s) orally every 6 hours as needed for pain  gabapentin 300 mg oral tablet: 3 orally 4 times a day  Lexapro 20 mg oral tablet: 2 tab(s) orally once a day  methocarbamol 500 mg oral tablet: 2 tab(s) orally every 8 hours as needed for Muscle Spasm pain control  mirtazapine 7.5 mg oral tablet: 1 tab(s) orally once a day (at bedtime)  psyllium 3.4 g/7 g oral powder for reconstitution: orally once a day please place in 4 oz of apple sauce or yogurt or water  testosterone 100 mg/mL intramuscular solution: intramuscular every 7 days on mondays  traZODone 150 mg oral tablet: orally once a day (at bedtime)   Abilify 15 mg oral tablet: 1 tab(s) orally once a day  acetaminophen 500 mg oral capsule: 2 tab(s) orally every 6 hours as needed for pain  gabapentin 300 mg oral tablet: 3 orally 4 times a day  Lexapro 20 mg oral tablet: 2 tab(s) orally once a day  loperamide 2 mg oral capsule: 1 cap(s) orally 4 times a day  methocarbamol 500 mg oral tablet: 2 tab(s) orally every 8 hours as needed for Muscle Spasm pain control  mirtazapine 7.5 mg oral tablet: 1 tab(s) orally once a day (at bedtime)  psyllium 3.4 g/7 g oral powder for reconstitution: orally once a day please place in 4 oz of apple sauce or yogurt or water  testosterone 100 mg/mL intramuscular solution: intramuscular every 7 days on mondays  traZODone 150 mg oral tablet: orally once a day (at bedtime)

## 2024-11-04 NOTE — DISCHARGE NOTE NURSING/CASE MANAGEMENT/SOCIAL WORK - FINANCIAL ASSISTANCE
[facility] provides services at a reduced cost to those who are determined to be eligible through [facility]’s financial assistance program. Information regarding [facility]’s financial assistance program can be found by going to [email or web address] or by calling [phone].

## 2024-11-04 NOTE — DISCHARGE NOTE NURSING/CASE MANAGEMENT/SOCIAL WORK - NSDCPEFALRISK_GEN_ALL_CORE
For information on Fall & Injury Prevention, visit: [email or web address] OR  [email or web address] OR  https://www.cdc.gov/steadi/patient.html

## 2024-11-04 NOTE — DISCHARGE NOTE PROVIDER - NSDCCPCAREPLAN_GEN_ALL_CORE_FT
PRINCIPAL DISCHARGE DIAGNOSIS  Diagnosis: High output ileostomy  Assessment and Plan of Treatment: 1.  Resume your regular diet  2.  Activity as tolerated  3.  Follow-up with Dr. Meyers within 1-2 weeks.  Please call office for appointment.       PRINCIPAL DISCHARGE DIAGNOSIS  Diagnosis: High output ileostomy  Assessment and Plan of Treatment: 1.  Resume your regular diet  2.  Activity as tolerated  3.  Follow-up with Dr. Meyers within 1-2 weeks.  Please call office for appointment.  Monitor and record ostomy output.  Notify surgeon if output decrease to less than 400cc/24 hours or greater than 1200/24 hours.  Please call office if any questions.   if your ostomy output increases to 1500ml/ 24 hours start taking Metamucil 1 tablespoon- once daily mixed in 4 ounces of liquid and imodium 1 tab - 4 times a day- 30 minutes prior to each meal and at bedtime if output continues to be greater than 1500ml then can slowly increase to 2 tabs - 4 times day to a max of 8 tabs daily

## 2024-11-04 NOTE — DISCHARGE NOTE PROVIDER - NSDCFUSCHEDAPPT_GEN_ALL_CORE_FT
Jarrell Meyers  [facility]  Scheduled Appointment: 11/12/2024    [facility] Physician [facility]  COLOSURG [address]  Scheduled Appointment: 11/12/2024    Jarrell Meyers  [facility] PreAdmits  Scheduled Appointment: 11/14/2024    Jarrell Meyers  [facility] Physician [facility]  COLOSURG [address]  Scheduled Appointment: 11/14/2024

## 2024-11-12 ENCOUNTER — APPOINTMENT (OUTPATIENT)
Dept: COLORECTAL SURGERY | Facility: CLINIC | Age: 39
End: 2024-11-12

## 2024-11-12 ENCOUNTER — OUTPATIENT (OUTPATIENT)
Dept: OUTPATIENT SERVICES | Facility: HOSPITAL | Age: 39
LOS: 1 days | End: 2024-11-12
Payer: COMMERCIAL

## 2024-11-12 VITALS
HEART RATE: 83 BPM | HEIGHT: 67 IN | BODY MASS INDEX: 22.76 KG/M2 | DIASTOLIC BLOOD PRESSURE: 81 MMHG | OXYGEN SATURATION: 99 % | SYSTOLIC BLOOD PRESSURE: 148 MMHG | WEIGHT: 145 LBS

## 2024-11-12 VITALS
HEIGHT: 67 IN | OXYGEN SATURATION: 97 % | SYSTOLIC BLOOD PRESSURE: 137 MMHG | RESPIRATION RATE: 16 BRPM | HEART RATE: 73 BPM | WEIGHT: 158.07 LBS | DIASTOLIC BLOOD PRESSURE: 80 MMHG | TEMPERATURE: 98 F

## 2024-11-12 DIAGNOSIS — K91.858 OTHER COMPLICATIONS OF INTESTINAL POUCH: ICD-10-CM

## 2024-11-12 DIAGNOSIS — Z87.19 PERSONAL HISTORY OF OTHER DISEASES OF THE DIGESTIVE SYSTEM: Chronic | ICD-10-CM

## 2024-11-12 DIAGNOSIS — Z98.890 OTHER SPECIFIED POSTPROCEDURAL STATES: Chronic | ICD-10-CM

## 2024-11-12 DIAGNOSIS — Z93.2 ILEOSTOMY STATUS: Chronic | ICD-10-CM

## 2024-11-12 DIAGNOSIS — S42.409A UNSPECIFIED FRACTURE OF LOWER END OF UNSPECIFIED HUMERUS, INITIAL ENCOUNTER FOR CLOSED FRACTURE: Chronic | ICD-10-CM

## 2024-11-12 DIAGNOSIS — Z87.81 PERSONAL HISTORY OF (HEALED) TRAUMATIC FRACTURE: Chronic | ICD-10-CM

## 2024-11-12 DIAGNOSIS — G47.00 INSOMNIA, UNSPECIFIED: ICD-10-CM

## 2024-11-12 DIAGNOSIS — Z90.49 ACQUIRED ABSENCE OF OTHER SPECIFIED PARTS OF DIGESTIVE TRACT: Chronic | ICD-10-CM

## 2024-11-12 DIAGNOSIS — G89.29 OTHER CHRONIC PAIN: ICD-10-CM

## 2024-11-12 LAB
ANION GAP SERPL CALC-SCNC: 17 MMOL/L — SIGNIFICANT CHANGE UP (ref 5–17)
BUN SERPL-MCNC: 14 MG/DL — SIGNIFICANT CHANGE UP (ref 7–23)
CALCIUM SERPL-MCNC: 9.8 MG/DL — SIGNIFICANT CHANGE UP (ref 8.4–10.5)
CHLORIDE SERPL-SCNC: 106 MMOL/L — SIGNIFICANT CHANGE UP (ref 96–108)
CO2 SERPL-SCNC: 20 MMOL/L — LOW (ref 22–31)
CREAT SERPL-MCNC: 1.02 MG/DL — SIGNIFICANT CHANGE UP (ref 0.5–1.3)
EGFR: 96 ML/MIN/1.73M2 — SIGNIFICANT CHANGE UP
GLUCOSE SERPL-MCNC: 108 MG/DL — HIGH (ref 70–99)
HCT VFR BLD CALC: 43.1 % — SIGNIFICANT CHANGE UP (ref 39–50)
HGB BLD-MCNC: 14.6 G/DL — SIGNIFICANT CHANGE UP (ref 13–17)
MCHC RBC-ENTMCNC: 29.4 PG — SIGNIFICANT CHANGE UP (ref 27–34)
MCHC RBC-ENTMCNC: 33.9 G/DL — SIGNIFICANT CHANGE UP (ref 32–36)
MCV RBC AUTO: 86.9 FL — SIGNIFICANT CHANGE UP (ref 80–100)
NRBC # BLD: 0 /100 WBCS — SIGNIFICANT CHANGE UP (ref 0–0)
PLATELET # BLD AUTO: 309 K/UL — SIGNIFICANT CHANGE UP (ref 150–400)
POTASSIUM SERPL-MCNC: 4 MMOL/L — SIGNIFICANT CHANGE UP (ref 3.5–5.3)
POTASSIUM SERPL-SCNC: 4 MMOL/L — SIGNIFICANT CHANGE UP (ref 3.5–5.3)
RBC # BLD: 4.96 M/UL — SIGNIFICANT CHANGE UP (ref 4.2–5.8)
RBC # FLD: 12.5 % — SIGNIFICANT CHANGE UP (ref 10.3–14.5)
SODIUM SERPL-SCNC: 143 MMOL/L — SIGNIFICANT CHANGE UP (ref 135–145)
WBC # BLD: 6.39 K/UL — SIGNIFICANT CHANGE UP (ref 3.8–10.5)
WBC # FLD AUTO: 6.39 K/UL — SIGNIFICANT CHANGE UP (ref 3.8–10.5)

## 2024-11-12 PROCEDURE — 85027 COMPLETE CBC AUTOMATED: CPT

## 2024-11-12 PROCEDURE — 99212 OFFICE O/P EST SF 10 MIN: CPT

## 2024-11-12 PROCEDURE — 80048 BASIC METABOLIC PNL TOTAL CA: CPT

## 2024-11-12 PROCEDURE — G0463: CPT

## 2024-11-12 NOTE — H&P PST ADULT - NSICDXPASTMEDICALHX_GEN_ALL_CORE_FT
PAST MEDICAL HISTORY:  Anxiety and depression     Chronic pain     Ulcerative colitis      PAST MEDICAL HISTORY:  Anxiety and depression     Chronic pain     Insomnia     Ulcerative colitis

## 2024-11-12 NOTE — H&P PST ADULT - PROBLEM SELECTOR PLAN 1
Ileostomy closure on 11/14/2024 with Dr Meyers  Labs: CBC, BMP  Pre op instructions given, Questions answered

## 2024-11-12 NOTE — H&P PST ADULT - ASSESSMENT
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   Loose teeth or denture: denies    CAPRINI SCORE    AGE RELATED RISK FACTORS                                                             [ ] Age 41-60 years                                            (1 Point)  [ ] Age: 61-74 years                                           (2 Points)                 [ ] Age= 75 years                                                (3 Points)             DISEASE RELATED RISK FACTORS                                                       [ ] Edema in the lower extremities                 (1 Point)                     [ ] Varicose veins                                               (1 Point)                                 [ ] BMI > 25 Kg/m2                                            (1 Point)                                  [ ] Serious infection (ie PNA)                            (1 Point)                     [ ] Lung disease ( COPD, Emphysema)            (1 Point)                                                                          [ ] Acute myocardial infarction                         (1 Point)                  [ ] Congestive heart failure (in the previous month)  (1 Point)         [ x ] Inflammatory bowel disease                            (1 Point)                  [ ] Central venous access, PICC or Port               (2 points)       (within the last month)                                                                [ ] Stroke (in the previous month)                        (5 Points)    [ ] Previous or present malignancy                       (2 points)                                                                                                                                                         HEMATOLOGY RELATED FACTORS                                                         [ ] Prior episodes of VTE                                     (3 Points)                     [ ] Positive family history for VTE                      (3 Points)                  [ ] Prothrombin 57700 A                                     (3 Points)                     [ ] Factor V Leiden                                                (3 Points)                        [ ] Lupus anticoagulants                                      (3 Points)                                                           [ ] Anticardiolipin antibodies                              (3 Points)                                                       [ ] High homocysteine in the blood                   (3 Points)                                             [ ] Other congenital or acquired thrombophilia      (3 Points)                                                [ ] Heparin induced thrombocytopenia                  (3 Points)                                        MOBILITY RELATED FACTORS  [ ] Bed rest                                                         (1 Point)  [ ] Plaster cast                                                    (2 points)  [ ] Bed bound for more than 72 hours           (2 Points)    GENDER SPECIFIC FACTORS  [ ] Pregnancy or had a baby within the last month   (1 Point)  [ ] Post-partum < 6 weeks                                   (1 Point)  [ ] Hormonal therapy  or oral contraception   (1 Point)  [ ] History of pregnancy complications              (1 point)  [ ] Unexplained or recurrent              (1 Point)    OTHER RISK FACTORS                                           (1 Point)  [ ] BMI >40, smoking, diabetes requiring insulin, chemotherapy  blood transfusions and length of surgery over 2 hours    SURGERY RELATED RISK FACTORS  [ ]  Section within the last month     (1 Point)  [ ] Minor surgery                                                  (1 Point)  [ ] Arthroscopic surgery                                       (2 Points)  [ x ] Planned major surgery lasting more            (2 Points)      than 45 minutes     [ ] Elective hip or knee joint replacement       (5 points)       surgery                                                TRAUMA RELATED RISK FACTORS  [ ] Fracture of the hip, pelvis, or leg                       (5 Points)  [ ] Spinal cord injury resulting in paralysis             (5 points)       (in the previous month)    [ ] Paralysis  (less than 1 month)                             (5 Points)  [ ] Multiple Trauma within 1 month                        (5 Points)    Total Score [     3   ]    Caprini Score 0-2: Low Risk, NO VTE prophylaxis required for most patients, encourage ambulation  Caprini Score 3-6: Moderate Risk , pharmacologic VTE prophylaxis is indicated for most patients (in the absence of contraindications)  Caprini Score Greater than or =7: High risk, pharmocologic VTE prophylaxis indicated for most patients (in the absence of contraindications)

## 2024-11-12 NOTE — H&P PST ADULT - ATTENDING COMMENTS
Patient seen and examined. No change from H&P. All risks and benefits explained to the patient and the patient consents to the procedure.    Jarrell Meyers

## 2024-11-12 NOTE — H&P PST ADULT - HISTORY OF PRESENT ILLNESS
This is a pleasant 39 year old male in NAD with PMHx Insomnia (on trazodone, mirtazipine) , Anxiety and Depression, Chronic pain ( discontinued opioids 6/2024) currently on gabapentin;  IBD ,  diagnosed with UC in 2006 s/p 2-stage j pouch surgery in 2008 at [facility] in [place] with Dr. Wexner ;- EUA/Flexible Pouchoscopy (2022); Ileostomy creation ( 2/2022); Ileostomy revision due to prolapse (4/2022); ELIGIO enteropexy (2022);  s/p diagnostic laparoscopy with preemptive conversion to explorative laparotomy, lysis of adhesions extensive, revision of ileostomy with small bowel resection including the previous ileostomy closure site which was a side-to-side anastomosis as end ileostomy, seprafilm insertion, decompression of the small bowel (7/2022).  Patient presents to [facility] prior to his scheduled Ileostomy closure on 11/14/2024 with Dr Meyers.  Patient was s/p redo pouch 6/25/2024.  Patient states he developed hematoma which was drained and is now healing well.  Patient reports good output , has good appetite denies any unintentional weight loss or weakness.  MRI Pelvis 9/2024 showed no evidence of leak, fistula or abscess.  Patient met with GI specialist for followup and the above surgery was recommended.   ?

## 2024-11-14 ENCOUNTER — APPOINTMENT (OUTPATIENT)
Dept: COLORECTAL SURGERY | Facility: HOSPITAL | Age: 39
End: 2024-11-14

## 2024-11-14 ENCOUNTER — TRANSCRIPTION ENCOUNTER (OUTPATIENT)
Age: 39
End: 2024-11-14

## 2024-11-14 ENCOUNTER — INPATIENT (INPATIENT)
Facility: HOSPITAL | Age: 39
LOS: 2 days | Discharge: HOME | DRG: 395 | End: 2024-11-17
Attending: COLON & RECTAL SURGERY | Admitting: COLON & RECTAL SURGERY
Payer: COMMERCIAL

## 2024-11-14 VITALS
TEMPERATURE: 98 F | WEIGHT: 169.98 LBS | SYSTOLIC BLOOD PRESSURE: 115 MMHG | RESPIRATION RATE: 18 BRPM | DIASTOLIC BLOOD PRESSURE: 71 MMHG | HEART RATE: 53 BPM | OXYGEN SATURATION: 99 % | HEIGHT: 67 IN

## 2024-11-14 DIAGNOSIS — Z90.49 ACQUIRED ABSENCE OF OTHER SPECIFIED PARTS OF DIGESTIVE TRACT: Chronic | ICD-10-CM

## 2024-11-14 DIAGNOSIS — Z87.81 PERSONAL HISTORY OF (HEALED) TRAUMATIC FRACTURE: Chronic | ICD-10-CM

## 2024-11-14 DIAGNOSIS — K91.858 OTHER COMPLICATIONS OF INTESTINAL POUCH: ICD-10-CM

## 2024-11-14 DIAGNOSIS — Z98.890 OTHER SPECIFIED POSTPROCEDURAL STATES: Chronic | ICD-10-CM

## 2024-11-14 DIAGNOSIS — Z93.2 ILEOSTOMY STATUS: Chronic | ICD-10-CM

## 2024-11-14 DIAGNOSIS — Z87.19 PERSONAL HISTORY OF OTHER DISEASES OF THE DIGESTIVE SYSTEM: Chronic | ICD-10-CM

## 2024-11-14 DIAGNOSIS — S42.409A UNSPECIFIED FRACTURE OF LOWER END OF UNSPECIFIED HUMERUS, INITIAL ENCOUNTER FOR CLOSED FRACTURE: Chronic | ICD-10-CM

## 2024-11-14 PROCEDURE — 45990 SURG DX EXAM ANORECTAL: CPT

## 2024-11-14 PROCEDURE — 44620 REPAIR BOWEL OPENING: CPT

## 2024-11-14 RX ORDER — ACETAMINOPHEN 500MG 500 MG/1
1000 TABLET, COATED ORAL EVERY 6 HOURS
Refills: 0 | Status: COMPLETED | OUTPATIENT
Start: 2024-11-14 | End: 2024-11-15

## 2024-11-14 RX ORDER — GABAPENTIN 300 MG/1
600 CAPSULE ORAL THREE TIMES A DAY
Refills: 0 | Status: DISCONTINUED | OUTPATIENT
Start: 2024-11-14 | End: 2024-11-14

## 2024-11-14 RX ORDER — 0.9 % SODIUM CHLORIDE 0.9 %
1000 INTRAVENOUS SOLUTION INTRAVENOUS
Refills: 0 | Status: DISCONTINUED | OUTPATIENT
Start: 2024-11-14 | End: 2024-11-17

## 2024-11-14 RX ORDER — HEPARIN SODIUM,PORCINE 1000/ML
5000 VIAL (ML) INJECTION EVERY 8 HOURS
Refills: 0 | Status: DISCONTINUED | OUTPATIENT
Start: 2024-11-14 | End: 2024-11-17

## 2024-11-14 RX ORDER — MIRTAZAPINE 15 MG/1
7.5 TABLET, FILM COATED ORAL AT BEDTIME
Refills: 0 | Status: DISCONTINUED | OUTPATIENT
Start: 2024-11-14 | End: 2024-11-17

## 2024-11-14 RX ORDER — GABAPENTIN 300 MG/1
900 CAPSULE ORAL THREE TIMES A DAY
Refills: 0 | Status: DISCONTINUED | OUTPATIENT
Start: 2024-11-14 | End: 2024-11-17

## 2024-11-14 RX ORDER — LIDOCAINE HCL 20 MG/ML
0.2 VIAL (ML) INJECTION ONCE
Refills: 0 | Status: DISCONTINUED | OUTPATIENT
Start: 2024-11-14 | End: 2024-11-14

## 2024-11-14 RX ORDER — CEFOTETAN AND DEXTROSE 1 G/50ML
2 INJECTION, SOLUTION INTRAVENOUS ONCE
Refills: 0 | Status: DISCONTINUED | OUTPATIENT
Start: 2024-11-14 | End: 2024-11-14

## 2024-11-14 RX ORDER — HYDROMORPHONE HYDROCHLORIDE 2 MG/1
0.5 TABLET ORAL ONCE
Refills: 0 | Status: DISCONTINUED | OUTPATIENT
Start: 2024-11-14 | End: 2024-11-14

## 2024-11-14 RX ORDER — OXYCODONE HYDROCHLORIDE 30 MG/1
2.5 TABLET ORAL ONCE
Refills: 0 | Status: DISCONTINUED | OUTPATIENT
Start: 2024-11-14 | End: 2024-11-14

## 2024-11-14 RX ORDER — HYDROMORPHONE HYDROCHLORIDE 2 MG/1
0.5 TABLET ORAL EVERY 6 HOURS
Refills: 0 | Status: DISCONTINUED | OUTPATIENT
Start: 2024-11-14 | End: 2024-11-17

## 2024-11-14 RX ORDER — SODIUM CHLORIDE 9 MG/ML
3 INJECTION, SOLUTION INTRAMUSCULAR; INTRAVENOUS; SUBCUTANEOUS EVERY 8 HOURS
Refills: 0 | Status: DISCONTINUED | OUTPATIENT
Start: 2024-11-14 | End: 2024-11-14

## 2024-11-14 RX ORDER — INFLUENZA VIRUS VACCINE 15; 15; 15; 15 UG/.5ML; UG/.5ML; UG/.5ML; UG/.5ML
0.5 SUSPENSION INTRAMUSCULAR ONCE
Refills: 0 | Status: DISCONTINUED | OUTPATIENT
Start: 2024-11-14 | End: 2024-11-17

## 2024-11-14 RX ORDER — ONDANSETRON HYDROCHLORIDE 4 MG/1
4 TABLET, FILM COATED ORAL ONCE
Refills: 0 | Status: DISCONTINUED | OUTPATIENT
Start: 2024-11-14 | End: 2024-11-14

## 2024-11-14 RX ORDER — ESCITALOPRAM OXALATE 10 MG/1
20 TABLET, FILM COATED ORAL DAILY
Refills: 0 | Status: DISCONTINUED | OUTPATIENT
Start: 2024-11-14 | End: 2024-11-15

## 2024-11-14 RX ORDER — HYDROMORPHONE HYDROCHLORIDE 2 MG/1
1 TABLET ORAL EVERY 4 HOURS
Refills: 0 | Status: DISCONTINUED | OUTPATIENT
Start: 2024-11-14 | End: 2024-11-17

## 2024-11-14 RX ORDER — TRAZODONE HYDROCHLORIDE 150 MG/1
150 TABLET ORAL AT BEDTIME
Refills: 0 | Status: DISCONTINUED | OUTPATIENT
Start: 2024-11-14 | End: 2024-11-17

## 2024-11-14 RX ADMIN — HYDROMORPHONE HYDROCHLORIDE 0.5 MILLIGRAM(S): 2 TABLET ORAL at 17:10

## 2024-11-14 RX ADMIN — MIRTAZAPINE 7.5 MILLIGRAM(S): 15 TABLET, FILM COATED ORAL at 21:48

## 2024-11-14 RX ADMIN — Medication 100 MILLILITER(S): at 17:00

## 2024-11-14 RX ADMIN — HYDROMORPHONE HYDROCHLORIDE 0.5 MILLIGRAM(S): 2 TABLET ORAL at 18:30

## 2024-11-14 RX ADMIN — GABAPENTIN 600 MILLIGRAM(S): 300 CAPSULE ORAL at 21:48

## 2024-11-14 RX ADMIN — ACETAMINOPHEN 500MG 400 MILLIGRAM(S): 500 TABLET, COATED ORAL at 20:19

## 2024-11-14 RX ADMIN — TRAZODONE HYDROCHLORIDE 150 MILLIGRAM(S): 150 TABLET ORAL at 22:29

## 2024-11-14 RX ADMIN — HYDROMORPHONE HYDROCHLORIDE 0.5 MILLIGRAM(S): 2 TABLET ORAL at 17:40

## 2024-11-14 RX ADMIN — ACETAMINOPHEN 500MG 1000 MILLIGRAM(S): 500 TABLET, COATED ORAL at 20:49

## 2024-11-14 RX ADMIN — HYDROMORPHONE HYDROCHLORIDE 0.5 MILLIGRAM(S): 2 TABLET ORAL at 18:00

## 2024-11-14 RX ADMIN — Medication 5000 UNIT(S): at 21:49

## 2024-11-14 NOTE — PRE-ANESTHESIA EVALUATION ADULT - NSANTHPMHFT_GEN_ALL_CORE
39M with PMH s/f anxiety and depression, chronic pain (discontinued opioids 6/2024 currently on gabapentin), IBD/UC (s/p multiple abdominal surgeries with Ileostomy creation 2/2022) now presents for scheduled Ileostomy closure on 11/14/2024 with Dr Meyers.

## 2024-11-14 NOTE — PRE-ANESTHESIA EVALUATION ADULT - BP NONINVASIVE DIASTOLIC (MM HG)
Ok to remain off the sertraline. If mood worsening let us know and this can be restarted   Pantoprazole 40mg daily for gerd in placed of omeprazole. If not helping gerd please let me know  Continue diet, exercise efforts  Follow up 4-6 months for your physical , sooner if needed  
71

## 2024-11-14 NOTE — CHART NOTE - NSCHARTNOTEFT_GEN_A_CORE
SURGERY POST OP CHECK    STATUS POST PROCEDURE: ileostomy closure    SUBJECTIVE: Pt seen and examined at bedside. He reports surgical pain is well-controlled with current regimen. He tolerated ice chips and sips without nausea or vomiting. He is ambulating, voiding independently, and has not yet passed flatus. Otherwise denies dizziness, chest pain, dyspnea.    --------------------------------------------------------------------------------------------------------------------------------------------------------------------------------------------------------------  OBJECTIVE:  Vital Signs Last 24 Hrs  T(C): 36.8 (14 Nov 2024 22:00), Max: 36.8 (14 Nov 2024 22:00)  T(F): 98.3 (14 Nov 2024 22:00), Max: 98.3 (14 Nov 2024 22:00)  HR: 63 (14 Nov 2024 22:00) (53 - 84)  BP: 119/58 (14 Nov 2024 22:00) (107/54 - 135/68)  BP(mean): 87 (14 Nov 2024 19:30) (77 - 100)  RR: 18 (14 Nov 2024 22:00) (16 - 18)  SpO2: 94% (14 Nov 2024 22:00) (94% - 99%)    Parameters below as of 14 Nov 2024 22:00  Patient On (Oxygen Delivery Method): room air      I&O's Summary    14 Nov 2024 07:01  -  14 Nov 2024 22:14  --------------------------------------------------------  IN: 300 mL / OUT: 550 mL / NET: -250 mL        PHYSICAL EXAM:  Constitutional: Well developed, well nourished, NAD  Chest: Symmetric chest rise bilaterally, no increased WOB  Abdomen: Soft, nondistended, minimal tenderness to palpation around surgical site without rebound or guarding. Ileostomy site dressing saturated with sanguineous and serosanguineous fluid leaking through tegaderm, ostomy site with minimal oozing and blood clots. Replaced with gauze, ABD, paper tape.  Extremities: Warm to touch, ambulating  ----------------------------------------------------------------------------------------------------------------------------------------------------------------------------------------------------------------  ASSESSMENT:  Pt is a 39 year old male with a history of ulcerative colitis s/p two-spage J-pouch (2008), ileostomy creation (2022) requiring multiple revisions and small bowel resections, now POD #0 s/p ileostomy closure. He is hemodynamically stable, awaiting return of bowel function.    PLAN:  - Monitor ileostomy site for bleeding  - Pain: Tylenol, Dilaudid PRN, home gabapentin. Chronic pain consult.  - Diet: NPO with sips, mIVF 100  - Monitor for return of bowel function   - Continue home medications  - OOB as tolerated  - DVT prophylaxis: SCD, SQH    [facility]  [id number]

## 2024-11-15 LAB
ANION GAP SERPL CALC-SCNC: 13 MMOL/L — SIGNIFICANT CHANGE UP (ref 5–17)
BUN SERPL-MCNC: 11 MG/DL — SIGNIFICANT CHANGE UP (ref 7–23)
CALCIUM SERPL-MCNC: 8.8 MG/DL — SIGNIFICANT CHANGE UP (ref 8.4–10.5)
CHLORIDE SERPL-SCNC: 100 MMOL/L — SIGNIFICANT CHANGE UP (ref 96–108)
CO2 SERPL-SCNC: 23 MMOL/L — SIGNIFICANT CHANGE UP (ref 22–31)
CREAT SERPL-MCNC: 0.9 MG/DL — SIGNIFICANT CHANGE UP (ref 0.5–1.3)
EGFR: 111 ML/MIN/1.73M2 — SIGNIFICANT CHANGE UP
GLUCOSE SERPL-MCNC: 87 MG/DL — SIGNIFICANT CHANGE UP (ref 70–99)
HCT VFR BLD CALC: 38.7 % — LOW (ref 39–50)
HGB BLD-MCNC: 13.3 G/DL — SIGNIFICANT CHANGE UP (ref 13–17)
MAGNESIUM SERPL-MCNC: 1.9 MG/DL — SIGNIFICANT CHANGE UP (ref 1.6–2.6)
MCHC RBC-ENTMCNC: 30 PG — SIGNIFICANT CHANGE UP (ref 27–34)
MCHC RBC-ENTMCNC: 34.4 G/DL — SIGNIFICANT CHANGE UP (ref 32–36)
MCV RBC AUTO: 87.2 FL — SIGNIFICANT CHANGE UP (ref 80–100)
NRBC # BLD: 0 /100 WBCS — SIGNIFICANT CHANGE UP (ref 0–0)
PHOSPHATE SERPL-MCNC: 3.5 MG/DL — SIGNIFICANT CHANGE UP (ref 2.5–4.5)
PLATELET # BLD AUTO: 266 K/UL — SIGNIFICANT CHANGE UP (ref 150–400)
POTASSIUM SERPL-MCNC: 3.7 MMOL/L — SIGNIFICANT CHANGE UP (ref 3.5–5.3)
POTASSIUM SERPL-SCNC: 3.7 MMOL/L — SIGNIFICANT CHANGE UP (ref 3.5–5.3)
RBC # BLD: 4.44 M/UL — SIGNIFICANT CHANGE UP (ref 4.2–5.8)
RBC # FLD: 12.3 % — SIGNIFICANT CHANGE UP (ref 10.3–14.5)
SODIUM SERPL-SCNC: 136 MMOL/L — SIGNIFICANT CHANGE UP (ref 135–145)
WBC # BLD: 9.67 K/UL — SIGNIFICANT CHANGE UP (ref 3.8–10.5)
WBC # FLD AUTO: 9.67 K/UL — SIGNIFICANT CHANGE UP (ref 3.8–10.5)

## 2024-11-15 RX ORDER — ESCITALOPRAM OXALATE 10 MG/1
40 TABLET, FILM COATED ORAL DAILY
Refills: 0 | Status: DISCONTINUED | OUTPATIENT
Start: 2024-11-15 | End: 2024-11-17

## 2024-11-15 RX ORDER — ACETAMINOPHEN 500MG 500 MG/1
975 TABLET, COATED ORAL EVERY 6 HOURS
Refills: 0 | Status: DISCONTINUED | OUTPATIENT
Start: 2024-11-15 | End: 2024-11-17

## 2024-11-15 RX ORDER — POTASSIUM CHLORIDE 600 MG/1
10 TABLET, EXTENDED RELEASE ORAL
Refills: 0 | Status: COMPLETED | OUTPATIENT
Start: 2024-11-15 | End: 2024-11-15

## 2024-11-15 RX ADMIN — POTASSIUM CHLORIDE 100 MILLIEQUIVALENT(S): 600 TABLET, EXTENDED RELEASE ORAL at 14:12

## 2024-11-15 RX ADMIN — TRAZODONE HYDROCHLORIDE 150 MILLIGRAM(S): 150 TABLET ORAL at 22:39

## 2024-11-15 RX ADMIN — ACETAMINOPHEN 500MG 400 MILLIGRAM(S): 500 TABLET, COATED ORAL at 06:07

## 2024-11-15 RX ADMIN — ACETAMINOPHEN 500MG 400 MILLIGRAM(S): 500 TABLET, COATED ORAL at 12:28

## 2024-11-15 RX ADMIN — Medication 5000 UNIT(S): at 14:10

## 2024-11-15 RX ADMIN — GABAPENTIN 900 MILLIGRAM(S): 300 CAPSULE ORAL at 05:28

## 2024-11-15 RX ADMIN — ACETAMINOPHEN 500MG 1000 MILLIGRAM(S): 500 TABLET, COATED ORAL at 18:47

## 2024-11-15 RX ADMIN — HYDROMORPHONE HYDROCHLORIDE 0.5 MILLIGRAM(S): 2 TABLET ORAL at 17:10

## 2024-11-15 RX ADMIN — HYDROMORPHONE HYDROCHLORIDE 1 MILLIGRAM(S): 2 TABLET ORAL at 23:58

## 2024-11-15 RX ADMIN — Medication 5000 UNIT(S): at 05:29

## 2024-11-15 RX ADMIN — HYDROMORPHONE HYDROCHLORIDE 1 MILLIGRAM(S): 2 TABLET ORAL at 23:28

## 2024-11-15 RX ADMIN — ESCITALOPRAM OXALATE 20 MILLIGRAM(S): 10 TABLET, FILM COATED ORAL at 12:27

## 2024-11-15 RX ADMIN — Medication 100 GRAM(S): at 08:52

## 2024-11-15 RX ADMIN — GABAPENTIN 900 MILLIGRAM(S): 300 CAPSULE ORAL at 21:41

## 2024-11-15 RX ADMIN — ACETAMINOPHEN 500MG 400 MILLIGRAM(S): 500 TABLET, COATED ORAL at 00:55

## 2024-11-15 RX ADMIN — MIRTAZAPINE 7.5 MILLIGRAM(S): 15 TABLET, FILM COATED ORAL at 21:41

## 2024-11-15 RX ADMIN — POTASSIUM CHLORIDE 100 MILLIEQUIVALENT(S): 600 TABLET, EXTENDED RELEASE ORAL at 12:48

## 2024-11-15 RX ADMIN — POTASSIUM CHLORIDE 100 MILLIEQUIVALENT(S): 600 TABLET, EXTENDED RELEASE ORAL at 10:36

## 2024-11-15 RX ADMIN — Medication 5000 UNIT(S): at 21:41

## 2024-11-15 RX ADMIN — ACETAMINOPHEN 500MG 1000 MILLIGRAM(S): 500 TABLET, COATED ORAL at 06:37

## 2024-11-15 RX ADMIN — GABAPENTIN 900 MILLIGRAM(S): 300 CAPSULE ORAL at 14:10

## 2024-11-15 RX ADMIN — ACETAMINOPHEN 500MG 1000 MILLIGRAM(S): 500 TABLET, COATED ORAL at 01:25

## 2024-11-16 LAB
ANION GAP SERPL CALC-SCNC: 11 MMOL/L — SIGNIFICANT CHANGE UP (ref 5–17)
BUN SERPL-MCNC: 12 MG/DL — SIGNIFICANT CHANGE UP (ref 7–23)
CALCIUM SERPL-MCNC: 8.8 MG/DL — SIGNIFICANT CHANGE UP (ref 8.4–10.5)
CHLORIDE SERPL-SCNC: 102 MMOL/L — SIGNIFICANT CHANGE UP (ref 96–108)
CO2 SERPL-SCNC: 25 MMOL/L — SIGNIFICANT CHANGE UP (ref 22–31)
CREAT SERPL-MCNC: 0.98 MG/DL — SIGNIFICANT CHANGE UP (ref 0.5–1.3)
EGFR: 101 ML/MIN/1.73M2 — SIGNIFICANT CHANGE UP
GLUCOSE SERPL-MCNC: 87 MG/DL — SIGNIFICANT CHANGE UP (ref 70–99)
HCT VFR BLD CALC: 38.6 % — LOW (ref 39–50)
HGB BLD-MCNC: 12.9 G/DL — LOW (ref 13–17)
MAGNESIUM SERPL-MCNC: 1.9 MG/DL — SIGNIFICANT CHANGE UP (ref 1.6–2.6)
MCHC RBC-ENTMCNC: 29.1 PG — SIGNIFICANT CHANGE UP (ref 27–34)
MCHC RBC-ENTMCNC: 33.4 G/DL — SIGNIFICANT CHANGE UP (ref 32–36)
MCV RBC AUTO: 87.1 FL — SIGNIFICANT CHANGE UP (ref 80–100)
NRBC # BLD: 0 /100 WBCS — SIGNIFICANT CHANGE UP (ref 0–0)
PHOSPHATE SERPL-MCNC: 3.1 MG/DL — SIGNIFICANT CHANGE UP (ref 2.5–4.5)
PLATELET # BLD AUTO: 255 K/UL — SIGNIFICANT CHANGE UP (ref 150–400)
POTASSIUM SERPL-MCNC: 3.8 MMOL/L — SIGNIFICANT CHANGE UP (ref 3.5–5.3)
POTASSIUM SERPL-SCNC: 3.8 MMOL/L — SIGNIFICANT CHANGE UP (ref 3.5–5.3)
RBC # BLD: 4.43 M/UL — SIGNIFICANT CHANGE UP (ref 4.2–5.8)
RBC # FLD: 12.8 % — SIGNIFICANT CHANGE UP (ref 10.3–14.5)
SODIUM SERPL-SCNC: 138 MMOL/L — SIGNIFICANT CHANGE UP (ref 135–145)
WBC # BLD: 5.46 K/UL — SIGNIFICANT CHANGE UP (ref 3.8–10.5)
WBC # FLD AUTO: 5.46 K/UL — SIGNIFICANT CHANGE UP (ref 3.8–10.5)

## 2024-11-16 RX ADMIN — Medication 5000 UNIT(S): at 05:31

## 2024-11-16 RX ADMIN — Medication 5000 UNIT(S): at 14:24

## 2024-11-16 RX ADMIN — ACETAMINOPHEN 500MG 975 MILLIGRAM(S): 500 TABLET, COATED ORAL at 17:29

## 2024-11-16 RX ADMIN — ACETAMINOPHEN 500MG 975 MILLIGRAM(S): 500 TABLET, COATED ORAL at 05:30

## 2024-11-16 RX ADMIN — ACETAMINOPHEN 500MG 975 MILLIGRAM(S): 500 TABLET, COATED ORAL at 00:51

## 2024-11-16 RX ADMIN — ACETAMINOPHEN 500MG 975 MILLIGRAM(S): 500 TABLET, COATED ORAL at 12:05

## 2024-11-16 RX ADMIN — ACETAMINOPHEN 500MG 975 MILLIGRAM(S): 500 TABLET, COATED ORAL at 18:00

## 2024-11-16 RX ADMIN — ACETAMINOPHEN 500MG 975 MILLIGRAM(S): 500 TABLET, COATED ORAL at 01:21

## 2024-11-16 RX ADMIN — GABAPENTIN 900 MILLIGRAM(S): 300 CAPSULE ORAL at 05:30

## 2024-11-16 RX ADMIN — ESCITALOPRAM OXALATE 40 MILLIGRAM(S): 10 TABLET, FILM COATED ORAL at 11:35

## 2024-11-16 RX ADMIN — HYDROMORPHONE HYDROCHLORIDE 1 MILLIGRAM(S): 2 TABLET ORAL at 18:00

## 2024-11-16 RX ADMIN — GABAPENTIN 900 MILLIGRAM(S): 300 CAPSULE ORAL at 21:37

## 2024-11-16 RX ADMIN — ACETAMINOPHEN 500MG 975 MILLIGRAM(S): 500 TABLET, COATED ORAL at 11:35

## 2024-11-16 RX ADMIN — Medication 5000 UNIT(S): at 21:38

## 2024-11-16 RX ADMIN — TRAZODONE HYDROCHLORIDE 150 MILLIGRAM(S): 150 TABLET ORAL at 21:37

## 2024-11-16 RX ADMIN — HYDROMORPHONE HYDROCHLORIDE 1 MILLIGRAM(S): 2 TABLET ORAL at 12:41

## 2024-11-16 RX ADMIN — GABAPENTIN 900 MILLIGRAM(S): 300 CAPSULE ORAL at 14:24

## 2024-11-16 RX ADMIN — HYDROMORPHONE HYDROCHLORIDE 1 MILLIGRAM(S): 2 TABLET ORAL at 17:32

## 2024-11-16 RX ADMIN — MIRTAZAPINE 7.5 MILLIGRAM(S): 15 TABLET, FILM COATED ORAL at 21:37

## 2024-11-16 RX ADMIN — Medication 100 MILLILITER(S): at 17:29

## 2024-11-16 RX ADMIN — ACETAMINOPHEN 500MG 975 MILLIGRAM(S): 500 TABLET, COATED ORAL at 06:00

## 2024-11-16 RX ADMIN — HYDROMORPHONE HYDROCHLORIDE 1 MILLIGRAM(S): 2 TABLET ORAL at 12:56

## 2024-11-17 VITALS
HEART RATE: 58 BPM | SYSTOLIC BLOOD PRESSURE: 124 MMHG | RESPIRATION RATE: 18 BRPM | DIASTOLIC BLOOD PRESSURE: 70 MMHG | OXYGEN SATURATION: 95 % | TEMPERATURE: 98 F

## 2024-11-17 LAB
ANION GAP SERPL CALC-SCNC: 13 MMOL/L — SIGNIFICANT CHANGE UP (ref 5–17)
BUN SERPL-MCNC: 12 MG/DL — SIGNIFICANT CHANGE UP (ref 7–23)
CALCIUM SERPL-MCNC: 9 MG/DL — SIGNIFICANT CHANGE UP (ref 8.4–10.5)
CHLORIDE SERPL-SCNC: 103 MMOL/L — SIGNIFICANT CHANGE UP (ref 96–108)
CO2 SERPL-SCNC: 24 MMOL/L — SIGNIFICANT CHANGE UP (ref 22–31)
CREAT SERPL-MCNC: 0.96 MG/DL — SIGNIFICANT CHANGE UP (ref 0.5–1.3)
EGFR: 103 ML/MIN/1.73M2 — SIGNIFICANT CHANGE UP
GLUCOSE SERPL-MCNC: 102 MG/DL — HIGH (ref 70–99)
HCT VFR BLD CALC: 37 % — LOW (ref 39–50)
HGB BLD-MCNC: 12.4 G/DL — LOW (ref 13–17)
MAGNESIUM SERPL-MCNC: 1.8 MG/DL — SIGNIFICANT CHANGE UP (ref 1.6–2.6)
MCHC RBC-ENTMCNC: 29.5 PG — SIGNIFICANT CHANGE UP (ref 27–34)
MCHC RBC-ENTMCNC: 33.5 G/DL — SIGNIFICANT CHANGE UP (ref 32–36)
MCV RBC AUTO: 88.1 FL — SIGNIFICANT CHANGE UP (ref 80–100)
NRBC # BLD: 0 /100 WBCS — SIGNIFICANT CHANGE UP (ref 0–0)
PHOSPHATE SERPL-MCNC: 3.5 MG/DL — SIGNIFICANT CHANGE UP (ref 2.5–4.5)
PLATELET # BLD AUTO: 242 K/UL — SIGNIFICANT CHANGE UP (ref 150–400)
POTASSIUM SERPL-MCNC: 3.6 MMOL/L — SIGNIFICANT CHANGE UP (ref 3.5–5.3)
POTASSIUM SERPL-SCNC: 3.6 MMOL/L — SIGNIFICANT CHANGE UP (ref 3.5–5.3)
RBC # BLD: 4.2 M/UL — SIGNIFICANT CHANGE UP (ref 4.2–5.8)
RBC # FLD: 13 % — SIGNIFICANT CHANGE UP (ref 10.3–14.5)
SODIUM SERPL-SCNC: 140 MMOL/L — SIGNIFICANT CHANGE UP (ref 135–145)
WBC # BLD: 4.62 K/UL — SIGNIFICANT CHANGE UP (ref 3.8–10.5)
WBC # FLD AUTO: 4.62 K/UL — SIGNIFICANT CHANGE UP (ref 3.8–10.5)

## 2024-11-17 PROCEDURE — 80048 BASIC METABOLIC PNL TOTAL CA: CPT

## 2024-11-17 PROCEDURE — C9399: CPT

## 2024-11-17 PROCEDURE — 85027 COMPLETE CBC AUTOMATED: CPT

## 2024-11-17 PROCEDURE — 83735 ASSAY OF MAGNESIUM: CPT

## 2024-11-17 PROCEDURE — 84100 ASSAY OF PHOSPHORUS: CPT

## 2024-11-17 RX ORDER — OXYCODONE HYDROCHLORIDE 30 MG/1
5 TABLET ORAL EVERY 6 HOURS
Refills: 0 | Status: DISCONTINUED | OUTPATIENT
Start: 2024-11-17 | End: 2024-11-17

## 2024-11-17 RX ORDER — POTASSIUM CHLORIDE 600 MG/1
40 TABLET, EXTENDED RELEASE ORAL ONCE
Refills: 0 | Status: COMPLETED | OUTPATIENT
Start: 2024-11-17 | End: 2024-11-17

## 2024-11-17 RX ORDER — OXYCODONE HYDROCHLORIDE 30 MG/1
10 TABLET ORAL EVERY 6 HOURS
Refills: 0 | Status: DISCONTINUED | OUTPATIENT
Start: 2024-11-17 | End: 2024-11-17

## 2024-11-17 RX ORDER — ACETAMINOPHEN 500MG 500 MG/1
3 TABLET, COATED ORAL
Qty: 0 | Refills: 0 | DISCHARGE
Start: 2024-11-17

## 2024-11-17 RX ADMIN — OXYCODONE HYDROCHLORIDE 10 MILLIGRAM(S): 30 TABLET ORAL at 08:52

## 2024-11-17 RX ADMIN — Medication 100 MILLILITER(S): at 08:51

## 2024-11-17 RX ADMIN — OXYCODONE HYDROCHLORIDE 10 MILLIGRAM(S): 30 TABLET ORAL at 09:52

## 2024-11-17 RX ADMIN — ESCITALOPRAM OXALATE 40 MILLIGRAM(S): 10 TABLET, FILM COATED ORAL at 11:12

## 2024-11-17 RX ADMIN — Medication 100 MILLILITER(S): at 11:13

## 2024-11-17 RX ADMIN — ACETAMINOPHEN 500MG 975 MILLIGRAM(S): 500 TABLET, COATED ORAL at 11:12

## 2024-11-17 RX ADMIN — POTASSIUM CHLORIDE 40 MILLIEQUIVALENT(S): 600 TABLET, EXTENDED RELEASE ORAL at 08:52

## 2024-11-17 RX ADMIN — ACETAMINOPHEN 500MG 975 MILLIGRAM(S): 500 TABLET, COATED ORAL at 05:27

## 2024-11-17 RX ADMIN — GABAPENTIN 900 MILLIGRAM(S): 300 CAPSULE ORAL at 05:27

## 2024-11-17 RX ADMIN — Medication 5000 UNIT(S): at 05:27

## 2024-11-17 RX ADMIN — ACETAMINOPHEN 500MG 975 MILLIGRAM(S): 500 TABLET, COATED ORAL at 05:57

## 2024-11-17 RX ADMIN — ACETAMINOPHEN 500MG 975 MILLIGRAM(S): 500 TABLET, COATED ORAL at 12:10

## 2024-11-17 NOTE — DISCHARGE NOTE NURSING/CASE MANAGEMENT/SOCIAL WORK - PATIENT PORTAL LINK FT
You can access the FollowMyHealth Patient Portal offered by [facility] by registering at the following website: [email or web address]. By joining [facility]’s FollowMyHealth portal, you will also be able to view your health information using other applications (apps) compatible with our system.

## 2024-11-17 NOTE — DISCHARGE NOTE NURSING/CASE MANAGEMENT/SOCIAL WORK - FINANCIAL ASSISTANCE
[facility] provides services at a reduced cost to those who are determined to be eligible through [facility]’s financial assistance program. Information regarding [facility]’s financial assistance program can be found by going to [email or web address] or by calling [phone].

## 2024-11-17 NOTE — DISCHARGE NOTE PROVIDER - NSDCCPTREATMENT_GEN_ALL_CORE_FT
PRINCIPAL PROCEDURE  Procedure: Closure, ileostomy  Findings and Treatment: WOUND CARE: Keep area clean and dry. Cover with gauze and tape and change daily or more frequently if soiled.   BATHING: Please do not submerge wound underwater. You may shower and/or sponge bathe.  ACTIVITY: No heavy lifting or straining. Otherwise, you may return to your usual level of physical activity. If you are taking narcotic pain medication (such as Percocet), do NOT drive a car, operate machinery or make important decisions.  DIET: Low fiber diet   NOTIFY YOUR SURGEON IF: You have any bleeding that does not stop, any pus draining from your wound, any fever (over 100.4 F) or chills, persistent nausea/vomiting, persistent diarrhea, or if your pain is not controlled on your discharge pain medications.  FOLLOW-UP:  1. Follow-up with Dr. Jeanne Cordero within 1-2 weeks of discharge.  Please call office for appointment  2. Please follow up with your primary care physician in one week regarding your hospitalization.

## 2024-11-17 NOTE — DISCHARGE NOTE NURSING/CASE MANAGEMENT/SOCIAL WORK - NSDCPEFALRISK_GEN_ALL_CORE
For information on Fall & Injury Prevention, visit: [email or web address] OR  [email or web address] OR  https://www.cdc.gov/steadi/patient.html

## 2024-11-17 NOTE — PROGRESS NOTE ADULT - ASSESSMENT
39 year old male with a history of ulcerative colitis s/p two-spage J-pouch (2008), ileostomy creation (2022) requiring multiple revisions and small bowel resections, now POD #0 s/p ileostomy closure. He is hemodynamically stable, awaiting return of bowel function.    PLAN:  - Monitor ileostomy site for bleeding  - Pain: Tylenol, IV Dilaudid PRN, home gabapentin. Chronic pain consult.  - Diet: NPO with sips, mIVF 100- advance diet  - Monitor for return of bowel function   - Continue home medications  - OOB as tolerated  - DVT prophylaxis: SCD, SQH    [facility]  [id number].      
39 year old male with a history of ulcerative colitis s/p two-spage J-pouch (2008), ileostomy creation (2022) requiring multiple revisions and small bowel resections, now POD #0 s/p ileostomy closure. He is hemodynamically stable.     PLAN:  - Monitor ileostomy site for bleeding  - Pain: Tylenol, IV Dilaudid PRN, home gabapentin. Chronic pain consult.  - Diet: LRD   - +/+ flatus/BM   - Continue home medications  - OOB as tolerated  - DVT prophylaxis: SCD, SQH    [facility]  [id number].  
39 year old male with a history of ulcerative colitis s/p two-spage J-pouch (2008), ileostomy creation (2022) requiring multiple revisions and small bowel resections, now s/p ileostomy closure. He is hemodynamically stable.     PLAN:    - Pain: Tylenol, IV Dilaudid PRN, home gabapentin. Transition to PO meds today   - Diet: LRD   - +/+ flatus/BM   - Continue home medications  - OOB as tolerated  - DVT prophylaxis: SCD, SQH    [facility]  [id number].

## 2024-11-17 NOTE — DISCHARGE NOTE PROVIDER - NSDCMRMEDTOKEN_GEN_ALL_CORE_FT
acetaminophen 325 mg oral tablet: 3 tab(s) orally every 6 hours  Gabapentin: 900 mg tid  Lexapro 20 mg oral tablet: 1 tab(s) orally take 40 mg po daily  mirtazapine 7.5 mg oral tablet: 1 tab(s) orally once a day (at bedtime)  testosterone 100 mg/mL intramuscular solution: intramuscular every 7 days on mondays  traZODone 150 mg oral tablet: orally once a day (at bedtime)

## 2024-11-17 NOTE — DISCHARGE NOTE PROVIDER - CARE PROVIDER_API CALL
Jarrell Meyers  Colon/Rectal Surgery  [address] [place] [id number]  Phone: [phone]  Fax: [phone]  Follow Up Time: 2 weeks

## 2024-11-17 NOTE — PROGRESS NOTE ADULT - SUBJECTIVE AND OBJECTIVE BOX
SURGERY DAILY PROGRESS NOTE    24 Hour/Overnight Events: No acute events overnight    SUBJECTIVE: Patient seen and evaluated on AM rounds. Pt is resting comfortably in bed with no acute complaints. Tolerating diet. Ambulating well.   Denies fevers/chills, chest pain, dyspnea, abdominal pain, nausea, vomiting, and diarrhea. Patient passing gas an having bowel function     ------------------------------------------------------------------------------------------------------------  OBJECTIVE:  Vital Signs Last 24 Hrs  T(C): 36.5 (17 Nov 2024 04:36), Max: 36.8 (16 Nov 2024 08:39)  T(F): 97.7 (17 Nov 2024 04:36), Max: 98.2 (16 Nov 2024 08:39)  HR: 56 (17 Nov 2024 04:36) (51 - 58)  BP: 115/69 (17 Nov 2024 04:36) (106/56 - 130/70)  BP(mean): --  RR: 18 (17 Nov 2024 04:36) (18 - 18)  SpO2: 95% (17 Nov 2024 04:36) (95% - 98%)    Parameters below as of 17 Nov 2024 04:36  Patient On (Oxygen Delivery Method): room air      I&O's Detail    15 Nov 2024 07:01  -  16 Nov 2024 07:00  --------------------------------------------------------  IN:    IV PiggyBack: 350 mL    Lactated Ringers: 1800 mL    Oral Fluid: 480 mL  Total IN: 2630 mL    OUT:    Blood Loss (mL): 0 mL    Voided (mL): 2100 mL  Total OUT: 2100 mL    Total NET: 530 mL      16 Nov 2024 07:01  -  17 Nov 2024 06:19  --------------------------------------------------------  IN:    Lactated Ringers: 2400 mL    Oral Fluid: 250 mL  Total IN: 2650 mL    OUT:    Voided (mL): 250 mL  Total OUT: 250 mL    Total NET: 2400 mL          LABS:                        12.9   5.46  )-----------( 255      ( 16 Nov 2024 07:14 )             38.6     11-16    138  |  102  |  12  ----------------------------<  87  3.8   |  25  |  0.98    Ca    8.8      16 Nov 2024 07:11  Phos  3.1     11-16  Mg     1.9     11-16          Urinalysis Basic - ( 16 Nov 2024 07:11 )    Color: x / Appearance: x / SG: x / pH: x  Gluc: 87 mg/dL / Ketone: x  / Bili: x / Urobili: x   Blood: x / Protein: x / Nitrite: x   Leuk Esterase: x / RBC: x / WBC x   Sq Epi: x / Non Sq Epi: x / Bacteria: x      PHYSICAL EXAM:  Constitutional: Well developed, well nourished, NAD  Chest: Symmetric chest rise bilaterally, no increased WOB  Abdomen: Soft, nondistended, minimal tenderness to palpation around surgical site without rebound or guarding. Ileostomy site dressing c/d/i   Extremities: Warm to touch, ambulating  
SURGERY DAILY PROGRESS NOTE:       Subjective / Overnight events:    Patient seen and examined, tolerating low fiber diet       Objective:      MEDICATIONS  (STANDING):  acetaminophen     Tablet .. 975 milliGRAM(s) Oral every 6 hours  escitalopram 40 milliGRAM(s) Oral daily  gabapentin 900 milliGRAM(s) Oral three times a day  heparin   Injectable 5000 Unit(s) SubCutaneous every 8 hours  influenza   Vaccine 0.5 milliLiter(s) IntraMuscular once  lactated ringers. 1000 milliLiter(s) (100 mL/Hr) IV Continuous <Continuous>  mirtazapine 7.5 milliGRAM(s) Oral at bedtime  traZODone 150 milliGRAM(s) Oral at bedtime    MEDICATIONS  (PRN):  HYDROmorphone  Injectable 0.5 milliGRAM(s) IV Push every 6 hours PRN Moderate Pain (4 - 6)  HYDROmorphone  Injectable 1 milliGRAM(s) IV Push every 4 hours PRN Severe Pain (7 - 10)      Vital Signs Last 24 Hrs  T(C): 36.5 (17 Nov 2024 04:36), Max: 36.8 (16 Nov 2024 08:39)  T(F): 97.7 (17 Nov 2024 04:36), Max: 98.2 (16 Nov 2024 08:39)  HR: 56 (17 Nov 2024 04:36) (51 - 58)  BP: 115/69 (17 Nov 2024 04:36) (112/76 - 130/70)  BP(mean): --  RR: 18 (17 Nov 2024 04:36) (18 - 18)  SpO2: 95% (17 Nov 2024 04:36) (95% - 98%)    Parameters below as of 17 Nov 2024 04:36  Patient On (Oxygen Delivery Method): room air        I&O's Detail    15 Nov 2024 07:01  -  16 Nov 2024 07:00  --------------------------------------------------------  IN:    IV PiggyBack: 350 mL    Lactated Ringers: 1800 mL    Oral Fluid: 480 mL  Total IN: 2630 mL    OUT:    Blood Loss (mL): 0 mL    Voided (mL): 2100 mL  Total OUT: 2100 mL    Total NET: 530 mL      16 Nov 2024 07:01  -  17 Nov 2024 06:58  --------------------------------------------------------  IN:    Lactated Ringers: 2400 mL    Oral Fluid: 250 mL  Total IN: 2650 mL    OUT:    Voided (mL): 250 mL  Total OUT: 250 mL    Total NET: 2400 mL          Daily     Daily     LABS:                        12.9   5.46  )-----------( 255      ( 16 Nov 2024 07:14 )             38.6     11-16    138  |  102  |  12  ----------------------------<  87  3.8   |  25  |  0.98    Ca    8.8      16 Nov 2024 07:11  Phos  3.1     11-16  Mg     1.9     11-16        Urinalysis Basic - ( 16 Nov 2024 07:11 )    Color: x / Appearance: x / SG: x / pH: x  Gluc: 87 mg/dL / Ketone: x  / Bili: x / Urobili: x   Blood: x / Protein: x / Nitrite: x   Leuk Esterase: x / RBC: x / WBC x   Sq Epi: x / Non Sq Epi: x / Bacteria: x          PHYSICAL EXAM  Constitutional: Well developed, well nourished, NAD  Chest: Symmetric chest rise bilaterally, no increased WOB  Abdomen: Soft, nondistended, minimal tenderness to palpation around surgical site without rebound or guarding. Ileostomy site dressing c/d/i   Extremities: Warm to touch, ambulating
SURGERY DAILY PROGRESS NOTE:     Overnight Events:  No acute events overnight.    SUBJECTIVE: Patient seen and evaluated on AM rounds. Pt is resting comfortably in bed with no complaints.      OBJECTIVE:  Vital Signs Last 24 Hrs  T(C): 36.7 (15 Nov 2024 06:17), Max: 36.8 (14 Nov 2024 22:00)  T(F): 98 (15 Nov 2024 06:17), Max: 98.3 (14 Nov 2024 22:00)  HR: 55 (15 Nov 2024 06:17) (53 - 84)  BP: 117/56 (15 Nov 2024 06:17) (105/61 - 135/68)  BP(mean): 87 (14 Nov 2024 19:30) (77 - 100)  RR: 18 (15 Nov 2024 06:17) (16 - 18)  SpO2: 97% (15 Nov 2024 06:17) (94% - 99%)    Parameters below as of 15 Nov 2024 06:17  Patient On (Oxygen Delivery Method): room air      I&O's Detail    14 Nov 2024 07:01  -  15 Nov 2024 07:00  --------------------------------------------------------  IN:    IV PiggyBack: 200 mL    Lactated Ringers: 1500 mL  Total IN: 1700 mL    OUT:    Voided (mL): 1400 mL  Total OUT: 1400 mL    Total NET: 300 mL        Daily Height in cm: 170.18 (14 Nov 2024 14:00)    Daily     LABS:                        13.3   9.67  )-----------( 266      ( 15 Nov 2024 07:03 )             38.7     11-15    136  |  100  |  11  ----------------------------<  87  3.7   |  23  |  0.90    Ca    8.8      15 Nov 2024 07:03  Phos  3.5     11-15  Mg     1.9     11-15        Urinalysis Basic - ( 15 Nov 2024 07:03 )    Color: x / Appearance: x / SG: x / pH: x  Gluc: 87 mg/dL / Ketone: x  / Bili: x / Urobili: x   Blood: x / Protein: x / Nitrite: x   Leuk Esterase: x / RBC: x / WBC x   Sq Epi: x / Non Sq Epi: x / Bacteria: x          PHYSICAL EXAM:  Constitutional: Well developed, well nourished, NAD  Chest: Symmetric chest rise bilaterally, no increased WOB  Abdomen: Soft, nondistended, minimal tenderness to palpation around surgical site without rebound or guarding. Ileostomy site dressing saturated with sanguineous and serosanguineous fluid leaking through tegaderm, ostomy site with minimal oozing and blood clots. Replaced with gauze, ABD, paper tape.  Extremities: Warm to touch, ambulating

## 2024-11-17 NOTE — DISCHARGE NOTE PROVIDER - NSDCCPCAREPLAN_GEN_ALL_CORE_FT
PRINCIPAL DISCHARGE DIAGNOSIS  Diagnosis: Other complications of intestinal pouch  Assessment and Plan of Treatment:

## 2024-11-17 NOTE — DISCHARGE NOTE PROVIDER - HOSPITAL COURSE
39 year old male with a history of ulcerative colitis s/p two-spage J-pouch (2008), ileostomy creation (2022) requiring multiple revisions and small bowel resections, now s/p ileostomy closure. He had return of bowel function with an uncomplicated post operative course. On day of discharge, the patient was tolerating diet, ambulating well and pain controlled.

## 2024-11-17 NOTE — DISCHARGE NOTE PROVIDER - NSDCQMAMI_CARD_ALL_CORE
0100: RN found pt lethargic, not following commands and breathing with accessory muscles. RT was alerted and pt was placed on bipap.     0300: Pt ripped bipap off and is refusing any oxygen, swatting at staff members who attempted to put nasal cannula on. Precedex was restarted and RT was notified.     0330: Pt less agitated and agreed to put on bipap   No

## 2024-11-20 PROBLEM — K51.90 ULCERATIVE COLITIS, UNSPECIFIED, WITHOUT COMPLICATIONS: Chronic | Status: ACTIVE | Noted: 2024-11-02

## 2024-11-20 PROBLEM — G47.00 INSOMNIA, UNSPECIFIED: Chronic | Status: ACTIVE | Noted: 2024-11-12

## 2024-12-03 ENCOUNTER — INPATIENT (INPATIENT)
Facility: HOSPITAL | Age: 39
LOS: 0 days | Discharge: HOME | DRG: 348 | End: 2024-12-04
Attending: COLON & RECTAL SURGERY | Admitting: COLON & RECTAL SURGERY
Payer: COMMERCIAL

## 2024-12-03 ENCOUNTER — NON-APPOINTMENT (OUTPATIENT)
Age: 39
End: 2024-12-03

## 2024-12-03 VITALS
RESPIRATION RATE: 18 BRPM | SYSTOLIC BLOOD PRESSURE: 124 MMHG | TEMPERATURE: 98 F | OXYGEN SATURATION: 99 % | HEART RATE: 59 BPM | DIASTOLIC BLOOD PRESSURE: 81 MMHG | WEIGHT: 149.91 LBS | HEIGHT: 67 IN

## 2024-12-03 DIAGNOSIS — Z87.19 PERSONAL HISTORY OF OTHER DISEASES OF THE DIGESTIVE SYSTEM: Chronic | ICD-10-CM

## 2024-12-03 DIAGNOSIS — Z93.2 ILEOSTOMY STATUS: Chronic | ICD-10-CM

## 2024-12-03 DIAGNOSIS — K62.9 DISEASE OF ANUS AND RECTUM, UNSPECIFIED: ICD-10-CM

## 2024-12-03 DIAGNOSIS — S42.409A UNSPECIFIED FRACTURE OF LOWER END OF UNSPECIFIED HUMERUS, INITIAL ENCOUNTER FOR CLOSED FRACTURE: Chronic | ICD-10-CM

## 2024-12-03 DIAGNOSIS — Z90.49 ACQUIRED ABSENCE OF OTHER SPECIFIED PARTS OF DIGESTIVE TRACT: Chronic | ICD-10-CM

## 2024-12-03 DIAGNOSIS — Z87.81 PERSONAL HISTORY OF (HEALED) TRAUMATIC FRACTURE: Chronic | ICD-10-CM

## 2024-12-03 DIAGNOSIS — Z98.890 OTHER SPECIFIED POSTPROCEDURAL STATES: Chronic | ICD-10-CM

## 2024-12-03 LAB
ALBUMIN SERPL ELPH-MCNC: 4.1 G/DL — SIGNIFICANT CHANGE UP (ref 3.3–5)
ALP SERPL-CCNC: 76 U/L — SIGNIFICANT CHANGE UP (ref 40–120)
ALT FLD-CCNC: 48 U/L — HIGH (ref 10–45)
ANION GAP SERPL CALC-SCNC: 14 MMOL/L — SIGNIFICANT CHANGE UP (ref 5–17)
AST SERPL-CCNC: 33 U/L — SIGNIFICANT CHANGE UP (ref 10–40)
BASOPHILS # BLD AUTO: 0.03 K/UL — SIGNIFICANT CHANGE UP (ref 0–0.2)
BASOPHILS NFR BLD AUTO: 0.5 % — SIGNIFICANT CHANGE UP (ref 0–2)
BILIRUB SERPL-MCNC: 0.9 MG/DL — SIGNIFICANT CHANGE UP (ref 0.2–1.2)
BUN SERPL-MCNC: 14 MG/DL — SIGNIFICANT CHANGE UP (ref 7–23)
CALCIUM SERPL-MCNC: 9.3 MG/DL — SIGNIFICANT CHANGE UP (ref 8.4–10.5)
CHLORIDE SERPL-SCNC: 101 MMOL/L — SIGNIFICANT CHANGE UP (ref 96–108)
CO2 SERPL-SCNC: 21 MMOL/L — LOW (ref 22–31)
CREAT SERPL-MCNC: 1.22 MG/DL — SIGNIFICANT CHANGE UP (ref 0.5–1.3)
EGFR: 77 ML/MIN/1.73M2 — SIGNIFICANT CHANGE UP
EOSINOPHIL # BLD AUTO: 0.07 K/UL — SIGNIFICANT CHANGE UP (ref 0–0.5)
EOSINOPHIL NFR BLD AUTO: 1.2 % — SIGNIFICANT CHANGE UP (ref 0–6)
GLUCOSE SERPL-MCNC: 98 MG/DL — SIGNIFICANT CHANGE UP (ref 70–99)
HCT VFR BLD CALC: 45.7 % — SIGNIFICANT CHANGE UP (ref 39–50)
HGB BLD-MCNC: 15.4 G/DL — SIGNIFICANT CHANGE UP (ref 13–17)
IMM GRANULOCYTES NFR BLD AUTO: 0.3 % — SIGNIFICANT CHANGE UP (ref 0–0.9)
LYMPHOCYTES # BLD AUTO: 1.76 K/UL — SIGNIFICANT CHANGE UP (ref 1–3.3)
LYMPHOCYTES # BLD AUTO: 29.1 % — SIGNIFICANT CHANGE UP (ref 13–44)
MCHC RBC-ENTMCNC: 30 PG — SIGNIFICANT CHANGE UP (ref 27–34)
MCHC RBC-ENTMCNC: 33.7 G/DL — SIGNIFICANT CHANGE UP (ref 32–36)
MCV RBC AUTO: 89.1 FL — SIGNIFICANT CHANGE UP (ref 80–100)
MONOCYTES # BLD AUTO: 0.39 K/UL — SIGNIFICANT CHANGE UP (ref 0–0.9)
MONOCYTES NFR BLD AUTO: 6.5 % — SIGNIFICANT CHANGE UP (ref 2–14)
NEUTROPHILS # BLD AUTO: 3.77 K/UL — SIGNIFICANT CHANGE UP (ref 1.8–7.4)
NEUTROPHILS NFR BLD AUTO: 62.4 % — SIGNIFICANT CHANGE UP (ref 43–77)
NRBC # BLD: 0 /100 WBCS — SIGNIFICANT CHANGE UP (ref 0–0)
PLATELET # BLD AUTO: 280 K/UL — SIGNIFICANT CHANGE UP (ref 150–400)
POTASSIUM SERPL-MCNC: 4 MMOL/L — SIGNIFICANT CHANGE UP (ref 3.5–5.3)
POTASSIUM SERPL-SCNC: 4 MMOL/L — SIGNIFICANT CHANGE UP (ref 3.5–5.3)
PROCALCITONIN SERPL-MCNC: 0.03 NG/ML — SIGNIFICANT CHANGE UP (ref 0.02–0.1)
PROT SERPL-MCNC: 7.5 G/DL — SIGNIFICANT CHANGE UP (ref 6–8.3)
RBC # BLD: 5.13 M/UL — SIGNIFICANT CHANGE UP (ref 4.2–5.8)
RBC # FLD: 12.6 % — SIGNIFICANT CHANGE UP (ref 10.3–14.5)
SODIUM SERPL-SCNC: 136 MMOL/L — SIGNIFICANT CHANGE UP (ref 135–145)
WBC # BLD: 6.04 K/UL — SIGNIFICANT CHANGE UP (ref 3.8–10.5)
WBC # FLD AUTO: 6.04 K/UL — SIGNIFICANT CHANGE UP (ref 3.8–10.5)

## 2024-12-03 PROCEDURE — 99285 EMERGENCY DEPT VISIT HI MDM: CPT

## 2024-12-03 PROCEDURE — 99222 1ST HOSP IP/OBS MODERATE 55: CPT | Mod: 24,GC

## 2024-12-03 PROCEDURE — 74177 CT ABD & PELVIS W/CONTRAST: CPT | Mod: 26,MC

## 2024-12-03 RX ORDER — OXYCODONE HYDROCHLORIDE 30 MG/1
5 TABLET ORAL EVERY 6 HOURS
Refills: 0 | Status: DISCONTINUED | OUTPATIENT
Start: 2024-12-03 | End: 2024-12-04

## 2024-12-03 RX ORDER — TRAZODONE HYDROCHLORIDE 150 MG/1
150 TABLET ORAL AT BEDTIME
Refills: 0 | Status: DISCONTINUED | OUTPATIENT
Start: 2024-12-03 | End: 2024-12-04

## 2024-12-03 RX ORDER — ESCITALOPRAM OXALATE 10 MG/1
40 TABLET, FILM COATED ORAL
Refills: 0 | Status: DISCONTINUED | OUTPATIENT
Start: 2024-12-03 | End: 2024-12-04

## 2024-12-03 RX ORDER — ESCITALOPRAM OXALATE 10 MG/1
40 TABLET, FILM COATED ORAL DAILY
Refills: 0 | Status: DISCONTINUED | OUTPATIENT
Start: 2024-12-03 | End: 2024-12-03

## 2024-12-03 RX ORDER — GABAPENTIN 300 MG/1
0 CAPSULE ORAL
Refills: 0 | DISCHARGE

## 2024-12-03 RX ORDER — OXYCODONE HYDROCHLORIDE 30 MG/1
10 TABLET ORAL EVERY 6 HOURS
Refills: 0 | Status: DISCONTINUED | OUTPATIENT
Start: 2024-12-03 | End: 2024-12-04

## 2024-12-03 RX ORDER — ESCITALOPRAM 10 MG/1
1 TABLET, FILM COATED ORAL
Refills: 0 | DISCHARGE

## 2024-12-03 RX ORDER — PIPERACILLIN SODIUM AND TAZOBACTAM SODIUM 4; .5 G/20ML; G/20ML
3.38 INJECTION, POWDER, LYOPHILIZED, FOR SOLUTION INTRAVENOUS ONCE
Refills: 0 | Status: COMPLETED | OUTPATIENT
Start: 2024-12-03 | End: 2024-12-03

## 2024-12-03 RX ORDER — MIRTAZAPINE 15 MG/1
7.5 TABLET, FILM COATED ORAL
Refills: 0 | Status: DISCONTINUED | OUTPATIENT
Start: 2024-12-03 | End: 2024-12-04

## 2024-12-03 RX ORDER — GABAPENTIN 300 MG/1
900 CAPSULE ORAL THREE TIMES A DAY
Refills: 0 | Status: DISCONTINUED | OUTPATIENT
Start: 2024-12-03 | End: 2024-12-03

## 2024-12-03 RX ORDER — ACETAMINOPHEN 500MG 500 MG/1
650 TABLET, COATED ORAL EVERY 6 HOURS
Refills: 0 | Status: DISCONTINUED | OUTPATIENT
Start: 2024-12-03 | End: 2024-12-04

## 2024-12-03 RX ORDER — PIPERACILLIN SODIUM AND TAZOBACTAM SODIUM 4; .5 G/20ML; G/20ML
3.38 INJECTION, POWDER, LYOPHILIZED, FOR SOLUTION INTRAVENOUS EVERY 8 HOURS
Refills: 0 | Status: DISCONTINUED | OUTPATIENT
Start: 2024-12-04 | End: 2024-12-04

## 2024-12-03 RX ORDER — SODIUM CHLORIDE 9 MG/ML
500 INJECTION, SOLUTION INTRAMUSCULAR; INTRAVENOUS; SUBCUTANEOUS ONCE
Refills: 0 | Status: COMPLETED | OUTPATIENT
Start: 2024-12-03 | End: 2024-12-03

## 2024-12-03 RX ORDER — GABAPENTIN 300 MG/1
900 CAPSULE ORAL
Refills: 0 | Status: DISCONTINUED | OUTPATIENT
Start: 2024-12-03 | End: 2024-12-04

## 2024-12-03 RX ORDER — ENOXAPARIN SODIUM 30 MG/.3ML
40 INJECTION SUBCUTANEOUS EVERY 24 HOURS
Refills: 0 | Status: DISCONTINUED | OUTPATIENT
Start: 2024-12-03 | End: 2024-12-04

## 2024-12-03 RX ORDER — INFLUENZA VIRUS VACCINE 15; 15; 15; 15 UG/.5ML; UG/.5ML; UG/.5ML; UG/.5ML
0.5 SUSPENSION INTRAMUSCULAR ONCE
Refills: 0 | Status: DISCONTINUED | OUTPATIENT
Start: 2024-12-03 | End: 2024-12-04

## 2024-12-03 RX ADMIN — PIPERACILLIN SODIUM AND TAZOBACTAM SODIUM 200 GRAM(S): 4; .5 INJECTION, POWDER, LYOPHILIZED, FOR SOLUTION INTRAVENOUS at 17:06

## 2024-12-03 RX ADMIN — OXYCODONE HYDROCHLORIDE 10 MILLIGRAM(S): 30 TABLET ORAL at 21:00

## 2024-12-03 RX ADMIN — TRAZODONE HYDROCHLORIDE 150 MILLIGRAM(S): 150 TABLET ORAL at 21:10

## 2024-12-03 RX ADMIN — PIPERACILLIN SODIUM AND TAZOBACTAM SODIUM 25 GRAM(S): 4; .5 INJECTION, POWDER, LYOPHILIZED, FOR SOLUTION INTRAVENOUS at 20:08

## 2024-12-03 RX ADMIN — SODIUM CHLORIDE 500 MILLILITER(S): 9 INJECTION, SOLUTION INTRAMUSCULAR; INTRAVENOUS; SUBCUTANEOUS at 11:16

## 2024-12-03 RX ADMIN — GABAPENTIN 900 MILLIGRAM(S): 300 CAPSULE ORAL at 21:10

## 2024-12-03 RX ADMIN — GABAPENTIN 900 MILLIGRAM(S): 300 CAPSULE ORAL at 18:27

## 2024-12-03 RX ADMIN — OXYCODONE HYDROCHLORIDE 10 MILLIGRAM(S): 30 TABLET ORAL at 20:27

## 2024-12-03 RX ADMIN — Medication 4 MILLIGRAM(S): at 11:16

## 2024-12-03 RX ADMIN — ESCITALOPRAM OXALATE 40 MILLIGRAM(S): 10 TABLET, FILM COATED ORAL at 18:27

## 2024-12-03 RX ADMIN — MIRTAZAPINE 7.5 MILLIGRAM(S): 15 TABLET, FILM COATED ORAL at 21:10

## 2024-12-03 RX ADMIN — GABAPENTIN 900 MILLIGRAM(S): 300 CAPSULE ORAL at 20:08

## 2024-12-03 RX ADMIN — Medication 4 MILLIGRAM(S): at 15:31

## 2024-12-03 NOTE — H&P ADULT - NSHPLABSRESULTS_GEN_ALL_CORE
LOWER CHEST: Within normal limits.    LIVER: Within normal limits.  BILE DUCTS: Normal caliber.  GALLBLADDER: Within normal limits.  SPLEEN: Within normal limits.  PANCREAS: Within normal limits.  ADRENALS: Within normal limits.  KIDNEYS/URETERS: Within normal limits.    BLADDER: Within normal limits.  REPRODUCTIVE ORGANS: Prostate within normal limits.    BOWEL: Status post total colectomy with J-pouch formation. There appears   to be reversal of the previously noted right lower quadrant ileostomy   with a small skin defect remaining at the ostomy site. There is mild   focal small bowel wall thickening within the right upper quadrant (301-54   and 602-20), greater than expected for underdistention. There is   asymmetric thickening and enhancement of the left aspect of the anus with   probable inflammatory changes within the intersphincteric space. No   definite abscess identified.  PERITONEUM/RETROPERITONEUM: Within normal limits.  VESSELS: Within normal limits.  LYMPH NODES: No lymphadenopathy.  ABDOMINAL WALL: Within normal limits.  BONES: Within normal limits.    IMPRESSION:  1. There is asymmetric thickening and enhancement of the left aspect of   the anus (external anal sphincter) with apparent stranding within the   left intersphincteric space, suspicious for an infectious or inflammatory   process. No definite fluid collection identified. This could be further   evaluated with perianal fistula protocol MRI if clinically indicated.  2. Status post totalcolectomy with J-pouch creation and apparent   interval reversal of the right lower quadrant ileostomy. There is focal   wall thickening of small bowel within the right upper quadrant, which   could be due to underdistention or an infectious or inflammatory process.

## 2024-12-03 NOTE — ED PROVIDER NOTE - CLINICAL SUMMARY MEDICAL DECISION MAKING FREE TEXT BOX
Dr. Aguilar Note: anal pressure and mass s/p recent intraabdominal surgery, consider fistula vs anal cyst vs abscess, ct abdomen/pelvis with iv contrast, check cbc r/o anemia or leukocytosis, check bmp to r/o metabolic derangement and lyte imbalance, IV opiates for pain, surgical consult.

## 2024-12-03 NOTE — H&P ADULT - NSHPPHYSICALEXAM_GEN_ALL_CORE
CONSTITUTIONAL: Well groomed, no apparent distress  EYES: PERRLA and symmetric, EOMI, No conjunctival or scleral injection, non-icteric  ENMT: Oral mucosa with moist membranes. Normal dentition  RESP: No respiratory distress, no use of accessory muscles, on RA  CV: Regular rate, normotensive  GI: Soft, nondistended, small left perirectal area of induration w/o fluctuance or erythema  SKIN: No rashes or ulcers noted; no subcutaneous nodules or induration palpable  NEURO: CN II-XII intact; on gross examination  PSYCH: Appropriate insight/judgment; A+O x 3, mood and affect appropriate, recent/remote memory intact

## 2024-12-03 NOTE — H&P ADULT - ATTENDING COMMENTS
Patient s/p redo pouch with ileostomy closure a few months ago  Signs of phlegmon in perianal tissue  will treat with antibiotics & take for EUA to evaluate fistula/need for I&D  Seem with Dr Mira Frank MD

## 2024-12-03 NOTE — PATIENT PROFILE ADULT - NSPROSPHOSPCHAPLAINYN_GEN_A_NUR
[General Appearance - Well Developed] : well developed [Normal Appearance] : normal appearance [Well Groomed] : well groomed [General Appearance - In No Acute Distress] : no acute distress [General Appearance - Well Nourished] : well nourished [Normal Conjunctiva] : the conjunctiva exhibited no abnormalities [Eyelids - No Xanthelasma] : the eyelids demonstrated no xanthelasmas [Normal Jugular Venous V Waves Present] : normal jugular venous V waves present [Normal Jugular Venous A Waves Present] : normal jugular venous A waves present [Respiration, Rhythm And Depth] : normal respiratory rhythm and effort [Auscultation Breath Sounds / Voice Sounds] : lungs were clear to auscultation bilaterally [Heart Rate And Rhythm] : heart rate and rhythm were normal [Bowel Sounds] : normal bowel sounds [Abdomen Soft] : soft [Abdomen Tenderness] : non-tender [Abnormal Walk] : normal gait [Cyanosis, Localized] : no localized cyanosis [Nail Clubbing] : no clubbing of the fingernails [] : no rash [Skin Color & Pigmentation] : normal skin color and pigmentation [No Venous Stasis] : no venous stasis [Impaired Insight] : insight and judgment were intact [Affect] : the affect was normal [Mood] : the mood was normal [FreeTextEntry1] : 2+ pulses in the upper and lower extremities. No edema. no

## 2024-12-03 NOTE — ED PROVIDER NOTE - PHYSICAL EXAMINATION
Vitals reviewed, Head Atraumatic, PERRL, EOMI, moist mucous membranes, neck supple, no signs of meningitis, chest clear to ausculation, cardiac regular rate and rhythym, Abdomen soft and nontender, Extremities well perfused and without edema, Skin intact, Psych normal mood, Neuro nonfocal.   +td and small 2cm fluctuance of left inner anus region.

## 2024-12-03 NOTE — ED PROVIDER NOTE - OBJECTIVE STATEMENT
Dr. Aguilar Note: 39M with ulcerative colitis s/p recent reversal of ileostomy 3wks ago presenting with gradual onset over 5 days of anal irritation, pain, progressively worsening with left sided anal fullness and feeling a painful mass interior that is getting bigger.  No infectious sxs.  Passing stool and no vomiting.

## 2024-12-03 NOTE — ED ADULT NURSE NOTE - NSFALLUNIVINTERV_ED_ALL_ED
Bed/Stretcher in lowest position, wheels locked, appropriate side rails in place/Call bell, personal items and telephone in reach/Instruct patient to call for assistance before getting out of bed/chair/stretcher/Non-slip footwear applied when patient is off stretcher/[place] to call system/Physically safe environment - no spills, clutter or unnecessary equipment/Purposeful proactive rounding/Room/bathroom lighting operational, light cord in reach

## 2024-12-03 NOTE — ED ADULT TRIAGE NOTE - ARRIVAL FROM
[facility] Transitions Initial Follow Up Call    Outreach made within 2 business days of discharge: Yes    Patient: Bon Leger Patient : 1995   MRN: [id number]  Reason for Admission: There are no discharge diagnoses documented for the most recent discharge. Discharge Date: 22       Spoke with: Tomas Colby    Discharge department/facility: [facility], [place]    TCM Interactive Patient Contact:  Was patient able to fill all prescriptions: Yes  Was patient instructed to bring all medications to the follow-up visit: Yes  Is patient taking all medications as directed in the discharge summary? Yes  Does patient understand their discharge instructions: Yes  Does patient have questions or concerns that need addressed prior to 7-14 day follow up office visit: no    Patient did not feel she needed PCP follow-up. Instructed to call if any questions arise. Follow Up  No future appointments.     Kaylee Delgado
Home

## 2024-12-03 NOTE — H&P ADULT - HISTORY OF PRESENT ILLNESS
39M  Anxiety, Depression, Diagnosed with UC in 2006 s/p 2-stage j pouch surgery in 2008; Hx small bowel obstruction-requiring Ileostomy in 2023, J pouch re-do and DLI 6/25 s/p reversal (11/14/24) presenting with gradual onset over 5 days of anal irritation, pain, progressively worsening with left sided anal fullness and feeling a painful mass interior that is getting bigger. No infectious sxs. Passing stool and no vomiting.

## 2024-12-03 NOTE — ED PROVIDER NOTE - NSICDXPASTMEDICALHX_GEN_ALL_CORE_FT
PAST MEDICAL HISTORY:  Anxiety and depression     Chronic pain     Insomnia     Ulcerative colitis

## 2024-12-03 NOTE — ED ADULT TRIAGE NOTE - CHIEF COMPLAINT QUOTE
Pt complaining of rectal abscess  States had ileostomy closure x 15 days ago   Endorsing having normal, nonbloody bms

## 2024-12-03 NOTE — ED ADULT NURSE NOTE - OBJECTIVE STATEMENT
Pt is 38 y/o male, presenting to the ED c/o rectal pain. Pt has PMH of UC an d s/p recent reversal of ileostomy x3 weeks ago. As per pt, he has had increased irritation to anal area, worse on the Left side and feels a "mass" that is getting bigger prompting ED visit. Pt able to pass stool and denies vomiting. Upon assessment, pt AxOx3, sitting up in stretcher and speaking in full sentences. Breathing spontaneously and unlabored, >95% RA. Abdomen soft and nontender to palpation. Pt moves all extremities w/ = strength. Skin is warm, dry, and intact w/ + peripheral pulses. Pt denies SOB, chest pain, n/v/d, dizziness, vision changes, chills, and fever. Safety and comfort measures provided- bed in lowest position, locked, and blanket given.

## 2024-12-03 NOTE — ED PROVIDER NOTE - PROGRESS NOTE DETAILS
Dr. Aguilar Note: reviewed ct scan with patient, surgery consulted, will admit for concern for possible developing fistula vs fluid collection, for advanced imaging, pain control, and further workup.

## 2024-12-03 NOTE — H&P ADULT - ASSESSMENT
39M  Anxiety, Depression, Diagnosed with UC in 2006 s/p 2-stage j pouch surgery in 2008; Hx small bowel obstruction-requiring Ileostomy in 2023, J pouch re-do and DLI 6/25 s/p reversal (11/14/24) presenting with a left perirectal abscess vs phlegmon vs fistula.     Plan:  - No acute surgical intervention  - MRI to better evaluate fistula vs abscess  - IV abx  - Pain control  - Home medications restarted  - OOBAT  - Lovenox for DVT ppx    The above discussed with Dr. Frank     [facility]  [id number]

## 2024-12-04 ENCOUNTER — TRANSCRIPTION ENCOUNTER (OUTPATIENT)
Age: 39
End: 2024-12-04

## 2024-12-04 ENCOUNTER — APPOINTMENT (OUTPATIENT)
Dept: COLORECTAL SURGERY | Facility: CLINIC | Age: 39
End: 2024-12-04

## 2024-12-04 VITALS
DIASTOLIC BLOOD PRESSURE: 80 MMHG | TEMPERATURE: 98 F | RESPIRATION RATE: 18 BRPM | HEART RATE: 67 BPM | SYSTOLIC BLOOD PRESSURE: 133 MMHG | OXYGEN SATURATION: 99 %

## 2024-12-04 LAB
ANION GAP SERPL CALC-SCNC: 11 MMOL/L — SIGNIFICANT CHANGE UP (ref 5–17)
APTT BLD: 35.4 SEC — SIGNIFICANT CHANGE UP (ref 24.5–35.6)
BLD GP AB SCN SERPL QL: NEGATIVE — SIGNIFICANT CHANGE UP
BUN SERPL-MCNC: 12 MG/DL — SIGNIFICANT CHANGE UP (ref 7–23)
CALCIUM SERPL-MCNC: 8.9 MG/DL — SIGNIFICANT CHANGE UP (ref 8.4–10.5)
CHLORIDE SERPL-SCNC: 105 MMOL/L — SIGNIFICANT CHANGE UP (ref 96–108)
CO2 SERPL-SCNC: 22 MMOL/L — SIGNIFICANT CHANGE UP (ref 22–31)
CREAT SERPL-MCNC: 1.15 MG/DL — SIGNIFICANT CHANGE UP (ref 0.5–1.3)
EGFR: 83 ML/MIN/1.73M2 — SIGNIFICANT CHANGE UP
GLUCOSE SERPL-MCNC: 81 MG/DL — SIGNIFICANT CHANGE UP (ref 70–99)
HCT VFR BLD CALC: 43.3 % — SIGNIFICANT CHANGE UP (ref 39–50)
HGB BLD-MCNC: 14.3 G/DL — SIGNIFICANT CHANGE UP (ref 13–17)
INR BLD: 1.04 RATIO — SIGNIFICANT CHANGE UP (ref 0.85–1.16)
MAGNESIUM SERPL-MCNC: 2 MG/DL — SIGNIFICANT CHANGE UP (ref 1.6–2.6)
MCHC RBC-ENTMCNC: 29.8 PG — SIGNIFICANT CHANGE UP (ref 27–34)
MCHC RBC-ENTMCNC: 33 G/DL — SIGNIFICANT CHANGE UP (ref 32–36)
MCV RBC AUTO: 90.2 FL — SIGNIFICANT CHANGE UP (ref 80–100)
NRBC # BLD: 0 /100 WBCS — SIGNIFICANT CHANGE UP (ref 0–0)
PHOSPHATE SERPL-MCNC: 4.4 MG/DL — SIGNIFICANT CHANGE UP (ref 2.5–4.5)
PLATELET # BLD AUTO: 259 K/UL — SIGNIFICANT CHANGE UP (ref 150–400)
POTASSIUM SERPL-MCNC: 3.9 MMOL/L — SIGNIFICANT CHANGE UP (ref 3.5–5.3)
POTASSIUM SERPL-SCNC: 3.9 MMOL/L — SIGNIFICANT CHANGE UP (ref 3.5–5.3)
PROTHROM AB SERPL-ACNC: 11.8 SEC — SIGNIFICANT CHANGE UP (ref 9.9–13.4)
RBC # BLD: 4.8 M/UL — SIGNIFICANT CHANGE UP (ref 4.2–5.8)
RBC # FLD: 12.8 % — SIGNIFICANT CHANGE UP (ref 10.3–14.5)
RH IG SCN BLD-IMP: POSITIVE — SIGNIFICANT CHANGE UP
SODIUM SERPL-SCNC: 138 MMOL/L — SIGNIFICANT CHANGE UP (ref 135–145)
WBC # BLD: 4.4 K/UL — SIGNIFICANT CHANGE UP (ref 3.8–10.5)
WBC # FLD AUTO: 4.4 K/UL — SIGNIFICANT CHANGE UP (ref 3.8–10.5)

## 2024-12-04 PROCEDURE — 86900 BLOOD TYPING SEROLOGIC ABO: CPT

## 2024-12-04 PROCEDURE — 85730 THROMBOPLASTIN TIME PARTIAL: CPT

## 2024-12-04 PROCEDURE — 83735 ASSAY OF MAGNESIUM: CPT

## 2024-12-04 PROCEDURE — 80053 COMPREHEN METABOLIC PANEL: CPT

## 2024-12-04 PROCEDURE — 86901 BLOOD TYPING SEROLOGIC RH(D): CPT

## 2024-12-04 PROCEDURE — 86850 RBC ANTIBODY SCREEN: CPT

## 2024-12-04 PROCEDURE — 85025 COMPLETE CBC W/AUTO DIFF WBC: CPT

## 2024-12-04 PROCEDURE — 36415 COLL VENOUS BLD VENIPUNCTURE: CPT

## 2024-12-04 PROCEDURE — 46045 I&D ABSC TRANAL UNDER ANES: CPT | Mod: 79

## 2024-12-04 PROCEDURE — 74177 CT ABD & PELVIS W/CONTRAST: CPT | Mod: MC

## 2024-12-04 PROCEDURE — 80048 BASIC METABOLIC PNL TOTAL CA: CPT

## 2024-12-04 PROCEDURE — 96374 THER/PROPH/DIAG INJ IV PUSH: CPT

## 2024-12-04 PROCEDURE — 99285 EMERGENCY DEPT VISIT HI MDM: CPT | Mod: 25

## 2024-12-04 PROCEDURE — 84100 ASSAY OF PHOSPHORUS: CPT

## 2024-12-04 PROCEDURE — 96376 TX/PRO/DX INJ SAME DRUG ADON: CPT

## 2024-12-04 PROCEDURE — 84145 PROCALCITONIN (PCT): CPT

## 2024-12-04 PROCEDURE — 85610 PROTHROMBIN TIME: CPT

## 2024-12-04 PROCEDURE — 85027 COMPLETE CBC AUTOMATED: CPT

## 2024-12-04 RX ORDER — OXYCODONE HYDROCHLORIDE 30 MG/1
1 TABLET ORAL
Qty: 6 | Refills: 0
Start: 2024-12-04

## 2024-12-04 RX ORDER — METRONIDAZOLE 500 MG/1
1 TABLET ORAL
Qty: 90 | Refills: 0
Start: 2024-12-04 | End: 2025-01-02

## 2024-12-04 RX ORDER — OXYCODONE HYDROCHLORIDE 30 MG/1
5 TABLET ORAL ONCE
Refills: 0 | Status: DISCONTINUED | OUTPATIENT
Start: 2024-12-04 | End: 2024-12-04

## 2024-12-04 RX ORDER — METRONIDAZOLE 500 MG/1
1 TABLET ORAL
Qty: 42 | Refills: 0
Start: 2024-12-04 | End: 2024-12-17

## 2024-12-04 RX ORDER — CIPROFLOXACIN HCL 750 MG
1 TABLET ORAL
Qty: 60 | Refills: 0
Start: 2024-12-04 | End: 2025-01-02

## 2024-12-04 RX ORDER — CIPROFLOXACIN HCL 750 MG
1 TABLET ORAL
Qty: 28 | Refills: 0
Start: 2024-12-04 | End: 2024-12-17

## 2024-12-04 RX ORDER — HYDROMORPHONE HYDROCHLORIDE 2 MG/1
0.5 TABLET ORAL
Refills: 0 | Status: DISCONTINUED | OUTPATIENT
Start: 2024-12-04 | End: 2024-12-04

## 2024-12-04 RX ORDER — ONDANSETRON HYDROCHLORIDE 4 MG/1
4 TABLET, FILM COATED ORAL ONCE
Refills: 0 | Status: DISCONTINUED | OUTPATIENT
Start: 2024-12-04 | End: 2024-12-04

## 2024-12-04 RX ORDER — ACETAMINOPHEN 500MG 500 MG/1
975 TABLET, COATED ORAL EVERY 6 HOURS
Refills: 0 | Status: DISCONTINUED | OUTPATIENT
Start: 2024-12-04 | End: 2024-12-04

## 2024-12-04 RX ADMIN — ENOXAPARIN SODIUM 40 MILLIGRAM(S): 30 INJECTION SUBCUTANEOUS at 05:10

## 2024-12-04 RX ADMIN — OXYCODONE HYDROCHLORIDE 5 MILLIGRAM(S): 30 TABLET ORAL at 13:52

## 2024-12-04 RX ADMIN — OXYCODONE HYDROCHLORIDE 10 MILLIGRAM(S): 30 TABLET ORAL at 05:09

## 2024-12-04 RX ADMIN — OXYCODONE HYDROCHLORIDE 10 MILLIGRAM(S): 30 TABLET ORAL at 05:54

## 2024-12-04 RX ADMIN — MIRTAZAPINE 7.5 MILLIGRAM(S): 15 TABLET, FILM COATED ORAL at 05:09

## 2024-12-04 RX ADMIN — OXYCODONE HYDROCHLORIDE 5 MILLIGRAM(S): 30 TABLET ORAL at 13:53

## 2024-12-04 RX ADMIN — PIPERACILLIN SODIUM AND TAZOBACTAM SODIUM 25 GRAM(S): 4; .5 INJECTION, POWDER, LYOPHILIZED, FOR SOLUTION INTRAVENOUS at 05:09

## 2024-12-04 NOTE — PROGRESS NOTE ADULT - ASSESSMENT
39-year-old male with a history of anxiety, depression, ulcerative colitis (diagnosed 2006), two-stage j-pouch surgery (2008), SBO requiring ileostomy (2023), j-pouch revision and DLI on 6/25/2024 with subsequent reversal on 11/14/2024 now presents with a 5-day history of progressively worsening left-sided anal irritation, pain, fullness, and a growing painful internal mass.  He denies infectious symptoms, vomiting, and reports normal bowel movements. CT showing left intersphincteric perirectal abscess v phlegmon v fistula.      Plan:  - No acute surgical intervention  - MRI to better evaluate fistula vs abscess - pending  - IV abx  - Pain control  - Home medications restarted  - OOBAT  - Lovenox for DVT ppx   39-year-old male with a history of anxiety, depression, ulcerative colitis (diagnosed 2006), two-stage j-pouch surgery (2008), SBO requiring ileostomy (2023), j-pouch revision and DLI on 6/25/2024 with subsequent reversal on 11/14/2024 now presents with a 5-day history of progressively worsening left-sided anal irritation, pain, fullness, and a growing painful internal mass.  He denies infectious symptoms, vomiting, and reports normal bowel movements. CT showing left intersphincteric perirectal abscess v phlegmon v fistula.      Plan:  - No acute surgical intervention  - MRI to better evaluate fistula vs abscess - pending  - IV abx - zosyn  - Pain control  - Home medications restarted  - OOBAT  - Lovenox for DVT ppx    [facility] [id number]

## 2024-12-04 NOTE — PROGRESS NOTE ADULT - SUBJECTIVE AND OBJECTIVE BOX
SURGERY DAILY PROGRESS NOTE:     Overnight Events:  No acute events overnight.  Patient put in oxy 5 and 10 for hx of opioid use at home for chronic pain    SUBJECTIVE:   Patient seen and evaluated on AM rounds.   Pt is resting comfortably in bed with no complaints.   Denies fever, chills, N/V, chest pain, or shortness of breath.   Patient is passing gas and having bowel movements.   Tolerating diet.   Ambulating well.   Pain is adequately controlled on current regimen.    OBJECTIVE:  Vital Signs Last 24 Hrs  T(C): 36.4 (04 Dec 2024 04:25), Max: 36.9 (03 Dec 2024 17:53)  T(F): 97.5 (04 Dec 2024 04:25), Max: 98.4 (03 Dec 2024 17:53)  HR: 58 (04 Dec 2024 04:25) (50 - 72)  BP: 123/73 (04 Dec 2024 04:25) (100/63 - 128/83)  BP(mean): --  RR: 18 (04 Dec 2024 04:25) (16 - 18)  SpO2: 96% (04 Dec 2024 04:25) (95% - 100%)    Parameters below as of 04 Dec 2024 04:25  Patient On (Oxygen Delivery Method): room air      I&O's Detail    03 Dec 2024 07:01  -  04 Dec 2024 05:48  --------------------------------------------------------  IN:    IV PiggyBack: 100 mL    Oral Fluid: 880 mL  Total IN: 980 mL    OUT:  Total OUT: 0 mL    Total NET: 980 mL        Daily Height in cm: 170.18 (03 Dec 2024 09:50)    Daily     LABS:                        15.4   6.04  )-----------( 280      ( 03 Dec 2024 11:30 )             45.7     12-03    136  |  101  |  14  ----------------------------<  98  4.0   |  21[L]  |  1.22    Ca    9.3      03 Dec 2024 11:30    TPro  7.5  /  Alb  4.1  /  TBili  0.9  /  DBili  x   /  AST  33  /  ALT  48[H]  /  AlkPhos  76  12-03      Urinalysis Basic - ( 03 Dec 2024 11:30 )    Color: x / Appearance: x / SG: x / pH: x  Gluc: 98 mg/dL / Ketone: x  / Bili: x / Urobili: x   Blood: x / Protein: x / Nitrite: x   Leuk Esterase: x / RBC: x / WBC x   Sq Epi: x / Non Sq Epi: x / Bacteria: x      PHYSICAL EXAM  CONSTITUTIONAL: Well groomed, no apparent distress  EYES: PERRLA and symmetric, EOMI, No conjunctival or scleral injection, non-icteric  ENMT: Oral mucosa with moist membranes. Normal dentition  RESP: No respiratory distress, no use of accessory muscles, on RA  CV: Regular rate, normotensive  GI: Soft, nondistended, small left perirectal area of induration w/o fluctuance or erythema  SKIN: No rashes or ulcers noted; no subcutaneous nodules or induration palpable  NEURO: CN II-XII intact; on gross examination  PSYCH: Appropriate insight/judgment; A+O x 3, mood and affect appropriate, recent/remote memory intact   SURGERY DAILY PROGRESS NOTE:     Overnight Events:  No acute events overnight.        OBJECTIVE:  Vital Signs Last 24 Hrs  T(C): 36.4 (04 Dec 2024 04:25), Max: 36.9 (03 Dec 2024 17:53)  T(F): 97.5 (04 Dec 2024 04:25), Max: 98.4 (03 Dec 2024 17:53)  HR: 58 (04 Dec 2024 04:25) (50 - 72)  BP: 123/73 (04 Dec 2024 04:25) (100/63 - 128/83)  BP(mean): --  RR: 18 (04 Dec 2024 04:25) (16 - 18)  SpO2: 96% (04 Dec 2024 04:25) (95% - 100%)    Parameters below as of 04 Dec 2024 04:25  Patient On (Oxygen Delivery Method): room air      I&O's Detail    03 Dec 2024 07:01  -  04 Dec 2024 05:48  --------------------------------------------------------  IN:    IV PiggyBack: 100 mL    Oral Fluid: 880 mL  Total IN: 980 mL    OUT:  Total OUT: 0 mL    Total NET: 980 mL        Daily Height in cm: 170.18 (03 Dec 2024 09:50)    Daily     LABS:                        15.4   6.04  )-----------( 280      ( 03 Dec 2024 11:30 )             45.7     12-03    136  |  101  |  14  ----------------------------<  98  4.0   |  21[L]  |  1.22    Ca    9.3      03 Dec 2024 11:30    TPro  7.5  /  Alb  4.1  /  TBili  0.9  /  DBili  x   /  AST  33  /  ALT  48[H]  /  AlkPhos  76  12-03      Urinalysis Basic - ( 03 Dec 2024 11:30 )    Color: x / Appearance: x / SG: x / pH: x  Gluc: 98 mg/dL / Ketone: x  / Bili: x / Urobili: x   Blood: x / Protein: x / Nitrite: x   Leuk Esterase: x / RBC: x / WBC x   Sq Epi: x / Non Sq Epi: x / Bacteria: x      PHYSICAL EXAM  CONSTITUTIONAL: Well groomed, no apparent distress  RESP: No respiratory distress  CV: Regular rate, normotensive  GI: Soft, nondistended, small left perirectal area of induration w/o fluctuance or erythema  SKIN: No rashes or ulcers noted; no subcutaneous nodules or induration palpable  PSYCH: Appropriate insight/judgment; A+O x 3, mood and affect appropriate, recent/remote memory intact

## 2024-12-04 NOTE — DISCHARGE NOTE NURSING/CASE MANAGEMENT/SOCIAL WORK - FINANCIAL ASSISTANCE
[facility] provides services at a reduced cost to those who are determined to be eligible through [facility]’s financial assistance program. Information regarding [facility]’s financial assistance program can be found by going to [email or web address] or by calling [phone].

## 2024-12-04 NOTE — DISCHARGE NOTE NURSING/CASE MANAGEMENT/SOCIAL WORK - NSDCPEFALRISK_GEN_ALL_CORE
For information on Fall & Injury Prevention, visit: [email or web address] OR  [email or web address] OR  https://www.cdc.gov/steadi/patient.html

## 2024-12-04 NOTE — CHART NOTE - NSCHARTNOTEFT_GEN_A_CORE
PRE OPERATIVE NOTE    Pre-op Diagnosis: perianal abscess vs. fistula  Procedure: EUA with pouchoscopy and possible drainage of perianal abscess   Surgeon: Dr. Meyers/Jessica/Monika/Helio                              14.3   4.40  )-----------( 259      ( 04 Dec 2024 07:05 )             43.3     12-04    138  |  105  |  12  ----------------------------<  81  3.9   |  22  |  1.15    Ca    8.9      04 Dec 2024 07:05  Phos  4.4     12-04  Mg     2.0     12-04    TPro  7.5  /  Alb  4.1  /  TBili  0.9  /  DBili  x   /  AST  33  /  ALT  48[H]  /  AlkPhos  76  12-03    LIVER FUNCTIONS - ( 03 Dec 2024 11:30 )  Alb: 4.1 g/dL / Pro: 7.5 g/dL / ALK PHOS: 76 U/L / ALT: 48 U/L / AST: 33 U/L / GGT: x           PT/INR - ( 04 Dec 2024 07:05 )   PT: 11.8 sec;   INR: 1.04 ratio         PTT - ( 04 Dec 2024 07:05 )  PTT:35.4 sec  Urinalysis Basic - ( 04 Dec 2024 07:05 )    Color: x / Appearance: x / SG: x / pH: x  Gluc: 81 mg/dL / Ketone: x  / Bili: x / Urobili: x   Blood: x / Protein: x / Nitrite: x   Leuk Esterase: x / RBC: x / WBC x   Sq Epi: x / Non Sq Epi: x / Bacteria: x      CAPILLARY BLOOD GLUCOSE          Type & Screen: not applicable      A/P: 39y Male planned for above procedure.    NPO past midnight, except medications  IVF  Pain/nausea control  AM labs  Consent in chart  piperacillin/tazobactam IVPB      [facility] [id number]

## 2024-12-04 NOTE — BRIEF OPERATIVE NOTE - NSICDXBRIEFPROCEDURE_GEN_ALL_CORE_FT
PROCEDURES:  Endoscopic ileoanal pouch examination under anesthesia with incision and drainage of abscess 04-Dec-2024 15:06:54  Payam Mackenzie

## 2024-12-04 NOTE — DISCHARGE NOTE PROVIDER - NSDCFUSCHEDAPPT_GEN_ALL_CORE_FT
[facility]  [address]  Scheduled Appointment: 12/11/2024     [facility]  [address]   Scheduled Appointment: 12/11/2024    Gerald Bernal  [facility]  [address] [facility] [address]  Scheduled Appointment: 12/11/2024

## 2024-12-04 NOTE — DISCHARGE NOTE PROVIDER - NSDCMRMEDTOKEN_GEN_ALL_CORE_FT
acetaminophen 325 mg oral tablet: 3 tab(s) orally every 6 hours  ciprofloxacin 500 mg oral tablet: 1 tab(s) orally every 12 hours  Gabapentin: 900 mg tid  Lexapro 20 mg oral tablet: 1 tab(s) orally take 40 mg po daily  metroNIDAZOLE 250 mg oral tablet: 1 tab(s) orally every 8 hours  mirtazapine 7.5 mg oral tablet: 1 tab(s) orally 2 times a day  testosterone 100 mg/mL intramuscular solution: intramuscular every 7 days on mondays  traZODone 150 mg oral tablet: orally once a day (at bedtime)   acetaminophen 325 mg oral tablet: 3 tab(s) orally every 6 hours  ciprofloxacin 500 mg oral tablet: 1 tab(s) orally every 12 hours  Gabapentin: 900 mg tid  Lexapro 20 mg oral tablet: 1 tab(s) orally take 40 mg po daily  metroNIDAZOLE 250 mg oral tablet: 1 tab(s) orally every 8 hours  mirtazapine 7.5 mg oral tablet: 1 tab(s) orally 2 times a day  oxyCODONE 5 mg oral tablet: 1 tab(s) orally every 6 hours as needed for  severe pain MDD: 4 tab  testosterone 100 mg/mL intramuscular solution: intramuscular every 7 days on mondays  traZODone 150 mg oral tablet: orally once a day (at bedtime)

## 2024-12-04 NOTE — DISCHARGE NOTE PROVIDER - HOSPITAL COURSE
39-year-old male with a history of anxiety, depression, ulcerative colitis (diagnosed 2006), two-stage j-pouch surgery (2008), SBO requiring ileostomy (2023), j-pouch revision and DLI on 6/25/2024 with subsequent reversal on 11/14/2024 now presents with a 5-day history of progressively worsening left-sided anal irritation, pain, fullness, and a growing painful internal mass.  He denies infectious symptoms, vomiting, and reports normal bowel movements. CT showing left intersphincteric perirectal abscess v phlegmon v fistula. Started on IV zosyn on admission and is now s/p EUA with pouchoscopy and drainage of perianal abscess. He is medically stable to be discharged home with 14 days of ciprofloxacin and flagyl.

## 2024-12-04 NOTE — DISCHARGE NOTE PROVIDER - CARE PROVIDER_API CALL
Jarrell Meyers  Colon/Rectal Surgery  [address] [place] [id number]  Phone: [phone]  Fax: [phone]  Follow Up Time: 2 weeks

## 2024-12-04 NOTE — DISCHARGE NOTE PROVIDER - NSDCCPTREATMENT_GEN_ALL_CORE_FT
PRINCIPAL PROCEDURE  Procedure: Exam under anesthesia, anus  Findings and Treatment: Keep area clean and try to do sitz baths after every bowel movement. Take tylenol as needed for pain and be sure to complete a full course of antibiotics (ciprofloxacin and flagyl) for 14 days.

## 2024-12-04 NOTE — DISCHARGE NOTE NURSING/CASE MANAGEMENT/SOCIAL WORK - PATIENT PORTAL LINK FT
You can access the FollowMyHealth Patient Portal offered by [facility] by registering at the following website: [email or web address]. By joining [employer]’s FollowMyHealth portal, you will also be able to view your health information using other applications (apps) compatible with our system.

## 2024-12-11 ENCOUNTER — APPOINTMENT (OUTPATIENT)
Dept: COLORECTAL SURGERY | Facility: CLINIC | Age: 39
End: 2024-12-11

## 2024-12-11 ENCOUNTER — APPOINTMENT (OUTPATIENT)
Dept: GASTROENTEROLOGY | Facility: CLINIC | Age: 39
End: 2024-12-11
Payer: COMMERCIAL

## 2024-12-11 ENCOUNTER — NON-APPOINTMENT (OUTPATIENT)
Age: 39
End: 2024-12-11

## 2024-12-11 VITALS
HEART RATE: 57 BPM | DIASTOLIC BLOOD PRESSURE: 83 MMHG | SYSTOLIC BLOOD PRESSURE: 137 MMHG | OXYGEN SATURATION: 99 % | BODY MASS INDEX: 23.54 KG/M2 | HEIGHT: 67 IN | WEIGHT: 150 LBS

## 2024-12-11 VITALS
WEIGHT: 155 LBS | OXYGEN SATURATION: 98 % | HEART RATE: 56 BPM | HEIGHT: 67 IN | DIASTOLIC BLOOD PRESSURE: 85 MMHG | TEMPERATURE: 98 F | BODY MASS INDEX: 24.33 KG/M2 | SYSTOLIC BLOOD PRESSURE: 142 MMHG

## 2024-12-11 DIAGNOSIS — K91.858 OTHER COMPLICATIONS OF INTESTINAL POUCH: ICD-10-CM

## 2024-12-11 DIAGNOSIS — K50.90 CROHN'S DISEASE, UNSPECIFIED, W/OUT COMPLICATIONS: ICD-10-CM

## 2024-12-11 DIAGNOSIS — K61.0 ANAL ABSCESS: ICD-10-CM

## 2024-12-11 PROCEDURE — 99215 OFFICE O/P EST HI 40 MIN: CPT

## 2024-12-11 PROCEDURE — 36415 COLL VENOUS BLD VENIPUNCTURE: CPT

## 2024-12-11 PROCEDURE — G2211 COMPLEX E/M VISIT ADD ON: CPT | Mod: NC

## 2024-12-11 RX ORDER — LOPERAMIDE HCL 2 MG
2 CAPSULE ORAL
Refills: 0 | Status: ACTIVE | COMMUNITY

## 2024-12-11 RX ORDER — PYRITHIONE ZINC 1 G/100ML
LOTION/SHAMPOO TOPICAL
Refills: 0 | Status: ACTIVE | COMMUNITY

## 2024-12-12 LAB
25(OH)D3 SERPL-MCNC: 72.5 NG/ML
ALBUMIN SERPL ELPH-MCNC: 5.1 G/DL
ALP BLD-CCNC: 69 U/L
ALT SERPL-CCNC: 35 U/L
ANION GAP SERPL CALC-SCNC: 12 MMOL/L
AST SERPL-CCNC: 37 U/L
BASOPHILS # BLD AUTO: 0.02 K/UL
BASOPHILS NFR BLD AUTO: 0.3 %
BILIRUB SERPL-MCNC: 1.1 MG/DL
BUN SERPL-MCNC: 12 MG/DL
CALCIUM SERPL-MCNC: 10 MG/DL
CHLORIDE SERPL-SCNC: 101 MMOL/L
CO2 SERPL-SCNC: 28 MMOL/L
CREAT SERPL-MCNC: 1.09 MG/DL
CRP SERPL-MCNC: <3 MG/L
EGFR: 89 ML/MIN/1.73M2
EOSINOPHIL # BLD AUTO: 0.07 K/UL
EOSINOPHIL NFR BLD AUTO: 1.2 %
FOLATE SERPL-MCNC: >20 NG/ML
GLUCOSE SERPL-MCNC: 94 MG/DL
HBV CORE IGG+IGM SER QL: NONREACTIVE
HBV SURFACE AB SER QL: REACTIVE
HBV SURFACE AG SER QL: NONREACTIVE
HCT VFR BLD CALC: 47.6 %
HCV AB SER QL: NONREACTIVE
HCV S/CO RATIO: 0.07 S/CO
HEPATITIS A IGG ANTIBODY: NONREACTIVE
HGB BLD-MCNC: 15.6 G/DL
HIV1+2 AB SPEC QL IA.RAPID: NONREACTIVE
IMM GRANULOCYTES NFR BLD AUTO: 0.2 %
IRON SATN MFR SERPL: 18 %
IRON SERPL-MCNC: 72 UG/DL
LYMPHOCYTES # BLD AUTO: 1.9 K/UL
LYMPHOCYTES NFR BLD AUTO: 32.5 %
MAN DIFF?: NORMAL
MCHC RBC-ENTMCNC: 30.4 PG
MCHC RBC-ENTMCNC: 32.8 G/DL
MCV RBC AUTO: 92.6 FL
MONOCYTES # BLD AUTO: 0.32 K/UL
MONOCYTES NFR BLD AUTO: 5.5 %
NEUTROPHILS # BLD AUTO: 3.53 K/UL
NEUTROPHILS NFR BLD AUTO: 60.3 %
PLATELET # BLD AUTO: 268 K/UL
POTASSIUM SERPL-SCNC: 4 MMOL/L
PROT SERPL-MCNC: 7.9 G/DL
RBC # BLD: 5.14 M/UL
RBC # FLD: 13.3 %
SODIUM SERPL-SCNC: 141 MMOL/L
TIBC SERPL-MCNC: 391 UG/DL
UIBC SERPL-MCNC: 319 UG/DL
VIT B12 SERPL-MCNC: 1432 PG/ML
WBC # FLD AUTO: 5.85 K/UL

## 2024-12-16 LAB
M TB IFN-G BLD-IMP: NEGATIVE
QUANTIFERON TB PLUS MITOGEN MINUS NIL: >10 IU/ML
QUANTIFERON TB PLUS NIL: 0.04 IU/ML
QUANTIFERON TB PLUS TB1 MINUS NIL: 0 IU/ML
QUANTIFERON TB PLUS TB2 MINUS NIL: 0 IU/ML

## 2024-12-19 ENCOUNTER — APPOINTMENT (OUTPATIENT)
Dept: COLORECTAL SURGERY | Facility: CLINIC | Age: 39
End: 2024-12-19

## 2024-12-19 PROCEDURE — 99024 POSTOP FOLLOW-UP VISIT: CPT

## 2025-01-08 ENCOUNTER — APPOINTMENT (OUTPATIENT)
Dept: COLORECTAL SURGERY | Facility: CLINIC | Age: 40
End: 2025-01-08
Payer: COMMERCIAL

## 2025-01-08 VITALS
HEIGHT: 67 IN | BODY MASS INDEX: 23.86 KG/M2 | RESPIRATION RATE: 16 BRPM | HEART RATE: 74 BPM | DIASTOLIC BLOOD PRESSURE: 76 MMHG | OXYGEN SATURATION: 99 % | SYSTOLIC BLOOD PRESSURE: 131 MMHG | WEIGHT: 152 LBS

## 2025-01-08 PROCEDURE — 99024 POSTOP FOLLOW-UP VISIT: CPT

## 2025-01-10 ENCOUNTER — NON-APPOINTMENT (OUTPATIENT)
Age: 40
End: 2025-01-10

## 2025-01-10 ENCOUNTER — APPOINTMENT (OUTPATIENT)
Dept: RHEUMATOLOGY | Facility: CLINIC | Age: 40
End: 2025-01-10
Payer: COMMERCIAL

## 2025-01-10 VITALS
RESPIRATION RATE: 15 BRPM | SYSTOLIC BLOOD PRESSURE: 137 MMHG | TEMPERATURE: 98.3 F | DIASTOLIC BLOOD PRESSURE: 72 MMHG | OXYGEN SATURATION: 98 % | HEART RATE: 65 BPM

## 2025-01-10 VITALS
RESPIRATION RATE: 15 BRPM | SYSTOLIC BLOOD PRESSURE: 126 MMHG | DIASTOLIC BLOOD PRESSURE: 72 MMHG | OXYGEN SATURATION: 98 % | HEART RATE: 69 BPM | TEMPERATURE: 98 F

## 2025-01-10 DIAGNOSIS — K50.90 CROHN'S DISEASE, UNSPECIFIED, W/OUT COMPLICATIONS: ICD-10-CM

## 2025-01-10 PROCEDURE — 36415 COLL VENOUS BLD VENIPUNCTURE: CPT

## 2025-01-10 PROCEDURE — 96413 CHEMO IV INFUSION 1 HR: CPT

## 2025-01-10 RX ORDER — RISANKIZUMAB-RZAA 60 MG/ML
600 INJECTION INTRAVENOUS
Qty: 0 | Refills: 0 | Status: COMPLETED | OUTPATIENT
Start: 2025-01-02

## 2025-01-14 LAB
ALBUMIN SERPL ELPH-MCNC: 4.6 G/DL
ALP BLD-CCNC: 68 U/L
ALT SERPL-CCNC: 31 U/L
ANION GAP SERPL CALC-SCNC: 10 MMOL/L
AST SERPL-CCNC: 32 U/L
BASOPHILS # BLD AUTO: 0.03 K/UL
BASOPHILS NFR BLD AUTO: 0.5 %
BILIRUB SERPL-MCNC: 0.7 MG/DL
BUN SERPL-MCNC: 11 MG/DL
CALCIUM SERPL-MCNC: 9.5 MG/DL
CHLORIDE SERPL-SCNC: 102 MMOL/L
CO2 SERPL-SCNC: 27 MMOL/L
CREAT SERPL-MCNC: 1.11 MG/DL
CRP SERPL-MCNC: <3 MG/L
EGFR: 87 ML/MIN/1.73M2
EOSINOPHIL # BLD AUTO: 0.06 K/UL
EOSINOPHIL NFR BLD AUTO: 1.1 %
GLUCOSE SERPL-MCNC: 119 MG/DL
HCT VFR BLD CALC: 45.8 %
HGB BLD-MCNC: 15 G/DL
IMM GRANULOCYTES NFR BLD AUTO: 0.2 %
LYMPHOCYTES # BLD AUTO: 1.44 K/UL
LYMPHOCYTES NFR BLD AUTO: 26.1 %
MAN DIFF?: NORMAL
MCHC RBC-ENTMCNC: 30.2 PG
MCHC RBC-ENTMCNC: 32.8 G/DL
MCV RBC AUTO: 92.2 FL
MONOCYTES # BLD AUTO: 0.26 K/UL
MONOCYTES NFR BLD AUTO: 4.7 %
NEUTROPHILS # BLD AUTO: 3.71 K/UL
NEUTROPHILS NFR BLD AUTO: 67.4 %
PLATELET # BLD AUTO: 253 K/UL
POTASSIUM SERPL-SCNC: 3.8 MMOL/L
PROT SERPL-MCNC: 7 G/DL
RBC # BLD: 4.97 M/UL
RBC # FLD: 13.3 %
SODIUM SERPL-SCNC: 139 MMOL/L
WBC # FLD AUTO: 5.51 K/UL

## 2025-01-30 ENCOUNTER — APPOINTMENT (OUTPATIENT)
Dept: COLORECTAL SURGERY | Facility: CLINIC | Age: 40
End: 2025-01-30
Payer: COMMERCIAL

## 2025-01-30 VITALS
HEART RATE: 74 BPM | SYSTOLIC BLOOD PRESSURE: 134 MMHG | DIASTOLIC BLOOD PRESSURE: 79 MMHG | BODY MASS INDEX: 23.54 KG/M2 | RESPIRATION RATE: 14 BRPM | HEIGHT: 67 IN | WEIGHT: 150 LBS | OXYGEN SATURATION: 98 %

## 2025-01-30 DIAGNOSIS — Z98.890 OTHER SPECIFIED POSTPROCEDURAL STATES: ICD-10-CM

## 2025-01-30 DIAGNOSIS — K91.858 OTHER COMPLICATIONS OF INTESTINAL POUCH: ICD-10-CM

## 2025-01-30 PROCEDURE — 99024 POSTOP FOLLOW-UP VISIT: CPT

## 2025-02-10 ENCOUNTER — APPOINTMENT (OUTPATIENT)
Dept: RHEUMATOLOGY | Facility: CLINIC | Age: 40
End: 2025-02-10
Payer: COMMERCIAL

## 2025-02-10 VITALS
SYSTOLIC BLOOD PRESSURE: 123 MMHG | RESPIRATION RATE: 15 BRPM | OXYGEN SATURATION: 98 % | HEART RATE: 54 BPM | DIASTOLIC BLOOD PRESSURE: 71 MMHG

## 2025-02-10 VITALS
HEART RATE: 62 BPM | SYSTOLIC BLOOD PRESSURE: 126 MMHG | OXYGEN SATURATION: 97 % | TEMPERATURE: 98.6 F | RESPIRATION RATE: 15 BRPM | DIASTOLIC BLOOD PRESSURE: 72 MMHG

## 2025-02-10 PROCEDURE — 36415 COLL VENOUS BLD VENIPUNCTURE: CPT

## 2025-02-10 PROCEDURE — 96413 CHEMO IV INFUSION 1 HR: CPT

## 2025-02-10 PROCEDURE — 96365 THER/PROPH/DIAG IV INF INIT: CPT

## 2025-02-10 RX ORDER — RISANKIZUMAB-RZAA 60 MG/ML
600 INJECTION INTRAVENOUS
Qty: 0 | Refills: 0 | Status: COMPLETED | OUTPATIENT
Start: 2025-02-06

## 2025-02-11 ENCOUNTER — APPOINTMENT (OUTPATIENT)
Dept: GASTROENTEROLOGY | Facility: CLINIC | Age: 40
End: 2025-02-11
Payer: COMMERCIAL

## 2025-02-11 DIAGNOSIS — K91.858 OTHER COMPLICATIONS OF INTESTINAL POUCH: ICD-10-CM

## 2025-02-11 DIAGNOSIS — Z98.890 OTHER SPECIFIED POSTPROCEDURAL STATES: ICD-10-CM

## 2025-02-11 DIAGNOSIS — K50.90 CROHN'S DISEASE, UNSPECIFIED, W/OUT COMPLICATIONS: ICD-10-CM

## 2025-02-11 LAB
ALBUMIN SERPL ELPH-MCNC: 4.3 G/DL
ALP BLD-CCNC: 58 U/L
ALT SERPL-CCNC: 42 U/L
ANION GAP SERPL CALC-SCNC: 12 MMOL/L
AST SERPL-CCNC: 38 U/L
BASOPHILS # BLD AUTO: 0.03 K/UL
BASOPHILS NFR BLD AUTO: 0.7 %
BILIRUB SERPL-MCNC: 1 MG/DL
BUN SERPL-MCNC: 20 MG/DL
CALCIUM SERPL-MCNC: 9.1 MG/DL
CHLORIDE SERPL-SCNC: 104 MMOL/L
CO2 SERPL-SCNC: 23 MMOL/L
CREAT SERPL-MCNC: 1.26 MG/DL
CRP SERPL-MCNC: <3 MG/L
EGFR: 74 ML/MIN/1.73M2
EOSINOPHIL # BLD AUTO: 0.07 K/UL
EOSINOPHIL NFR BLD AUTO: 1.6 %
GLUCOSE SERPL-MCNC: 147 MG/DL
HCT VFR BLD CALC: 43.1 %
HGB BLD-MCNC: 13.7 G/DL
IMM GRANULOCYTES NFR BLD AUTO: 0.2 %
LYMPHOCYTES # BLD AUTO: 1.43 K/UL
LYMPHOCYTES NFR BLD AUTO: 32.4 %
MAN DIFF?: NORMAL
MCHC RBC-ENTMCNC: 30.4 PG
MCHC RBC-ENTMCNC: 31.8 G/DL
MCV RBC AUTO: 95.8 FL
MONOCYTES # BLD AUTO: 0.29 K/UL
MONOCYTES NFR BLD AUTO: 6.6 %
NEUTROPHILS # BLD AUTO: 2.58 K/UL
NEUTROPHILS NFR BLD AUTO: 58.5 %
PLATELET # BLD AUTO: 235 K/UL
POTASSIUM SERPL-SCNC: 4.2 MMOL/L
PROT SERPL-MCNC: 6.4 G/DL
RBC # BLD: 4.5 M/UL
RBC # FLD: 13.7 %
SODIUM SERPL-SCNC: 138 MMOL/L
WBC # FLD AUTO: 4.41 K/UL

## 2025-02-11 PROCEDURE — 99214 OFFICE O/P EST MOD 30 MIN: CPT | Mod: 95

## 2025-02-18 RX ORDER — RISANKIZUMAB-RZAA 360 MG/2.4
360 KIT SUBCUTANEOUS
Qty: 1 | Refills: 3 | Status: ACTIVE | COMMUNITY
Start: 2025-02-11 | End: 1900-01-01

## 2025-03-10 ENCOUNTER — TRANSCRIPTION ENCOUNTER (OUTPATIENT)
Age: 40
End: 2025-03-10

## 2025-03-10 ENCOUNTER — APPOINTMENT (OUTPATIENT)
Dept: RHEUMATOLOGY | Facility: CLINIC | Age: 40
End: 2025-03-10
Payer: COMMERCIAL

## 2025-03-10 VITALS
OXYGEN SATURATION: 99 % | HEART RATE: 57 BPM | DIASTOLIC BLOOD PRESSURE: 84 MMHG | SYSTOLIC BLOOD PRESSURE: 131 MMHG | TEMPERATURE: 98.7 F | RESPIRATION RATE: 15 BRPM

## 2025-03-10 VITALS
OXYGEN SATURATION: 100 % | DIASTOLIC BLOOD PRESSURE: 82 MMHG | HEART RATE: 61 BPM | TEMPERATURE: 98.6 F | SYSTOLIC BLOOD PRESSURE: 141 MMHG | RESPIRATION RATE: 15 BRPM

## 2025-03-10 DIAGNOSIS — K50.90 CROHN'S DISEASE, UNSPECIFIED, W/OUT COMPLICATIONS: ICD-10-CM

## 2025-03-10 PROCEDURE — 36415 COLL VENOUS BLD VENIPUNCTURE: CPT

## 2025-03-10 PROCEDURE — 96365 THER/PROPH/DIAG IV INF INIT: CPT

## 2025-03-10 PROCEDURE — 96413 CHEMO IV INFUSION 1 HR: CPT

## 2025-03-10 RX ORDER — RISANKIZUMAB-RZAA 60 MG/ML
600 INJECTION INTRAVENOUS
Qty: 0 | Refills: 0 | Status: COMPLETED | OUTPATIENT
Start: 2025-03-05

## 2025-03-11 LAB
BASOPHILS # BLD AUTO: 0.02 K/UL
BASOPHILS NFR BLD AUTO: 0.5 %
EOSINOPHIL # BLD AUTO: 0.02 K/UL
EOSINOPHIL NFR BLD AUTO: 0.5 %
HCT VFR BLD CALC: 47.8 %
HGB BLD-MCNC: 14.9 G/DL
IMM GRANULOCYTES NFR BLD AUTO: 0.2 %
LYMPHOCYTES # BLD AUTO: 1.07 K/UL
LYMPHOCYTES NFR BLD AUTO: 24.7 %
MAN DIFF?: NORMAL
MCHC RBC-ENTMCNC: 30.5 PG
MCHC RBC-ENTMCNC: 31.2 G/DL
MCV RBC AUTO: 97.8 FL
MONOCYTES # BLD AUTO: 0.26 K/UL
MONOCYTES NFR BLD AUTO: 6 %
NEUTROPHILS # BLD AUTO: 2.95 K/UL
NEUTROPHILS NFR BLD AUTO: 68.1 %
PLATELET # BLD AUTO: 245 K/UL
RBC # BLD: 4.89 M/UL
RBC # FLD: 13.7 %
WBC # FLD AUTO: 4.33 K/UL

## 2025-03-13 LAB
ALBUMIN SERPL ELPH-MCNC: 4.7 G/DL
ALP BLD-CCNC: 57 U/L
ALT SERPL-CCNC: 53 U/L
ANION GAP SERPL CALC-SCNC: 10 MMOL/L
AST SERPL-CCNC: 61 U/L
BILIRUB SERPL-MCNC: 0.5 MG/DL
BUN SERPL-MCNC: 22 MG/DL
CALCIUM SERPL-MCNC: 9.4 MG/DL
CHLORIDE SERPL-SCNC: 103 MMOL/L
CO2 SERPL-SCNC: 26 MMOL/L
CREAT SERPL-MCNC: 1.25 MG/DL
CRP SERPL-MCNC: 4 MG/L
EGFRCR SERPLBLD CKD-EPI 2021: 75 ML/MIN/1.73M2
GLUCOSE SERPL-MCNC: 86 MG/DL
POTASSIUM SERPL-SCNC: 4.6 MMOL/L
PROT SERPL-MCNC: 7 G/DL
SODIUM SERPL-SCNC: 139 MMOL/L

## 2025-04-01 ENCOUNTER — TRANSCRIPTION ENCOUNTER (OUTPATIENT)
Age: 40
End: 2025-04-01

## 2025-04-02 ENCOUNTER — APPOINTMENT (OUTPATIENT)
Dept: GASTROENTEROLOGY | Facility: CLINIC | Age: 40
End: 2025-04-02

## 2025-04-08 ENCOUNTER — TRANSCRIPTION ENCOUNTER (OUTPATIENT)
Age: 40
End: 2025-04-08

## 2025-04-30 ENCOUNTER — APPOINTMENT (OUTPATIENT)
Dept: GASTROENTEROLOGY | Facility: CLINIC | Age: 40
End: 2025-04-30

## 2025-04-30 ENCOUNTER — NON-APPOINTMENT (OUTPATIENT)
Age: 40
End: 2025-04-30

## 2025-04-30 VITALS
BODY MASS INDEX: 22.6 KG/M2 | TEMPERATURE: 97.2 F | DIASTOLIC BLOOD PRESSURE: 73 MMHG | HEART RATE: 68 BPM | HEIGHT: 67 IN | OXYGEN SATURATION: 98 % | SYSTOLIC BLOOD PRESSURE: 110 MMHG | WEIGHT: 144 LBS | RESPIRATION RATE: 14 BRPM

## 2025-04-30 DIAGNOSIS — K91.858 OTHER COMPLICATIONS OF INTESTINAL POUCH: ICD-10-CM

## 2025-04-30 DIAGNOSIS — K50.90 CROHN'S DISEASE, UNSPECIFIED, W/OUT COMPLICATIONS: ICD-10-CM

## 2025-04-30 PROCEDURE — 36415 COLL VENOUS BLD VENIPUNCTURE: CPT

## 2025-04-30 PROCEDURE — 99215 OFFICE O/P EST HI 40 MIN: CPT

## 2025-05-02 ENCOUNTER — TRANSCRIPTION ENCOUNTER (OUTPATIENT)
Age: 40
End: 2025-05-02

## 2025-05-02 LAB
ALBUMIN SERPL ELPH-MCNC: 4.9 G/DL
ALP BLD-CCNC: 59 U/L
ALT SERPL-CCNC: 92 U/L
ANION GAP SERPL CALC-SCNC: 13 MMOL/L
AST SERPL-CCNC: 104 U/L
BASOPHILS # BLD AUTO: 0.02 K/UL
BASOPHILS NFR BLD AUTO: 0.3 %
BILIRUB SERPL-MCNC: 0.5 MG/DL
BUN SERPL-MCNC: 30 MG/DL
CALCIUM SERPL-MCNC: 9.8 MG/DL
CHLORIDE SERPL-SCNC: 99 MMOL/L
CO2 SERPL-SCNC: 25 MMOL/L
CREAT SERPL-MCNC: 1.12 MG/DL
CRP SERPL-MCNC: <3 MG/L
EGFRCR SERPLBLD CKD-EPI 2021: 86 ML/MIN/1.73M2
EOSINOPHIL # BLD AUTO: 0.01 K/UL
EOSINOPHIL NFR BLD AUTO: 0.2 %
GLUCOSE SERPL-MCNC: 87 MG/DL
HCT VFR BLD CALC: 44.5 %
HGB BLD-MCNC: 14.7 G/DL
IMM GRANULOCYTES NFR BLD AUTO: 0.2 %
LYMPHOCYTES # BLD AUTO: 1.23 K/UL
LYMPHOCYTES NFR BLD AUTO: 19.7 %
MAN DIFF?: NORMAL
MCHC RBC-ENTMCNC: 30.9 PG
MCHC RBC-ENTMCNC: 33 G/DL
MCV RBC AUTO: 93.5 FL
MONOCYTES # BLD AUTO: 0.36 K/UL
MONOCYTES NFR BLD AUTO: 5.8 %
NEUTROPHILS # BLD AUTO: 4.61 K/UL
NEUTROPHILS NFR BLD AUTO: 73.8 %
PLATELET # BLD AUTO: 247 K/UL
POTASSIUM SERPL-SCNC: 4.4 MMOL/L
PROT SERPL-MCNC: 7.5 G/DL
RBC # BLD: 4.76 M/UL
RBC # FLD: 14 %
SODIUM SERPL-SCNC: 137 MMOL/L
WBC # FLD AUTO: 6.24 K/UL

## 2025-05-07 ENCOUNTER — TRANSCRIPTION ENCOUNTER (OUTPATIENT)
Age: 40
End: 2025-05-07

## 2025-05-19 ENCOUNTER — APPOINTMENT (OUTPATIENT)
Dept: HEPATOLOGY | Facility: CLINIC | Age: 40
End: 2025-05-19

## 2025-05-19 ENCOUNTER — APPOINTMENT (OUTPATIENT)
Dept: HEPATOLOGY | Facility: CLINIC | Age: 40
End: 2025-05-19
Payer: COMMERCIAL

## 2025-05-19 VITALS
WEIGHT: 154.32 LBS | RESPIRATION RATE: 16 BRPM | OXYGEN SATURATION: 99 % | HEIGHT: 67 IN | HEART RATE: 45 BPM | TEMPERATURE: 97.3 F | DIASTOLIC BLOOD PRESSURE: 88 MMHG | SYSTOLIC BLOOD PRESSURE: 150 MMHG | BODY MASS INDEX: 24.22 KG/M2

## 2025-05-19 DIAGNOSIS — R74.8 ABNORMAL LEVELS OF OTHER SERUM ENZYMES: ICD-10-CM

## 2025-05-19 PROCEDURE — 99202 OFFICE O/P NEW SF 15 MIN: CPT

## 2025-05-19 PROCEDURE — 76981 USE PARENCHYMA: CPT

## 2025-05-19 PROCEDURE — 99212 OFFICE O/P EST SF 10 MIN: CPT

## 2025-05-28 ENCOUNTER — NON-APPOINTMENT (OUTPATIENT)
Age: 40
End: 2025-05-28

## 2025-05-28 LAB
A1AT SERPL-MCNC: 79 MG/DL
ALBUMIN SERPL ELPH-MCNC: 4.9 G/DL
ALP BLD-CCNC: 60 U/L
ALT SERPL-CCNC: 99 U/L
ANA PAT FLD IF-IMP: ABNORMAL
ANA SER IF-ACNC: ABNORMAL
ANION GAP SERPL CALC-SCNC: 18 MMOL/L
ANNOTATION COMMENT IMP: NOT DETECTED
AST SERPL-CCNC: 112 U/L
BILIRUB SERPL-MCNC: 0.3 MG/DL
BUN SERPL-MCNC: 16 MG/DL
CALCIUM SERPL-MCNC: 10.3 MG/DL
CERULOPLASMIN SERPL-MCNC: 24 MG/DL
CHLORIDE SERPL-SCNC: 98 MMOL/L
CMV DNA SPEC QL NAA+PROBE: NOT DETECTED IU/ML
CMVPCR LOG: NOT DETECTED LOG10IU/ML
CO2 SERPL-SCNC: 23 MMOL/L
CREAT SERPL-MCNC: 1.01 MG/DL
EBV DNA SERPL NAA+PROBE-ACNC: NOT DETECTED IU/ML
EBVPCR LOG: NOT DETECTED LOG10IU/ML
EGFRCR SERPLBLD CKD-EPI 2021: 97 ML/MIN/1.73M2
FERRITIN SERPL-MCNC: 50 NG/ML
GLUCOSE SERPL-MCNC: 81 MG/DL
HEPATITIS E QUANT BY PCR, IU/ML: <1800 IU/ML
HEPATITIS E QUANT BY PCR, LOG IU/ML: <3.3 LOG IU/ML
HEPATITIS E QUANT BY PCR, SOURCE: NORMAL
HSV1-DNA: NORMAL
HSV2-DNA: NORMAL
IGG SERPL-MCNC: 938 MG/DL
IGM SERPL-MCNC: 75 MG/DL
IRON SATN MFR SERPL: 10 %
IRON SERPL-MCNC: 43 UG/DL
MITOCHONDRIA AB SER IF-ACNC: NORMAL
PETH 16:0/18:1: 24 NG/ML
PETH 16:0/18:2: 38 NG/ML
PETH COMMENTS: NORMAL
POTASSIUM SERPL-SCNC: 4.3 MMOL/L
PROT SERPL-MCNC: 7.4 G/DL
SMOOTH MUSCLE AB SER QL IF: NORMAL
SODIUM SERPL-SCNC: 140 MMOL/L
SOLUBLE LIVER IGG SER IA-ACNC: 0.3
SOURCE: NORMAL
TIBC SERPL-MCNC: 432 UG/DL
TTG IGA SER IA-ACNC: <0.5 U/ML
TTG IGA SER-ACNC: NEGATIVE
UIBC SERPL-MCNC: 388 UG/DL

## 2025-05-30 ENCOUNTER — NON-APPOINTMENT (OUTPATIENT)
Age: 40
End: 2025-05-30

## 2025-05-30 ENCOUNTER — TRANSCRIPTION ENCOUNTER (OUTPATIENT)
Age: 40
End: 2025-05-30

## 2025-06-05 ENCOUNTER — TRANSCRIPTION ENCOUNTER (OUTPATIENT)
Age: 40
End: 2025-06-05

## 2025-06-06 RX ORDER — RISANKIZUMAB-RZAA 360 MG/2.4
360 KIT SUBCUTANEOUS
Qty: 1 | Refills: 4 | Status: ACTIVE | COMMUNITY
Start: 2025-06-05

## 2025-06-16 ENCOUNTER — APPOINTMENT (OUTPATIENT)
Dept: ULTRASOUND IMAGING | Facility: CLINIC | Age: 40
End: 2025-06-16
Payer: COMMERCIAL

## 2025-06-16 PROCEDURE — 76700 US EXAM ABDOM COMPLETE: CPT

## 2025-06-17 ENCOUNTER — APPOINTMENT (OUTPATIENT)
Dept: HEPATOLOGY | Facility: CLINIC | Age: 40
End: 2025-06-17

## 2025-06-17 VITALS
RESPIRATION RATE: 16 BRPM | DIASTOLIC BLOOD PRESSURE: 80 MMHG | HEIGHT: 67 IN | TEMPERATURE: 98.8 F | BODY MASS INDEX: 24.33 KG/M2 | WEIGHT: 155 LBS | OXYGEN SATURATION: 98 % | HEART RATE: 56 BPM | SYSTOLIC BLOOD PRESSURE: 133 MMHG

## 2025-06-17 PROCEDURE — 99214 OFFICE O/P EST MOD 30 MIN: CPT

## 2025-06-17 PROCEDURE — G2211 COMPLEX E/M VISIT ADD ON: CPT | Mod: NC

## 2025-06-19 ENCOUNTER — NON-APPOINTMENT (OUTPATIENT)
Age: 40
End: 2025-06-19

## 2025-06-19 LAB
ALBUMIN SERPL ELPH-MCNC: 4.4 G/DL
ALP BLD-CCNC: 53 U/L
ALT SERPL-CCNC: 97 U/L
AST SERPL-CCNC: 137 U/L
BILIRUB DIRECT SERPL-MCNC: 0.21 MG/DL
BILIRUB INDIRECT SERPL-MCNC: 0.4 MG/DL
BILIRUB SERPL-MCNC: 0.6 MG/DL
HSV1-DNA: NOT DETECTED
HSV2-DNA: NOT DETECTED
PROT SERPL-MCNC: 7 G/DL
SOURCE: NORMAL

## 2025-06-20 LAB
A1AT PHENOTYP SERPL-IMP: NORMAL
A1AT SERPL-MCNC: 67 MG/DL
PETH 16:0/18:1: NEGATIVE NG/ML
PETH 16:0/18:2: NEGATIVE NG/ML
PETH COMMENTS: NORMAL

## 2025-06-27 NOTE — PATIENT PROFILE ADULT - INTERNATIONAL TRAVEL
Health Maintenance       COVID-19 Vaccine (4 - 2024-25 season)  Overdue since 9/1/2024    DTaP/Tdap/Td Vaccine (9 - Td or Tdap)  Overdue since 2/24/2025           Following review of the above:  Patient is not proceeding with: COVID-19     Note: Refer to final orders and clinician documentation.         Recent PHQ 2/9 Score    PHQ 2:  PHQ 2 Score Adult PHQ 2 Score Adult PHQ 2 Interpretation Little interest or pleasure in activity?   6/27/2025   8:51 AM 2 No further screening needed 1       PHQ 9:  PHQ 9 Score Adult PHQ 9 Score Adult PHQ 9 Interpretation   5/29/2025   9:47 AM 11  Moderate Depression        Patient-reported        Most Recent TATE 7 Score       TATE 7 Score TATE 7 Score   6/27/2025   9:00 AM 4       No

## 2025-07-09 ENCOUNTER — INPATIENT (INPATIENT)
Facility: HOSPITAL | Age: 40
LOS: 3 days | Discharge: HOME | DRG: 390 | End: 2025-07-13
Attending: COLON & RECTAL SURGERY | Admitting: SURGERY
Payer: COMMERCIAL

## 2025-07-09 VITALS
DIASTOLIC BLOOD PRESSURE: 86 MMHG | HEIGHT: 67 IN | WEIGHT: 145.06 LBS | SYSTOLIC BLOOD PRESSURE: 132 MMHG | HEART RATE: 49 BPM | TEMPERATURE: 98 F | RESPIRATION RATE: 17 BRPM | OXYGEN SATURATION: 98 %

## 2025-07-09 DIAGNOSIS — Z93.2 ILEOSTOMY STATUS: Chronic | ICD-10-CM

## 2025-07-09 DIAGNOSIS — Z90.49 ACQUIRED ABSENCE OF OTHER SPECIFIED PARTS OF DIGESTIVE TRACT: Chronic | ICD-10-CM

## 2025-07-09 DIAGNOSIS — S42.409A UNSPECIFIED FRACTURE OF LOWER END OF UNSPECIFIED HUMERUS, INITIAL ENCOUNTER FOR CLOSED FRACTURE: Chronic | ICD-10-CM

## 2025-07-09 DIAGNOSIS — Z87.81 PERSONAL HISTORY OF (HEALED) TRAUMATIC FRACTURE: Chronic | ICD-10-CM

## 2025-07-09 DIAGNOSIS — Z98.890 OTHER SPECIFIED POSTPROCEDURAL STATES: Chronic | ICD-10-CM

## 2025-07-09 DIAGNOSIS — Z87.19 PERSONAL HISTORY OF OTHER DISEASES OF THE DIGESTIVE SYSTEM: Chronic | ICD-10-CM

## 2025-07-09 LAB
ADD ON TEST-SPECIMEN IN LAB: SIGNIFICANT CHANGE UP
ALBUMIN SERPL ELPH-MCNC: 4.7 G/DL — SIGNIFICANT CHANGE UP (ref 3.3–5)
ALP SERPL-CCNC: 52 U/L — SIGNIFICANT CHANGE UP (ref 40–120)
ALT FLD-CCNC: 67 U/L — HIGH (ref 10–45)
ANION GAP SERPL CALC-SCNC: 15 MMOL/L — SIGNIFICANT CHANGE UP (ref 5–17)
ANISOCYTOSIS BLD QL: SLIGHT — SIGNIFICANT CHANGE UP
APTT BLD: 28.9 SEC — SIGNIFICANT CHANGE UP (ref 26.1–36.8)
AST SERPL-CCNC: 104 U/L — HIGH (ref 10–40)
BASOPHILS # BLD AUTO: 0.03 K/UL — SIGNIFICANT CHANGE UP (ref 0–0.2)
BASOPHILS # BLD MANUAL: 0.09 K/UL — SIGNIFICANT CHANGE UP (ref 0–0.2)
BASOPHILS NFR BLD AUTO: 0.3 % — SIGNIFICANT CHANGE UP (ref 0–2)
BASOPHILS NFR BLD MANUAL: 0.9 % — SIGNIFICANT CHANGE UP (ref 0–2)
BILIRUB SERPL-MCNC: 1.1 MG/DL — SIGNIFICANT CHANGE UP (ref 0.2–1.2)
BUN SERPL-MCNC: 20 MG/DL — SIGNIFICANT CHANGE UP (ref 7–23)
CALCIUM SERPL-MCNC: 10.3 MG/DL — SIGNIFICANT CHANGE UP (ref 8.4–10.5)
CHLORIDE SERPL-SCNC: 100 MMOL/L — SIGNIFICANT CHANGE UP (ref 96–108)
CK SERPL-CCNC: 887 U/L — HIGH (ref 30–200)
CO2 SERPL-SCNC: 24 MMOL/L — SIGNIFICANT CHANGE UP (ref 22–31)
CREAT SERPL-MCNC: 1.06 MG/DL — SIGNIFICANT CHANGE UP (ref 0.5–1.3)
EGFR: 91 ML/MIN/1.73M2 — SIGNIFICANT CHANGE UP
EGFR: 91 ML/MIN/1.73M2 — SIGNIFICANT CHANGE UP
EOSINOPHIL # BLD AUTO: 0.02 K/UL — SIGNIFICANT CHANGE UP (ref 0–0.5)
EOSINOPHIL # BLD MANUAL: 0 K/UL — SIGNIFICANT CHANGE UP (ref 0–0.5)
EOSINOPHIL NFR BLD AUTO: 0.2 % — SIGNIFICANT CHANGE UP (ref 0–6)
EOSINOPHIL NFR BLD MANUAL: 0 % — SIGNIFICANT CHANGE UP (ref 0–6)
GAS PNL BLDV: SIGNIFICANT CHANGE UP
GLUCOSE SERPL-MCNC: 91 MG/DL — SIGNIFICANT CHANGE UP (ref 70–99)
HCT VFR BLD CALC: 48.4 % — SIGNIFICANT CHANGE UP (ref 39–50)
HGB BLD-MCNC: 16 G/DL — SIGNIFICANT CHANGE UP (ref 13–17)
IMM GRANULOCYTES # BLD AUTO: 0.04 K/UL — SIGNIFICANT CHANGE UP (ref 0–0.07)
IMM GRANULOCYTES NFR BLD AUTO: 0.4 % — SIGNIFICANT CHANGE UP (ref 0–0.9)
INR BLD: 1.06 RATIO — SIGNIFICANT CHANGE UP (ref 0.85–1.16)
LIDOCAIN IGE QN: 32 U/L — SIGNIFICANT CHANGE UP (ref 7–60)
LYMPHOCYTES # BLD AUTO: 1.48 K/UL — SIGNIFICANT CHANGE UP (ref 1–3.3)
LYMPHOCYTES # BLD MANUAL: 2.32 K/UL — SIGNIFICANT CHANGE UP (ref 1–3.3)
LYMPHOCYTES NFR BLD AUTO: 14.8 % — SIGNIFICANT CHANGE UP (ref 13–44)
LYMPHOCYTES NFR BLD MANUAL: 23.1 % — SIGNIFICANT CHANGE UP (ref 13–44)
MACROCYTES BLD QL: SLIGHT — SIGNIFICANT CHANGE UP
MCHC RBC-ENTMCNC: 31.1 PG — SIGNIFICANT CHANGE UP (ref 27–34)
MCHC RBC-ENTMCNC: 33.1 G/DL — SIGNIFICANT CHANGE UP (ref 32–36)
MCV RBC AUTO: 94.2 FL — SIGNIFICANT CHANGE UP (ref 80–100)
MONOCYTES # BLD AUTO: 0.48 K/UL — SIGNIFICANT CHANGE UP (ref 0–0.9)
MONOCYTES # BLD MANUAL: 0.51 K/UL — SIGNIFICANT CHANGE UP (ref 0–0.9)
MONOCYTES NFR BLD AUTO: 4.8 % — SIGNIFICANT CHANGE UP (ref 2–14)
MONOCYTES NFR BLD MANUAL: 5.1 % — SIGNIFICANT CHANGE UP (ref 2–14)
NEUTROPHILS # BLD AUTO: 7.98 K/UL — HIGH (ref 1.8–7.4)
NEUTROPHILS # BLD MANUAL: 7.11 K/UL — SIGNIFICANT CHANGE UP (ref 1.8–7.4)
NEUTROPHILS NFR BLD AUTO: 79.5 % — HIGH (ref 43–77)
NEUTROPHILS NFR BLD MANUAL: 70.9 % — SIGNIFICANT CHANGE UP (ref 43–77)
NRBC # BLD AUTO: 0 K/UL — SIGNIFICANT CHANGE UP (ref 0–0)
NRBC # FLD: 0 K/UL — SIGNIFICANT CHANGE UP (ref 0–0)
PLAT MORPH BLD: NORMAL — SIGNIFICANT CHANGE UP
PLATELET # BLD AUTO: 236 K/UL — SIGNIFICANT CHANGE UP (ref 150–400)
PMV BLD: SIGNIFICANT CHANGE UP FL (ref 7–13)
POTASSIUM SERPL-MCNC: 4.3 MMOL/L — SIGNIFICANT CHANGE UP (ref 3.5–5.3)
POTASSIUM SERPL-SCNC: 4.3 MMOL/L — SIGNIFICANT CHANGE UP (ref 3.5–5.3)
PROT SERPL-MCNC: 7.7 G/DL — SIGNIFICANT CHANGE UP (ref 6–8.3)
PROTHROM AB SERPL-ACNC: 12.2 SEC — SIGNIFICANT CHANGE UP (ref 9.9–13.4)
RBC # BLD: 5.14 M/UL — SIGNIFICANT CHANGE UP (ref 4.2–5.8)
RBC # FLD: SIGNIFICANT CHANGE UP % (ref 10.3–14.5)
RBC BLD AUTO: SIGNIFICANT CHANGE UP
SODIUM SERPL-SCNC: 139 MMOL/L — SIGNIFICANT CHANGE UP (ref 135–145)
WBC # BLD: 10.03 K/UL — SIGNIFICANT CHANGE UP (ref 3.8–10.5)
WBC # FLD AUTO: 10.03 K/UL — SIGNIFICANT CHANGE UP (ref 3.8–10.5)

## 2025-07-09 PROCEDURE — 93010 ELECTROCARDIOGRAM REPORT: CPT | Mod: 76

## 2025-07-09 PROCEDURE — 99285 EMERGENCY DEPT VISIT HI MDM: CPT

## 2025-07-09 RX ORDER — ACETAMINOPHEN 500 MG/5ML
1000 LIQUID (ML) ORAL ONCE
Refills: 0 | Status: COMPLETED | OUTPATIENT
Start: 2025-07-09 | End: 2025-07-09

## 2025-07-09 RX ORDER — IOHEXOL 350 MG/ML
30 INJECTION, SOLUTION INTRAVENOUS ONCE
Refills: 0 | Status: DISCONTINUED | OUTPATIENT
Start: 2025-07-09 | End: 2025-07-09

## 2025-07-09 RX ORDER — ONDANSETRON HCL/PF 4 MG/2 ML
4 VIAL (ML) INJECTION ONCE
Refills: 0 | Status: COMPLETED | OUTPATIENT
Start: 2025-07-09 | End: 2025-07-09

## 2025-07-09 RX ORDER — SODIUM CHLORIDE 9 G/1000ML
1000 INJECTION, SOLUTION INTRAVENOUS ONCE
Refills: 0 | Status: COMPLETED | OUTPATIENT
Start: 2025-07-09 | End: 2025-07-09

## 2025-07-09 RX ADMIN — Medication 400 MILLIGRAM(S): at 21:51

## 2025-07-09 RX ADMIN — SODIUM CHLORIDE 1000 MILLILITER(S): 9 INJECTION, SOLUTION INTRAVENOUS at 21:51

## 2025-07-09 RX ADMIN — Medication 4 MILLIGRAM(S): at 21:51

## 2025-07-09 RX ADMIN — Medication 4 MILLIGRAM(S): at 23:23

## 2025-07-09 NOTE — ED PROVIDER NOTE - EKG/XRAY ADDITIONAL INFORMATION
IRick (ED Attending), independently interpreted the EKG:  Interpreted at: 10:37 PM  Sinus bradycardia, rate 43, , , QTc 358  EKG comments on acute stemi 2/2 AGGIE lead v2-v6, no recipricol changes

## 2025-07-09 NOTE — ED PROVIDER NOTE - OBJECTIVE STATEMENT
40-year-old male with a complex surgical history including a J-pouch and history of bowel obstruction following Dr. Lopez presents with several hours of abdominal pain with associated nausea vomiting and unable to pass gas since the pain started and inability to tolerate p.o.  Went for a 10 mile bike ride this morning and was feeling otherwise fine.  As he left to get coffee he had what he describes gas pain continued to worsen.  As the pain progressed he became more nauseous and had an episode of vomiting.  Has not been able to tolerate p.o. since this time.  Denies able to pass gas since the pain started however had a normal bowel movement this morning.  Denies fever, URI symptoms, chest pain or shortness of breath, urinary symptoms.

## 2025-07-09 NOTE — ED PROVIDER NOTE - ATTENDING CONTRIBUTION TO CARE
40M Anxiety, Depression, UC s/p 2-stage j pouch surgery in 2008; Hx small bowel obstruction-requiring Ileostomy in 2023, J pouch re-do and DLI 6/25 s/p reversal (11/14/24) presenting with abdominal pain. He reports feeling fine this AM, was able to exercise as normal. After returning from bike ride, developed pain that felt like 'gas' pain. Went for a walk to help alleviate his symptoms, but the pain worsened. He additionally developed nausea and vomiting. Not passing gas since this AM. He had one small bowel movement in the ED.. Still reporting cramping abdominal pain and nausea. Denies fevers, chills, chest pain, shortness of breath  and sick contacts.     Hemodynamically stable. NAD. AAOx4 (person, place, time, event). PERRL 3mm, EOMI w/o nystagmus, well hydrated, RRR, no audible cardiac murmurs. clear lungs, no inc work of breathing. +epigastric and LUQ/LEFT mid abd ttp, post surgical scars, post ostomy scar, - hernandez, no peritoneal signs, no cva ttp. no visible rashes nor deformities, no signs of jaundice, fluid overload, nor anemia. symm calves, no edema. full str/rom/neurovasc all 4 extrem. Steady gait.     Acute upper abdominal pain with nausea, vomiting and not passing gas with hx c/b UC, J-pouch and prior sbo, r/o SBO, fistula, internal hernia/stricture, pouchitis. Labs, CT a/p with iv and oral contrast, analgesia. Summary of presentation, physical exam findings, plan, expected turn around time for diagnostics discussed with patient. Agrees.    -> (23:00)  patient signed out pending remainder of w/u, close reassessments, discussion of results, dispo.

## 2025-07-09 NOTE — ED ADULT TRIAGE NOTE - CHIEF COMPLAINT QUOTE
R sided middle abd pain since 1400 today, states "feels like gas pain", w/ x2 episodes of emesis, passed flatulence, w/ last BM today   hx. SBO

## 2025-07-09 NOTE — ED PROVIDER NOTE - PROGRESS NOTE DETAILS
Attending Rick Perez:  cards consulted 2/2 EKG commenting on acute STEMI but patient w/o chest pain, shortness of breath, diaphoresis.

## 2025-07-09 NOTE — ED PROVIDER NOTE - PHYSICAL EXAMINATION
Const: Awake, alert, no acute distress.    HEENT: NC/AT.  Moist mucous membranes.  Eyes: Extraocular movements intact b/l.  No scleral icterus.  Neck: Full ROM without pain.  Cardiac: Extremities well perfused, normal coloration, no peripheral cyanosis.  No peripheral edema.  Resp: Speaking in full sentences.  No evidence of respiratory distress.  Abd: Mild distension. Soft. Epigastric ttp. negative hernandez's/  Skin: Normal coloration.  No rashes, abrasions or lacerations.  Neuro: Awake, alert & oriented x 3.  Moves all extremities symmetrically.  No obvious focal deficits.

## 2025-07-09 NOTE — ED ADULT TRIAGE NOTE - IDEAL BODY WEIGHT(KG)
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

## 2025-07-09 NOTE — ED PROVIDER NOTE - CLINICAL SUMMARY MEDICAL DECISION MAKING FREE TEXT BOX
Benji, PGY2: 40-year-old male with a complex surgical history including a J-pouch and history of bowel obstruction following Dr. Lopez presents with several hours of abdominal pain with associated nausea/ vomiting and unable to pass gas since the pain started and inability to tolerate p.o. Vitals are HD stable. PE is notable for an abdomen with Mild distension. Soft. Epigastric ttp. negative hernandez's. Given surgical history and symptoms. will get CTAP to r/o bowel obstruction. Will get labs and treat symptoms. dispo pending results.

## 2025-07-10 DIAGNOSIS — K56.609 UNSPECIFIED INTESTINAL OBSTRUCTION, UNSPECIFIED AS TO PARTIAL VERSUS COMPLETE OBSTRUCTION: ICD-10-CM

## 2025-07-10 DIAGNOSIS — R94.31 ABNORMAL ELECTROCARDIOGRAM [ECG] [EKG]: ICD-10-CM

## 2025-07-10 DIAGNOSIS — K52.9 NONINFECTIVE GASTROENTERITIS AND COLITIS, UNSPECIFIED: ICD-10-CM

## 2025-07-10 LAB
ALBUMIN SERPL ELPH-MCNC: 4.5 G/DL — SIGNIFICANT CHANGE UP (ref 3.3–5)
ALP SERPL-CCNC: 52 U/L — SIGNIFICANT CHANGE UP (ref 40–120)
ALT FLD-CCNC: 59 U/L — HIGH (ref 10–45)
ANION GAP SERPL CALC-SCNC: 17 MMOL/L — SIGNIFICANT CHANGE UP (ref 5–17)
AST SERPL-CCNC: 85 U/L — HIGH (ref 10–40)
BILIRUB SERPL-MCNC: 1.3 MG/DL — HIGH (ref 0.2–1.2)
BUN SERPL-MCNC: 18 MG/DL — SIGNIFICANT CHANGE UP (ref 7–23)
CALCIUM SERPL-MCNC: 9.8 MG/DL — SIGNIFICANT CHANGE UP (ref 8.4–10.5)
CHLORIDE SERPL-SCNC: 100 MMOL/L — SIGNIFICANT CHANGE UP (ref 96–108)
CK MB BLD-MCNC: 4.1 % — HIGH (ref 0–3.5)
CK MB CFR SERPL CALC: 36.4 NG/ML — HIGH (ref 0–6.7)
CO2 SERPL-SCNC: 24 MMOL/L — SIGNIFICANT CHANGE UP (ref 22–31)
CREAT SERPL-MCNC: 1 MG/DL — SIGNIFICANT CHANGE UP (ref 0.5–1.3)
EGFR: 98 ML/MIN/1.73M2 — SIGNIFICANT CHANGE UP
EGFR: 98 ML/MIN/1.73M2 — SIGNIFICANT CHANGE UP
GLUCOSE BLDC GLUCOMTR-MCNC: 101 MG/DL — HIGH (ref 70–99)
GLUCOSE SERPL-MCNC: 112 MG/DL — HIGH (ref 70–99)
HCT VFR BLD CALC: 50.1 % — HIGH (ref 39–50)
HGB BLD-MCNC: 16.4 G/DL — SIGNIFICANT CHANGE UP (ref 13–17)
LACTATE BLDV-MCNC: 1.5 MMOL/L — SIGNIFICANT CHANGE UP (ref 0.5–2)
MAGNESIUM SERPL-MCNC: 1.9 MG/DL — SIGNIFICANT CHANGE UP (ref 1.6–2.6)
MCHC RBC-ENTMCNC: 31.1 PG — SIGNIFICANT CHANGE UP (ref 27–34)
MCHC RBC-ENTMCNC: 32.7 G/DL — SIGNIFICANT CHANGE UP (ref 32–36)
MCV RBC AUTO: 95.1 FL — SIGNIFICANT CHANGE UP (ref 80–100)
NRBC # BLD AUTO: 0 K/UL — SIGNIFICANT CHANGE UP (ref 0–0)
NRBC # FLD: 0 K/UL — SIGNIFICANT CHANGE UP (ref 0–0)
NRBC BLD AUTO-RTO: 0 /100 WBCS — SIGNIFICANT CHANGE UP (ref 0–0)
PHOSPHATE SERPL-MCNC: 3.2 MG/DL — SIGNIFICANT CHANGE UP (ref 2.5–4.5)
PLATELET # BLD AUTO: 279 K/UL — SIGNIFICANT CHANGE UP (ref 150–400)
PMV BLD: 9.9 FL — SIGNIFICANT CHANGE UP (ref 7–13)
POTASSIUM SERPL-MCNC: 3.3 MMOL/L — LOW (ref 3.5–5.3)
POTASSIUM SERPL-SCNC: 3.3 MMOL/L — LOW (ref 3.5–5.3)
PROT SERPL-MCNC: 7.4 G/DL — SIGNIFICANT CHANGE UP (ref 6–8.3)
RBC # BLD: 5.27 M/UL — SIGNIFICANT CHANGE UP (ref 4.2–5.8)
RBC # FLD: 12 % — SIGNIFICANT CHANGE UP (ref 10.3–14.5)
SODIUM SERPL-SCNC: 141 MMOL/L — SIGNIFICANT CHANGE UP (ref 135–145)
TROPONIN T, HIGH SENSITIVITY RESULT: 11 NG/L — SIGNIFICANT CHANGE UP (ref 0–51)
TROPONIN T, HIGH SENSITIVITY RESULT: 13 NG/L — SIGNIFICANT CHANGE UP (ref 0–51)
WBC # BLD: 11.27 K/UL — HIGH (ref 3.8–10.5)
WBC # FLD AUTO: 11.27 K/UL — HIGH (ref 3.8–10.5)

## 2025-07-10 PROCEDURE — 74177 CT ABD & PELVIS W/CONTRAST: CPT

## 2025-07-10 PROCEDURE — 83735 ASSAY OF MAGNESIUM: CPT

## 2025-07-10 PROCEDURE — 82962 GLUCOSE BLOOD TEST: CPT

## 2025-07-10 PROCEDURE — 74177 CT ABD & PELVIS W/CONTRAST: CPT | Mod: 26

## 2025-07-10 PROCEDURE — 85730 THROMBOPLASTIN TIME PARTIAL: CPT

## 2025-07-10 PROCEDURE — 83880 ASSAY OF NATRIURETIC PEPTIDE: CPT

## 2025-07-10 PROCEDURE — 80053 COMPREHEN METABOLIC PANEL: CPT

## 2025-07-10 PROCEDURE — 99221 1ST HOSP IP/OBS SF/LOW 40: CPT

## 2025-07-10 PROCEDURE — 71045 X-RAY EXAM CHEST 1 VIEW: CPT | Mod: 26,77,76

## 2025-07-10 PROCEDURE — 83605 ASSAY OF LACTIC ACID: CPT

## 2025-07-10 PROCEDURE — 71045 X-RAY EXAM CHEST 1 VIEW: CPT

## 2025-07-10 PROCEDURE — 84100 ASSAY OF PHOSPHORUS: CPT

## 2025-07-10 PROCEDURE — 82550 ASSAY OF CK (CPK): CPT

## 2025-07-10 PROCEDURE — 36415 COLL VENOUS BLD VENIPUNCTURE: CPT

## 2025-07-10 PROCEDURE — 82553 CREATINE MB FRACTION: CPT

## 2025-07-10 PROCEDURE — 84484 ASSAY OF TROPONIN QUANT: CPT

## 2025-07-10 PROCEDURE — 85610 PROTHROMBIN TIME: CPT

## 2025-07-10 PROCEDURE — 85027 COMPLETE CBC AUTOMATED: CPT

## 2025-07-10 PROCEDURE — 71045 X-RAY EXAM CHEST 1 VIEW: CPT | Mod: 26

## 2025-07-10 RX ORDER — ACETAMINOPHEN 500 MG/5ML
1000 LIQUID (ML) ORAL ONCE
Refills: 0 | Status: COMPLETED | OUTPATIENT
Start: 2025-07-10 | End: 2025-07-10

## 2025-07-10 RX ORDER — ACETAMINOPHEN 500 MG/5ML
1000 LIQUID (ML) ORAL EVERY 6 HOURS
Refills: 0 | Status: COMPLETED | OUTPATIENT
Start: 2025-07-10 | End: 2025-07-11

## 2025-07-10 RX ORDER — SODIUM CHLORIDE 9 G/1000ML
1000 INJECTION, SOLUTION INTRAVENOUS ONCE
Refills: 0 | Status: COMPLETED | OUTPATIENT
Start: 2025-07-10 | End: 2025-07-10

## 2025-07-10 RX ORDER — SODIUM CHLORIDE 9 G/1000ML
1000 INJECTION, SOLUTION INTRAVENOUS
Refills: 0 | Status: DISCONTINUED | OUTPATIENT
Start: 2025-07-10 | End: 2025-07-13

## 2025-07-10 RX ORDER — METOCLOPRAMIDE HCL 10 MG
10 TABLET ORAL ONCE
Refills: 0 | Status: COMPLETED | OUTPATIENT
Start: 2025-07-10 | End: 2025-07-10

## 2025-07-10 RX ORDER — HYDROMORPHONE/SOD CHLOR,ISO/PF 2 MG/10 ML
1 SYRINGE (ML) INJECTION ONCE
Refills: 0 | Status: DISCONTINUED | OUTPATIENT
Start: 2025-07-10 | End: 2025-07-10

## 2025-07-10 RX ORDER — HYDROMORPHONE/SOD CHLOR,ISO/PF 2 MG/10 ML
0.5 SYRINGE (ML) INJECTION ONCE
Refills: 0 | Status: DISCONTINUED | OUTPATIENT
Start: 2025-07-10 | End: 2025-07-10

## 2025-07-10 RX ORDER — HYDROMORPHONE/SOD CHLOR,ISO/PF 2 MG/10 ML
0.5 SYRINGE (ML) INJECTION EVERY 4 HOURS
Refills: 0 | Status: DISCONTINUED | OUTPATIENT
Start: 2025-07-10 | End: 2025-07-12

## 2025-07-10 RX ORDER — ONDANSETRON HCL/PF 4 MG/2 ML
4 VIAL (ML) INJECTION EVERY 6 HOURS
Refills: 0 | Status: DISCONTINUED | OUTPATIENT
Start: 2025-07-10 | End: 2025-07-13

## 2025-07-10 RX ORDER — ESCITALOPRAM OXALATE 20 MG/1
1 TABLET ORAL
Refills: 0 | DISCHARGE

## 2025-07-10 RX ORDER — ESCITALOPRAM OXALATE 20 MG/1
30 TABLET ORAL
Refills: 0 | DISCHARGE

## 2025-07-10 RX ORDER — ENOXAPARIN SODIUM 100 MG/ML
40 INJECTION SUBCUTANEOUS EVERY 24 HOURS
Refills: 0 | Status: DISCONTINUED | OUTPATIENT
Start: 2025-07-10 | End: 2025-07-13

## 2025-07-10 RX ADMIN — Medication 0.5 MILLIGRAM(S): at 19:42

## 2025-07-10 RX ADMIN — Medication 0.5 MILLIGRAM(S): at 05:50

## 2025-07-10 RX ADMIN — Medication 1000 MILLIGRAM(S): at 20:39

## 2025-07-10 RX ADMIN — Medication 400 MILLIGRAM(S): at 13:51

## 2025-07-10 RX ADMIN — Medication 4 MILLIGRAM(S): at 02:43

## 2025-07-10 RX ADMIN — Medication 1000 MILLIGRAM(S): at 14:21

## 2025-07-10 RX ADMIN — Medication 0.5 MILLIGRAM(S): at 11:38

## 2025-07-10 RX ADMIN — Medication 4 MILLIGRAM(S): at 20:10

## 2025-07-10 RX ADMIN — Medication 0.5 MILLIGRAM(S): at 15:11

## 2025-07-10 RX ADMIN — Medication 4 MILLIGRAM(S): at 04:37

## 2025-07-10 RX ADMIN — Medication 100 MILLIEQUIVALENT(S): at 13:50

## 2025-07-10 RX ADMIN — SODIUM CHLORIDE 150 MILLILITER(S): 9 INJECTION, SOLUTION INTRAVENOUS at 15:11

## 2025-07-10 RX ADMIN — Medication 0.5 MILLIGRAM(S): at 12:08

## 2025-07-10 RX ADMIN — SODIUM CHLORIDE 1000 MILLILITER(S): 9 INJECTION, SOLUTION INTRAVENOUS at 11:07

## 2025-07-10 RX ADMIN — Medication 0.5 MILLIGRAM(S): at 08:34

## 2025-07-10 RX ADMIN — Medication 4 MILLIGRAM(S): at 14:01

## 2025-07-10 RX ADMIN — Medication 400 MILLIGRAM(S): at 20:09

## 2025-07-10 RX ADMIN — SODIUM CHLORIDE 1000 MILLILITER(S): 9 INJECTION, SOLUTION INTRAVENOUS at 00:27

## 2025-07-10 RX ADMIN — Medication 100 MILLIEQUIVALENT(S): at 11:08

## 2025-07-10 RX ADMIN — Medication 0.5 MILLIGRAM(S): at 20:12

## 2025-07-10 RX ADMIN — SODIUM CHLORIDE 100 MILLILITER(S): 9 INJECTION, SOLUTION INTRAVENOUS at 04:50

## 2025-07-10 RX ADMIN — Medication 100 MILLIEQUIVALENT(S): at 09:57

## 2025-07-10 RX ADMIN — Medication 1000 MILLIGRAM(S): at 09:34

## 2025-07-10 RX ADMIN — Medication 10 MILLIGRAM(S): at 00:30

## 2025-07-10 RX ADMIN — Medication 0.5 MILLIGRAM(S): at 04:50

## 2025-07-10 RX ADMIN — Medication 0.5 MILLIGRAM(S): at 09:34

## 2025-07-10 RX ADMIN — Medication 4 MILLIGRAM(S): at 00:27

## 2025-07-10 RX ADMIN — ENOXAPARIN SODIUM 40 MILLIGRAM(S): 100 INJECTION SUBCUTANEOUS at 05:15

## 2025-07-10 RX ADMIN — Medication 1 MILLIGRAM(S): at 06:01

## 2025-07-10 RX ADMIN — Medication 400 MILLIGRAM(S): at 08:34

## 2025-07-10 RX ADMIN — Medication 0.5 MILLIGRAM(S): at 15:41

## 2025-07-10 RX ADMIN — SODIUM CHLORIDE 100 MILLILITER(S): 9 INJECTION, SOLUTION INTRAVENOUS at 11:07

## 2025-07-10 NOTE — PATIENT PROFILE ADULT - HOW OFTEN DOES ANYONE, INCLUDING FAMILY AND FRIENDS, THREATEN YOU WITH HARM?
DCP

CM spoke to pt discussed dc plans. Pt is independent prior to admission, lives at home with 
spouse and 23 grandchildren. Pt has a walker he hardly uses, and shower chair. Denies any 
other equipments/services. Feels safe to go back home, spouse and daughter able to assist 
with transportation and needs as necessary. Pt agreeable for any [facility]/HH/SNF if 
necessary, telephone consent obtained WARD, witnessed by Melanie SCHAFER. Pt however verbalized as 
much as possible he prefers to go home and does not want to go to [facility]. DC plan to home 
vs w/HH or SNF. CM to continue to follow up.

-------------------------------------------------------------------------------

Addendum: 08/21/20 at 1415 by GERARDO DALY LVN CM

-------------------------------------------------------------------------------

Amended: Links added.
FEELS ANXIOUS, STATES HE NEEDS AN ANXIETY PILL LIKE THE ONE FROM YESTERDAY.  INFORMED SAUL WHITFIELD.

MEDICATED WITH VALIUM 5MG PO.
LABS

Pt refused am labs and ABG
Lopressor 12.5mg 2100 dose held this day and Lasix changed to 10mg IV daily.
Lopressor held. Blood Pressure 96/54. Will continue to monitor.
MEDS

SHIFT ASSESSMENT DONE, PLEASE REFER TO CHART. DUE MEDS ADMINISTERED, TOLERATED WELL. CALL 
LIGHT WITHIN REACH. WILL MONITOR PT. 

-------------------------------------------------------------------------------

Addendum: 08/26/20 at 2103 by RUBI HULL RN RN

-------------------------------------------------------------------------------

Amended: Links added.
OXYGENATION 

Pt was placed on ventimask by RT, pt called out to nurse's station stating mask was too 
uncomfortable and tight. Explained to pt the importance of wearing mask. pt insists on 
taking mask off and wearing NC. O2 via NC placed on pt @5L.pt denies further needs. RT 
notified of change. will cont to monitor
One unit of FFP completed with pt in stable condition.  No s/s of transfusion 

reaction noted.  Patient continued on 4L NC with POX between 92-97%.

No acute respiratory distress at this time. Following POC.
Patient cleared by primary team to be discharged home. Pt instructed to follow up with PCP 
and [facility] in forthcoming weeks. Pt sent home with Home O2 and verbalized 
understanding of DC information
RDSCREEN - LOS X 7



Pt admitted with Positive COVID-19. Pt History of Obesity, RAMONA, HTN. Heart Healthy diet 
order in place. Good PO intake. No report of GI distress. Vitamin C, Zinc in place. 
Monitored labs: , Alb 2.0, .



Recommend continue diet order

Recommend 60mL ProMod BID

Recommend 2000-2200mL Free H2O for hydration



RD to continue to monitor. Please notify as additional nutrition concerns arise. Thank you.
RE-ASSESS

RAD TECH IN TO DO X-RAY AT BEDSIDE. O2 SAT MAINTAINING AT 91-92 % ON 50% VENTI MASK. NO 
CONCERNS VERBALIZED BY PT. FOR MORE CARE.
ROUNDS

PT RESTING WELL, FAIRLY ASLEEP. NO DISTRESS NOTED. KEPT UNDISTURBED FOR NOW. WILL CONTINUE 
TO MONITOR. CALL LIGHT WITHIN REACH.
ROUNDS

PT RESTING WELL. NO DISTRESS NOTED. NO CONCERNS VERBALIZED. KEPT RESTED AND COMFORTABLE. 
CALL LIGHT WITHIN REACH. WILL MONITOR PT.
RT

PT'S O2 SATS ON PULSE OXIMETER=87-88%. PT IS ASYMPTOMATIC, NO NOTED DISTRESS. NO COMPLAINTS 
VERBALIZED. V/S MONITORED AND O2 SATS=90-91% ON V/S MACHINE. ALFRT, CALLED TO RE-ASSESS 
AND FIX PULSE OXIMETER. PT WAS PLACED ON 50% VENTI MASK AND PULSE OXIMETER FIXED WITH O2 
SATS=86%. WILL RE-ASSESS PT.
chart reviewed, anticipate dc home  with o2 when sats improve- goal 4 lnc

  transferred to second floor, report to CM

-------------------------------------------------------------------------------

Addendum: 09/02/20 at 1510 by ALBERT DANIELSON RN CM

-------------------------------------------------------------------------------

Amended: Links added.
no

## 2025-07-10 NOTE — PATIENT PROFILE ADULT - FALL HARM RISK - HARM RISK INTERVENTIONS

## 2025-07-10 NOTE — H&P ADULT - ASSESSMENT
39M Anxiety, Depression, UC s/p 2-stage j pouch surgery in 2008; Hx small bowel obstruction-requiring Ileostomy in 2023, J pouch re-do and DLI 6/25 s/p reversal (11/14/24) presenting with high grade SBO. Patient s/p NGt placement w/ 1.5L of output, and abdominal exam benign. Being admitted for decompression and bowel rest. Of note, in the ED cardiology was consulted for ST segment elevateion seen on EKG, however troponins within normal limits - low suspicion for cardiac event.    Plan:  - Admit to Dr. Golden  - NPO w/ IVF  - NGt to LCWS  - Pain control after abdominal exam (patient w/ history of chronic pain and narcotic use)  - Ambulate as tolerated  - DVT ppx: Lovenox, SCDs  - Transfer to [facility] [facility] service in AM    The above was discussed with Dr. Golden    [facility]  [id number]

## 2025-07-10 NOTE — ED ADULT NURSE NOTE - NSFALLUNIVINTERV_ED_ALL_ED
Bed/Stretcher in lowest position, wheels locked, appropriate side rails in place/Call bell, personal items and telephone in reach/Instruct patient to call for assistance before getting out of bed/chair/stretcher/Non-slip footwear applied when patient is off stretcher/[place] to call system/Physically safe environment - no spills, clutter or unnecessary equipment/Purposeful proactive rounding/Room/bathroom lighting operational, light cord in reach

## 2025-07-10 NOTE — RAPID RESPONSE TEAM SUMMARY - NSSITUATIONBACKGROUNDRRT_GEN_ALL_CORE
39M Anxiety, Depression, UC s/p 2-stage j pouch surgery in 2008; Hx small bowel obstruction-requiring Ileostomy in 2023, J pouch re-do and DLI 6/25 s/p reversal (11/14/24) presenting with high grade SBO. Patient s/p NGt placement w/ 1.5L of output, and abdominal exam benign. Being admitted for decompression and bowel rest. Of note, in the ED cardiology was consulted for ST segment elevateion seen on EKG, however troponins within normal limits - low suspicion for cardiac event. RRT called for family concerns of NGT malfunctioning and nursing had difficulty getting in touch with primary team.

## 2025-07-10 NOTE — CONSULT NOTE ADULT - ASSESSMENT
39 y/o male with PMHx of anxiety/depression, chronic pain, IBD s/p multiple prior bowel surgeries, who presents today for epigastric abdominal pain and nausea. Cardiology is consulted for abnormal EKG    - Abnormal EKG  - Abdominal pain    EKG shows sinus bradycardia with signs of LVH and concave ST-elevations in lateral leads. Patient is an avid cyclist and regularly bikes >50 miles, so these changes are likely reflective of an athlete's heart.    He denies any cardiac symptoms. Low suspicion for acute coronary syndrome.    RECOMMENDATIONS:  - follow-up CT A/P  - obtain TTE        Néstor Hammond (PGY 4)  Cardiology Fellow      Of note, these recommendations are preliminary. Case to be discussed with attending. Please see attending attestation for final recommendations.  41 y/o male with PMHx of anxiety/depression, chronic pain, IBD s/p multiple prior bowel surgeries, who presents today for epigastric abdominal pain and nausea. Cardiology is consulted for abnormal EKG    - Abnormal EKG  - Abdominal pain    EKG shows sinus bradycardia with signs of LVH and concave ST-elevations in lateral leads. Patient is an avid cyclist and regularly bikes >50 miles, so these changes are likely reflective of an athlete's heart.    He denies any cardiac symptoms. Low suspicion for acute coronary syndrome.    RECOMMENDATIONS:  - follow-up CT A/P  - obtain TTE  - repeat 12-lead EKG        Néstor Hammond (PGY 4)  Cardiology Fellow      Of note, these recommendations are preliminary. Case to be discussed with attending. Please see attending attestation for final recommendations.  39 y/o male with PMHx of anxiety/depression, chronic pain, IBD s/p multiple prior bowel surgeries, who presents today for epigastric abdominal pain and nausea. Cardiology is consulted for abnormal EKG    - Abnormal EKG  - Abdominal pain    EKG shows sinus bradycardia with signs of LVH and concave ST-elevations in lateral leads. Patient is an avid cyclist and regularly bikes >50 miles, so these changes are likely reflective of an athlete's heart.    He denies any cardiac symptoms. Low suspicion for acute coronary syndrome.    RECOMMENDATIONS:  - follow-up CT A/P  - obtain TTE  - repeat 12-lead EKG        Néstor Hammond (PGY 4)  Cardiology Fellow      Of note, these recommendations are preliminary. Case to be discussed with attending. Please see attending attestation for final recommendations.     This patient was seen and examined personally by me and the plan was discussed with the fellow and/or resident above. Amendments were made as necessary to the above. Agree with the excellent note and plan above. 40M here w SBO and ecg changes. 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 TTE pending.     45 minutes were spent on this encounter for extensive review of medical record details including labs and/or imaging studies and/or adjacent care team and consultant records, as well as review and reconciliation of current medications. Time was spent on obtaining a history, performing physical examination of patient, and answering patient and/or family questions regarding plan of care. Time was also spent discussing plan of care with patient’s other care team members including primary and consulting teams. Time also was spent on documentation of this encounter into the EHR.    Brian Hamilton MD, MPhil, [facility]  Cardiologist, [facility]  ; [facility] and [facility]  Email: [email or web address] Cardiology and Cardiovascular Surgery on-service contact/call information, go to [email or web address] and use "[email or web address]" to login.  Outpatient Cardiology appointments, call [phone] to arrange with a colleague; I do not have outpatient Cardiology clinic.

## 2025-07-10 NOTE — PATIENT PROFILE ADULT - FALL HARM RISK - FACTORS
Infectious Diseases Consultation    Date:  4/4/2023  Requesting physician:  Dr De La Rosa   Reason for consultation:  Intra-abdominal abscess in setting of Crohn's exacerbation    HPI:  Ms Damon is a 26 year old female who was admitted on 4/2/2023 with complaints of abdominal pain.  The patient has a history of Crohn's disease on imuran and humira with recent prednisone taper due to flare.  She started to developed RLQ abdominal pain as she was coming off the prednisone with a taper and have worsening pain over the course of about 2 weeks in the area.  She had associated loose stools but no hematochezia.  Upon coming to the ER, she had WBC of 11,600.  She underwent CT of abd/pelvis showing dilated appendix with loculated complex fluid collection consistent with ruptured appendix vs Crohn's exacerbation with likely ileus as well.  She was admitted and placed on zosyn.  IR evaluated and placed drain yesterday with gram stain polymicrobial.  We are asked to evaluate.    When seen today, the patient states she has ongoing abdominal pain no significant change compared to prior really.  She has been dealing with R abdominal pain even with the 2 months of prednisone she was on.  Denies any overt fevers but some chills.  She states with tapering of the prednisone she had return of the prior \"9-10/10 pain\" from before.  She had followed with Dr Ahmadi and now plans to follow with new GI specialist here.  She denies any rashes.  Has some constipation related to the ileus noted.  Denies any nausea or emesis at this time.  No dyspnea, cough.    ROS:  As above, remainder of review unremarkable.    Past History  Past Medical History:   Diagnosis Date   • Acid reflux    • Anemia    • Crohn's disease (CMD)      Past Surgical History:   Procedure Laterality Date   • Colonoscopy     • Colonoscopy  03/03/2022    crohns disease w/mild inflamation terminal ileum   • Colonoscopy w/ biopsies N/A 08/03/2021    Repeat colonoscopy in 2 year  d/t to Crohn's   • Esophagogastroduodenoscopy transoral flex w/bx single or mult N/A 08/03/2021   • Hernia repair      as a kid   • [place] tooth extraction       Current Medications  Current Facility-Administered Medications   Medication Dose Route Frequency Provider Last Rate Last Admin   • sodium chloride (PF) 0.9 % injection 2 mL  2 mL Intracatheter 2 times per day Taj Nicolas MD       • heparin (porcine) injection 5,000 Units  5,000 Units Subcutaneous 3 times per day Taj Nicolas MD       • Potassium Standard Replacement Protocol (Levels 3.5 and lower)   Does not apply See Admin Instructions Taj Nicolas MD       • Potassium Replacement (Levels 3.6 - 4)   Does not apply See Admin Instructions Taj Nicolas MD       • Magnesium Standard Replacement Protocol   Does not apply See Admin Instructions Taj Nicolas MD       • piperacillin-tazobactam (ZOSYN) 4.5 g in sodium chloride 0.9 % 100 mL IVPB  4.5 g Intravenous 3 times per day Taj Nicolas MD   Completed at 04/04/23 0647     Allergies  ALLERGIES:   Allergen Reactions   • Bee Sting SWELLING     Localized swelling      Social History  Social History     Socioeconomic History   • Marital status: Single     Spouse name: Not on file   • Number of children: 0   • Years of education: Not on file   • Highest education level: Not on file   Occupational History   • Occupation: School-RN to NP   Tobacco Use   • Smoking status: Never   • Smokeless tobacco: Never   Vaping Use   • Vaping Use: never used   Substance and Sexual Activity   • Alcohol use: Yes     Comment: rare   • Drug use: No   • Sexual activity: Yes     Partners: Male     Birth control/protection: Pill   Other Topics Concern   • Not on file   Social History Narrative    Lives with her roommate Shelly. 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      Social Determinants of Health     Financial Resource Strain: Low Risk    • Social Determinants: Financial Resource Strain: None   Food Insecurity: No Food Insecurity    • Social Determinants: Food Insecurity: Never   Transportation Needs: No Transportation Needs   • Lack of Transportation (Medical): No   • Lack of Transportation (Non-Medical): No   Physical Activity: Not on file   Stress: Not on file   Social Connections: Socially Integrated   • Social Determinants: Social Connections: 5 or more times a week   Intimate Partner Violence: Not At Risk   • Social Determinants: Intimate Partner Violence Past Fear: No   • Social Determinants: Intimate Partner Violence Current Fear: No     Family History  Family History   Problem Relation Age of Onset   • Hyperlipidemia Mother    • Hyperthyroid Mother    • Rheumatoid Arthritis Mother    • Sleep Apnea Father    • Psoriasis Father    • Cancer, Prostate Maternal Grandfather    • Cancer Maternal Grandfather         Bone   • Dementia/Alzheimers Paternal Grandmother    • Cancer, Endometrial Paternal Grandmother    • Cancer, Lung Paternal Grandfather        Physical Exam  Visit Vitals  /74 (BP Location: RUE - Right upper extremity, Patient Position: Sitting)   Pulse (!) 54   Temp 97.5 °F (36.4 °C) (Oral)   Resp 16   Ht 5' 6\" (1.676 m)   Wt 72.4 kg (159 lb 9.6 oz)   LMP 11/21/2022 (Exact Date)   SpO2 97%   BMI 25.76 kg/m²     Temp:  [97.5 °F (36.4 °C)-98.2 °F (36.8 °C)] 97.5 °F (36.4 °C)  Heart Rate:  [50-68] 54  Resp:  [14-24] 16  BP: (108-126)/(53-77) 116/74  I/O last 3 completed shifts:  In: 2833.3 [P.O.:200; I.V.:2366.7; IV Piggyback:266.7]  Out: 0   I/O this shift:  In: -   Out: 2.5 [Drains:2.5]    General:  A 26 year old female in NAD, upright on cough   HEENT:  NCAT, sclera without icterus, no conjunctival injeciton. External ears/nares normal.  No oral lesions  Neck:  Supple   Cardiac:  RRR with S1, S2.  No murmur appreciated.    Lungs:  CTA bilaterally.  Currently on room air  Abdomen:  Soft, RLQ AGUEDA drain in place with scant sanguinous fluid.  There is diffuse abdominal tenderness of the lower abdomen especially even with light  palpation  Extremities:  no peripheral edema.  No joint effusions noted. No cyanosis.   Integumentary:  No rash noted throughout   Neuro:  Appears to be alert and oriented, grossly non-focal exam with equal limb movement bilaterally  Psych:  Answers questions appropriately, pleasant and cooperative with normal affect  Lines:  PIV ok    Labs  Recent Labs   Lab 23  0530 23  1224   WBC 11.8* 11.6*   RBC 3.47* 3.65*   HGB 10.8* 11.5*   HCT 32.9* 35.3*    475*     Recent Labs   Lab 23  0530 23  1232 23  1224   SODIUM 137  --  137   CHLORIDE 104  --  102   CO2 24  --  26   BUN 8  --  10   CREATININE 0.54 0.60 0.61   CALCIUM 8.6  --  8.8   ALBUMIN  --   --  2.9*   BILIRUBIN  --   --  0.2   ALKPT  --   --  72   GPT  --   --  19   AST  --   --  9   GLUCOSE 128*  --  90       Micro Data:  4/3 abdominal abscess:  Many PMns, few GPC, mod GNB.  Cx P  Fungal - smear neg, P  AFB - P    Imagin/2 CT abd/pelvis - personally reviewed  IMPRESSION:  1.  Dilated fluid-filled appendix with associated extensive inflammation  within the right lower quadrant and right pelvis. Tip of the appendix is  not identified. Instead multiple loculated complex appearing fluid  collections are suspicious for developing abscesses. The findings are  concerning for ruptured appendicitis. Additionally, the patient has a  history of Crohn's disease and Crohn's exacerbation is also under  consideration.   2.  Significantly thick-walled terminal ileum with surrounding soft tissue  stranding and cecum are likely reactive. Upstream to the terminal ileum the  small bowel is prominent with air-fluid levels, suggestive of ileus due to  ongoing process.  3.  Multiple up to mildly enlarged lymph nodes within the right lower  quadrant and right pelvis, favored to be reactive.    Assessment  1.  A 26 year old female with mild leukocytosis secondary to RLQ abdominal abscess in setting of Crohn's disease exacerbation  - CT as  above with fluid collection and dilated appendix with appendicitis or just Crohn's involving the appendix given terminal ileum and cecum inflammatory changes.   - s/p IR drainage with GPC and GNB on stain.  Cx P  - ongoing pain / discomfort which has been more or less chronic given her disease.    2.  Leukocytosis - due to #1.  Last check 11,800    3.  Crohn's disease - has been on imuran and humira, difficult to control.  GI following    4.  Anemia - Hgb 10.8 last check 4/3.    Recommendations  - no issue with zosyn at this time  - follow WBC and baseline CRP  - Crohn's management per GI  - anticipate midline and week or 2 of rocephin / PO flagyl at discharge.  She can do this at home and will ask SW to evaluate.     Staffed with Dr. White.  Agrees with above assessment and recommendations.  Thanks for the consult!    I personally spent a total of 65 minutes which includes face-to-face time and non-face-to-face time which includes time preparing to see the patient, reviewing prior notes / testing, obtaining history from patient, counseling and educating the patient, and documenting information in the electronic medical record.   Mick Melendez PA-C  4/4/2023      ID Staff:  Labs, XRay and vitals personally reviewed  Relevant notes reviewed  Above discussed with PA and A/P created in collaboration     PABLO White DO     IV and/or equipment tethered to patient/Other

## 2025-07-10 NOTE — H&P ADULT - HISTORY OF PRESENT ILLNESS
40M Anxiety, Depression, UC s/p 2-stage j pouch surgery in 2008; Hx small bowel obstruction-requiring Ileostomy in 2023, J pouch re-do and DLI 6/25 s/p reversal (11/14/24) presenting with abdominal pain. He reports feeling fine this AM, was able to exercise as normal. After returning from bike ride, developed pain that felt like 'gas' pain. Went for a walk to help alleviate his symptoms, but the pain worsened. He additionally developed nausea and vomiting. Not passing gas since this AM. He had one small bowel movement in the ED.. Still reporting cramping abdominal pain and nausea. Denies fevers, chills, SOB, and sick contacts.

## 2025-07-10 NOTE — H&P ADULT - NSHPPHYSICALEXAM_GEN_ALL_CORE
CONSTITUTIONAL: Well groomed, no apparent distress  EYES: PERRLA and symmetric, EOMI, No conjunctival or scleral injection, non-icteric  ENMT: Oral mucosa with moist membranes. Normal dentition  RESP: No respiratory distress, no use of accessory muscles, on RA  CV: Regular rate, normotensive  GI: Soft, nondistended, nontender, well healed midline incision and ileostomy reversal site  SKIN: No rashes or ulcers noted; no subcutaneous nodules or induration palpable  NEURO: CN II-XII intact; on gross examination  PSYCH: Appropriate insight/judgment; A+O x 3, mood and affect appropriate, recent/remote memory intact

## 2025-07-10 NOTE — ED ADULT NURSE NOTE - OBJECTIVE STATEMENT
39 y/o m A/Ox4 PMH surgical hx of J-pouch, SBO presented to the ED via walk-in triage with complaints of upper abdominal pain. Patient states that the abdominal pain is associated with nausea and vomiting. Patient states he went bike riding this morning and was fine but as the day went on the abdominal pain progressed, leading to nausea and one episode of vomiting. Patient states he has not been able to tolerate PO at this time. Patient denies being able to pass gas but states he had a small bowel movement in the afternoon. Patient's respirations are even and unlabored, patient denies chest pain, SOB, and urinary symptoms. Patient is able to move all extremities evenly, neuro and sensation are intact. Safety measures in place, bed locked, lowered, and side rails raised. All procedures performed as per policy.

## 2025-07-10 NOTE — RAPID RESPONSE TEAM SUMMARY - NSADDTLFINDINGSRRT_GEN_ALL_CORE
Upon arrival to RRT, patient was hemodynamically stable. Was endorsing significant abdominal pain. Nursing and family report decreased fluid output from NGT that was placed to suction. Once contact was made with primary surgical team they arrived at bedside and RRT was ended. Surgery to manage NGT.

## 2025-07-10 NOTE — CONSULT NOTE ADULT - SUBJECTIVE AND OBJECTIVE BOX
39 y/o male with PMHx of anxiety/depression, chronic pain, IBD s/p multiple prior bowel surgeries, who presents today for epigastric abdominal pain and nausea. Cardiology is consulted for abnormal EKG    At baseline, he is an avid cyclist and frequent bikes >50 miles. Earlier today, he had gone for a bike ride, and returned home around 12 pm. He then went for a walk and grabbed a coffee. Around 1 or 2 pm, he began experiencing a constant, epigastric pain that feels like "gas" or "intestinal blockage". Also endorses some nausea. He tried to self-induce vomiting to get rid of the pain, but its persistent. He denies any chest pain, shortness of breath, diaphoresis, palpitations or loss of consciousness. Given severity of pain, he presented to the ED for further work-up.    PAST MEDICAL & SURGICAL HISTORY:  Anxiety and depression  Chronic pain  Ulcerative colitis  Insomnia  Fracture of elbow  History of hand fracture  H/O small bowel obstruction  Ileostomy present  H/O ileostomy  H/O total colectomy    FAMILY HISTORY: relevant information in HPI    SOCIAL HISTORY  - no tobacco use  - daily 1 beer use  - testosterone use for many years    MEDICATIONS:  - testosterone  - stelara (recently discontinue for elevated LFTs)  - lexapro  - trazodone  - mirtazapine  - gabape  -------------------------------------------------------------------------------------------  PHYSICAL EXAM:  T(C): 37 (25 @ 23:20), Max: 37 (25 @ 23:20)  HR: 60 (25 @ 23:20) (49 - 60)  BP: 113/71 (25 @ 23:20) (113/71 - 132/86)  RR: 20 (25 @ 23:20) (17 - 20)  SpO2: 99% (25 @ 23:20) (98% - 99%)    GENERAL: no acute distress  NECK: JVP is NOT elevated  CHEST/LUNG: clear to auscultation bilaterally, unlabored breathing  HEART: bradycardia, no murmurs.  ABDOMEN: soft, mild tenderness to palpation in the LUQ and LLQ  EXTREMITIES:  warm, no LE edema    -------------------------------------------------------------------------------------------  RELEVANT LABS:    WBC 10  Hgb 16  Plt 236    trop 22 -> 13  Cr 1.9      ALT 67    lipase 32      CK-MB 36.4  CPK mass assay 4.1    -------------------------------------------------------------------------------------------  RELEVANT IMAGING:    EC/9/25:  sinus bradycardia with signs of LVH and concave ST-elevations in lateral leads.     Echo: pending    -------------------------------------------------------------------------------------------                 41 y/o male with PMHx of anxiety/depression, chronic pain, IBD s/p multiple prior bowel surgeries, who presents today for epigastric abdominal pain and nausea. Cardiology is consulted for abnormal EKG    At baseline, he is an avid cyclist and frequent bikes >50 miles. Earlier today, he had gone for a bike ride, and returned home around 12 pm. He then went for a walk and grabbed a coffee. Around 1 or 2 pm, he began experiencing a constant, epigastric pain that feels like "gas" or "intestinal blockage". Also endorses some nausea. He tried to self-induce vomiting to get rid of the pain, but its persistent. He denies any chest pain, shortness of breath, diaphoresis, palpitations or loss of consciousness. Given severity of pain, he presented to the ED for further work-up.    Has been taking testosterone supplements for many years, otherwise no drug use    Was on Stelara for IBD, but it was recently discontinued due to elevated LFTs.    PAST MEDICAL & SURGICAL HISTORY:  Anxiety and depression  Chronic pain  Ulcerative colitis  Insomnia  Fracture of elbow  History of hand fracture  H/O small bowel obstruction  Ileostomy present  H/O ileostomy  H/O total colectomy    FAMILY HISTORY: relevant information in HPI    SOCIAL HISTORY  - no tobacco use  - daily 1 beer use  - testosterone use for many years    MEDICATIONS:  - testosterone  - stelara (recently discontinue for elevated LFTs)  - lexapro  - trazodone  - mirtazapine  - gabape  -------------------------------------------------------------------------------------------  PHYSICAL EXAM:  T(C): 37 (25 @ 23:20), Max: 37 (25 @ 23:20)  HR: 60 (25 @ 23:20) (49 - 60)  BP: 113/71 (25 @ 23:20) (113/71 - 132/86)  RR: 20 (25 @ 23:20) (17 - 20)  SpO2: 99% (25 @ 23:20) (98% - 99%)    GENERAL: no acute distress  NECK: JVP is NOT elevated  CHEST/LUNG: clear to auscultation bilaterally, unlabored breathing  HEART: bradycardia, no murmurs.  ABDOMEN: soft, mild tenderness to palpation in the LUQ and LLQ  EXTREMITIES:  warm, no LE edema    -------------------------------------------------------------------------------------------  RELEVANT LABS:    WBC 10  Hgb 16  Plt 236    trop 22 -> 13  Cr 1.9      ALT 67    lipase 32      CK-MB 36.4  CPK mass assay 4.1    -------------------------------------------------------------------------------------------  RELEVANT IMAGING:    EC/9/25:  sinus bradycardia with signs of LVH and concave ST-elevations in lateral leads.     Echo: pending    -------------------------------------------------------------------------------------------

## 2025-07-10 NOTE — PATIENT PROFILE ADULT - FALL HARM RISK - ATTEMPT OOB
Spoke with patient and she was informed of scheduling process. Patient verbalized understanding.   
No
No

## 2025-07-10 NOTE — H&P ADULT - NSHPLABSRESULTS_GEN_ALL_CORE
FINDINGS:  LOWER CHEST: Within normal limits.    LIVER: Incomplete visualization of the liver dome. Visual portion is   within normal limits.  BILE DUCTS: Normal caliber.  GALLBLADDER: Within normal limits.  SPLEEN: Within normal limits.  PANCREAS: Withinnormal limits.  ADRENALS: Within normal limits.  KIDNEYS/URETERS: Within normal limits.    BLADDER: Within normal limits.  REPRODUCTIVE ORGANS: Prostate is enlarged.    BOWEL: Significantly dilated bowel loops in the upper abdomen with   transition point in the left hemiabdomen (series 9 image 44, series 8   image 22). There is fecalization of the proximal small bowel loop. Status   post low colonic anastomosis that appears grossly intact.  PERITONEUM/RETROPERITONEUM: Within normal limits.  VESSELS: Within normal limits.  LYMPH NODES: No lymphadenopathy.  ABDOMINAL WALL: Within normal limits.  BONES: Within normal limits.    IMPRESSION:  Small bowel obstruction with transition point in the left hemiabdomen   (4:70). There is fecalization of the proximal small bowel loop,   concerning for high-grade obstruction.

## 2025-07-11 ENCOUNTER — RESULT REVIEW (OUTPATIENT)
Age: 40
End: 2025-07-11

## 2025-07-11 LAB
ADD ON TEST-SPECIMEN IN LAB: SIGNIFICANT CHANGE UP
ANION GAP SERPL CALC-SCNC: 16 MMOL/L — SIGNIFICANT CHANGE UP (ref 5–17)
BUN SERPL-MCNC: 18 MG/DL — SIGNIFICANT CHANGE UP (ref 7–23)
CALCIUM SERPL-MCNC: 9.3 MG/DL — SIGNIFICANT CHANGE UP (ref 8.4–10.5)
CHLORIDE SERPL-SCNC: 100 MMOL/L — SIGNIFICANT CHANGE UP (ref 96–108)
CO2 SERPL-SCNC: 26 MMOL/L — SIGNIFICANT CHANGE UP (ref 22–31)
CREAT SERPL-MCNC: 0.94 MG/DL — SIGNIFICANT CHANGE UP (ref 0.5–1.3)
CRP SERPL-MCNC: 33 MG/L — HIGH (ref 0–4)
EGFR: 105 ML/MIN/1.73M2 — SIGNIFICANT CHANGE UP
EGFR: 105 ML/MIN/1.73M2 — SIGNIFICANT CHANGE UP
GLUCOSE SERPL-MCNC: 77 MG/DL — SIGNIFICANT CHANGE UP (ref 70–99)
HCT VFR BLD CALC: 46.8 % — SIGNIFICANT CHANGE UP (ref 39–50)
HGB BLD-MCNC: 15.1 G/DL — SIGNIFICANT CHANGE UP (ref 13–17)
MAGNESIUM SERPL-MCNC: 1.9 MG/DL — SIGNIFICANT CHANGE UP (ref 1.6–2.6)
MCHC RBC-ENTMCNC: 31.1 PG — SIGNIFICANT CHANGE UP (ref 27–34)
MCHC RBC-ENTMCNC: 32.3 G/DL — SIGNIFICANT CHANGE UP (ref 32–36)
MCV RBC AUTO: 96.3 FL — SIGNIFICANT CHANGE UP (ref 80–100)
NRBC # BLD AUTO: 0 K/UL — SIGNIFICANT CHANGE UP (ref 0–0)
NRBC # FLD: 0 K/UL — SIGNIFICANT CHANGE UP (ref 0–0)
NRBC BLD AUTO-RTO: 0 /100 WBCS — SIGNIFICANT CHANGE UP (ref 0–0)
PHOSPHATE SERPL-MCNC: 2.7 MG/DL — SIGNIFICANT CHANGE UP (ref 2.5–4.5)
PLATELET # BLD AUTO: 243 K/UL — SIGNIFICANT CHANGE UP (ref 150–400)
PMV BLD: 10 FL — SIGNIFICANT CHANGE UP (ref 7–13)
POTASSIUM SERPL-MCNC: 3.8 MMOL/L — SIGNIFICANT CHANGE UP (ref 3.5–5.3)
POTASSIUM SERPL-SCNC: 3.8 MMOL/L — SIGNIFICANT CHANGE UP (ref 3.5–5.3)
RBC # BLD: 4.86 M/UL — SIGNIFICANT CHANGE UP (ref 4.2–5.8)
RBC # FLD: 12.1 % — SIGNIFICANT CHANGE UP (ref 10.3–14.5)
SODIUM SERPL-SCNC: 142 MMOL/L — SIGNIFICANT CHANGE UP (ref 135–145)
WBC # BLD: 9.77 K/UL — SIGNIFICANT CHANGE UP (ref 3.8–10.5)
WBC # FLD AUTO: 9.77 K/UL — SIGNIFICANT CHANGE UP (ref 3.8–10.5)

## 2025-07-11 PROCEDURE — 82550 ASSAY OF CK (CPK): CPT

## 2025-07-11 PROCEDURE — 84484 ASSAY OF TROPONIN QUANT: CPT

## 2025-07-11 PROCEDURE — 82553 CREATINE MB FRACTION: CPT

## 2025-07-11 PROCEDURE — 74270 X-RAY XM COLON 1CNTRST STD: CPT | Mod: 26

## 2025-07-11 PROCEDURE — 74270 X-RAY XM COLON 1CNTRST STD: CPT

## 2025-07-11 PROCEDURE — 74177 CT ABD & PELVIS W/CONTRAST: CPT

## 2025-07-11 PROCEDURE — 36415 COLL VENOUS BLD VENIPUNCTURE: CPT

## 2025-07-11 PROCEDURE — 84100 ASSAY OF PHOSPHORUS: CPT

## 2025-07-11 PROCEDURE — 93356 MYOCRD STRAIN IMG SPCKL TRCK: CPT

## 2025-07-11 PROCEDURE — 93306 TTE W/DOPPLER COMPLETE: CPT | Mod: 26

## 2025-07-11 PROCEDURE — 80053 COMPREHEN METABOLIC PANEL: CPT

## 2025-07-11 PROCEDURE — 85610 PROTHROMBIN TIME: CPT

## 2025-07-11 PROCEDURE — 85730 THROMBOPLASTIN TIME PARTIAL: CPT

## 2025-07-11 PROCEDURE — 71045 X-RAY EXAM CHEST 1 VIEW: CPT

## 2025-07-11 PROCEDURE — 80048 BASIC METABOLIC PNL TOTAL CA: CPT

## 2025-07-11 PROCEDURE — 83880 ASSAY OF NATRIURETIC PEPTIDE: CPT

## 2025-07-11 PROCEDURE — 86140 C-REACTIVE PROTEIN: CPT

## 2025-07-11 PROCEDURE — 85027 COMPLETE CBC AUTOMATED: CPT

## 2025-07-11 PROCEDURE — 99232 SBSQ HOSP IP/OBS MODERATE 35: CPT

## 2025-07-11 PROCEDURE — 82962 GLUCOSE BLOOD TEST: CPT

## 2025-07-11 PROCEDURE — 83735 ASSAY OF MAGNESIUM: CPT

## 2025-07-11 PROCEDURE — 83605 ASSAY OF LACTIC ACID: CPT

## 2025-07-11 RX ORDER — SODIUM CHLORIDE 9 G/1000ML
500 INJECTION, SOLUTION INTRAVENOUS ONCE
Refills: 0 | Status: COMPLETED | OUTPATIENT
Start: 2025-07-11 | End: 2025-07-11

## 2025-07-11 RX ORDER — LORAZEPAM 4 MG/ML
0.5 VIAL (ML) INJECTION EVERY 6 HOURS
Refills: 0 | Status: DISCONTINUED | OUTPATIENT
Start: 2025-07-11 | End: 2025-07-13

## 2025-07-11 RX ORDER — LORAZEPAM 4 MG/ML
0.5 VIAL (ML) INJECTION ONCE
Refills: 0 | Status: DISCONTINUED | OUTPATIENT
Start: 2025-07-11 | End: 2025-07-11

## 2025-07-11 RX ORDER — MAGNESIUM SULFATE 500 MG/ML
1 SYRINGE (ML) INJECTION ONCE
Refills: 0 | Status: COMPLETED | OUTPATIENT
Start: 2025-07-11 | End: 2025-07-11

## 2025-07-11 RX ADMIN — ENOXAPARIN SODIUM 40 MILLIGRAM(S): 100 INJECTION SUBCUTANEOUS at 06:09

## 2025-07-11 RX ADMIN — SODIUM CHLORIDE 1000 MILLILITER(S): 9 INJECTION, SOLUTION INTRAVENOUS at 09:57

## 2025-07-11 RX ADMIN — Medication 0.5 MILLIGRAM(S): at 13:30

## 2025-07-11 RX ADMIN — Medication 400 MILLIGRAM(S): at 01:58

## 2025-07-11 RX ADMIN — Medication 0.5 MILLIGRAM(S): at 20:40

## 2025-07-11 RX ADMIN — Medication 100 GRAM(S): at 11:24

## 2025-07-11 NOTE — PROVIDER CONTACT NOTE (OTHER) - RECOMMENDATIONS
instructed Pt and family at bedside to not give any liquids or drink any water. It will alter output measurements.

## 2025-07-12 ENCOUNTER — TRANSCRIPTION ENCOUNTER (OUTPATIENT)
Age: 40
End: 2025-07-12

## 2025-07-12 LAB
ANION GAP SERPL CALC-SCNC: 13 MMOL/L — SIGNIFICANT CHANGE UP (ref 5–17)
BASOPHILS # BLD AUTO: 0.01 K/UL — SIGNIFICANT CHANGE UP (ref 0–0.2)
BASOPHILS NFR BLD AUTO: 0.1 % — SIGNIFICANT CHANGE UP (ref 0–2)
BUN SERPL-MCNC: 17 MG/DL — SIGNIFICANT CHANGE UP (ref 7–23)
CALCIUM SERPL-MCNC: 9.1 MG/DL — SIGNIFICANT CHANGE UP (ref 8.4–10.5)
CHLORIDE SERPL-SCNC: 98 MMOL/L — SIGNIFICANT CHANGE UP (ref 96–108)
CO2 SERPL-SCNC: 24 MMOL/L — SIGNIFICANT CHANGE UP (ref 22–31)
CREAT SERPL-MCNC: 0.89 MG/DL — SIGNIFICANT CHANGE UP (ref 0.5–1.3)
CRP SERPL-MCNC: 59 MG/L — HIGH (ref 0–4)
EGFR: 111 ML/MIN/1.73M2 — SIGNIFICANT CHANGE UP
EGFR: 111 ML/MIN/1.73M2 — SIGNIFICANT CHANGE UP
EOSINOPHIL # BLD AUTO: 0.01 K/UL — SIGNIFICANT CHANGE UP (ref 0–0.5)
EOSINOPHIL NFR BLD AUTO: 0.1 % — SIGNIFICANT CHANGE UP (ref 0–6)
GLUCOSE SERPL-MCNC: 84 MG/DL — SIGNIFICANT CHANGE UP (ref 70–99)
HCT VFR BLD CALC: 43.5 % — SIGNIFICANT CHANGE UP (ref 39–50)
HGB BLD-MCNC: 14.1 G/DL — SIGNIFICANT CHANGE UP (ref 13–17)
IMM GRANULOCYTES # BLD AUTO: 0.02 K/UL — SIGNIFICANT CHANGE UP (ref 0–0.07)
IMM GRANULOCYTES NFR BLD AUTO: 0.2 % — SIGNIFICANT CHANGE UP (ref 0–0.9)
LYMPHOCYTES # BLD AUTO: 0.81 K/UL — LOW (ref 1–3.3)
LYMPHOCYTES NFR BLD AUTO: 9.8 % — LOW (ref 13–44)
MAGNESIUM SERPL-MCNC: 2 MG/DL — SIGNIFICANT CHANGE UP (ref 1.6–2.6)
MCHC RBC-ENTMCNC: 31 PG — SIGNIFICANT CHANGE UP (ref 27–34)
MCHC RBC-ENTMCNC: 32.4 G/DL — SIGNIFICANT CHANGE UP (ref 32–36)
MCV RBC AUTO: 95.6 FL — SIGNIFICANT CHANGE UP (ref 80–100)
MONOCYTES # BLD AUTO: 0.6 K/UL — SIGNIFICANT CHANGE UP (ref 0–0.9)
MONOCYTES NFR BLD AUTO: 7.2 % — SIGNIFICANT CHANGE UP (ref 2–14)
NEUTROPHILS # BLD AUTO: 6.85 K/UL — SIGNIFICANT CHANGE UP (ref 1.8–7.4)
NEUTROPHILS NFR BLD AUTO: 82.6 % — HIGH (ref 43–77)
NRBC # BLD AUTO: 0 K/UL — SIGNIFICANT CHANGE UP (ref 0–0)
NRBC # FLD: 0 K/UL — SIGNIFICANT CHANGE UP (ref 0–0)
NRBC BLD AUTO-RTO: 0 /100 WBCS — SIGNIFICANT CHANGE UP (ref 0–0)
PHOSPHATE SERPL-MCNC: 2.5 MG/DL — SIGNIFICANT CHANGE UP (ref 2.5–4.5)
PLATELET # BLD AUTO: 221 K/UL — SIGNIFICANT CHANGE UP (ref 150–400)
PMV BLD: 10.3 FL — SIGNIFICANT CHANGE UP (ref 7–13)
POTASSIUM SERPL-MCNC: 3.6 MMOL/L — SIGNIFICANT CHANGE UP (ref 3.5–5.3)
POTASSIUM SERPL-SCNC: 3.6 MMOL/L — SIGNIFICANT CHANGE UP (ref 3.5–5.3)
RBC # BLD: 4.55 M/UL — SIGNIFICANT CHANGE UP (ref 4.2–5.8)
RBC # FLD: 12.3 % — SIGNIFICANT CHANGE UP (ref 10.3–14.5)
SODIUM SERPL-SCNC: 135 MMOL/L — SIGNIFICANT CHANGE UP (ref 135–145)
WBC # BLD: 8.3 K/UL — SIGNIFICANT CHANGE UP (ref 3.8–10.5)
WBC # FLD AUTO: 8.3 K/UL — SIGNIFICANT CHANGE UP (ref 3.8–10.5)

## 2025-07-12 PROCEDURE — 99233 SBSQ HOSP IP/OBS HIGH 50: CPT

## 2025-07-12 RX ORDER — MIRTAZAPINE 30 MG/1
7.5 TABLET, FILM COATED ORAL AT BEDTIME
Refills: 0 | Status: DISCONTINUED | OUTPATIENT
Start: 2025-07-12 | End: 2025-07-13

## 2025-07-12 RX ORDER — TRAZODONE HCL 100 MG
150 TABLET ORAL AT BEDTIME
Refills: 0 | Status: DISCONTINUED | OUTPATIENT
Start: 2025-07-12 | End: 2025-07-13

## 2025-07-12 RX ORDER — GABAPENTIN 400 MG/1
900 CAPSULE ORAL THREE TIMES A DAY
Refills: 0 | Status: DISCONTINUED | OUTPATIENT
Start: 2025-07-12 | End: 2025-07-13

## 2025-07-12 RX ORDER — ESCITALOPRAM OXALATE 20 MG/1
30 TABLET ORAL DAILY
Refills: 0 | Status: DISCONTINUED | OUTPATIENT
Start: 2025-07-12 | End: 2025-07-13

## 2025-07-12 RX ORDER — MELATONIN 5 MG
1 TABLET ORAL AT BEDTIME
Refills: 0 | Status: DISCONTINUED | OUTPATIENT
Start: 2025-07-12 | End: 2025-07-13

## 2025-07-12 RX ADMIN — SODIUM CHLORIDE 100 MILLILITER(S): 9 INJECTION, SOLUTION INTRAVENOUS at 18:24

## 2025-07-12 RX ADMIN — ENOXAPARIN SODIUM 40 MILLIGRAM(S): 100 INJECTION SUBCUTANEOUS at 04:59

## 2025-07-12 RX ADMIN — ESCITALOPRAM OXALATE 30 MILLIGRAM(S): 20 TABLET ORAL at 18:23

## 2025-07-12 RX ADMIN — SODIUM CHLORIDE 150 MILLILITER(S): 9 INJECTION, SOLUTION INTRAVENOUS at 01:38

## 2025-07-12 RX ADMIN — Medication 0.5 MILLIGRAM(S): at 08:43

## 2025-07-12 RX ADMIN — Medication 1000 MILLIGRAM(S): at 02:28

## 2025-07-12 RX ADMIN — Medication 0.5 MILLIGRAM(S): at 18:23

## 2025-07-12 RX ADMIN — SODIUM CHLORIDE 100 MILLILITER(S): 9 INJECTION, SOLUTION INTRAVENOUS at 21:18

## 2025-07-12 NOTE — DISCHARGE NOTE PROVIDER - NSFOLLOWUPCLINICS_GEN_ALL_ED_FT
Cardiology at [facility]  Cardiology  [address] [place] [address]  Phone: [phone]  Fax:

## 2025-07-12 NOTE — DISCHARGE NOTE PROVIDER - PROVIDER TOKENS
PROVIDER:[TOKEN:[[id number]],FOLLOWUP:[Routine]],PROVIDER:[TOKEN:[[id number]]] PROVIDER:[TOKEN:[[id number]],FOLLOWUP:[2 weeks]],PROVIDER:[TOKEN:[[id number]]]

## 2025-07-12 NOTE — PROVIDER CONTACT NOTE (OTHER) - ASSESSMENT
Pt NGT output measuring large amount in output.
pt AOx4, VS as charted per flow, pt sleeping
Patient A/O X4, complaining of abdominal pain 10/10 and nausea.

## 2025-07-12 NOTE — DISCHARGE NOTE PROVIDER - NSDCFUSCHEDAPPT_GEN_ALL_CORE_FT
Ban Tirado  [facility]  HEPATOLOGY [address]  Scheduled Appointment: 07/29/2025

## 2025-07-12 NOTE — PROVIDER CONTACT NOTE (OTHER) - ACTION/TREATMENT ORDERED:
Continue to monitor Pt intake and outputs.
Refusal documented. plan of care ongoing.
Provider notified. Zofran and Dilaudid ordered.

## 2025-07-12 NOTE — DISCHARGE NOTE PROVIDER - CARE PROVIDER_API CALL
Jarrell Meyers  Colon & Rectal Surgery  [address] [place] [address]  Phone: [phone]  Fax: [phone]  Follow Up Time: Can Pollard  Cardiovascular Disease  [address], [address]  [place] [address]  Phone: [phone]  Fax: [phone]  Follow Up Time:    Jarrell Meyers  Colon & Rectal Surgery  [address] [place] [address]  Phone: [phone]  Fax: [phone]  Follow Up Time: 2 weeks    Can Bass  Cardiovascular Disease  [address] [facility], [address]  [place] [address]  Phone: [phone]  Fax: [phone]  Follow Up Time:

## 2025-07-12 NOTE — PROVIDER CONTACT NOTE (OTHER) - BACKGROUND
Admitted for intestinal obstruction. PMH of ulcerative colitis, chronic pain, anxiety, and depression.
Admitted for intestinal obstruction. PMH of ulcerative colitis, chronic pain, anxiety, and depression.

## 2025-07-12 NOTE — DISCHARGE NOTE PROVIDER - HOSPITAL COURSE
39M Anxiety, Depression, UC s/p 2-stage j pouch surgery in 2008; Hx small bowel obstruction-requiring Ileostomy in 2023, J pouch re-do and DLI 6/25 s/p reversal (11/14/24) presenting with high grade SBO. Patient s/p NGt placement w/ 1.5L of output, and abdominal exam benign. Being admitted for decompression and bowel rest. Of note, in the ED cardiology was consulted for ST segment elevation seen on EKG, however troponins within normal limits - low suspicion for cardiac event.    39M Anxiety, Depression, UC s/p 2-stage j pouch surgery in 2008; Hx small bowel obstruction-requiring Ileostomy in 2023, J pouch re-do and DLI 6/25 s/p reversal (11/14/24) presenting with high grade SBO. Patient s/p NGt placement w/ 1.5L of output, and abdominal exam benign. Being admitted for decompression and bowel rest. Of note, in the ED cardiology was consulted for ST segment elevation seen on EKG, however troponins within normal limits - low suspicion for cardiac event. NGT output high however patient drinking fluids. PAtient having GI fxn.  On 7/12- clamp trial passed, NGT dc and started on clears. 7/13 diet advt to LRD and tolerated.   At the time of discharge, the patient was hemodynamically stable, was tolerating PO diet, was voiding urine and passing stool, was ambulating, and was comfortable with adequate pain control. The patient was instructed to follow up with Dr. Meyers/Emperatriz, his GI Dr, and cardiology within 1-2 weeks after discharge from the hospital. The patient felt comfortable with discharge. The patient was discharged to home. The patient had no other issues.

## 2025-07-12 NOTE — PROVIDER CONTACT NOTE (OTHER) - REASON
Patient complaining of pain and nausea.
Pt non compliant with NPO status
pt refusing all bedtime medication

## 2025-07-12 NOTE — PROVIDER CONTACT NOTE (OTHER) - RECOMMENDATIONS
provider made aware via teams, pt educated on the purpose of each medication. pt verbalized understanding and receptive to education

## 2025-07-12 NOTE — DISCHARGE NOTE PROVIDER - NSDCMRMEDTOKEN_GEN_ALL_CORE_FT
acetaminophen 325 mg oral tablet: 3 tab(s) orally every 6 hours  Escitalopram: 30 milligram(s) orally once a day  Gabapentin: 900 mg tid  mirtazapine 7.5 mg oral tablet: 1 tab(s) orally 2 times a day  testosterone 100 mg/mL intramuscular solution: intramuscular every 7 days on mondays  traZODone 150 mg oral tablet: orally once a day (at bedtime)

## 2025-07-12 NOTE — DISCHARGE NOTE PROVIDER - NSDCCPCAREPLAN_GEN_ALL_CORE_FT
PRINCIPAL DISCHARGE DIAGNOSIS  Diagnosis: SBO (small bowel obstruction)  Assessment and Plan of Treatment: Notify your surgeon and return to ER for temperatures greater than 101, chills sweats, pain not controlled with pain medications, persistent nausea and vomiting, inability to tolerate food, significant abdominal bloating/distension, no passing of flatus or bowel movements, or acutely concerning matters to you, that may require urgent medical attention.   Please follow up with your PMD within 1 week regarding your hospitalization.      SECONDARY DISCHARGE DIAGNOSES  Diagnosis: ECG abnormal  Assessment and Plan of Treatment: You should follow up with a cardiologist upon discharge.     PRINCIPAL DISCHARGE DIAGNOSIS  Diagnosis: SBO (small bowel obstruction)  Assessment and Plan of Treatment: Notify your surgeon and return to ER for temperatures greater than 101, chills sweats, pain not controlled with pain medications, persistent nausea and vomiting, inability to tolerate food, significant abdominal bloating/distension, no passing of flatus or bowel movements, or acutely concerning matters to you, that may require urgent medical attention.   Please follow up with your PMD within 1 week regarding your hospitalization.      SECONDARY DISCHARGE DIAGNOSES  Diagnosis: ECG abnormal  Assessment and Plan of Treatment: You had an echo during your hospitalization which showed left atrial dilatation.  You should follow up with a cardiologist upon discharge.

## 2025-07-12 NOTE — DISCHARGE NOTE PROVIDER - CARE PROVIDERS DIRECT ADDRESSES
[email or web address]

## 2025-07-12 NOTE — PROVIDER CONTACT NOTE (OTHER) - SITUATION
pt refusing all bedtime medication    -gabapentin   -melatonin  -mirtazapine   -trazadone
Pt instructed by RN and PA staff to have nothing by mouth while NGT-LWS in progress.
Patient complaining of pain and nausea.

## 2025-07-13 VITALS
HEART RATE: 60 BPM | DIASTOLIC BLOOD PRESSURE: 68 MMHG | OXYGEN SATURATION: 95 % | SYSTOLIC BLOOD PRESSURE: 110 MMHG | RESPIRATION RATE: 18 BRPM | TEMPERATURE: 98 F

## 2025-07-13 LAB
ANION GAP SERPL CALC-SCNC: 11 MMOL/L — SIGNIFICANT CHANGE UP (ref 5–17)
BUN SERPL-MCNC: 13 MG/DL — SIGNIFICANT CHANGE UP (ref 7–23)
CALCIUM SERPL-MCNC: 8.8 MG/DL — SIGNIFICANT CHANGE UP (ref 8.4–10.5)
CHLORIDE SERPL-SCNC: 101 MMOL/L — SIGNIFICANT CHANGE UP (ref 96–108)
CO2 SERPL-SCNC: 24 MMOL/L — SIGNIFICANT CHANGE UP (ref 22–31)
CREAT SERPL-MCNC: 0.95 MG/DL — SIGNIFICANT CHANGE UP (ref 0.5–1.3)
CRP SERPL-MCNC: 37 MG/L — HIGH (ref 0–4)
EGFR: 104 ML/MIN/1.73M2 — SIGNIFICANT CHANGE UP
EGFR: 104 ML/MIN/1.73M2 — SIGNIFICANT CHANGE UP
GLUCOSE SERPL-MCNC: 89 MG/DL — SIGNIFICANT CHANGE UP (ref 70–99)
HCT VFR BLD CALC: 40.1 % — SIGNIFICANT CHANGE UP (ref 39–50)
HGB BLD-MCNC: 13.3 G/DL — SIGNIFICANT CHANGE UP (ref 13–17)
MAGNESIUM SERPL-MCNC: 2 MG/DL — SIGNIFICANT CHANGE UP (ref 1.6–2.6)
MCHC RBC-ENTMCNC: 31.9 PG — SIGNIFICANT CHANGE UP (ref 27–34)
MCHC RBC-ENTMCNC: 33.2 G/DL — SIGNIFICANT CHANGE UP (ref 32–36)
MCV RBC AUTO: 96.2 FL — SIGNIFICANT CHANGE UP (ref 80–100)
NRBC # BLD AUTO: 0 K/UL — SIGNIFICANT CHANGE UP (ref 0–0)
NRBC # FLD: 0 K/UL — SIGNIFICANT CHANGE UP (ref 0–0)
NRBC BLD AUTO-RTO: 0 /100 WBCS — SIGNIFICANT CHANGE UP (ref 0–0)
PHOSPHATE SERPL-MCNC: 1.4 MG/DL — LOW (ref 2.5–4.5)
PLATELET # BLD AUTO: 203 K/UL — SIGNIFICANT CHANGE UP (ref 150–400)
PMV BLD: 9.7 FL — SIGNIFICANT CHANGE UP (ref 7–13)
POTASSIUM SERPL-MCNC: 3.9 MMOL/L — SIGNIFICANT CHANGE UP (ref 3.5–5.3)
POTASSIUM SERPL-SCNC: 3.9 MMOL/L — SIGNIFICANT CHANGE UP (ref 3.5–5.3)
RBC # BLD: 4.17 M/UL — LOW (ref 4.2–5.8)
RBC # FLD: 12.1 % — SIGNIFICANT CHANGE UP (ref 10.3–14.5)
SODIUM SERPL-SCNC: 136 MMOL/L — SIGNIFICANT CHANGE UP (ref 135–145)
WBC # BLD: 5.6 K/UL — SIGNIFICANT CHANGE UP (ref 3.8–10.5)
WBC # FLD AUTO: 5.6 K/UL — SIGNIFICANT CHANGE UP (ref 3.8–10.5)

## 2025-07-13 PROCEDURE — 71045 X-RAY EXAM CHEST 1 VIEW: CPT

## 2025-07-13 PROCEDURE — 80048 BASIC METABOLIC PNL TOTAL CA: CPT

## 2025-07-13 PROCEDURE — 93005 ELECTROCARDIOGRAM TRACING: CPT

## 2025-07-13 PROCEDURE — 93306 TTE W/DOPPLER COMPLETE: CPT

## 2025-07-13 PROCEDURE — 83880 ASSAY OF NATRIURETIC PEPTIDE: CPT

## 2025-07-13 PROCEDURE — 85730 THROMBOPLASTIN TIME PARTIAL: CPT

## 2025-07-13 PROCEDURE — 86140 C-REACTIVE PROTEIN: CPT

## 2025-07-13 PROCEDURE — 99285 EMERGENCY DEPT VISIT HI MDM: CPT

## 2025-07-13 PROCEDURE — 85027 COMPLETE CBC AUTOMATED: CPT

## 2025-07-13 PROCEDURE — 83605 ASSAY OF LACTIC ACID: CPT

## 2025-07-13 PROCEDURE — 85610 PROTHROMBIN TIME: CPT

## 2025-07-13 PROCEDURE — 84484 ASSAY OF TROPONIN QUANT: CPT

## 2025-07-13 PROCEDURE — 74177 CT ABD & PELVIS W/CONTRAST: CPT

## 2025-07-13 PROCEDURE — 82550 ASSAY OF CK (CPK): CPT

## 2025-07-13 PROCEDURE — 36415 COLL VENOUS BLD VENIPUNCTURE: CPT

## 2025-07-13 PROCEDURE — 96376 TX/PRO/DX INJ SAME DRUG ADON: CPT

## 2025-07-13 PROCEDURE — 83735 ASSAY OF MAGNESIUM: CPT

## 2025-07-13 PROCEDURE — 74270 X-RAY XM COLON 1CNTRST STD: CPT

## 2025-07-13 PROCEDURE — 82962 GLUCOSE BLOOD TEST: CPT

## 2025-07-13 PROCEDURE — 80053 COMPREHEN METABOLIC PANEL: CPT

## 2025-07-13 PROCEDURE — 93356 MYOCRD STRAIN IMG SPCKL TRCK: CPT

## 2025-07-13 PROCEDURE — 84100 ASSAY OF PHOSPHORUS: CPT

## 2025-07-13 PROCEDURE — 82553 CREATINE MB FRACTION: CPT

## 2025-07-13 PROCEDURE — 85025 COMPLETE CBC W/AUTO DIFF WBC: CPT

## 2025-07-13 PROCEDURE — 96374 THER/PROPH/DIAG INJ IV PUSH: CPT

## 2025-07-13 PROCEDURE — 96375 TX/PRO/DX INJ NEW DRUG ADDON: CPT

## 2025-07-13 RX ORDER — SOD PHOS DI, MONO/K PHOS MONO 250 MG
2 TABLET ORAL ONCE
Refills: 0 | Status: COMPLETED | OUTPATIENT
Start: 2025-07-13 | End: 2025-07-13

## 2025-07-13 RX ADMIN — ENOXAPARIN SODIUM 40 MILLIGRAM(S): 100 INJECTION SUBCUTANEOUS at 05:12

## 2025-07-13 RX ADMIN — Medication 2 TABLET(S): at 11:50

## 2025-07-13 NOTE — DISCHARGE NOTE NURSING/CASE MANAGEMENT/SOCIAL WORK - NSDCPEFALRISK_GEN_ALL_CORE
For information on Fall & Injury Prevention, visit: [email or web address] OR  [email or web address] OR  https://www.cdc.gov/steadi/patient.html

## 2025-07-13 NOTE — PROGRESS NOTE ADULT - ASSESSMENT
39M Anxiety, Depression, UC s/p 2-stage j pouch surgery in 2008; Hx small bowel obstruction-requiring Ileostomy in 2023, J pouch re-do and DLI 6/25 s/p reversal (11/14/24) presenting with high grade SBO.     Plan:  - NPO w/ IVF  - NGT  - Pain control after abdominal exam (patient w/ history of chronic pain and narcotic use)  - Ambulate as tolerated  - DVT ppx: Lovenox, SCDs    [facility]  [id number]    I have spent 25 minutes of time on the encounter which excludes teaching and separately reported services. 
39M Anxiety, Depression, UC s/p 2-stage j pouch surgery in 2008; Hx small bowel obstruction-requiring Ileostomy in 2023, J pouch re-do and DLI 6/25 s/p reversal (11/14/24) presenting with high grade SBO. NGT in place     Plan:   - Xray GG enema today; f/u results  - NPO w/ IVF  - NGT  - Pain control after abdominal exam (patient w/ history of chronic pain and narcotic use)  - Ambulate as tolerated  - DVT ppx: Lovenox, SCDs
39M Anxiety, Depression, UC s/p 2-stage j pouch surgery in 2008; Hx small bowel obstruction-requiring Ileostomy in 2023, J pouch re-do and DLI 6/25 s/p reversal (11/14/24) presenting with high grade SBO. NGT in place -clamp trial passed.    Plan:   - Advance to regular diet   - Ambulate as tolerated  - DVT ppx: Lovenox, SCDs  - dispo: DC Today, outpatient fu    [facility]  [id number]
39M Anxiety, Depression, UC s/p 2-stage j pouch surgery in 2008; Hx small bowel obstruction-requiring Ileostomy in 2023, J pouch re-do and DLI 6/25 s/p reversal (11/14/24) presenting with high grade SBO. NGT in place -clamp trial passed.    Plan:   - dc NGT  - clears today  - restart home meds  - Pain control after abdominal exam (patient w/ history of chronic pain and narcotic use)  - Ambulate as tolerated  - DVT ppx: Lovenox, SCDs  - dispo: adv diet brenton if nola potential dc brenton     [facility]  [id number]

## 2025-07-13 NOTE — DISCHARGE NOTE NURSING/CASE MANAGEMENT/SOCIAL WORK - PATIENT PORTAL LINK FT
You can access the FollowMyHealth Patient Portal offered by [facility] by registering at the following website: [email or web address]. By joining [employer]’s FollowMyHealth portal, you will also be able to view your health information using other applications (apps) compatible with our system.

## 2025-07-13 NOTE — PROGRESS NOTE ADULT - SUBJECTIVE AND OBJECTIVE BOX
SURGERY DAILY PROGRESS NOTE    SUBJECTIVE: Pt seen and examined at bedside. Admits to having flatus/BM, NGT output significantly less overnight, abd flat, no more pain, and states that he has been drinking a lot of water.  Patient has been sitting OOB2C with NGT off suction since 0630.  Wants to leave AMA if NGT not removed.  Denies chest pain, SOB, palpitations, HA, fever, chills, N/V.      OBJECTIVE:  Vital Signs Last 24 Hrs  T(C): 36.4 (12 Jul 2025 08:40), Max: 36.9 (11 Jul 2025 17:02)  T(F): 97.5 (12 Jul 2025 08:40), Max: 98.5 (11 Jul 2025 20:31)  HR: 54 (12 Jul 2025 08:40) (42 - 56)  BP: 112/60 (12 Jul 2025 08:40) (112/60 - 127/75)  BP(mean): --  RR: 18 (12 Jul 2025 08:40) (18 - 18)  SpO2: 98% (12 Jul 2025 08:40) (95% - 100%)    Parameters below as of 12 Jul 2025 08:40  Patient On (Oxygen Delivery Method): room air        I&O's Summary    11 Jul 2025 07:01  -  12 Jul 2025 07:00  --------------------------------------------------------  IN: 4715 mL / OUT: 3700 mL / NET: 1015 mL    12 Jul 2025 07:01  -  12 Jul 2025 12:12  --------------------------------------------------------  IN: 0 mL / OUT: 0 mL / NET: 0 mL        Physical Exam:  General Appearance: Appears well, NAD, A& O x 3  Neck: Supple, NGT in place (bilious output in cannister)  Chest: Equal expansion bilaterally, equal breath sounds  CV: Pulse regular presently  Abdomen: Soft, nontense, nondistended  Extremities: Grossly symmetric, SCD's in place     LABS:                        14.1   8.30  )-----------( 221      ( 12 Jul 2025 07:21 )             43.5     07-12    135  |  98  |  17  ----------------------------<  84  3.6   |  24  |  0.89    Ca    9.1      12 Jul 2025 07:21  Phos  2.5     07-12  Mg     2.0     07-12        Urinalysis Basic - ( 12 Jul 2025 07:21 )    Color: x / Appearance: x / SG: x / pH: x  Gluc: 84 mg/dL / Ketone: x  / Bili: x / Urobili: x   Blood: x / Protein: x / Nitrite: x   Leuk Esterase: x / RBC: x / WBC x   Sq Epi: x / Non Sq Epi: x / Bacteria: x        RADIOLOGY & ADDITIONAL STUDIES:    < from: Xray Hypaque Enema (07.11.25 @ 14:10) >  FINDINGS:  The preliminary radiograph of the abdomen demonstrates dilated small   bowel loops within the midabdomen compatible with bowel obstruction.   Partially visualized enteric tube in the stomach.  There is no evidence of free intraperitoneal air.  The visualized soft tissues are unremarkable.    The distal anastomosis was patent without evidence of anastomotic leak or   stricture.  No sinus tract or fistula was identified.  Contrast fills the J-pouch and refluxed into the distal ileum.  No leak or obstruction was noted. The distal ileum was not dilated.      Post procedure x-rays demonstrated near complete emptying of the J-pouch.        IMPRESSION:  No evidence of anastomotic leak, fistula or stricture.    The distal ileum is normal in caliber suggesting a more proximal   obstruction.    < end of copied text >  
INTERVAL EVENTS/SUBJ:  No events     Home Medications:  acetaminophen 325 mg oral tablet: 3 tab(s) orally every 6 hours (10 Jul 2025 03:21)  Escitalopram: 30 milligram(s) orally once a day (10 Jul 2025 03:21)  Gabapentin: 900 mg tid (10 Jul 2025 03:21)  mirtazapine 7.5 mg oral tablet: 1 tab(s) orally 2 times a day (10 Jul 2025 03:21)  testosterone 100 mg/mL intramuscular solution: intramuscular every 7 days on mondays (10 Jul 2025 03:21)  traZODone 150 mg oral tablet: orally once a day (at bedtime) (10 Jul 2025 03:21)      MEDICATIONS  (STANDING):  enoxaparin Injectable 40 milliGRAM(s) SubCutaneous every 24 hours  lactated ringers Bolus 500 milliLiter(s) IV Bolus once  lactated ringers. 1000 milliLiter(s) (150 mL/Hr) IV Continuous <Continuous>    MEDICATIONS  (PRN):  HYDROmorphone  Injectable 0.5 milliGRAM(s) IV Push every 4 hours PRN Severe Pain (7 - 10)  ondansetron Injectable 4 milliGRAM(s) IV Push every 6 hours PRN Nausea and/or Vomiting      Vital Signs Last 24 Hrs  T(C): 36.6 (11 Jul 2025 05:26), Max: 36.6 (10 Jul 2025 17:16)  T(F): 97.8 (11 Jul 2025 05:26), Max: 97.9 (10 Jul 2025 21:17)  HR: 51 (11 Jul 2025 05:26) (40 - 54)  BP: 132/65 (11 Jul 2025 05:26) (122/66 - 145/81)  BP(mean): --  RR: 18 (11 Jul 2025 05:26) (18 - 18)  SpO2: 93% (11 Jul 2025 05:26) (93% - 97%)    Parameters below as of 11 Jul 2025 05:26  Patient On (Oxygen Delivery Method): room air        REVIEW OF SYSTEMS:  As per HPI, otherwise unremarkable.     PHYSICAL EXAM:  Constitutional/Appearance: Normal, Well-developed  HEENT:   Normal oral mucosa, no drainage or redness, supple neck  Lymphatic: No lymphadenopathy  Cardiovascular: Normal S1 S2, No edema, II/VI LUCILA  Respiratory: Lungs clear to auscultation, respirations non-labored  Psychiatry: A & O x 3, appropriate affect.   Gastrointestinal:  Soft, Non-tender, no distention  Skin: No rashes, No ecchymoses, No cyanosis	  Neurologic: Non-focal, Alert and oriented x 3  Extremities: Normal range of motion  Vascular: Peripheral pulses palpable 2+ bilaterally (radial)    LABS:  CBC Full  -  ( 11 Jul 2025 07:54 )  WBC Count : 9.77 K/uL  RBC Count : 4.86 M/uL  Hemoglobin : 15.1 g/dL  Hematocrit : 46.8 %  Platelet Count - Automated : 243 K/uL  Mean Cell Volume : 96.3 fl  Mean Cell Hemoglobin : 31.1 pg  Mean Cell Hemoglobin Concentration : 32.3 g/dL  Auto Neutrophil # : x  Auto Lymphocyte # : x  Auto Monocyte # : x  Auto Eosinophil # : x  Auto Basophil # : x  Auto Neutrophil % : x  Auto Lymphocyte % : x  Auto Monocyte % : x  Auto Eosinophil % : x  Auto Basophil % : x      07-10    141  |  100  |  18  ----------------------------<  112[H]  3.3[L]   |  24  |  1.00    Ca    9.8      10 Jul 2025 06:57  Phos  3.2     07-10  Mg     1.9     07-10    TPro  7.4  /  Alb  4.5  /  TBili  1.3[H]  /  DBili  x   /  AST  85[H]  /  ALT  59[H]  /  AlkPhos  52  07-10    IMPRESSION AND PLAN: 40M here w SBO and ecg changes. 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 TTE pending. Cardiology to sign off, please reconsult if concerning TTE findings.       35 minutes were spent on this encounter for extensive review of medical record details including labs and/or imaging studies and/or adjacent care team and consultant records, as well as review and reconciliation of current medications. Time was spent on obtaining a history, performing physical examination of patient, and answering patient and/or family questions regarding plan of care. Time was also spent discussing plan of care with patient’s other care team members including primary and consulting teams. Time also was spent on documentation of this encounter into the EHR.    ***    Brian Hamilton MD, MPhil, [facility]  Cardiologist, [facility]  ; [employer] [facility]  email: [email or web address] on-service contact/call information, go to [email or web address] and use "[email or web address]" to login.  Outpatient Cardiology appointments, call  [phone] to arrange with a colleague; I do not have outpatient Cardiology clinic.   
SURGERY DAILY PROGRESS NOTE    SUBJECTIVE:  Patient seen and examined at bedside during morning rounds. Patient states he is in pain and feeling nauseous with emesis bag. NG in place. Currently NPO.    Vital Signs Last 24 Hrs  T(C): 36.4 (10 Jul 2025 07:06), Max: 37 (09 Jul 2025 23:20)  T(F): 97.5 (10 Jul 2025 07:06), Max: 98.6 (09 Jul 2025 23:20)  HR: 45 (10 Jul 2025 07:06) (45 - 60)  BP: 143/96 (10 Jul 2025 07:06) (110/64 - 145/80)  BP(mean): 82 (10 Jul 2025 03:51) (82 - 82)  RR: 18 (10 Jul 2025 07:06) (16 - 20)  SpO2: 95% (10 Jul 2025 07:06) (93% - 99%)    Parameters below as of 10 Jul 2025 07:06  Patient On (Oxygen Delivery Method): room air      I&Os    07-09-25 @ 07:01  -  07-10-25 @ 07:00  --------------------------------------------------------  IN: 0 mL / OUT: 900 mL / NET: -900 mL      PHYSICAL EXAM:  GENERAL: NAD, resting comfortably in bed  HEAD: Normocephalic, atraumatic   LUNG: Nonlabored breathing. Equal chest rise bilaterally.  ABD: Well healed previous surgical incisions. Soft. Tender to palpation throughout abdomen.  EXT: Warm, well perfused.   NEURO:  Responds appropriately    MEDICATIONS:  enoxaparin Injectable 40 milliGRAM(s) SubCutaneous every 24 hours  lactated ringers. 1000 milliLiter(s) IV Continuous <Continuous>  ondansetron Injectable 4 milliGRAM(s) IV Push every 6 hours PRN      LABS:                        16.4   11.27 )-----------( 279      ( 10 Jul 2025 06:57 )             50.1     07-10    141  |  100  |  18  ----------------------------<  112[H]  3.3[L]   |  24  |  1.00    Ca    9.8      10 Jul 2025 06:57  Phos  3.2     07-10  Mg     1.9     07-10    TPro  7.4  /  Alb  4.5  /  TBili  1.3[H]  /  DBili  x   /  AST  85[H]  /  ALT  59[H]  /  AlkPhos  52  07-10    PT/INR - ( 09 Jul 2025 23:27 )   PT: 12.2 sec;   INR: 1.06 ratio      PTT - ( 09 Jul 2025 23:27 )  PTT:28.9 sec    Urinalysis Basic - ( 10 Jul 2025 06:57 )  Color: x / Appearance: x / SG: x / pH: x  Gluc: 112 mg/dL / Ketone: x  / Bili: x / Urobili: x   Blood: x / Protein: x / Nitrite: x   Leuk Esterase: x / RBC: x / WBC x   Sq Epi: x / Non Sq Epi: x / Bacteria: x    
SURGERY DAILY PROGRESS NOTE:       Subjective / Overnight events: Pt seen during AM rounds. Pt resting in bed. Reported small amnt of BM this morning and belly feels slightly less bloated.       Objective:      MEDICATIONS  (STANDING):  enoxaparin Injectable 40 milliGRAM(s) SubCutaneous every 24 hours  lactated ringers. 1000 milliLiter(s) (150 mL/Hr) IV Continuous <Continuous>    MEDICATIONS  (PRN):  HYDROmorphone  Injectable 0.5 milliGRAM(s) IV Push every 4 hours PRN Severe Pain (7 - 10)  ondansetron Injectable 4 milliGRAM(s) IV Push every 6 hours PRN Nausea and/or Vomiting      Vital Signs Last 24 Hrs  T(C): 36.6 (11 Jul 2025 05:26), Max: 36.6 (10 Jul 2025 17:16)  T(F): 97.8 (11 Jul 2025 05:26), Max: 97.9 (10 Jul 2025 21:17)  HR: 51 (11 Jul 2025 05:26) (40 - 54)  BP: 132/65 (11 Jul 2025 05:26) (122/66 - 145/81)  BP(mean): --  RR: 18 (11 Jul 2025 05:26) (18 - 18)  SpO2: 93% (11 Jul 2025 05:26) (93% - 97%)    Parameters below as of 11 Jul 2025 05:26  Patient On (Oxygen Delivery Method): room air        I&O's Detail    10 Jul 2025 07:01  -  11 Jul 2025 07:00  --------------------------------------------------------  IN:    Enteral Tube Flush: 700 mL    IV PiggyBack: 400 mL    Lactated Ringers: 1800 mL    Lactated Ringers Bolus: 1000 mL  Total IN: 3900 mL    OUT:    Nasogastric/Oral tube (mL): 2000 mL    Oral Fluid: 0 mL    Voided (mL): 1000 mL  Total OUT: 3000 mL    Total NET: 900 mL          Daily     Daily     LABS:                        16.4   11.27 )-----------( 279      ( 10 Jul 2025 06:57 )             50.1     07-10    141  |  100  |  18  ----------------------------<  112[H]  3.3[L]   |  24  |  1.00    Ca    9.8      10 Jul 2025 06:57  Phos  3.2     07-10  Mg     1.9     07-10    TPro  7.4  /  Alb  4.5  /  TBili  1.3[H]  /  DBili  x   /  AST  85[H]  /  ALT  59[H]  /  AlkPhos  52  07-10    PT/INR - ( 09 Jul 2025 23:27 )   PT: 12.2 sec;   INR: 1.06 ratio         PTT - ( 09 Jul 2025 23:27 )  PTT:28.9 sec  Urinalysis Basic - ( 10 Jul 2025 06:57 )    Color: x / Appearance: x / SG: x / pH: x  Gluc: 112 mg/dL / Ketone: x  / Bili: x / Urobili: x   Blood: x / Protein: x / Nitrite: x   Leuk Esterase: x / RBC: x / WBC x   Sq Epi: x / Non Sq Epi: x / Bacteria: x          PHYSICAL EXAM  --------------------------------------------------------------------------------    Physical Exam:  	Gen: NAD, well-appearing  	Chest/resp: symmetrical rise/fall chest without increase WOB on RA  	Abd: Softly distended, nontender, NGT in place to [facility]           	  
SURGERY DAILY PROGRESS NOTE    STATUS POST:      SUBJECTIVE: Pt seen and examined at bedside. Pt feeling well, self advanced himself to solid food this am. States he ate some eggs and toast and tolerated well. Continues to have bowel function. No complaints. Wishes to be discharged.       OBJECTIVE:  Vital Signs Last 24 Hrs  T(C): 36.6 (13 Jul 2025 10:25), Max: 36.9 (12 Jul 2025 20:22)  T(F): 97.9 (13 Jul 2025 10:25), Max: 98.5 (12 Jul 2025 20:22)  HR: 60 (13 Jul 2025 10:25) (48 - 62)  BP: 110/68 (13 Jul 2025 10:25) (110/68 - 129/75)  BP(mean): --  RR: 18 (13 Jul 2025 10:25) (18 - 18)  SpO2: 95% (13 Jul 2025 10:25) (94% - 99%)    Parameters below as of 13 Jul 2025 10:25  Patient On (Oxygen Delivery Method): room air        I&O's Summary    12 Jul 2025 07:01  -  13 Jul 2025 07:00  --------------------------------------------------------  IN: 2640 mL / OUT: 450 mL / NET: 2190 mL    13 Jul 2025 07:01  -  13 Jul 2025 10:42  --------------------------------------------------------  IN: 340 mL / OUT: 250 mL / NET: 90 mL        Physical Exam:  General Appearance: Appears well, NAD, A& O x 3  Neck: Supple  Chest: Equal expansion bilaterally, equal breath sounds  Abdomen: Soft, nontense, non tender   Extremities: Grossly symmetric, SCD's in place     LABS:                        13.3   5.60  )-----------( 203      ( 13 Jul 2025 07:10 )             40.1     07-13    136  |  101  |  13  ----------------------------<  89  3.9   |  24  |  0.95    Ca    8.8      13 Jul 2025 07:06  Phos  1.4     07-13  Mg     2.0     07-13        Urinalysis Basic - ( 13 Jul 2025 07:06 )    Color: x / Appearance: x / SG: x / pH: x  Gluc: 89 mg/dL / Ketone: x  / Bili: x / Urobili: x   Blood: x / Protein: x / Nitrite: x   Leuk Esterase: x / RBC: x / WBC x   Sq Epi: x / Non Sq Epi: x / Bacteria: x        RADIOLOGY & ADDITIONAL STUDIES:

## 2025-07-13 NOTE — DISCHARGE NOTE NURSING/CASE MANAGEMENT/SOCIAL WORK - FINANCIAL ASSISTANCE
[facility] provides services at a reduced cost to those who are determined to be eligible through [facility]’s financial assistance program. Information regarding [facility]’s financial assistance program can be found by going to [email or web address] or by calling [phone].

## 2025-07-29 ENCOUNTER — APPOINTMENT (OUTPATIENT)
Dept: HEPATOLOGY | Facility: CLINIC | Age: 40
End: 2025-07-29

## 2025-07-31 ENCOUNTER — APPOINTMENT (OUTPATIENT)
Dept: COLORECTAL SURGERY | Facility: CLINIC | Age: 40
End: 2025-07-31
Payer: COMMERCIAL

## 2025-07-31 DIAGNOSIS — K91.858 OTHER COMPLICATIONS OF INTESTINAL POUCH: ICD-10-CM

## 2025-07-31 PROCEDURE — 99212 OFFICE O/P EST SF 10 MIN: CPT | Mod: 95

## 2025-08-13 ENCOUNTER — TRANSCRIPTION ENCOUNTER (OUTPATIENT)
Age: 40
End: 2025-08-13

## 2025-08-15 ENCOUNTER — TRANSCRIPTION ENCOUNTER (OUTPATIENT)
Age: 40
End: 2025-08-15

## 2025-08-18 ENCOUNTER — TRANSCRIPTION ENCOUNTER (OUTPATIENT)
Age: 40
End: 2025-08-18

## 2025-08-21 ENCOUNTER — TRANSCRIPTION ENCOUNTER (OUTPATIENT)
Age: 40
End: 2025-08-21

## 2025-08-22 ENCOUNTER — TRANSCRIPTION ENCOUNTER (OUTPATIENT)
Age: 40
End: 2025-08-22

## (undated) DEVICE — PREP CHLORAPREP HI-LITE ORANGE 26ML

## (undated) DEVICE — ELCTR BOVIE PENCIL HANDPIECE ROCKER SWITCH 15FT

## (undated) DEVICE — OSTOMY KIT 2-PIECE 2.75" NS (BLUE)

## (undated) DEVICE — SUT MONOCRYL 4-0 18" PS-2

## (undated) DEVICE — GOWN SURGICAL REINFORCED X-LAR

## (undated) DEVICE — GOWN TRIMAX XXL

## (undated) DEVICE — TUBING SMOKE EVAC PENCIL COATED

## (undated) DEVICE — BLADE SCALPEL #10

## (undated) DEVICE — SUT CHROMIC 1 54" REEL

## (undated) DEVICE — CANNISTERS 1000ML

## (undated) DEVICE — PACK RFID MAJOR ABDOMINAL

## (undated) DEVICE — TIP BOVIE BLADE 6IN

## (undated) DEVICE — DRAIN JACKSON PRATT 15FR ROUND W/TROCAR

## (undated) DEVICE — WARMING BLANKET UPPER ADULT

## (undated) DEVICE — Device

## (undated) DEVICE — RELOAD CURVED CUTTER STAPLER CARTRIDGE GREEN

## (undated) DEVICE — SUT POLYSORB 2-0 36" GS-21 UNDYED

## (undated) DEVICE — SUT PDS PLUS 1 27" CT

## (undated) DEVICE — FOLEY TRAY 16FR 5CC LF LUBRISIL ADVANCE TEMP CLOSED

## (undated) DEVICE — PACK CYSTO

## (undated) DEVICE — SUT VICRYL 2-0 27" SH

## (undated) DEVICE — DRAPE LEGGINGS XL

## (undated) DEVICE — DRSG CURITY GAUZE SPONGE 4 X 4" 12-PLY

## (undated) DEVICE — PACK MAJOR ABDOMINAL SUPINE

## (undated) DEVICE — SOL IRR POUR NS 0.9% 500ML

## (undated) DEVICE — SUT VICRYL 2-0 27" SH UNDYED

## (undated) DEVICE — GOWN SURGICAL REINFORCED LARGE

## (undated) DEVICE — DRESSING OPEN ABDOMEN ABTHERA SENSATRAC

## (undated) DEVICE — POSITIONER PINK PAD PIGAZZI SYSTEM

## (undated) DEVICE — DRESSING ABTHERA OPEN ABDOMEN

## (undated) DEVICE — SUT PROLENE 0 30" SH

## (undated) DEVICE — DRAPE IOBAN 23" X 23"

## (undated) DEVICE — ELCTR BOVIE TIP BLADE VALLEYLAB 6.5"

## (undated) DEVICE — GLOVE SZ 6.5 LINER PROTEXIS PI BL

## (undated) DEVICE — VENODYNE/SCD SLEEVE CALF MEDIUM

## (undated) DEVICE — POSITIONER FOAM HEADREST (PINK)

## (undated) DEVICE — SOL IRR BAG NS 0.9% 3000ML

## (undated) DEVICE — ***USE 138850*** SUTURE PDS II 1 Z371T CTX

## (undated) DEVICE — PAD GROUND ELECTROSURGICAL W/CORD

## (undated) DEVICE — DRAPE 1/2 SHEET 40X57"

## (undated) DEVICE — TUBING RAPIDVAC SMOKE EVACUATOR .25" X 10FT

## (undated) DEVICE — DRSG TAPE UMBILICAL COTTON 2" X 30 X 1/8"

## (undated) DEVICE — PACK LITHOTOMY

## (undated) DEVICE — SUT CHROMIC 3-0 30" V-20

## (undated) DEVICE — DRAPE LINGEMAN TUR

## (undated) DEVICE — WARMING BLANKET FULL ADULT

## (undated) DEVICE — DRSG TELFA 3 X 8

## (undated) DEVICE — OSTOMY STOMAHESIVE PASTE 2OZ TUBE

## (undated) DEVICE — DRAIN RESERVOIR FOR JACKSON PRATT 100CC CARDINAL

## (undated) DEVICE — DRAPE UNDER BUTTOCKS W SCREEN

## (undated) DEVICE — ***USE 56856*** CUTTER LINEAR 75MM    TLC75

## (undated) DEVICE — SOL IRR POUR H2O 250ML

## (undated) DEVICE — OSTOMY LOOP ROD EYELET BOTH END 3"

## (undated) DEVICE — OSTOMY KIT 2-PIECE 2.25" NS (RED)

## (undated) DEVICE — POSITIONER PURPLE ARM ONE STEP (LARGE)

## (undated) DEVICE — DRAIN JACKSON PRATT 10MM FLAT FULL NO TROCAR

## (undated) DEVICE — DEVICE NPWT PICO 10 X 30CM

## (undated) DEVICE — SYR ASEPTO

## (undated) DEVICE — SUT CHROMIC 0 30" GS-21

## (undated) DEVICE — DRAPE GENERAL ENDOSCOPY

## (undated) DEVICE — SUT VICRYL 3-0 27" SH

## (undated) DEVICE — SOLN IRRIG .9%SOD 1000ML

## (undated) DEVICE — GLV 8 PROTEXIS (WHITE)

## (undated) DEVICE — SOL IRR BAG H2O 3000ML

## (undated) DEVICE — DRAPE TOWEL BLUE 17" X 24"

## (undated) DEVICE — SUCTION YANKAUER OPEN TIP NO VENT CURVE

## (undated) DEVICE — ***USE 59070*** SUTURE VICRYL 0 J260H CT-1 27IN UNDYED

## (undated) DEVICE — VENODYNE/SCD SLEEVE CALF LARGE

## (undated) DEVICE — ADAPTER URETHRAL CATH CONNECTING

## (undated) DEVICE — CLIP LIGATING TITANIUM LARGE

## (undated) DEVICE — POSITIONER FOAM EGG CRATE ULNAR 2PCS (PINK)

## (undated) DEVICE — DRSG DERMABOND 0.7ML

## (undated) DEVICE — OSTOMY LOOP ROD EYELET BOTH END 2"

## (undated) DEVICE — CATH FOLEY STRIP TEMP-SENSING LATEX FREE 16FRX5CC

## (undated) DEVICE — CLIP LIGATING TITANIUM MEDIUM

## (undated) DEVICE — BLANKET UPPER BODY

## (undated) DEVICE — ***USE 38783*** STAPLER LINEAR RELOADABLE 60MM 3.5

## (undated) DEVICE — FOLEY TRAY 16FR 5CC LTX UMETER CLOSED

## (undated) DEVICE — SUTURE VICRYL 3-0 J416H SH UNDYED

## (undated) DEVICE — DRAPE IOBAN 13" X 13"

## (undated) DEVICE — APPLICATOR CHLORAPREP 26ML ORANGE TINT

## (undated) DEVICE — SUT VICRYL PLUS 2-0 27" SH

## (undated) DEVICE — STAPLER SKIN

## (undated) DEVICE — PREP BETADINE KIT

## (undated) DEVICE — SPECIMEN CONTAINER 100ML

## (undated) DEVICE — SOL INJ NS 0.9% 100ML

## (undated) DEVICE — SUTURE PDS 1 Z880G TP-1 LOOP VIOLET

## (undated) DEVICE — PACK COLON BUNDLE

## (undated) DEVICE — SUCTION YANKAUER NO CONTROL VENT

## (undated) DEVICE — DRAPE MAYO STAND 30"

## (undated) DEVICE — SUT MONOSOF 3-0 18" C-14

## (undated) DEVICE — SUT PDS II 1 27" CT

## (undated) DEVICE — SUT CHROMIC 1 36" CTX

## (undated) DEVICE — LIGASURE IMPACT

## (undated) DEVICE — BIOGUARD VALVES

## (undated) DEVICE — SOLN IRRIG STER WATER 1000ML

## (undated) DEVICE — BINDER ABDOMINAL MEDIUM 10 HIGH 30-36

## (undated) DEVICE — GLOVE SZ 6.5 PROTEXIS PI

## (undated) DEVICE — ***USE 57698*** SLEEVE FLOWTRON DVT CALF SINGLE USE

## (undated) DEVICE — TUBING THERMADX UROLOGY

## (undated) DEVICE — NEPTUNE II 4-PORT MANIFOLD

## (undated) DEVICE — GOWN TRIMAX LG

## (undated) DEVICE — NDL HYPO SAFE 22G X 1.5" (BLACK)

## (undated) DEVICE — GLOVE SZ 6 PROTEXIS PI

## (undated) DEVICE — TUBING CAP SET ERBEFLO CLEVERCAP HYBRID CO2 FOR OLYMPUS SCOPES AND UCR

## (undated) DEVICE — DRAPE SURGICAL #1010

## (undated) DEVICE — ACMI SELF-SEALING SEAL UP TO 7FR

## (undated) DEVICE — SUT MONOCRYL 4-0 27" PS-2 UNDYED

## (undated) DEVICE — COVER LIGHTHANDLE

## (undated) DEVICE — TIP SUCTION YANKAUER

## (undated) DEVICE — DRESSING ISLAND SURGICAL 4X14

## (undated) DEVICE — CANISTER INFOVAC 500ML W/GEL- 10 PACK

## (undated) DEVICE — NDL COUNTER FOAM AND MAGNET 40-70

## (undated) DEVICE — BULB SYRINGE 60CC

## (undated) DEVICE — PACK BASIN SPECIAL PROCEDURE

## (undated) DEVICE — LUBRICATING JELLY ONESHOT 1.25OZ

## (undated) DEVICE — CLEANSTART BEDSIDE KIT 500ML

## (undated) DEVICE — STAPLER CONTOUR 3.0 X 4.7MM CURVED

## (undated) DEVICE — SUT VICRYL PLUS 3-0 27" SH